# Patient Record
Sex: MALE | Race: WHITE | NOT HISPANIC OR LATINO | Employment: FULL TIME | URBAN - METROPOLITAN AREA
[De-identification: names, ages, dates, MRNs, and addresses within clinical notes are randomized per-mention and may not be internally consistent; named-entity substitution may affect disease eponyms.]

---

## 2017-01-09 ENCOUNTER — ALLSCRIPTS OFFICE VISIT (OUTPATIENT)
Dept: OTHER | Facility: OTHER | Age: 57
End: 2017-01-09

## 2017-01-09 LAB — S PYO AG THROAT QL: NEGATIVE

## 2017-06-08 ENCOUNTER — GENERIC CONVERSION - ENCOUNTER (OUTPATIENT)
Dept: OTHER | Facility: OTHER | Age: 57
End: 2017-06-08

## 2017-06-22 ENCOUNTER — GENERIC CONVERSION - ENCOUNTER (OUTPATIENT)
Dept: OTHER | Facility: OTHER | Age: 57
End: 2017-06-22

## 2017-07-20 ENCOUNTER — GENERIC CONVERSION - ENCOUNTER (OUTPATIENT)
Dept: OTHER | Facility: OTHER | Age: 57
End: 2017-07-20

## 2017-08-15 ENCOUNTER — GENERIC CONVERSION - ENCOUNTER (OUTPATIENT)
Dept: OTHER | Facility: OTHER | Age: 57
End: 2017-08-15

## 2017-10-31 ENCOUNTER — ALLSCRIPTS OFFICE VISIT (OUTPATIENT)
Dept: OTHER | Facility: OTHER | Age: 57
End: 2017-10-31

## 2017-11-17 ENCOUNTER — ALLSCRIPTS OFFICE VISIT (OUTPATIENT)
Dept: OTHER | Facility: OTHER | Age: 57
End: 2017-11-17

## 2017-11-17 LAB — HBA1C MFR BLD HPLC: 11.7 %

## 2017-11-20 ENCOUNTER — GENERIC CONVERSION - ENCOUNTER (OUTPATIENT)
Dept: OTHER | Facility: OTHER | Age: 57
End: 2017-11-20

## 2017-11-20 LAB
A/G RATIO (HISTORICAL): 1.4 (ref 1.2–2.2)
ALBUMIN SERPL BCP-MCNC: 4.2 G/DL (ref 3.5–5.5)
ALP SERPL-CCNC: 68 IU/L (ref 39–117)
ALT SERPL W P-5'-P-CCNC: 20 IU/L (ref 0–44)
AST SERPL W P-5'-P-CCNC: 20 IU/L (ref 0–40)
BILIRUB SERPL-MCNC: 0.9 MG/DL (ref 0–1.2)
BUN SERPL-MCNC: 19 MG/DL (ref 6–24)
BUN/CREA RATIO (HISTORICAL): 18 (ref 9–20)
CALCIUM SERPL-MCNC: 9.6 MG/DL (ref 8.7–10.2)
CHLORIDE SERPL-SCNC: 98 MMOL/L (ref 96–106)
CHOLEST SERPL-MCNC: 111 MG/DL (ref 100–199)
CO2 SERPL-SCNC: 24 MMOL/L (ref 18–29)
CREAT SERPL-MCNC: 1.04 MG/DL (ref 0.76–1.27)
CREATININE, URINE (HISTORICAL): 163.9 MG/DL
EGFR AFRICAN AMERICAN (HISTORICAL): 90 ML/MIN/1.73
EGFR-AMERICAN CALC (HISTORICAL): 78 ML/MIN/1.73
GLUCOSE SERPL-MCNC: 245 MG/DL (ref 65–99)
HDLC SERPL-MCNC: 40 MG/DL
LDLC SERPL CALC-MCNC: 54 MG/DL (ref 0–99)
MICROALBUM.,U,RANDOM (HISTORICAL): 1220.4 UG/ML
MICROALBUMIN/CREATININE RATIO (HISTORICAL): 744.6 MG/G CREAT (ref 0–30)
POTASSIUM SERPL-SCNC: 4.2 MMOL/L (ref 3.5–5.2)
PROSTATE SPECIFIC ANTIGEN (HISTORICAL): 0.5 NG/ML (ref 0–4)
SODIUM SERPL-SCNC: 138 MMOL/L (ref 134–144)
TOT. GLOBULIN, SERUM (HISTORICAL): 3 G/DL (ref 1.5–4.5)
TOTAL PROTEIN (HISTORICAL): 7.2 G/DL (ref 6–8.5)
TRIGL SERPL-MCNC: 85 MG/DL (ref 0–149)

## 2017-11-21 ENCOUNTER — GENERIC CONVERSION - ENCOUNTER (OUTPATIENT)
Dept: OTHER | Facility: OTHER | Age: 57
End: 2017-11-21

## 2017-11-21 LAB
HEPATITIS C ANTIBODY (HISTORICAL): <0.1 S/CO RATIO (ref 0–0.9)
INTERPRETATION (HISTORICAL): NORMAL

## 2018-01-07 ENCOUNTER — HOSPITAL ENCOUNTER (EMERGENCY)
Facility: HOSPITAL | Age: 58
Discharge: HOME | End: 2018-01-07
Attending: EMERGENCY MEDICINE | Admitting: EMERGENCY MEDICINE
Payer: COMMERCIAL

## 2018-01-07 VITALS
DIASTOLIC BLOOD PRESSURE: 88 MMHG | OXYGEN SATURATION: 97 % | TEMPERATURE: 95.5 F | HEART RATE: 70 BPM | WEIGHT: 255 LBS | RESPIRATION RATE: 15 BRPM | SYSTOLIC BLOOD PRESSURE: 167 MMHG

## 2018-01-07 DIAGNOSIS — I10 HYPERTENSION: ICD-10-CM

## 2018-01-07 DIAGNOSIS — R73.9 HYPERGLYCEMIA: ICD-10-CM

## 2018-01-07 DIAGNOSIS — F41.9 ANXIETY: Primary | ICD-10-CM

## 2018-01-07 LAB
ALBUMIN SERPL BCP-MCNC: 3.9 G/DL (ref 3.5–5)
ALP SERPL-CCNC: 99 U/L (ref 46–116)
ALT SERPL W P-5'-P-CCNC: 39 U/L (ref 12–78)
ANION GAP SERPL CALCULATED.3IONS-SCNC: 8 MMOL/L (ref 4–13)
AST SERPL W P-5'-P-CCNC: 16 U/L (ref 5–45)
BASOPHILS # BLD AUTO: 0 THOUSANDS/ΜL (ref 0–0.1)
BASOPHILS NFR BLD AUTO: 0 % (ref 0–1)
BILIRUB SERPL-MCNC: 0.9 MG/DL (ref 0.2–1)
BUN SERPL-MCNC: 20 MG/DL (ref 5–25)
CALCIUM SERPL-MCNC: 9.5 MG/DL (ref 8.3–10.1)
CHLORIDE SERPL-SCNC: 98 MMOL/L (ref 100–108)
CO2 SERPL-SCNC: 31 MMOL/L (ref 21–32)
CREAT SERPL-MCNC: 1.11 MG/DL (ref 0.6–1.3)
DEPRECATED D DIMER PPP: 620 NG/ML (FEU) (ref 190–520)
EOSINOPHIL # BLD AUTO: 0.2 THOUSAND/ΜL (ref 0–0.61)
EOSINOPHIL NFR BLD AUTO: 2 % (ref 0–6)
ERYTHROCYTE [DISTWIDTH] IN BLOOD BY AUTOMATED COUNT: 14.1 % (ref 11.6–15.1)
GFR SERPL CREATININE-BSD FRML MDRD: 72 ML/MIN/1.73SQ M
GLUCOSE SERPL-MCNC: 307 MG/DL (ref 65–140)
HCT VFR BLD AUTO: 47.6 % (ref 42–52)
HGB BLD-MCNC: 16.1 G/DL (ref 14–18)
LYMPHOCYTES # BLD AUTO: 1.1 THOUSANDS/ΜL (ref 0.6–4.47)
LYMPHOCYTES NFR BLD AUTO: 11 % (ref 14–44)
MCH RBC QN AUTO: 29 PG (ref 27–31)
MCHC RBC AUTO-ENTMCNC: 34 G/DL (ref 31.4–37.4)
MCV RBC AUTO: 86 FL (ref 82–98)
MONOCYTES # BLD AUTO: 0.8 THOUSAND/ΜL (ref 0.17–1.22)
MONOCYTES NFR BLD AUTO: 9 % (ref 4–12)
NEUTROPHILS # BLD AUTO: 7.3 THOUSANDS/ΜL (ref 1.85–7.62)
NEUTS SEG NFR BLD AUTO: 78 % (ref 43–75)
NRBC BLD AUTO-RTO: 0 /100 WBCS
PLATELET # BLD AUTO: 216 THOUSANDS/UL (ref 130–400)
PMV BLD AUTO: 9.5 FL (ref 8.9–12.7)
POTASSIUM SERPL-SCNC: 3.3 MMOL/L (ref 3.5–5.3)
PROT SERPL-MCNC: 8 G/DL (ref 6.4–8.2)
RBC # BLD AUTO: 5.56 MILLION/UL (ref 4.7–6.1)
SODIUM SERPL-SCNC: 137 MMOL/L (ref 136–145)
TROPONIN I SERPL-MCNC: <0.02 NG/ML
WBC # BLD AUTO: 9.4 THOUSAND/UL (ref 4.8–10.8)

## 2018-01-07 PROCEDURE — 84484 ASSAY OF TROPONIN QUANT: CPT | Performed by: EMERGENCY MEDICINE

## 2018-01-07 PROCEDURE — 85025 COMPLETE CBC W/AUTO DIFF WBC: CPT | Performed by: EMERGENCY MEDICINE

## 2018-01-07 PROCEDURE — 80053 COMPREHEN METABOLIC PANEL: CPT | Performed by: EMERGENCY MEDICINE

## 2018-01-07 PROCEDURE — 93005 ELECTROCARDIOGRAM TRACING: CPT

## 2018-01-07 PROCEDURE — 85379 FIBRIN DEGRADATION QUANT: CPT | Performed by: EMERGENCY MEDICINE

## 2018-01-07 PROCEDURE — 99285 EMERGENCY DEPT VISIT HI MDM: CPT

## 2018-01-07 PROCEDURE — 36415 COLL VENOUS BLD VENIPUNCTURE: CPT | Performed by: EMERGENCY MEDICINE

## 2018-01-07 RX ORDER — GLIPIZIDE 10 MG/1
10 TABLET, FILM COATED, EXTENDED RELEASE ORAL DAILY
COMMUNITY
End: 2018-02-25 | Stop reason: SDUPTHER

## 2018-01-07 RX ORDER — ALPRAZOLAM 0.5 MG/1
0.5 TABLET ORAL 3 TIMES DAILY PRN
Qty: 15 TABLET | Refills: 0 | Status: SHIPPED | OUTPATIENT
Start: 2018-01-07 | End: 2021-02-25 | Stop reason: ALTCHOICE

## 2018-01-07 RX ORDER — ASPIRIN 81 MG/1
81 TABLET ORAL
COMMUNITY
End: 2021-12-01 | Stop reason: HOSPADM

## 2018-01-07 RX ORDER — ALPRAZOLAM 0.5 MG/1
0.5 TABLET ORAL ONCE
Status: COMPLETED | OUTPATIENT
Start: 2018-01-07 | End: 2018-01-07

## 2018-01-07 RX ORDER — ATORVASTATIN CALCIUM 20 MG/1
20 TABLET, FILM COATED ORAL DAILY
COMMUNITY
End: 2018-07-03 | Stop reason: SDUPTHER

## 2018-01-07 RX ORDER — LOSARTAN POTASSIUM AND HYDROCHLOROTHIAZIDE 12.5; 5 MG/1; MG/1
1 TABLET ORAL DAILY
COMMUNITY
End: 2018-07-03 | Stop reason: SDUPTHER

## 2018-01-07 RX ORDER — INSULIN GLARGINE 100 [IU]/ML
60 INJECTION, SOLUTION SUBCUTANEOUS DAILY
COMMUNITY
End: 2018-01-31 | Stop reason: SDUPTHER

## 2018-01-07 RX ADMIN — ALPRAZOLAM 0.5 MG: 0.5 TABLET ORAL at 03:54

## 2018-01-07 NOTE — DISCHARGE INSTRUCTIONS
Anxiety   WHAT YOU SHOULD KNOW:   Anxiety is a condition that causes you to feel excessive worry, uneasiness, or fear  Family or work stress, smoking, caffeine, and alcohol can increase your risk for anxiety  Certain medicines or health conditions can also increase your risk  Anxiety may begin gradually, and can become a long-term condition if it is not managed or treated  AFTER YOU LEAVE:   Medicines:   · Medicines  can help you feel more calm and relaxed, and decrease your symptoms  · Take your medicine as directed  Contact your healthcare provider if you think your medicine is not helping or if you have side effects  Tell him if you are allergic to any medicine  Keep a list of the medicines, vitamins, and herbs you take  Include the amounts, and when and why you take them  Bring the list or the pill bottles to follow-up visits  Carry your medicine list with you in case of an emergency  Follow up with your healthcare provider within 2 weeks or as directed:  Write down your questions so you remember to ask them during your visits  Manage anxiety:   · Go to counseling as directed  Cognitive behavioral therapy can help you understand and change how you react to events that trigger your symptoms  · Find ways to manage your symptoms  Activities such as exercise, meditation, or listening to music can help you relax  · Practice deep breathing  Breathing can change how your body reacts to stress  Focus on taking slow, deep breaths several times a day, or during an anxiety attack  Breathe in through your nose, and out through your mouth  · Avoid caffeine  Caffeine can make your symptoms worse  Avoid foods or drinks that are meant to increase your energy level  · Limit or avoid alcohol  Ask your healthcare provider if alcohol is safe for you  You may not be able to drink alcohol if you take certain anxiety or depression medicines  Limit alcohol to 1 drink per day if you are a woman   Limit alcohol to 2 drinks per day if you are a man  A drink of alcohol is 12 ounces of beer, 5 ounces of wine, or 1½ ounces of liquor  Contact your healthcare provider if:   · Your symptoms get worse or do not get better with treatment  · You think your medicine may be causing side effects  · Your anxiety keeps you from doing your regular daily activities  · You have new symptoms since your last visit  · You have questions or concerns about your condition or care  Seek care immediately or call 911 if:   · You have chest pain, tightness, or heaviness that may spread to your shoulders, arms, jaw, neck, or back  · You feel like hurting yourself or someone else  · You feel dizzy, lightheaded, or faint  © 2014 [address] [employer] is for End User's use only and may not be sold, redistributed or otherwise used for commercial purposes  All illustrations and images included in CareNotes® are the copyrighted property of A [employer]  or Damien Moreno  The above information is an  only  It is not intended as medical advice for individual conditions or treatments  Talk to your doctor, nurse or pharmacist before following any medical regimen to see if it is safe and effective for you  Hypertension   WHAT YOU NEED TO KNOW:   Hypertension is high blood pressure (BP)  Your BP is the force of your blood moving against the walls of your arteries  Normal BP is less than 120/80  Prehypertension is between 120/80 and 139/89  Hypertension is 140/90 or higher  Hypertension causes your BP to get so high that your heart has to work much harder than normal  This can damage your heart  You can control hypertension with a healthy lifestyle or medicines  A controlled blood pressure helps protect your organs, such as your heart, lungs, brain, and kidneys    DISCHARGE INSTRUCTIONS:   Call 911 for any of the following:   · You have discomfort in your chest that feels like squeezing, pressure, fullness, or pain  · You become confused or have difficulty speaking  · You suddenly feel lightheaded or have trouble breathing  · You have pain or discomfort in your back, neck, jaw, stomach, or arm  Return to the emergency department if:   · You have a severe headache or vision loss  · You have weakness in an arm or leg  Contact your healthcare provider if:   · You feel faint, dizzy, confused, or drowsy  · You have been taking your BP medicine and your BP is still higher than your healthcare provider says it should be  · You have questions or concerns about your condition or care  Medicines: You may  need any of the following:  · Medicine  may be used to help lower your BP  You may need more than one type of medicine  Take the medicine exactly as directed  · Diuretics  help decrease extra fluid that collects in your body  This will help lower your BP  You may urinate more often while you take this medicine  · Cholesterol medicine  helps lower your cholesterol level  A low cholesterol level helps prevent heart disease and makes it easier to control your blood pressure  · Take your medicine as directed  Contact your healthcare provider if you think your medicine is not helping or if you have side effects  Tell him or her if you are allergic to any medicine  Keep a list of the medicines, vitamins, and herbs you take  Include the amounts, and when and why you take them  Bring the list or the pill bottles to follow-up visits  Carry your medicine list with you in case of an emergency  Follow up with your healthcare provider as directed: You will need to return to have your BP checked and to have other lab tests done  Write down your questions so you remember to ask them during your visits  Manage hypertension:  Talk with your healthcare provider about these and other ways to manage hypertension:  · Check your BP at home    Sit and rest for 5 minutes before you take your BP  Extend your arm and support it on a flat surface  Your arm should be at the same level as your heart  Follow the directions that came with your BP monitor  If possible, take at least 2 BP readings each time  Take your BP at least twice a day at the same times each day, such as morning and evening  Keep a record of your BP readings and bring it to your follow-up visits  Ask your healthcare provider what your BP should be  · Limit sodium (salt) as directed  Too much sodium can affect your fluid balance  Check labels to find low-sodium or no-salt-added foods  Some low-sodium foods use potassium salts for flavor  Too much potassium can also cause health problems  Your healthcare provider will tell you how much sodium and potassium are safe for you to have in a day  He or she may recommend that you limit sodium to 2,300 mg a day  · Follow the meal plan recommended by your healthcare provider  A dietitian or your provider can give you more information on low-sodium plans or the DASH (Dietary Approaches to Stop Hypertension) eating plan  The DASH plan is low in sodium, unhealthy fats, and total fat  It is high in potassium, calcium, and fiber  · Exercise to maintain a healthy weight  Exercise at least 30 minutes per day, on most days of the week  This will help decrease your blood pressure  Ask your healthcare provider about the best exercise plan for you  · Decrease stress  This may help lower your BP  Learn ways to relax, such as deep breathing or listening to music  · Limit alcohol  Women should limit alcohol to 1 drink a day  Men should limit alcohol to 2 drinks a day  A drink of alcohol is 12 ounces of beer, 5 ounces of wine, or 1½ ounces of liquor  · Do not smoke  Nicotine and other chemicals in cigarettes and cigars can increase your BP and also cause lung damage  Ask your healthcare provider for information if you currently smoke and need help to quit   E-cigarettes or smokeless tobacco still contain nicotine  Talk to your healthcare provider before you use these products  · Manage any other health conditions you have  Health conditions such as diabetes can increase your risk for hypertension  Follow your healthcare provider's instructions and take all your medicines as directed  © 2017 [address] [employer] Information is for End User's use only and may not be sold, redistributed or otherwise used for commercial purposes  All illustrations and images included in CareNotes® are the copyrighted property of [employer]  or Damien Moreno  The above information is an  only  It is not intended as medical advice for individual conditions or treatments  Talk to your doctor, nurse or pharmacist before following any medical regimen to see if it is safe and effective for you

## 2018-01-07 NOTE — ED PROVIDER NOTES
History  Chief Complaint   Patient presents with    Anxiety     woke up at midnight feeling extremely anxious, tried multiple things to calm down but still feels as if something is wrong    Shortness of Breath     states he can breathe fine but still feels short of breath    Ankle Pain     right ankle pain since lasy Thursday     59year-old anxious white male presents with complaints of waking up from sleep feeling short of breath and anxious  Patient tried multiple techniques to, is nerves that was not successful  Patient is felt like something was wrong  Patient also complains of having right ankle pain for several days  No chest pain, no nausea, no vomiting, no cough, no rash          History provided by:  Patient  Anxiety   Presenting symptoms: no suicidal thoughts, no suicidal threats and no suicide attempt    Patient accompanied by:  Family member  Degree of incapacity (severity):  Mild  Onset quality:  Sudden  Duration:  1 hour  Timing:  Constant  Progression:  Improving  Chronicity:  Recurrent  Context: not alcohol use, not drug abuse and not recent medication change    Treatment compliance:  Most of the time  Relieved by:  Nothing  Ineffective treatments:  None tried  Associated symptoms: anxiety and hyperventilating    Associated symptoms: no abdominal pain, no chest pain, not distractible, no euphoric mood and no poor judgment    Risk factors: no hx of mental illness, no hx of suicide attempts and no recent psychiatric admission    Shortness of Breath   Severity:  Mild  Onset quality:  Sudden  Duration:  1 hour  Timing:  Constant  Progression:  Improving  Chronicity:  Recurrent  Relieved by:  Nothing  Worsened by:  Stress  Ineffective treatments:  Sitting up, position changes and rest  Associated symptoms: no abdominal pain, no chest pain, no cough, no fever, no vomiting and no wheezing    Ankle Pain   Associated symptoms: no fever        Prior to Admission Medications   Prescriptions Last Dose Informant Patient Reported? Taking?   aspirin (ECOTRIN LOW STRENGTH) 81 mg EC tablet 1/6/2018 at Unknown time  Yes Yes   Sig: Take 81 mg by mouth daily   atorvastatin (LIPITOR) 20 mg tablet 1/6/2018 at Unknown time  Yes Yes   Sig: Take 20 mg by mouth daily   glipiZIDE (GLUCOTROL XL) 10 mg 24 hr tablet 1/6/2018 at Unknown time  Yes Yes   Sig: Take 10 mg by mouth daily   insulin glargine (LANTUS) 100 units/mL subcutaneous injection 1/7/2018 at Unknown time  Yes Yes   Sig: Inject 60 Units under the skin daily   losartan-hydrochlorothiazide (HYZAAR) 50-12 5 mg per tablet 1/6/2018 at Unknown time  Yes Yes   Sig: Take 1 tablet by mouth daily   metFORMIN (GLUCOPHAGE) 500 mg tablet 1/6/2018 at Unknown time  Yes Yes   Sig: Take 500 mg by mouth daily with breakfast      Facility-Administered Medications: None       Past Medical History:   Diagnosis Date    Diabetes mellitus ([facility] [address] )     Hypertension        Past Surgical History:   Procedure Laterality Date    ANKLE SURGERY      BACK SURGERY      FINGER SURGERY      KNEE SURGERY      SHOULDER SURGERY         History reviewed  No pertinent family history  I have reviewed and agree with the history as documented  Social History   Substance Use Topics    Smoking status: Never Smoker    Smokeless tobacco: Never Used    Alcohol use Yes      Comment: rare        Review of Systems   Constitutional: Negative  Negative for chills and fever  HENT: Negative  Respiratory: Positive for shortness of breath  Negative for cough, wheezing and stridor  Cardiovascular: Negative for chest pain, palpitations and leg swelling  Gastrointestinal: Negative for abdominal pain, nausea and vomiting  Genitourinary: Negative  Musculoskeletal: Positive for arthralgias  Skin: Negative  Neurological: Negative  Hematological: Negative  Psychiatric/Behavioral: Positive for sleep disturbance  Negative for suicidal ideas  The patient is nervous/anxious      All other systems reviewed and are negative  Physical Exam  ED Triage Vitals [01/07/18 0249]   Temperature Pulse Respirations Blood Pressure SpO2   (!) 95 5 °F (35 3 °C) 78 20 (!) 197/105 98 %      Temp Source Heart Rate Source Patient Position - Orthostatic VS BP Location FiO2 (%)   Tympanic Monitor Lying Right arm --      Pain Score       No Pain           Orthostatic Vital Signs  Vitals:    01/07/18 0249 01/07/18 0422   BP: (!) 197/105 167/88   Pulse: 78 70   Patient Position - Orthostatic VS: Lying Sitting       Physical Exam   Constitutional: He is oriented to person, place, and time  He appears well-developed and well-nourished  HENT:   Head: Normocephalic and atraumatic  Right Ear: External ear normal    Left Ear: External ear normal    Nose: Nose normal    Mouth/Throat: Oropharynx is clear and moist    Eyes: Conjunctivae and EOM are normal  Pupils are equal, round, and reactive to light  Neck: Normal range of motion  Neck supple  Cardiovascular: Normal rate, regular rhythm, normal heart sounds and intact distal pulses  Pulmonary/Chest: Effort normal and breath sounds normal  No respiratory distress  He has no wheezes  He has no rales  Abdominal: Soft  Bowel sounds are normal    Musculoskeletal: Normal range of motion  Neurological: He is alert and oriented to person, place, and time  Skin: Skin is warm and dry  Capillary refill takes less than 2 seconds  Psychiatric: His speech is normal and behavior is normal  Judgment and thought content normal  His mood appears anxious  He expresses no suicidal ideation  He expresses no suicidal plans and no homicidal plans  Nursing note and vitals reviewed        ED Medications  Medications   ALPRAZolam (XANAX) tablet 0 5 mg (0 5 mg Oral Given 1/7/18 0354)       Diagnostic Studies  Results Reviewed     Procedure Component Value Units Date/Time    Troponin I [[id number]]  (Normal) Collected:  01/07/18 8170    Lab Status:  Final result Specimen:  Blood from Arm, Left Updated:  01/07/18 0505     Troponin I <0 02 ng/mL     Narrative:         [employer] Chemistry analyzer 99% cutoff is > 0 04 ng/mL in network labs    o cTnI 99% cutoff is useful only when applied to patients in the clinical setting of myocardial ischemia  o cTnI 99% cutoff should be interpreted in the context of clinical history, ECG findings and possibly cardiac imaging to establish correct diagnosis  o cTnI 99% cutoff may be suggestive but clearly not indicative of a coronary event without the clinical setting of myocardial ischemia  Comprehensive metabolic panel [[id number]]  (Abnormal) Collected:  01/07/18 0439    Lab Status:  Final result Specimen:  Blood from Arm, Left Updated:  01/07/18 0502     Sodium 137 mmol/L      Potassium 3 3 (L) mmol/L      Chloride 98 (L) mmol/L      CO2 31 mmol/L      Anion Gap 8 mmol/L      BUN 20 mg/dL      Creatinine 1 11 mg/dL      Glucose 307 (H) mg/dL      Calcium 9 5 mg/dL      AST 16 U/L      ALT 39 U/L      Alkaline Phosphatase 99 U/L      Total Protein 8 0 g/dL      Albumin 3 9 g/dL      Total Bilirubin 0 90 mg/dL      eGFR 72 ml/min/1 73sq m     Narrative:         National Kidney Disease Education Program recommendations are as follows:  GFR calculation is accurate only with a steady state creatinine  Chronic Kidney disease less than 60 ml/min/1 73 sq  meters  Kidney failure less than 15 ml/min/1 73 sq  meters      CBC and differential [[id number]]  (Abnormal) Collected:  01/07/18 0439    Lab Status:  Final result Specimen:  Blood from Arm, Left Updated:  01/07/18 0447     WBC 9 40 Thousand/uL      RBC 5 56 Million/uL      Hemoglobin 16 1 g/dL      Hematocrit 47 6 %      MCV 86 fL      MCH 29 0 pg      MCHC 34 0 g/dL      RDW 14 1 %      MPV 9 5 fL      Platelets 552 Thousands/uL      nRBC 0 /100 WBCs      Neutrophils Relative 78 (H) %      Lymphocytes Relative 11 (L) %      Monocytes Relative 9 %      Eosinophils Relative 2 %      Basophils Relative 0 %      Neutrophils Absolute 7 30 Thousands/µL      Lymphocytes Absolute 1 10 Thousands/µL      Monocytes Absolute 0 80 Thousand/µL      Eosinophils Absolute 0 20 Thousand/µL      Basophils Absolute 0 00 Thousands/µL     D-Dimer [[id number]]  (Abnormal) Collected:  01/07/18 0310    Lab Status:  Final result Specimen:  Blood from Vein Updated:  01/07/18 0344     D-Dimer, Quant 620 (H) ng/ml (FEU)                  No orders to display              Procedures  ECG 12 Lead Documentation  Date/Time: 1/7/2018 2:52 AM  Performed by: Saba Shrestha  Authorized by: Tye DAO     ECG reviewed by me, the ED Provider: yes    Patient location:  ED  Previous ECG:     Comparison to cardiac monitor: Yes (SR 79)    Interpretation:     Interpretation: abnormal    Rate:     ECG rate:  79    ECG rate assessment: normal    Rhythm:     Rhythm: sinus rhythm and A-V block    Ectopy:     Ectopy: none    QRS:     QRS axis:  Normal    QRS intervals:  Normal  Conduction:     Conduction: abnormal      Abnormal conduction: bifascicular block    ST segments:     ST segments:  Normal  T waves:     T waves: normal    Other findings:     Other findings: LVH               Phone Contacts  ED Phone Contact    ED Course  ED Course                                MDM  CritCare Time    Disposition  Final diagnoses:   Anxiety   Hypertension   Hyperglycemia     Time reflects when diagnosis was documented in both MDM as applicable and the Disposition within this note     Time User Action Codes Description Comment    1/7/2018  3:16 AM Petra Guerrero Add [F41 9] Anxiety     1/7/2018  3:16 AM Petra Guerrero Add [I10] Hypertension     1/7/2018  5:37 AM Petra Guerrero Add [R73 9] Hyperglycemia       ED Disposition     ED Disposition Condition Comment    Discharge  Unknown Lard discharge to home/self care      Condition at discharge: Stable        Follow-up Information     Follow up With Specialties Details Why Contact Irlanda Ibarra DO Family Medicine Schedule an appointment as soon as possible for a visit in 3 days As needed Flor Irizarry  [phone]          Discharge Medication List as of 1/7/2018  3:20 AM      START taking these medications    Details   ALPRAZolam (XANAX) 0 5 mg tablet Take 1 tablet by mouth 3 (three) times a day as needed for anxiety for up to 15 doses, Starting Sun 1/7/2018, Print         CONTINUE these medications which have NOT CHANGED    Details   aspirin (ECOTRIN LOW STRENGTH) 81 mg EC tablet Take 81 mg by mouth daily, Historical Med      atorvastatin (LIPITOR) 20 mg tablet Take 20 mg by mouth daily, Historical Med      glipiZIDE (GLUCOTROL XL) 10 mg 24 hr tablet Take 10 mg by mouth daily, Historical Med      insulin glargine (LANTUS) 100 units/mL subcutaneous injection Inject 60 Units under the skin daily, Historical Med      losartan-hydrochlorothiazide (HYZAAR) 50-12 5 mg per tablet Take 1 tablet by mouth daily, Historical Med      metFORMIN (GLUCOPHAGE) 500 mg tablet Take 500 mg by mouth daily with breakfast, Historical Med           No discharge procedures on file      ED Provider  Electronically Signed by           Brittany Ahuja MD  01/11/18 4267

## 2018-01-08 LAB
ATRIAL RATE: 79 BPM
P AXIS: 67 DEGREES
PR INTERVAL: 232 MS
QRS AXIS: -74 DEGREES
QRSD INTERVAL: 112 MS
QT INTERVAL: 424 MS
QTC INTERVAL: 486 MS
T WAVE AXIS: 47 DEGREES
VENTRICULAR RATE: 79 BPM

## 2018-01-13 VITALS
RESPIRATION RATE: 16 BRPM | TEMPERATURE: 96.8 F | HEART RATE: 84 BPM | BODY MASS INDEX: 35.5 KG/M2 | WEIGHT: 248 LBS | HEIGHT: 70 IN | SYSTOLIC BLOOD PRESSURE: 138 MMHG | DIASTOLIC BLOOD PRESSURE: 76 MMHG

## 2018-01-14 VITALS
RESPIRATION RATE: 20 BRPM | WEIGHT: 257 LBS | TEMPERATURE: 98.3 F | BODY MASS INDEX: 36.79 KG/M2 | SYSTOLIC BLOOD PRESSURE: 124 MMHG | HEART RATE: 80 BPM | HEIGHT: 70 IN | DIASTOLIC BLOOD PRESSURE: 86 MMHG

## 2018-01-14 NOTE — RESULT NOTES
Discussion/Summary   Wagen Miranda,   Your PSA is normal    Your hepatitis C screening is negative  Your cholesterol level has improved  Kidney function, liver enzymes and electrolytes are normal    Dr Aminata Roman     Verified Results  (1) LIPID PANEL FASTING W DIRECT LDL REFLEX [id number] 11:23AM Sonia Hoffman     Test Name Result Flag Reference   Cholesterol, Total 111 mg/dL  100-199   Triglycerides 85 mg/dL  0-149   HDL Cholesterol 40 mg/dL  >39   LDL Cholesterol Calc 54 mg/dL  0-99     (1) COMPREHENSIVE METABOLIC PANEL [id number] 63:79MD Sonia Hoffman     Test Name Result Flag Reference   Glucose, Serum 245 mg/dL H 65-99   BUN 19 mg/dL  6-24   Creatinine, Serum 1 04 mg/dL  0 76-1 27   BUN/Creatinine Ratio 18  9-20   Sodium, Serum 138 mmol/L  134-144   Potassium, Serum 4 2 mmol/L  3 5-5 2   Chloride, Serum 98 mmol/L     Carbon Dioxide, Total 24 mmol/L  18-29   Calcium, Serum 9 6 mg/dL  8 7-10 2   Protein, Total, Serum 7 2 g/dL  6 0-8 5   Albumin, Serum 4 2 g/dL  3 5-5 5   Globulin, Total 3 0 g/dL  1 5-4 5   A/G Ratio 1 4  1 2-2 2   Bilirubin, Total 0 9 mg/dL  0 0-1 2   Alkaline Phosphatase, S 68 IU/L     AST (SGOT) 20 IU/L  0-40   ALT (SGPT) 20 IU/L  0-44   eGFR If NonAfricn Am 78 mL/min/1 73  >59   eGFR If Africn Am 90 mL/min/1 73  >59     (1) MICROALBUMIN CREATININE RATIO, RANDOM URINE 20Nov2017 11:23AM Sonia Hoffman     Test Name Result Flag Reference   Creatinine, Urine 163 9 mg/dL  Not Estab  Microalbumin, Urine 1220 4 ug/mL  Not Estab  Results confirmed on  dilution  Microalb/Creat Ratio 744 6 mg/g creat H 0 0-30 0     (1) PSA (SCREEN) (Dx V76 44 Screen for Prostate Cancer) [id number] 11:23AM Sonia Hoffman     Test Name Result Flag Reference   Prostate Specific Ag, Serum 0 5 ng/mL  0 0-4 0   Roche ECLIA methodology  According to the American Urological Association, Serum PSA should  decrease and remain at undetectable levels after radical  prostatectomy   The AUA defines biochemical recurrence as an initial  PSA value 0 2 ng/mL or greater followed by a subsequent confirmatory  PSA value 0 2 ng/mL or greater  Values obtained with different assay methods or kits cannot be used  interchangeably  Results cannot be interpreted as absolute evidence  of the presence or absence of malignant disease  [facility]) Cardiovascular Risk Assessment 20Nov2017 11:23AM Tameka Vang     Test Name Result Flag Reference   Interpretation Note     Supplemental report is available  PDF Image   (LC) HCV Antibody [id number] 11:23AM Tameka Vang     Test Name Result Flag Reference   Hep C Virus Ab <0 1 s/co ratio  0 0-0 9   Negative:     < 0 8                                              Indeterminate: 0 8 - 0 9                                                   Positive:     > 0 9                  The CDC recommends that a positive HCV antibody result                  be followed up with a HCV Nucleic Acid Amplification                  test ([id number])

## 2018-01-14 NOTE — MISCELLANEOUS
Provider Comments  Provider Comments:   LEFT MESSAGE REGARDING NO SHOW   TOLD PATIENT TO CALL AND RESCHEDULE      Signatures   Electronically signed by : Alicia Peters DO; Sep  9 2016 10:17AM EST                       (Review)

## 2018-01-15 NOTE — MISCELLANEOUS
Provider Comments  Provider Comments:   LMOM ABOUT MISSED APT        Signatures   Electronically signed by : Juli Colindres DO; May 25 2016  2:39PM EST                       (Review)

## 2018-01-16 NOTE — MISCELLANEOUS
Provider Comments  Provider Comments:   pt was a no show   called and left voicemail to reschedule      Signatures   Electronically signed by : Leida Arambula DO; Oct 31 2017 11:17AM EST                       (Review)

## 2018-01-23 ENCOUNTER — ALLSCRIPTS OFFICE VISIT (OUTPATIENT)
Dept: OTHER | Facility: OTHER | Age: 58
End: 2018-01-23

## 2018-01-24 NOTE — PROGRESS NOTES
Assessment    1  Benign essential hypertension (401 1) (I10)   2  Deviated nasal septum (470) (J34 2)   3  Shortness of breath (786 05) (R06 02)    Plan  Deviated nasal septum    · 1 - Thor Martinze MD, Marva Pacheco  (Otolaryngology) Co-Management  *  Status: Hold For - Scheduling   Requested for: 81ZNX7260  Care Summary provided  : Yes  Shortness of breath    · 1 Esme Magallanes MD, Rohan (Cardiology) Co-Management  *  Status: Active  Requested for:  [id number]  Care Summary provided  : Yes    Discussion/Summary    Hypertension - Well controlled   shortness of breath - not improving -reviewed ER visit, referred to cardiology  deviated septum - referred to ENT  Chief Complaint  follow up from er visit-      History of Present Illness  He woke up in the middle of the night feeling short of breath  he went to the ER for evaluation  he is feeling winded with activity  He is not sure if it is his weight and being out of shape  His nose has been congested and waking him up  He has tried nasal spray with no relief  Review of Systems    Constitutional: not feeling tired  Cardiovascular: no chest pain  Active Problems    1  Benign essential hypertension (401 1) (I10)   2  Carpal tunnel syndrome, bilateral (354 0) (G56 03)   3  Cholelithiasis (574 20) (K80 20)   4  Colon cancer screening (V76 51) (Z12 11)   5  Diabetes mellitus with neurological manifestations, uncontrolled (250 62)   (E11 49,E11 65)   6  Elevation of level of transaminase or lactic acid dehydrogenase (LDH) (790 4) (R74 0)   7  Fatty liver (571 8) (K76 0)   8  Gout (274 9) (M10 9)   9  Herniated nucleus pulposus, L3-4 (722 10) (M51 26)   10  Hyperlipidemia (272 4) (E78 5)   11  Liver enlargement (789 1) (R16 0)   12  Localized, primary osteoarthritis of lower leg, unspecified laterality   13  Lumbago (724 2) (M54 5)   14  Nephrolithiasis (592 0) (N20 0)   15   Peripheral neuropathy (356 9) (G62 9)   16  Special screening examination for neoplasm of prostate (V76 44) (Z12 5)   17  Special screening for malignant neoplasm of prostate (V76 44) (Z12 5)   18  Temporomandibular dysfunction syndrome (524 60) (M26 609)   19  Trigger finger (727 03) (M65 30)   20  Well adult on routine health check (V70 0) (Z00 00)    Past Medical History    1  Acute maxillary sinusitis (461 0) (J01 00)   2  Acute pharyngitis (462) (J02 9)   3  Acute upper respiratory infection (465 9) (J06 9)   4  History of Corneal Abrasion (918 1)   5  History of acute bronchitis (V12 69) (Z87 09)   6  History of acute pharyngitis (V12 69) (Z87 09)   7  History of acute sinusitis (V12 69) (Z87 09)   8  History of influenza (V12 09) (Z87 09)   9  History of streptococcal pharyngitis (V12 09) (Z87 09)   10  History of viremia (V12 3) (Z86 2)   11  Impaired fasting glucose (790 21) (R73 01)   12  Influenza vaccine needed (V04 81) (Z23)   13  History of Superficial Injury Trunk (911 8)   14  Well adult on routine health check (V70 0) (Z00 00)    Surgical History    1  History of Arthrotomy Of Knee   2  History of Arthrotomy Of The Ankle   3  History of Hand Incision Tendon Sheath Of A Finger   4  History of Shoulder Surgery    Family History  Mother    1  Family history of Hypertension (V17 49)   2  Family history of Lung Cancer (V16 1)  Father    3  Family history of Chronic Obstructive Pulmonary Disease  Family History    4  Family history of Depression   5  Family history of Diabetes Mellitus (V18 0)    Social History    · Former smoker (Z41 18) (V01 691)    Current Meds   1  Aspir-81 81 MG Oral Tablet Delayed Release; 1 every day; Therapy: 92YQK3628 to Recorded   2  Atorvastatin Calcium 20 MG Oral Tablet; take one tablet by mouth every day; Therapy: 82Fpz1825 to (Evaluate:21Jun2018)  Requested for: 62Eif1334; Last   Rx:86Cxu9419 Ordered   3  BD Pen Needle Short U/F 31G X 8 MM Miscellaneous; USE AS DIRECTED FOR   INJECTION OF LANTUS SOLOSTAR;    Therapy: 83WWU9910 to (Evaluate:10Jan2018)  Requested for: 62ORA4885; Last   Rx:12Oct2017 Ordered   4  GlipiZIDE XL 10 MG Oral Tablet Extended Release 24 Hour; Take 1 tablet daily; Therapy: 23SSZ4319 to (Evaluate:40Wuq0253)  Requested for: 68PAB0066; Last   Rx:17Nov2017 Ordered   5  Lancets Miscellaneous; test glucose 3 times a day  (dx: 250 02); Therapy: 10XEC7560 to (Last Rx:16Oct2013)  Requested for: 86ENH5497 Ordered   6  Lantus SoloStar 100 UNIT/ML Subcutaneous Solution Pen-injector; INJECT 60 UNITS   ONCE DAILY AS DIRECTED; Therapy: 23GYD7814 to (TBOHKJKS:64DEI3909)  Requested for: 92LJX2040; Last   Rx:56Xeq2485 Ordered   7  LifeScan Unistik 2 Miscellaneous; test glucose 3 times a day  (dx: E11 49, E11 65); Therapy: 62BAY6874 to (Evaluate:13Oct2016)  Requested for: 46YHW5986; Last   Rx:19Oct2015 Ordered   8  Losartan Potassium-HCTZ 50-12 5 MG Oral Tablet; take one tablet by mouth every day; Therapy: 37PTU9466 to (Lunda Exeland)  Requested for: 35VTI7024; Last   Rx:01Jan2018 Ordered   9  MetFORMIN HCl - 500 MG Oral Tablet; TAKE TWO TABLETS BY MOUTH TWICE A DAY; Therapy: 21XFR1297 to (Evaluate:10Jan2018)  Requested for: 07PLJ8208; Last   Rx:12Oct2017 Ordered   10  UltiCare Short Pen Needles 31G X 8 MM Miscellaneous; use once daily as directed; Therapy: 62NYE4468 to (Carlos Hernandez)  Requested for: 92Qxg0710; Last    Rx:73Tjo5347 Ordered    Allergies    1  Codeine Derivatives    2  Latex    Vitals  Vital Signs    Recorded: 44EDH3672 08:55AM   Temperature 96 6 F   Heart Rate 84   Respiration 16   Systolic 384   Diastolic 82   Height 5 ft 10 in   Weight 246 lb    BMI Calculated 35 3   BSA Calculated 2 28     Physical Exam    Constitutional   General appearance: No acute distress, well appearing and well nourished  Ears, Nose, Mouth, and Throat   External inspection of ears and nose: Normal     Otoscopic examination: Tympanic membrance translucent with normal light reflex  Canals patent without erythema      Oropharynx: Normal with no erythema, edema, exudate or lesions  Pulmonary   Auscultation of lungs: Clear to auscultation, equal breath sounds bilaterally, no wheezes, no rales, no rhonci  no rales or crackles were heard bilaterally  no rhonchi  no friction rub  no wheezing  Cardiovascular   Auscultation of heart: Normal rate and rhythm, normal S1 and S2, without murmurs  Musculoskeletal   Gait and station: Normal     Psychiatric   Mood and affect: Normal          Results/Data  PHQ-2 Adult Depression Screening 23Jan2018 09:05AM User, s     Test Name Result Flag Reference   PHQ-2 Adult Depression Score 0     Over the last two weeks, how often have you been bothered by any of the following problems?   Little interest or pleasure in doing things: Not at all - 0  Feeling down, depressed, or hopeless: Not at all - 0   PHQ-2 Adult Depression Screening Negative         Future Appointments    Date/Time Provider Specialty Site   02/19/2018 09:00 AM Lino Almanza DO Family Medicine [facility]     Signatures   Electronically signed by : Bernardino Ruggiero DO; Jan 23 2018  9:21AM EST                       (Author)

## 2018-01-31 DIAGNOSIS — IMO0002 DIABETES MELLITUS WITH NEUROLOGICAL MANIFESTATIONS, UNCONTROLLED: Primary | ICD-10-CM

## 2018-01-31 PROBLEM — K76.0 FATTY LIVER: Status: ACTIVE | Noted: 2017-11-17

## 2018-01-31 RX ORDER — DIABETIC SUPPLIES,MISCELL
KIT MISCELLANEOUS
COMMUNITY
Start: 2015-10-19 | End: 2021-07-15 | Stop reason: CLARIF

## 2018-01-31 RX ORDER — INSULIN GLARGINE 100 [IU]/ML
INJECTION, SOLUTION SUBCUTANEOUS
Qty: 15 ML | Refills: 3 | Status: SHIPPED | OUTPATIENT
Start: 2018-01-31 | End: 2018-06-08 | Stop reason: SDUPTHER

## 2018-01-31 RX ORDER — LANCETS 30 GAUGE
EACH MISCELLANEOUS
COMMUNITY
Start: 2013-10-16 | End: 2018-01-31 | Stop reason: SDUPTHER

## 2018-02-05 ENCOUNTER — OFFICE VISIT (OUTPATIENT)
Dept: CARDIOLOGY CLINIC | Facility: CLINIC | Age: 58
End: 2018-02-05
Payer: COMMERCIAL

## 2018-02-05 VITALS
BODY MASS INDEX: 37.08 KG/M2 | DIASTOLIC BLOOD PRESSURE: 82 MMHG | WEIGHT: 259 LBS | SYSTOLIC BLOOD PRESSURE: 142 MMHG | OXYGEN SATURATION: 97 % | HEART RATE: 65 BPM | HEIGHT: 70 IN

## 2018-02-05 DIAGNOSIS — K76.0 FATTY LIVER: ICD-10-CM

## 2018-02-05 DIAGNOSIS — I45.2 RBBB PLUS LA HEMIBLOCK: ICD-10-CM

## 2018-02-05 DIAGNOSIS — R06.02 SHORTNESS OF BREATH: Primary | ICD-10-CM

## 2018-02-05 DIAGNOSIS — I10 BENIGN ESSENTIAL HYPERTENSION: ICD-10-CM

## 2018-02-05 DIAGNOSIS — IMO0002 DIABETES MELLITUS WITH NEUROLOGICAL MANIFESTATIONS, UNCONTROLLED: ICD-10-CM

## 2018-02-05 DIAGNOSIS — R06.02 EXERTIONAL SHORTNESS OF BREATH: ICD-10-CM

## 2018-02-05 PROCEDURE — 99244 OFF/OP CNSLTJ NEW/EST MOD 40: CPT | Performed by: INTERNAL MEDICINE

## 2018-02-05 PROCEDURE — 93000 ELECTROCARDIOGRAM COMPLETE: CPT | Performed by: INTERNAL MEDICINE

## 2018-02-05 NOTE — PROGRESS NOTES
Cardiology Consultation     Kelsey Bassett  [id number]  1960  [facility]  [facility] [address] [address]    Consult for: Shortness of breath  Appreciate consult by:Dr Bret Piña    HPI:  68-year-old gentleman with a history of non-insulin diabetes mellitus, hypertension, former smoker with worsening shortness of breath on exertion  At times he feels lik he is unable to catch his breath  He is able to walk up a flight of stairs but has more labored breathing  He has had progressive shortness of breath on exertion  His weight has been stable  No swelling in his legs  Denies having irregular heart rhythm  Feels flutting very rarily  No dizziness or light-headness  He denies having any prior syncopal episodes  He reports having some snoring at night but denies having daytime fatigue  He denies having any history of asthma or recent upper respiratory infection  Previously he had elevated blood sugars but recently they have improved with medication increase and dietary changes  PMH  Diabetes for 10 years    Family Hx  Uncle MI in 45s    [address]  - 5 children  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  Travel   Take dog for walk  Hurt his ankle + knees- has trouble walking  Smoking- stop 18years ago  18yr pack hx  Alcohol- very rare  Sleep- has a hard time sleeping  +snoring  No daytime sleepiness  Past Medical History:   Diagnosis Date    Diabetes mellitus ([facility] [address] )     Hypertension      Social History     Social History    Marital status: /Civil Union     Spouse name: N/A    Number of children: N/A    Years of education: N/A     Occupational History    Not on file       Social History Main Topics    Smoking status: Never Smoker    Smokeless tobacco: Never Used    Alcohol use Yes      Comment: rare    Drug use: No    Sexual activity: Not on file     Other Topics Concern    Not on file     Social History Narrative    No narrative on file      Family History   Problem Relation Age of Onset    Hypertension Mother     Cancer Mother      lung    Arthritis Mother     Hyperlipidemia Mother     Stroke Maternal Grandfather     Diabetes Cousin      Past Surgical History:   Procedure Laterality Date    ANKLE SURGERY      BACK SURGERY      COLONOSCOPY      FINGER SURGERY      KNEE SURGERY      SHOULDER SURGERY         Current Outpatient Prescriptions:     ALPRAZolam (XANAX) 0 5 mg tablet, Take 1 tablet by mouth 3 (three) times a day as needed for anxiety for up to 15 doses, Disp: 15 tablet, Rfl: 0    aspirin (ECOTRIN LOW STRENGTH) 81 mg EC tablet, Take 81 mg by mouth daily, Disp: , Rfl:     atorvastatin (LIPITOR) 20 mg tablet, Take 20 mg by mouth daily, Disp: , Rfl:     glipiZIDE (GLUCOTROL XL) 10 mg 24 hr tablet, Take 10 mg by mouth daily, Disp: , Rfl:     Insulin Pen Needle (B-D ULTRAFINE III SHORT PEN) 31G X 8 MM MISC, by Does not apply route, Disp: , Rfl:     Lancets (LIFESCAN UNISTIK 2) MISC, by Does not apply route, Disp: , Rfl:     LANTUS SOLOSTAR injection pen 100 units/mL, INJECT 60 UNITS ONCE DAILY AS DIRECTED, Disp: 15 mL, Rfl: 3    losartan-hydrochlorothiazide (HYZAAR) 50-12 5 mg per tablet, Take 1 tablet by mouth daily, Disp: , Rfl:     metFORMIN (GLUCOPHAGE) 500 mg tablet, Take 500 mg by mouth daily with breakfast, Disp: , Rfl:   Allergies   Allergen Reactions    Codeine Nausea Only     Reaction Date: 03Oct2005;     Latex      Vitals:    02/05/18 1300   BP: 142/82   BP Location: Right arm   Patient Position: Sitting   Cuff Size: Large   Pulse: 65   SpO2: 97%   Weight: 117 kg (259 lb)   Height: 5' 10" (1 778 m)       Review of Systems:  Review of Systems   Constitutional: Negative  HENT: Negative  Eyes: Negative  Respiratory: Positive for shortness of breath  Cardiovascular: Negative  Gastrointestinal: Negative  Endocrine: Negative  Genitourinary: Negative  Musculoskeletal: Negative  Skin: Negative  Allergic/Immunologic: Negative  Neurological: Negative  Hematological: Negative  Psychiatric/Behavioral: Negative  Physical Exam:  Physical Exam   Constitutional: He is oriented to person, place, and time  No distress  HENT:   Head: Normocephalic and atraumatic  Right Ear: External ear normal    Left Ear: External ear normal    Eyes: Conjunctivae are normal  Pupils are equal, round, and reactive to light  Right eye exhibits no discharge  Left eye exhibits no discharge  No scleral icterus  Neck: Normal range of motion  Neck supple  No JVD present  No tracheal deviation present  No thyromegaly present  Cardiovascular: Normal rate and regular rhythm  Exam reveals no gallop and no friction rub  No murmur heard  Pulmonary/Chest: Effort normal and breath sounds normal  No stridor  No respiratory distress  He has no wheezes  He has no rales  He exhibits no tenderness  Abdominal: Soft  Bowel sounds are normal  He exhibits no distension and no mass  There is no tenderness  There is no rebound and no guarding  Musculoskeletal: Normal range of motion  He exhibits no edema, tenderness or deformity  Neurological: He is alert and oriented to person, place, and time  He has normal reflexes  No cranial nerve deficit  He exhibits normal muscle tone  Coordination normal    Skin: Skin is warm and dry  No rash noted  He is not diaphoretic  No erythema  No pallor  Psychiatric: He has a normal mood and affect  His behavior is normal  Judgment and thought content normal    Nursing note and vitals reviewed        Labs:  Admission on 01/07/2018, Discharged on 01/07/2018   Component Date Value    D-Dimer, Quant 01/07/2018 620*    WBC 01/07/2018 9 40     RBC 01/07/2018 5 56     Hemoglobin 01/07/2018 16 1     Hematocrit 01/07/2018 47 6     MCV 01/07/2018 86     MCH 01/07/2018 29 0     MCHC 01/07/2018 34 0     RDW 01/07/2018 14 1     MPV 01/07/2018 9 5     Platelets 66/75/5135 216     nRBC 01/07/2018 0     Neutrophils Relative 01/07/2018 78*    Lymphocytes Relative 01/07/2018 11*    Monocytes Relative 01/07/2018 9     Eosinophils Relative 01/07/2018 2     Basophils Relative 01/07/2018 0     Neutrophils Absolute 01/07/2018 7 30     Lymphocytes Absolute 01/07/2018 1 10     Monocytes Absolute 01/07/2018 0 80     Eosinophils Absolute 01/07/2018 0 20     Basophils Absolute 01/07/2018 0 00     Sodium 01/07/2018 137     Potassium 01/07/2018 3 3*    Chloride 01/07/2018 98*    CO2 01/07/2018 31     Anion Gap 01/07/2018 8     BUN 01/07/2018 20     Creatinine 01/07/2018 1 11     Glucose 01/07/2018 307*    Calcium 01/07/2018 9 5     AST 01/07/2018 16     ALT 01/07/2018 39     Alkaline Phosphatase 01/07/2018 99     Total Protein 01/07/2018 8 0     Albumin 01/07/2018 3 9     Total Bilirubin 01/07/2018 0 90     eGFR 01/07/2018 72     Troponin I 01/07/2018 <0 02     Ventricular Rate 01/07/2018 79     Atrial Rate 01/07/2018 79     MT Interval 01/07/2018 232     QRSD Interval 01/07/2018 112     QT Interval 01/07/2018 424     QTC Interval 01/07/2018 486     P Axis 01/07/2018 67     QRS Axis 01/07/2018 -74     T Wave [place] 01/07/2018 47              Patient Active Problem List   Diagnosis    Benign essential hypertension    Diabetes mellitus with neurological manifestations, uncontrolled ([address] )    Fatty liver    Gout    Hyperlipidemia    Herniated nucleus pulposus, L3-4    Low back pain    Shortness of breath    Exertional shortness of breath    RBBB plus LA hemiblock       Discussion/Summary:  Abnormal baseline EKG/exertional shortness of breath-high [place] risk factors for coronary artery disease  Will rule out for ischemia    He also reports having an 18 year history of smoking but has no signs of active wheezing   --exercise treadmill stress test to rule out active ischemia and to better define his exercise capacity  --echo 2D to evaluate for diastolic function and pulmonary pressures    Diabetes mellitus-previously poorly controlled    Currently with improved control    Fatty liver  --currently on statin  --recommend Omega 3 supplementation    Hypertension-well controlled      Kailey Davidson 59

## 2018-02-15 ENCOUNTER — HOSPITAL ENCOUNTER (OUTPATIENT)
Dept: NON INVASIVE DIAGNOSTICS | Facility: HOSPITAL | Age: 58
Discharge: HOME | End: 2018-02-15
Attending: INTERNAL MEDICINE

## 2018-02-25 DIAGNOSIS — E11.49 DIABETES MELLITUS TYPE 2 WITH NEUROLOGICAL MANIFESTATIONS (HCC): Primary | ICD-10-CM

## 2018-02-25 RX ORDER — GLIPIZIDE 10 MG/1
TABLET, FILM COATED, EXTENDED RELEASE ORAL
Qty: 90 TABLET | Refills: 1 | Status: SHIPPED | OUTPATIENT
Start: 2018-02-25 | End: 2018-09-15 | Stop reason: SDUPTHER

## 2018-03-06 ENCOUNTER — HOSPITAL ENCOUNTER (OUTPATIENT)
Dept: NON INVASIVE DIAGNOSTICS | Facility: HOSPITAL | Age: 58
Discharge: HOME | End: 2018-03-06
Attending: INTERNAL MEDICINE

## 2018-05-26 DIAGNOSIS — E11.49 DIABETES MELLITUS TYPE 2 WITH NEUROLOGICAL MANIFESTATIONS (HCC): Primary | ICD-10-CM

## 2018-05-28 RX ORDER — PEN NEEDLE, DIABETIC 31 GX5/16"
NEEDLE, DISPOSABLE MISCELLANEOUS
Qty: 200 EACH | Refills: 1 | Status: SHIPPED | OUTPATIENT
Start: 2018-05-28 | End: 2019-08-06 | Stop reason: SDUPTHER

## 2018-06-08 DIAGNOSIS — IMO0002 DIABETES MELLITUS WITH NEUROLOGICAL MANIFESTATIONS, UNCONTROLLED: ICD-10-CM

## 2018-06-08 RX ORDER — INSULIN GLARGINE 100 [IU]/ML
INJECTION, SOLUTION SUBCUTANEOUS
Qty: 15 ML | Refills: 3 | Status: SHIPPED | OUTPATIENT
Start: 2018-06-08 | End: 2018-08-21 | Stop reason: SDUPTHER

## 2018-06-27 DIAGNOSIS — Z79.4 TYPE 2 DIABETES MELLITUS WITHOUT COMPLICATION, WITH LONG-TERM CURRENT USE OF INSULIN (HCC): Primary | ICD-10-CM

## 2018-06-27 DIAGNOSIS — E11.9 TYPE 2 DIABETES MELLITUS WITHOUT COMPLICATION, WITH LONG-TERM CURRENT USE OF INSULIN (HCC): Primary | ICD-10-CM

## 2018-06-29 ENCOUNTER — TELEPHONE (OUTPATIENT)
Dept: FAMILY MEDICINE CLINIC | Facility: CLINIC | Age: 58
End: 2018-06-29

## 2018-06-29 DIAGNOSIS — E11.9 TYPE 2 DIABETES MELLITUS WITHOUT COMPLICATION, WITH LONG-TERM CURRENT USE OF INSULIN (HCC): ICD-10-CM

## 2018-06-29 DIAGNOSIS — Z79.4 TYPE 2 DIABETES MELLITUS WITHOUT COMPLICATION, WITH LONG-TERM CURRENT USE OF INSULIN (HCC): ICD-10-CM

## 2018-06-29 NOTE — TELEPHONE ENCOUNTER
Pharmacy would like clarification for directions for test strips that were Rx'd, please advise   gabino

## 2018-07-03 DIAGNOSIS — I10 BENIGN ESSENTIAL HYPERTENSION: ICD-10-CM

## 2018-07-03 DIAGNOSIS — E78.2 MIXED HYPERLIPIDEMIA: Primary | ICD-10-CM

## 2018-07-03 RX ORDER — ATORVASTATIN CALCIUM 20 MG/1
TABLET, FILM COATED ORAL
Qty: 30 TABLET | Refills: 1 | Status: SHIPPED | OUTPATIENT
Start: 2018-07-03 | End: 2018-09-15 | Stop reason: SDUPTHER

## 2018-07-03 RX ORDER — LOSARTAN POTASSIUM AND HYDROCHLOROTHIAZIDE 12.5; 5 MG/1; MG/1
1 TABLET ORAL DAILY
Qty: 30 TABLET | Refills: 1 | Status: SHIPPED | OUTPATIENT
Start: 2018-07-03 | End: 2018-09-15 | Stop reason: SDUPTHER

## 2018-07-20 ENCOUNTER — TELEPHONE (OUTPATIENT)
Dept: FAMILY MEDICINE CLINIC | Facility: CLINIC | Age: 58
End: 2018-07-20

## 2018-07-20 DIAGNOSIS — E11.49 DIABETES MELLITUS TYPE 2 WITH NEUROLOGICAL MANIFESTATIONS (HCC): Primary | ICD-10-CM

## 2018-07-20 NOTE — TELEPHONE ENCOUNTER
I am trying to order the machine to test sugars to match the test strips he just got at [facility]    Did I send you the order for approval?

## 2018-08-01 DIAGNOSIS — E78.2 MIXED HYPERLIPIDEMIA: ICD-10-CM

## 2018-08-01 DIAGNOSIS — E11.49 DIABETES MELLITUS TYPE 2 WITH NEUROLOGICAL MANIFESTATIONS (HCC): Primary | ICD-10-CM

## 2018-08-01 DIAGNOSIS — I10 BENIGN ESSENTIAL HYPERTENSION: ICD-10-CM

## 2018-08-02 NOTE — TELEPHONE ENCOUNTER
I sent in his Metformin, but he is due for a follow up appointment and lab work  Beatriz Fountain, DO

## 2018-08-07 ENCOUNTER — OFFICE VISIT (OUTPATIENT)
Dept: OBGYN CLINIC | Facility: CLINIC | Age: 58
End: 2018-08-07
Payer: COMMERCIAL

## 2018-08-07 ENCOUNTER — APPOINTMENT (OUTPATIENT)
Dept: RADIOLOGY | Facility: CLINIC | Age: 58
End: 2018-08-07
Payer: COMMERCIAL

## 2018-08-07 VITALS
WEIGHT: 260.8 LBS | SYSTOLIC BLOOD PRESSURE: 132 MMHG | HEIGHT: 70 IN | DIASTOLIC BLOOD PRESSURE: 86 MMHG | HEART RATE: 76 BPM | BODY MASS INDEX: 37.34 KG/M2

## 2018-08-07 DIAGNOSIS — M25.571 PAIN, JOINT, ANKLE AND FOOT, RIGHT: ICD-10-CM

## 2018-08-07 DIAGNOSIS — M19.171 POST-TRAUMATIC OSTEOARTHRITIS OF RIGHT ANKLE: Primary | ICD-10-CM

## 2018-08-07 PROCEDURE — 73610 X-RAY EXAM OF ANKLE: CPT

## 2018-08-07 PROCEDURE — 99203 OFFICE O/P NEW LOW 30 MIN: CPT | Performed by: ORTHOPAEDIC SURGERY

## 2018-08-07 RX ORDER — PREDNISONE 20 MG/1
20 TABLET ORAL 2 TIMES DAILY WITH MEALS
Qty: 10 TABLET | Refills: 0 | Status: SHIPPED | OUTPATIENT
Start: 2018-08-07 | End: 2018-09-07

## 2018-08-07 NOTE — PROGRESS NOTES
Assessment/Plan:  1  Post-traumatic osteoarthritis of right ankle     2  Pain, joint, ankle and foot, right  XR ankle 3+ vw right    predniSONE 20 mg tablet       Scribe Attestation    I,:   Gertrudis Barrett am acting as a scribe while in the presence of the attending physician :        I,:   Chon Ambrocio MD personally performed the services described in this documentation    as scribed in my presence :              Does not demonstrate like a gouty right ankle today  There is moderate swelling, but no redness and heat  Royann Canavan does demonstrate significant osteoarthritis the ankle mortise and this is consistent with his history of surgical intervention of this ankle  He did note that approximately 5 6 years ago his ankle was aspirated when it did become swollen red and hot where the doctors at that time said it was gout  Hip very well could have been an early flare up of gout  Today demonstrates more of an osteoarthritic flare up  I would like to treat this conservatively with an oral steroid for 5 days  I provided prescription for this today  Additionally I discouraged Royann Canavan from using ice or heat on his ankle as I explained ice will exacerbate his symptoms should it be a gouty origin  Should Royann Canavan fail to have symptom relief with the oral steroids and he may follow up with me in 1 week  Subjective:   Anil Mathis is a 59 y o  male who presents with right ankle pain and swelling  He states that it begin to cause him issues approximately 1 month ago however over the last 5 days has increased in pain and swelling  Today he is experiencing intermittent and moderate aching about the anterior and lateral aspect of his right ankle  Does have a history surgery to this ankle approximately 25 years ago  Pain is better at rest however is exacerbated with weight-bearing activities  Today he denies any distal paresthesias  Review of Systems   Constitutional: Negative  HENT: Negative      Eyes: Negative  Respiratory: Negative  Cardiovascular: Negative  Gastrointestinal: Negative  Endocrine:        Frequent Urination   Genitourinary: Negative  Musculoskeletal: Positive for arthralgias and myalgias  Skin: Negative  Allergic/Immunologic: Negative  Neurological: Positive for numbness  Hematological: Negative  Psychiatric/Behavioral: Positive for agitation  Past Medical History:   Diagnosis Date    Corneal abrasion     last assessed - [id number]    Diabetes mellitus ([address] )     Hypertension     Impaired fasting glucose     last assessed - 48MEV5157    Viremia     Resolved - 22Jan2018       Past Surgical History:   Procedure Laterality Date    ANKLE SURGERY      ARTHROTOMY ANKLE      ARTHROTOMY KNEE      BACK SURGERY      COLONOSCOPY      FINGER SURGERY      INCISION TENDON SHEATH      of a finger    KNEE SURGERY      SHOULDER SURGERY         Family History   Problem Relation Age of Onset    Hypertension Mother    [place] Arthritis Mother     Hyperlipidemia Mother     Lung cancer Mother     Stroke Maternal Grandfather     Diabetes Cousin     COPD Father     Depression Family     Diabetes Family        Social History     Occupational History    Not on file       Social History Main Topics    Smoking status: Never Smoker    Smokeless tobacco: Never Used      Comment: Former smoker    Alcohol use Yes      Comment: rare    Drug use: No    Sexual activity: Not on file         Current Outpatient Prescriptions:     ALPRAZolam (XANAX) 0 5 mg tablet, Take 1 tablet by mouth 3 (three) times a day as needed for anxiety for up to 15 doses, Disp: 15 tablet, Rfl: 0    aspirin (ECOTRIN LOW STRENGTH) 81 mg EC tablet, Take 81 mg by mouth daily, Disp: , Rfl:     atorvastatin (LIPITOR) 20 mg tablet, TAKE ONE TABLET BY MOUTH EVERY DAY, Disp: 30 tablet, Rfl: 1    B-D ULTRAFINE III SHORT PEN 31G X 8 MM MISC, USE AS DIRECTED FOR INJECTION OF LANTUS SOLOSTAR, Disp: 200 each, Rfl: 1    GLIPIZIDE XL 10 MG 24 hr tablet, TAKE ONE TABLET BY MOUTH EVERY DAY, Disp: 90 tablet, Rfl: 1    glucose blood (ONE TOUCH ULTRA TEST) test strip, Test glucose 3 times daily, Disp: 300 each, Rfl: 3    Lancets (LIFESCAN UNISTIK 2) MISC, by Does not apply route, Disp: , Rfl:     LANTUS SOLOSTAR 100 units/mL injection pen, INJECT 60 UNITS ONCE DAILY AS DIRECTED, Disp: 15 mL, Rfl: 3    losartan-hydrochlorothiazide (HYZAAR) 50-12 5 mg per tablet, TAKE 1 TABLET BY MOUTH DAILY, Disp: 30 tablet, Rfl: 1    metFORMIN (GLUCOPHAGE) 500 mg tablet, TAKE TWO TABLETS BY MOUTH TWICE A DAY, Disp: 360 tablet, Rfl: 0    predniSONE 20 mg tablet, Take 1 tablet (20 mg total) by mouth 2 (two) times a day with meals, Disp: 10 tablet, Rfl: 0    Allergies   Allergen Reactions    Codeine Nausea Only     Reaction Date: 03Oct2005;     Latex        Objective:  Vitals:    08/07/18 1409   BP: 132/86   Pulse: 76       Right Ankle Exam   Swelling: moderate    Tenderness   Right ankle tenderness location: Anterior and lateral von joint line is tender  Range of Motion   Dorsiflexion: 0   Plantar flexion: 45   Inversion: 45   Eversion: 20     Muscle Strength   Dorsiflexion:  5/5  Plantar flexion:  5/5  Anterior tibial:  5/5  Posterior tibial:  5/5  Gastrocsoleus:  5/5  Peroneal muscle:  5/5    Tests   Anterior drawer: negative  Varus tilt: negative    Other   Erythema: absent  Scars: absent  Sensation: normal  Pulse: present           Observations     Right Ankle/Foot   Negative for adhesive scar  Strength/Myotome Testing     Right Ankle/Foot   Dorsiflexion: 5  Plantar flexion: 5      Physical Exam   Constitutional: He is oriented to person, place, and time  He appears well-developed and well-nourished  HENT:   Head: Normocephalic and atraumatic  Eyes: Conjunctivae are normal    Neck: Normal range of motion  Cardiovascular: Normal rate      Pulmonary/Chest: Effort normal    Musculoskeletal:   As noted in HPI   Neurological: He is alert and oriented to person, place, and time  Skin: Skin is warm and dry  Psychiatric: He has a normal mood and affect  His behavior is normal  Judgment and thought content normal        I have personally reviewed pertinent films in PACS and my interpretation is as follows:    X-rays of the right ankle demonstrates moderate osteoarthritis of the ankle mortise with bone spurs noted at the medial malleolus  Osteochondral defect noted at the the medial aspect of the patellar dome

## 2018-08-17 LAB
ALBUMIN SERPL-MCNC: 4.1 G/DL (ref 3.5–5.5)
ALBUMIN/GLOB SERPL: 1.6 {RATIO} (ref 1.2–2.2)
ALP SERPL-CCNC: 79 IU/L (ref 39–117)
ALT SERPL-CCNC: 29 IU/L (ref 0–44)
AMBIG ABBREV DEFAULT: NORMAL
AST SERPL-CCNC: 23 IU/L (ref 0–40)
BASOPHILS # BLD AUTO: 0 X10E3/UL (ref 0–0.2)
BASOPHILS NFR BLD AUTO: 1 %
BILIRUB SERPL-MCNC: 1 MG/DL (ref 0–1.2)
BUN SERPL-MCNC: 18 MG/DL (ref 6–24)
BUN/CREAT SERPL: 19 (ref 9–20)
CALCIUM SERPL-MCNC: 9.6 MG/DL (ref 8.7–10.2)
CHLORIDE SERPL-SCNC: 96 MMOL/L (ref 96–106)
CHOLEST SERPL-MCNC: 119 MG/DL (ref 100–199)
CO2 SERPL-SCNC: 27 MMOL/L (ref 20–29)
CREAT SERPL-MCNC: 0.97 MG/DL (ref 0.76–1.27)
EOSINOPHIL # BLD AUTO: 0.3 X10E3/UL (ref 0–0.4)
EOSINOPHIL NFR BLD AUTO: 4 %
ERYTHROCYTE [DISTWIDTH] IN BLOOD BY AUTOMATED COUNT: 14.8 % (ref 12.3–15.4)
GLOBULIN SER-MCNC: 2.6 G/DL (ref 1.5–4.5)
GLUCOSE SERPL-MCNC: 298 MG/DL (ref 65–99)
HBA1C MFR BLD: 10.8 % (ref 4.8–5.6)
HCT VFR BLD AUTO: 46.7 % (ref 37.5–51)
HDLC SERPL-MCNC: 40 MG/DL
HGB BLD-MCNC: 16 G/DL (ref 13–17.7)
IMM GRANULOCYTES # BLD: 0 X10E3/UL (ref 0–0.1)
IMM GRANULOCYTES NFR BLD: 0 %
LDLC SERPL CALC-MCNC: 56 MG/DL (ref 0–99)
LYMPHOCYTES # BLD AUTO: 1.5 X10E3/UL (ref 0.7–3.1)
LYMPHOCYTES NFR BLD AUTO: 20 %
MCH RBC QN AUTO: 28.7 PG (ref 26.6–33)
MCHC RBC AUTO-ENTMCNC: 34.3 G/DL (ref 31.5–35.7)
MCV RBC AUTO: 84 FL (ref 79–97)
MICRODELETION SYND BLD/T FISH: NORMAL
MONOCYTES # BLD AUTO: 0.5 X10E3/UL (ref 0.1–0.9)
MONOCYTES NFR BLD AUTO: 7 %
NEUTROPHILS # BLD AUTO: 5.2 X10E3/UL (ref 1.4–7)
NEUTROPHILS NFR BLD AUTO: 68 %
PLATELET # BLD AUTO: 204 X10E3/UL (ref 150–379)
POTASSIUM SERPL-SCNC: 4 MMOL/L (ref 3.5–5.2)
PROT SERPL-MCNC: 6.7 G/DL (ref 6–8.5)
RBC # BLD AUTO: 5.58 X10E6/UL (ref 4.14–5.8)
SL AMB EGFR AFRICAN AMERICAN: 98 ML/MIN/1.73
SL AMB EGFR NON AFRICAN AMERICAN: 85 ML/MIN/1.73
SODIUM SERPL-SCNC: 139 MMOL/L (ref 134–144)
TRIGL SERPL-MCNC: 117 MG/DL (ref 0–149)
WBC # BLD AUTO: 7.6 X10E3/UL (ref 3.4–10.8)

## 2018-08-21 ENCOUNTER — OFFICE VISIT (OUTPATIENT)
Dept: FAMILY MEDICINE CLINIC | Facility: CLINIC | Age: 58
End: 2018-08-21
Payer: COMMERCIAL

## 2018-08-21 VITALS
DIASTOLIC BLOOD PRESSURE: 76 MMHG | WEIGHT: 258 LBS | TEMPERATURE: 98 F | HEART RATE: 82 BPM | SYSTOLIC BLOOD PRESSURE: 128 MMHG | HEIGHT: 70 IN | RESPIRATION RATE: 18 BRPM | BODY MASS INDEX: 36.94 KG/M2

## 2018-08-21 DIAGNOSIS — IMO0002 DIABETES MELLITUS WITH NEUROLOGICAL MANIFESTATIONS, UNCONTROLLED: ICD-10-CM

## 2018-08-21 DIAGNOSIS — E78.2 MIXED HYPERLIPIDEMIA: ICD-10-CM

## 2018-08-21 DIAGNOSIS — E11.49 DIABETES MELLITUS TYPE 2 WITH NEUROLOGICAL MANIFESTATIONS (HCC): Primary | ICD-10-CM

## 2018-08-21 DIAGNOSIS — Z12.11 COLON CANCER SCREENING: ICD-10-CM

## 2018-08-21 DIAGNOSIS — I10 BENIGN ESSENTIAL HYPERTENSION: ICD-10-CM

## 2018-08-21 PROCEDURE — 99214 OFFICE O/P EST MOD 30 MIN: CPT | Performed by: FAMILY MEDICINE

## 2018-08-21 PROCEDURE — 3074F SYST BP LT 130 MM HG: CPT | Performed by: FAMILY MEDICINE

## 2018-08-21 PROCEDURE — 3078F DIAST BP <80 MM HG: CPT | Performed by: FAMILY MEDICINE

## 2018-08-21 RX ORDER — BLOOD-GLUCOSE METER
EACH MISCELLANEOUS 3 TIMES DAILY
Qty: 1 EACH | Refills: 0 | Status: SHIPPED | OUTPATIENT
Start: 2018-08-21 | End: 2021-07-15 | Stop reason: CLARIF

## 2018-08-21 NOTE — PATIENT INSTRUCTIONS
Recent Results (from the past 672 hour(s))   Yolanda Hill Default    Collection Time: 08/16/18  9:07 AM   Result Value Ref Range    White Blood Cell Count 7 6 3 4 - 10 8 x10E3/uL    Red Blood Cell Count 5 58 4 14 - 5 80 x10E6/uL    Hemoglobin 16 0 13 0 - 17 7 g/dL    HCT 46 7 37 5 - 51 0 %    MCV 84 79 - 97 fL    MCH 28 7 26 6 - 33 0 pg    MCHC 34 3 31 5 - 35 7 g/dL    RDW 14 8 12 3 - 15 4 %    Platelet Count 880 135 - 379 x10E3/uL    Neutrophils 68 Not Estab  %    Lymphocytes 20 Not Estab  %    Monocytes 7 Not Estab  %    Eosinophils 4 Not Estab  %    Basophils Relative 1 Not Estab  %    Neutrophils (Absolute) 5 2 1 4 - 7 0 x10E3/uL    Lymphocytes (Absolute) 1 5 0 7 - 3 1 x10E3/uL    Monocytes (Absolute) 0 5 0 1 - 0 9 x10E3/uL    Eosinophils (Absolute) 0 3 0 0 - 0 4 x10E3/uL    Basophils (Absolute) 0 0 0 0 - 0 2 x10E3/uL    Immature Granulocytes 0 Not Estab  %    Immature Granulocytes (Absolute) 0 0 0 0 - 0 1 x10E3/uL   Comprehensive metabolic panel    Collection Time: 08/16/18  9:07 AM   Result Value Ref Range    SL AMB GLUCOSE 298 (H) 65 - 99 mg/dL    BUN 18 6 - 24 mg/dL    Creatinine, Serum 0 97 0 76 - 1 27 mg/dL    eGFR Non African American 85 >59 mL/min/1 73    SL AMB EGFR AFRICAN AMERICAN 98 >59 mL/min/1 73    SL AMB BUN/CREATININE RATIO 19 9 - 20    SL AMB SODIUM 139 134 - 144 mmol/L    SL AMB POTASSIUM 4 0 3 5 - 5 2 mmol/L    SL AMB CHLORIDE 96 96 - 106 mmol/L    SL AMB CARBON DIOXIDE 27 20 - 29 mmol/L    CALCIUM 9 6 8 7 - 10 2 mg/dL    SL AMB PROTEIN, TOTAL 6 7 6 0 - 8 5 g/dL    Serum Albumin 4 1 3 5 - 5 5 g/dL    Globulin, Total 2 6 1 5 - 4 5 g/dL    SL AMB ALBUMIN/GLOBULIN RATIO 1 6 1 2 - 2 2    SL AMB BILIRUBIN, TOTAL 1 0 0 0 - 1 2 mg/dL    Alk Phos Isoenzymes 79 39 - 117 IU/L    SL AMB AST 23 0 - 40 IU/L    SL AMB ALT 29 0 - 44 IU/L   Lipid panel    Collection Time: 08/16/18  9:07 AM   Result Value Ref Range    Cholesterol, Total 119 100 - 199 mg/dL    Triglycerides 117 0 - 149 mg/dL    HDL 40 >39 mg/dL    LDL Direct 56 0 - 99 mg/dL   Hemoglobin A1c (w/out EAG)    Collection Time: 08/16/18  9:07 AM   Result Value Ref Range    Hemoglobin A1C 10 8 (H) 4 8 - 5 6 %   Yolanda Harding Default    Collection Time: 08/16/18  9:07 AM   Result Value Ref Range    YOLANDA HARDING DEFAULT Comment    Cardiovascular Report    Collection Time: 08/16/18  9:07 AM   Result Value Ref Range    SL AMB INTERPRETATION Note      Increase your insulin by 1 unit a day until you reach 75 units a day

## 2018-08-21 NOTE — ASSESSMENT & PLAN NOTE
Lab Results   Component Value Date    HGBA1C 10 8 (H) 08/16/2018       No results for input(s): POCGLU in the last 72 hours      Blood Sugar Average: Last 72 hrs:    Not controlled   Dose of lantus increased to 75 units a day  He is going to meet with the nutritionist at [facility]

## 2018-08-21 NOTE — PROGRESS NOTES
Assessment/Plan:    Problem List Items Addressed This Visit     Benign essential hypertension     Well controlled         Relevant Orders    Comprehensive metabolic panel    Diabetes mellitus type 2 with neurological manifestations ([facility] [address] ) - Primary     Lab Results   Component Value Date    HGBA1C 10 8 (H) 08/16/2018       No results for input(s): POCGLU in the last 72 hours      Blood Sugar Average: Last 72 hrs:    Not controlled   Dose of lantus increased to 75 units a day  He is going to meet with the nutritionist at [facility]           Relevant Medications    Blood Glucose Monitoring Suppl (ONE TOUCH ULTRA 2) w/Device KIT    insulin glargine (LANTUS SOLOSTAR) 100 units/mL injection pen    Other Relevant Orders    Microalbumin / creatinine urine ratio    Comprehensive metabolic panel    Hemoglobin A1C    Mixed hyperlipidemia     LDL is at goal with atorvastatin  Continue 20 mg a day           Other Visit Diagnoses     Diabetes mellitus with neurological manifestations, uncontrolled ([facility] )        Relevant Medications    insulin glargine (LANTUS SOLOSTAR) 100 units/mL injection pen    Colon cancer screening        Relevant Orders    Ambulatory referral to Gastroenterology          Patient Instructions     Recent Results (from the past 672 hour(s))   Yolanda Hill Default    Collection Time: 08/16/18  9:07 AM   Result Value Ref Range    White Blood Cell Count 7 6 3 4 - 10 8 x10E3/uL    Red Blood Cell Count 5 58 4 14 - 5 80 x10E6/uL    Hemoglobin 16 0 13 0 - 17 7 g/dL    HCT 46 7 37 5 - 51 0 %    MCV 84 79 - 97 fL    MCH 28 7 26 6 - 33 0 pg    MCHC 34 3 31 5 - 35 7 g/dL    RDW 14 8 12 3 - 15 4 %    Platelet Count 623 995 - 379 x10E3/uL    Neutrophils 68 Not Estab  %    Lymphocytes 20 Not Estab  %    Monocytes 7 Not Estab  %    Eosinophils 4 Not Estab  %    Basophils Relative 1 Not Estab  %    Neutrophils (Absolute) 5 2 1 4 - 7 0 x10E3/uL    Lymphocytes (Absolute) 1 5 0 7 - 3 1 x10E3/uL    Monocytes (Absolute) 0 5 0 1 - 0 9 x10E3/uL    Eosinophils (Absolute) 0 3 0 0 - 0 4 x10E3/uL    Basophils (Absolute) 0 0 0 0 - 0 2 x10E3/uL    Immature Granulocytes 0 Not Estab  %    Immature Granulocytes (Absolute) 0 0 0 0 - 0 1 x10E3/uL   Comprehensive metabolic panel    Collection Time: 08/16/18  9:07 AM   Result Value Ref Range    SL AMB GLUCOSE 298 (H) 65 - 99 mg/dL    BUN 18 6 - 24 mg/dL    Creatinine, Serum 0 97 0 76 - 1 27 mg/dL    eGFR Non African American 85 >59 mL/min/1 73    SL AMB EGFR AFRICAN AMERICAN 98 >59 mL/min/1 73    SL AMB BUN/CREATININE RATIO 19 9 - 20    SL AMB SODIUM 139 134 - 144 mmol/L    SL AMB POTASSIUM 4 0 3 5 - 5 2 mmol/L    SL AMB CHLORIDE 96 96 - 106 mmol/L    SL AMB CARBON DIOXIDE 27 20 - 29 mmol/L    CALCIUM 9 6 8 7 - 10 2 mg/dL    SL AMB PROTEIN, TOTAL 6 7 6 0 - 8 5 g/dL    Serum Albumin 4 1 3 5 - 5 5 g/dL    Globulin, Total 2 6 1 5 - 4 5 g/dL    SL AMB ALBUMIN/GLOBULIN RATIO 1 6 1 2 - 2 2    SL AMB BILIRUBIN, TOTAL 1 0 0 0 - 1 2 mg/dL    Alk Phos Isoenzymes 79 39 - 117 IU/L    SL AMB AST 23 0 - 40 IU/L    SL AMB ALT 29 0 - 44 IU/L   Lipid panel    Collection Time: 08/16/18  9:07 AM   Result Value Ref Range    Cholesterol, Total 119 100 - 199 mg/dL    Triglycerides 117 0 - 149 mg/dL    HDL 40 >39 mg/dL    LDL Direct 56 0 - 99 mg/dL   Hemoglobin A1c (w/out EAG)    Collection Time: 08/16/18  9:07 AM   Result Value Ref Range    Hemoglobin A1C 10 8 (H) 4 8 - 5 6 %   Ambig Abbrev Default    Collection Time: 08/16/18  9:07 AM   Result Value Ref Range    AMBIG ABBREV DEFAULT Comment    Cardiovascular Report    Collection Time: 08/16/18  9:07 AM   Result Value Ref Range    SL AMB INTERPRETATION Note      Increase your insulin by 1 unit a day until you reach 75 units a day      Return in about 3 months (around 11/21/2018) for Next scheduled follow up  Subjective:      Patient ID: Yodit Farrell is a 59 y o  male      Chief Complaint   Patient presents with    Follow-up     lab work review and diabetic check  [facility] He knows that he is not eating as well as he should  He is taking his lantus  He sugars are running high  Hypertension   This is a chronic problem  The current episode started more than 1 year ago  The problem is unchanged  The problem is controlled  Past treatments include angiotensin blockers and diuretics  The current treatment provides moderate improvement  Compliance problems include diet  The following portions of the patient's history were reviewed and updated as appropriate:  past social history    Review of Systems   Respiratory: Negative  Cardiovascular: Negative  Musculoskeletal: Positive for arthralgias           Current Outpatient Prescriptions   Medication Sig Dispense Refill    ALPRAZolam (XANAX) 0 5 mg tablet Take 1 tablet by mouth 3 (three) times a day as needed for anxiety for up to 15 doses 15 tablet 0    aspirin (ECOTRIN LOW STRENGTH) 81 mg EC tablet Take 81 mg by mouth daily      atorvastatin (LIPITOR) 20 mg tablet TAKE ONE TABLET BY MOUTH EVERY DAY 30 tablet 1    B-D ULTRAFINE III SHORT PEN 31G X 8 MM MISC USE AS DIRECTED FOR INJECTION OF LANTUS SOLOSTAR 200 each 1    GLIPIZIDE XL 10 MG 24 hr tablet TAKE ONE TABLET BY MOUTH EVERY DAY 90 tablet 1    glucose blood (ONE TOUCH ULTRA TEST) test strip Test glucose 3 times daily 300 each 3    insulin glargine (LANTUS SOLOSTAR) 100 units/mL injection pen Inject 75 Units under the skin daily As directed 10 pen 5    Lancets (Breathometer UNISTIK 2) MISC by Does not apply route      losartan-hydrochlorothiazide (HYZAAR) 50-12 5 mg per tablet TAKE 1 TABLET BY MOUTH DAILY 30 tablet 1    metFORMIN (GLUCOPHAGE) 500 mg tablet TAKE TWO TABLETS BY MOUTH TWICE A  tablet 0    Blood Glucose Monitoring Suppl (ONE TOUCH ULTRA 2) w/Device KIT by Does not apply route 3 (three) times a day 1 each 0    predniSONE 20 mg tablet Take 1 tablet (20 mg total) by mouth 2 (two) times a day with meals (Patient not taking: Reported on 8/21/2018 ) 10 tablet 0     No current facility-administered medications for this visit  Objective:    /76   Pulse 82   Temp 98 °F (36 7 °C)   Resp 18   Ht 5' 10" (1 778 m)   Wt 117 kg (258 lb)   BMI 37 02 kg/m²        Physical Exam   Constitutional: He appears well-developed and well-nourished  HENT:   Head: Normocephalic and atraumatic  Right Ear: External ear normal    Left Ear: External ear normal    Mouth/Throat: Oropharynx is clear and moist    Cardiovascular: Normal rate, regular rhythm and normal heart sounds  No murmur heard  Pulses:       Dorsalis pedis pulses are 2+ on the right side, and 2+ on the left side  Posterior tibial pulses are 2+ on the right side, and 2+ on the left side  Pulmonary/Chest: Effort normal and breath sounds normal  No respiratory distress  He has no wheezes  He has no rales  Musculoskeletal: He exhibits no edema or tenderness  Feet:   Right Foot:   Skin Integrity: Negative for ulcer, skin breakdown, erythema, warmth, callus or dry skin  Left Foot:   Skin Integrity: Negative for ulcer, skin breakdown, erythema, warmth, callus or dry skin  Nursing note and vitals reviewed  Patient's shoes and socks removed  Right Foot/Ankle   Right Foot Inspection  Skin Exam: skin normal skin not intact, no dry skin, no warmth, no callus, no erythema, no maceration, no abnormal color, no pre-ulcer, no ulcer and no callus                          Toe Exam: ROM and strength within normal limits  Sensory       Monofilament testing: intact  Vascular  Capillary refills: < 3 seconds  The right DP pulse is 2+  The right PT pulse is 2+       Left Foot/Ankle  Left Foot Inspection  Skin Exam: skin normalskin not intact, no dry skin, no warmth, no erythema, no maceration, normal color, no pre-ulcer, no ulcer and no callus                         Toe Exam: ROM and strength within normal limits                   Sensory       Monofilament: intact  Vascular  Capillary refills: < 3 seconds  The left DP pulse is 2+  The left PT pulse is 2+     Assign Risk Category:  No deformity present; ;        Risk: 0       Ivana Palafox, DO

## 2018-08-29 ENCOUNTER — OFFICE VISIT (OUTPATIENT)
Dept: OBGYN CLINIC | Facility: CLINIC | Age: 58
End: 2018-08-29
Payer: COMMERCIAL

## 2018-08-29 VITALS
SYSTOLIC BLOOD PRESSURE: 151 MMHG | BODY MASS INDEX: 37.22 KG/M2 | DIASTOLIC BLOOD PRESSURE: 85 MMHG | HEIGHT: 70 IN | HEART RATE: 60 BPM | WEIGHT: 260 LBS

## 2018-08-29 DIAGNOSIS — M25.571 RIGHT ANKLE PAIN, UNSPECIFIED CHRONICITY: Primary | ICD-10-CM

## 2018-08-29 PROCEDURE — 99214 OFFICE O/P EST MOD 30 MIN: CPT | Performed by: ORTHOPAEDIC SURGERY

## 2018-08-29 NOTE — PROGRESS NOTES
Assessment/Plan:  1  Right ankle pain, unspecified chronicity  MRI ankle/heel right  wo contrast     Carina Tidwell did have what appeared to be an osteochondral defect on x-rays taken at last visit  We are ordering an MRI to further evaluate this  In the meantime he can ice and elevate the ankle  We will see him back after the MRI to discuss the results and how we will proceed  Subjective:   Lakisha Arrieta is a 59 y o  male who presents today for evaluation of his right ankle  We had seen him about 3 weeks ago for this ankle and questioned an arthritic flare verses a gout flare  He was prescribed prednisone which he said improved his symptoms very quickly  He had been doing fine up until this past weekend when the ankle flared up again  It became swollen and quite painful  He denies having any erythema  He states about Monday night the symptoms started to improve and he was able to walk more easily again  He states that his pain was mostly about the anterior aspect of the ankle and was worse with attempts at walking or really any activity  He still does note swelling though his pain is improved at this point  Notes fair range of motion of the ankle  X-rays of the ankle taken last visit 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  Does have a history of 1 prior gout flare of this ankle which was about 4 years ago, confirmed by aspiration of the ankle  Review of Systems   Constitutional: Negative for chills, fever and unexpected weight change  HENT: Negative for hearing loss, nosebleeds and sore throat  Eyes: Negative for pain, redness and visual disturbance  Respiratory: Negative for cough, shortness of breath and wheezing  Cardiovascular: Negative for chest pain, palpitations and leg swelling  Gastrointestinal: Negative for abdominal pain, nausea and vomiting  Endocrine: Negative for polyphagia and polyuria     Genitourinary: Negative for dysuria and hematuria  Musculoskeletal:        See HPI   Skin: Negative for rash and wound  Neurological: Negative for dizziness, numbness and headaches  Psychiatric/Behavioral: Negative for decreased concentration and suicidal ideas  The patient is not nervous/anxious  Past Medical History:   Diagnosis Date    Corneal abrasion     last assessed - [id number]    Diabetes mellitus ([address] )     Hypertension     Impaired fasting glucose     last assessed - [id number]    Viremia     Resolved - 22Jan2018       Past Surgical History:   Procedure Laterality Date    ANKLE SURGERY      ARTHROTOMY ANKLE      ARTHROTOMY KNEE      BACK SURGERY      COLONOSCOPY      FINGER SURGERY      INCISION TENDON SHEATH      of a finger    KNEE SURGERY      SHOULDER SURGERY         Family History   Problem Relation Age of Onset    Hypertension Mother    Tamiramona Ordoñez Arthritis Mother     Hyperlipidemia Mother     Lung cancer Mother     Stroke Maternal Grandfather     Diabetes Cousin     COPD Father     Depression Family     Diabetes Family        Social History     Occupational History    Not on file       Social History Main Topics    Smoking status: Former Smoker    Smokeless tobacco: Never Used      Comment: Former smoker    Alcohol use Yes      Comment: rare    Drug use: No    Sexual activity: Not on file         Current Outpatient Prescriptions:     ALPRAZolam (XANAX) 0 5 mg tablet, Take 1 tablet by mouth 3 (three) times a day as needed for anxiety for up to 15 doses, Disp: 15 tablet, Rfl: 0    aspirin (ECOTRIN LOW STRENGTH) 81 mg EC tablet, Take 81 mg by mouth daily, Disp: , Rfl:     atorvastatin (LIPITOR) 20 mg tablet, TAKE ONE TABLET BY MOUTH EVERY DAY, Disp: 30 tablet, Rfl: 1    B-D ULTRAFINE III SHORT PEN 31G X 8 MM MISC, USE AS DIRECTED FOR INJECTION OF LANTUS SOLOSTAR, Disp: 200 each, Rfl: 1    Blood Glucose Monitoring Suppl (ONE TOUCH ULTRA 2) w/Device KIT, by Does not apply route 3 (three) times a day, Disp: 1 each, Rfl: 0    GLIPIZIDE XL 10 MG 24 hr tablet, TAKE ONE TABLET BY MOUTH EVERY DAY, Disp: 90 tablet, Rfl: 1    glucose blood (ONE TOUCH ULTRA TEST) test strip, Test glucose 3 times daily, Disp: 300 each, Rfl: 3    insulin glargine (LANTUS SOLOSTAR) 100 units/mL injection pen, Inject 75 Units under the skin daily As directed, Disp: 10 pen, Rfl: 5    Lancets (LIFESCAN UNISTIK 2) MISC, by Does not apply route, Disp: , Rfl:     losartan-hydrochlorothiazide (HYZAAR) 50-12 5 mg per tablet, TAKE 1 TABLET BY MOUTH DAILY, Disp: 30 tablet, Rfl: 1    metFORMIN (GLUCOPHAGE) 500 mg tablet, TAKE TWO TABLETS BY MOUTH TWICE A DAY, Disp: 360 tablet, Rfl: 0    predniSONE 20 mg tablet, Take 1 tablet (20 mg total) by mouth 2 (two) times a day with meals, Disp: 10 tablet, Rfl: 0    Allergies   Allergen Reactions    Codeine Nausea Only     Reaction Date: 03Oct2005;     Latex        Objective:  Vitals:    08/29/18 1055   BP: 151/85   Pulse: 60       Ortho Exam    Physical Exam   Constitutional: He is oriented to person, place, and time  He appears well-developed  HENT:   Head: Normocephalic and atraumatic  Eyes: Conjunctivae are normal    Neck: Neck supple  Cardiovascular: Intact distal pulses  Pulmonary/Chest: Effort normal    Abdominal: Soft  Neurological: He is alert and oriented to person, place, and time  Skin: Skin is warm and dry  Psychiatric: He has a normal mood and affect  His behavior is normal    Vitals reviewed  Right ankle:  Diffuse swelling  No erythema  Near full range of motion of ankle  Tenderness tibiotalar joint and medial talar dome  No tenderness to light touch  Sensation intact  2+ DP pulse

## 2018-09-07 ENCOUNTER — OFFICE VISIT (OUTPATIENT)
Dept: FAMILY MEDICINE CLINIC | Facility: CLINIC | Age: 58
End: 2018-09-07
Payer: COMMERCIAL

## 2018-09-07 VITALS
BODY MASS INDEX: 36.88 KG/M2 | SYSTOLIC BLOOD PRESSURE: 142 MMHG | HEIGHT: 70 IN | HEART RATE: 64 BPM | DIASTOLIC BLOOD PRESSURE: 88 MMHG | RESPIRATION RATE: 18 BRPM | TEMPERATURE: 98.4 F | WEIGHT: 257.6 LBS

## 2018-09-07 DIAGNOSIS — J06.9 ACUTE UPPER RESPIRATORY INFECTION: Primary | ICD-10-CM

## 2018-09-07 PROCEDURE — 1036F TOBACCO NON-USER: CPT | Performed by: NURSE PRACTITIONER

## 2018-09-07 PROCEDURE — 3008F BODY MASS INDEX DOCD: CPT | Performed by: NURSE PRACTITIONER

## 2018-09-07 PROCEDURE — 3725F SCREEN DEPRESSION PERFORMED: CPT | Performed by: NURSE PRACTITIONER

## 2018-09-07 PROCEDURE — 99213 OFFICE O/P EST LOW 20 MIN: CPT | Performed by: NURSE PRACTITIONER

## 2018-09-07 RX ORDER — AMOXICILLIN 875 MG/1
875 TABLET, COATED ORAL 2 TIMES DAILY
Qty: 20 TABLET | Refills: 0 | Status: SHIPPED | OUTPATIENT
Start: 2018-09-07 | End: 2018-09-17

## 2018-09-07 NOTE — PROGRESS NOTES
Assessment/Plan:    Advised on supportive care of viral illness  Recommended Coricidin HBP OTC as well as saline nasal rinses  To start antibiotics if no improvement in symptoms in the next 3 days  Problem List Items Addressed This Visit     None      Visit Diagnoses     Acute upper respiratory infection    -  Primary    Relevant Medications    amoxicillin (AMOXIL) 875 mg tablet          There are no Patient Instructions on file for this visit  Return if symptoms worsen or fail to improve  Subjective:      Patient ID: Jovana Vila is a 59 y o  male  Chief Complaint   Patient presents with    Sore Throat     for past couple days, not pt says "mucusy" akrma       C/o sinus congestion, sore throat, and post nasal drip  This started last night  Denies cough, SOB, or wheezing  He is not currently taking anything OTC for symptoms  The following portions of the patient's history were reviewed and updated as appropriate: allergies, current medications, past family history, past medical history, past social history, past surgical history and problem list     Review of Systems   Constitutional: Negative for chills, fatigue and fever  HENT: Positive for congestion, postnasal drip and sore throat  Negative for ear pain, rhinorrhea and sinus pressure  Respiratory: Positive for cough  Negative for shortness of breath and wheezing  Cardiovascular: Negative for chest pain  Gastrointestinal: Negative for abdominal pain, diarrhea, nausea and vomiting  Musculoskeletal: Negative for arthralgias  Skin: Negative for rash  Neurological: Negative for headaches           Current Outpatient Prescriptions   Medication Sig Dispense Refill    ALPRAZolam (XANAX) 0 5 mg tablet Take 1 tablet by mouth 3 (three) times a day as needed for anxiety for up to 15 doses 15 tablet 0    aspirin (ECOTRIN LOW STRENGTH) 81 mg EC tablet Take 81 mg by mouth daily      atorvastatin (LIPITOR) 20 mg tablet TAKE ONE TABLET BY MOUTH EVERY DAY 30 tablet 1    B-D ULTRAFINE III SHORT PEN 31G X 8 MM MISC USE AS DIRECTED FOR INJECTION OF LANTUS SOLOSTAR 200 each 1    Blood Glucose Monitoring Suppl (ONE TOUCH ULTRA 2) w/Device KIT by Does not apply route 3 (three) times a day 1 each 0    GLIPIZIDE XL 10 MG 24 hr tablet TAKE ONE TABLET BY MOUTH EVERY DAY 90 tablet 1    glucose blood (ONE TOUCH ULTRA TEST) test strip Test glucose 3 times daily 300 each 3    insulin glargine (LANTUS SOLOSTAR) 100 units/mL injection pen Inject 75 Units under the skin daily As directed 10 pen 5    Lancets ([employer] UNISTIK 2) MISC by Does not apply route      losartan-hydrochlorothiazide (HYZAAR) 50-12 5 mg per tablet TAKE 1 TABLET BY MOUTH DAILY 30 tablet 1    metFORMIN (GLUCOPHAGE) 500 mg tablet TAKE TWO TABLETS BY MOUTH TWICE A  tablet 0    amoxicillin (AMOXIL) 875 mg tablet Take 1 tablet (875 mg total) by mouth 2 (two) times a day for 10 days 20 tablet 0     No current facility-administered medications for this visit  Objective:    /88   Pulse 64   Temp 98 4 °F (36 9 °C)   Resp 18   Ht 5' 10" (1 778 m)   Wt 117 kg (257 lb 9 6 oz)   BMI 36 96 kg/m²        Physical Exam   Constitutional: He appears well-developed and well-nourished  HENT:   Right Ear: Tympanic membrane, external ear and ear canal normal    Left Ear: Tympanic membrane, external ear and ear canal normal    Nose: Mucosal edema and rhinorrhea (clear) present  Mouth/Throat: Oropharynx is clear and moist and mucous membranes are normal    Eyes: Conjunctivae are normal    Cardiovascular: Normal rate, regular rhythm and normal heart sounds  Pulmonary/Chest: Effort normal and breath sounds normal    Abdominal: Bowel sounds are normal  He exhibits no distension  There is no splenomegaly or hepatomegaly  There is no tenderness  Lymphadenopathy:        Right cervical: No superficial cervical adenopathy present         Left cervical: No superficial cervical adenopathy present  Skin: No rash noted  Psychiatric: He has a normal mood and affect  Nursing note and vitals reviewed               Lizeth Catherine

## 2018-09-10 DIAGNOSIS — M25.571 PAIN, JOINT, ANKLE AND FOOT, RIGHT: Primary | ICD-10-CM

## 2018-09-15 DIAGNOSIS — E11.49 DIABETES MELLITUS TYPE 2 WITH NEUROLOGICAL MANIFESTATIONS (HCC): ICD-10-CM

## 2018-09-15 DIAGNOSIS — E78.2 MIXED HYPERLIPIDEMIA: ICD-10-CM

## 2018-09-15 DIAGNOSIS — I10 BENIGN ESSENTIAL HYPERTENSION: ICD-10-CM

## 2018-09-16 RX ORDER — LOSARTAN POTASSIUM AND HYDROCHLOROTHIAZIDE 12.5; 5 MG/1; MG/1
TABLET ORAL
Qty: 90 TABLET | Refills: 1 | Status: SHIPPED | OUTPATIENT
Start: 2018-09-16 | End: 2019-03-26 | Stop reason: SDUPTHER

## 2018-09-16 RX ORDER — ATORVASTATIN CALCIUM 20 MG/1
TABLET, FILM COATED ORAL
Qty: 90 TABLET | Refills: 1 | Status: SHIPPED | OUTPATIENT
Start: 2018-09-16 | End: 2019-03-26 | Stop reason: SDUPTHER

## 2018-09-16 RX ORDER — GLIPIZIDE 10 MG/1
TABLET, FILM COATED, EXTENDED RELEASE ORAL
Qty: 90 TABLET | Refills: 1 | Status: SHIPPED | OUTPATIENT
Start: 2018-09-16 | End: 2019-03-26 | Stop reason: SDUPTHER

## 2018-12-14 DIAGNOSIS — E11.49 DIABETES MELLITUS TYPE 2 WITH NEUROLOGICAL MANIFESTATIONS (HCC): ICD-10-CM

## 2018-12-18 ENCOUNTER — TELEPHONE (OUTPATIENT)
Dept: FAMILY MEDICINE CLINIC | Facility: CLINIC | Age: 58
End: 2018-12-18

## 2019-02-19 ENCOUNTER — TELEPHONE (OUTPATIENT)
Dept: FAMILY MEDICINE CLINIC | Facility: CLINIC | Age: 59
End: 2019-02-19

## 2019-02-19 NOTE — TELEPHONE ENCOUNTER
Called pt and [facility] requesting a call back for future appt and overdue labs    Fede Keller MA

## 2019-02-21 ENCOUNTER — TELEPHONE (OUTPATIENT)
Dept: FAMILY MEDICINE CLINIC | Facility: CLINIC | Age: 59
End: 2019-02-21

## 2019-02-21 NOTE — TELEPHONE ENCOUNTER
I asked the pt what surgery he is having and he stated that he  is having ankle surgery and I told him that usually the surgeon who is doing the surgery eliminates the medications that need to be stopped and he said that he was told that since Dr Juan Akbar manages his diabetes they wanted her to give the ok and revise the med's that need to be stopped  I also told him that Dr Juan Akbar was not here today and that I would be sending this to another provider    Hayley Scott MA

## 2019-02-21 NOTE — TELEPHONE ENCOUNTER
Patient had pre op clearance done by [facility] on 2/15 and as per note advised not to take metformin and glipizide on day of surgery and discuss lantus with PCP  Labs reviewed and glucose was 95 but no HbA1c was done recently and last done was done in oct 2018 and was 10 8  Patient surgeon on call RAMIRO Medeiros called and informed that patient HbA1c was 10 8 on august, 2018 and clearance was done by NP at [facility]  It will be surgeon discretion to proceed with surgery with this HbA1c levels  Patient stated that takes metformin and glipizide in evening and lantus in morning     Patient advised to take glipizide tonight and do not take metformin tonight and will take half of lantus in morning which will be 37 units in am      Above all discussed with Dr Mavis Carvalho and agreed with the plan

## 2019-02-21 NOTE — TELEPHONE ENCOUNTER
[facility] requesting a call back  Joleen Mckeon

## 2019-02-21 NOTE — TELEPHONE ENCOUNTER
Please review medications for patient  Patient is having surgery tomorrow and it is a Workmans comp  He went to [facility] for his preop per his WC  All of that paperwork is in epic in media  He needs to know if his medication needs to be changed or stopped  Please advise

## 2019-02-21 NOTE — TELEPHONE ENCOUNTER
Lori Mendez is the surgeon  [phone]    I told him that because he did not do his pre-op here that we are not suppose to advise anything  He said that he had his blood work faxed over, new blood work is in chart

## 2019-02-21 NOTE — TELEPHONE ENCOUNTER
Please call patient and ask for ortho surgeon information   Cannot advise anything as our office did not do pre op and last time was in office was sep, 2018 and HbA1c was 10 8  denis

## 2019-02-26 ENCOUNTER — TELEPHONE (OUTPATIENT)
Dept: FAMILY MEDICINE CLINIC | Facility: CLINIC | Age: 59
End: 2019-02-26

## 2019-02-26 DIAGNOSIS — Z12.5 PROSTATE CANCER SCREENING: Primary | ICD-10-CM

## 2019-02-26 NOTE — TELEPHONE ENCOUNTER
I printed out blood work labs to have done but wife is requested that Dr Lynette Cohen order a PSA too  Can we put this lab order with blood work up front for       Please call Rosy Escudero wife when ready [phone]    Thank You

## 2019-03-15 LAB
ALBUMIN SERPL-MCNC: 4.2 G/DL (ref 3.6–4.8)
ALBUMIN/CREAT UR: 1187.7 MG/G CREAT (ref 0–30)
ALBUMIN/GLOB SERPL: 1.5 {RATIO} (ref 1.2–2.2)
ALP SERPL-CCNC: 69 IU/L (ref 39–117)
ALT SERPL-CCNC: 29 IU/L (ref 0–44)
AST SERPL-CCNC: 26 IU/L (ref 0–40)
BILIRUB SERPL-MCNC: 0.6 MG/DL (ref 0–1.2)
BUN SERPL-MCNC: 17 MG/DL (ref 8–27)
BUN/CREAT SERPL: 14 (ref 10–24)
CALCIUM SERPL-MCNC: 9.7 MG/DL (ref 8.6–10.2)
CHLORIDE SERPL-SCNC: 99 MMOL/L (ref 96–106)
CO2 SERPL-SCNC: 26 MMOL/L (ref 20–29)
CREAT SERPL-MCNC: 1.18 MG/DL (ref 0.76–1.27)
CREAT UR-MCNC: 132.2 MG/DL
EST. AVERAGE GLUCOSE BLD GHB EST-MCNC: 194 MG/DL
GLOBULIN SER-MCNC: 2.8 G/DL (ref 1.5–4.5)
GLUCOSE SERPL-MCNC: 154 MG/DL (ref 65–99)
HBA1C MFR BLD: 8.4 % (ref 4.8–5.6)
MICROALBUMIN UR-MCNC: 1570.1 UG/ML
POTASSIUM SERPL-SCNC: 3.9 MMOL/L (ref 3.5–5.2)
PROT SERPL-MCNC: 7 G/DL (ref 6–8.5)
PSA SERPL-MCNC: 0.4 NG/ML (ref 0–4)
SL AMB EGFR AFRICAN AMERICAN: 77 ML/MIN/1.73
SL AMB EGFR NON AFRICAN AMERICAN: 67 ML/MIN/1.73
SL AMB REFLEX CRITERIA: NORMAL
SODIUM SERPL-SCNC: 142 MMOL/L (ref 134–144)

## 2019-03-19 ENCOUNTER — CONSULT (OUTPATIENT)
Dept: GASTROENTEROLOGY | Facility: CLINIC | Age: 59
End: 2019-03-19
Payer: COMMERCIAL

## 2019-03-19 VITALS
DIASTOLIC BLOOD PRESSURE: 88 MMHG | SYSTOLIC BLOOD PRESSURE: 142 MMHG | HEART RATE: 72 BPM | BODY MASS INDEX: 37.19 KG/M2 | HEIGHT: 70 IN | TEMPERATURE: 98.6 F | WEIGHT: 259.8 LBS

## 2019-03-19 DIAGNOSIS — Z12.11 SCREEN FOR COLON CANCER: Primary | ICD-10-CM

## 2019-03-19 PROCEDURE — 99243 OFF/OP CNSLTJ NEW/EST LOW 30: CPT | Performed by: INTERNAL MEDICINE

## 2019-03-19 RX ORDER — IBUPROFEN 800 MG/1
TABLET ORAL
Refills: 0 | COMMUNITY
Start: 2019-03-01 | End: 2019-03-29 | Stop reason: ALTCHOICE

## 2019-03-19 NOTE — PROGRESS NOTES
Consultation - [address] [facility]  [facility] Setting 1960 male         Chief Complaint:  For colonoscopy    HPI:  78-year-old male with history of diabetes mellitus, hypertension, hyperlipidemia was referred for colonoscopy  Patient's last colonoscopy was more than 10 years ago which was reported to be normal at that time  Patient has regular bowel movements and denies any blood or mucus in the stool  Appetite is good and denies any recent weight loss  Denies any abdominal pain, nausea, or vomiting  Has no heartburn or acid reflux  Denies any difficulty swallowing  REVIEW OF SYSTEMS: Review of Systems   Constitutional: Negative for activity change, appetite change, chills, diaphoresis, fatigue, fever and unexpected weight change  HENT: Negative for ear discharge, ear pain, facial swelling, hearing loss, nosebleeds, sore throat, tinnitus and voice change  Eyes: Negative for pain, discharge, redness, itching and visual disturbance  Respiratory: Negative for apnea, cough, chest tightness, shortness of breath and wheezing  Cardiovascular: Negative for chest pain and palpitations  Gastrointestinal:        As noted in HPI   Endocrine: Negative for cold intolerance, heat intolerance and polyuria  Genitourinary: Negative for difficulty urinating, dysuria, flank pain, hematuria and urgency  Musculoskeletal: Positive for arthralgias  Negative for back pain, gait problem, joint swelling and myalgias  Skin: Negative for rash and wound  Neurological: Negative for dizziness, tremors, seizures, speech difficulty, light-headedness, numbness and headaches  Hematological: Negative for adenopathy  Does not bruise/bleed easily  Psychiatric/Behavioral: Negative for agitation, behavioral problems and confusion  The patient is not nervous/anxious           Past Medical History:   Diagnosis Date    Corneal abrasion     last assessed - [id number]    Diabetes mellitus ([facility] [address] )     Hypertension     Impaired fasting glucose     last assessed - [id number]    Viremia     Resolved - [id number]      Past Surgical History:   Procedure Laterality Date    ANKLE SURGERY      ARTHROTOMY ANKLE      ARTHROTOMY KNEE      BACK SURGERY      COLONOSCOPY      FINGER SURGERY      INCISION TENDON SHEATH      of a finger    KNEE SURGERY      SHOULDER SURGERY       Social History     Socioeconomic History    Marital status: /Civil Union     Spouse name: Not on file    Number of children: Not on file    Years of education: Not on file    Highest education level: Not on file   Occupational History    Not on file   Social Needs    Financial resource strain: Not on file    Food insecurity:     Worry: Not on file     Inability: Not on file    Transportation needs:     Medical: Not on file     Non-medical: Not on file   Tobacco Use    Smoking status: Former Smoker    Smokeless tobacco: Never Used    Tobacco comment: Former smoker   Substance and Sexual Activity    Alcohol use: Yes     Comment: rare    Drug use: No    Sexual activity: Not on file   Lifestyle    Physical activity:     Days per week: Not on file     Minutes per session: Not on file    Stress: Not on file   Relationships    Social connections:     Talks on phone: Not on file     Gets together: Not on file     Attends Yarsani service: Not on file     Active member of club or organization: Not on file     Attends meetings of clubs or organizations: Not on file     Relationship status: Not on file    Intimate partner violence:     Fear of current or ex partner: Not on file     Emotionally abused: Not on file     Physically abused: Not on file     Forced sexual activity: Not on file   Other Topics Concern    Not on file   Social History Narrative    Not on file     Family History   Problem Relation Age of Onset    Hypertension Mother     Arthritis Mother     Hyperlipidemia Mother     Lung cancer Mother     Stroke Maternal Grandfather     Diabetes Cousin     COPD Father     Depression Family     Diabetes Family      Codeine and Latex  Current Outpatient Medications   Medication Sig Dispense Refill    aspirin (ECOTRIN LOW STRENGTH) 81 mg EC tablet Take 81 mg by mouth daily      atorvastatin (LIPITOR) 20 mg tablet TAKE ONE TABLET BY MOUTH EVERY DAY 90 tablet 1    B-D ULTRAFINE III SHORT PEN 31G X 8 MM MISC USE AS DIRECTED FOR INJECTION OF LANTUS SOLOSTAR 200 each 1    Blood Glucose Monitoring Suppl (ONE TOUCH ULTRA 2) w/Device KIT by Does not apply route 3 (three) times a day 1 each 0    glipiZIDE (GLUCOTROL XL) 10 mg 24 hr tablet TAKE ONE TABLET BY MOUTH EVERY DAY 90 tablet 1    glucose blood (ONE TOUCH ULTRA TEST) test strip Test glucose 3 times daily 300 each 3    ibuprofen (MOTRIN) 800 mg tablet   0    insulin glargine (LANTUS SOLOSTAR) 100 units/mL injection pen Inject 75 Units under the skin daily As directed 10 pen 5    Lancets ([employer] UNISTIK 2) MISC by Does not apply route      losartan-hydrochlorothiazide (HYZAAR) 50-12 5 mg per tablet TAKE ONE TABLET BY MOUTH EVERY DAY 90 tablet 1    metFORMIN (GLUCOPHAGE) 500 mg tablet TAKE TWO TABLETS BY MOUTH TWICE A  tablet 1    ALPRAZolam (XANAX) 0 5 mg tablet Take 1 tablet by mouth 3 (three) times a day as needed for anxiety for up to 15 doses (Patient not taking: Reported on 3/19/2019) 15 tablet 0    Na Sulfate-K Sulfate-Mg Sulf (SUPREP BOWEL PREP KIT) 17 5-3 13-1 6 GM/177ML SOLN Take 2 Bottles by mouth see administration instructions Please follow the instructions from the office 2 Bottle 0     No current facility-administered medications for this visit  Blood pressure 142/88, pulse 72, temperature 98 6 °F (37 °C), temperature source Tympanic, height 5' 10" (1 778 m), weight 118 kg (259 lb 12 8 oz)  PHYSICAL EXAM: Physical Exam   Constitutional: He is oriented to person, place, and time  He appears well-developed     HENT:   Head: Normocephalic and atraumatic  Mouth/Throat: Oropharynx is clear and moist    Eyes: Pupils are equal, round, and reactive to light  Conjunctivae are normal  Right eye exhibits no discharge  Left eye exhibits no discharge  No scleral icterus  Neck: Neck supple  No JVD present  No tracheal deviation present  No thyromegaly present  Cardiovascular: Normal rate, regular rhythm, normal heart sounds and intact distal pulses  Exam reveals no gallop and no friction rub  No murmur heard  Pulmonary/Chest: Effort normal and breath sounds normal  No respiratory distress  He has no wheezes  He has no rales  He exhibits no tenderness  Abdominal: Soft  Bowel sounds are normal  He exhibits no distension and no mass  There is no tenderness  There is no rebound and no guarding  No hernia  Musculoskeletal: He exhibits no edema  Lymphadenopathy:     He has no cervical adenopathy  Neurological: He is alert and oriented to person, place, and time  Skin: Skin is warm and dry  No rash noted  No erythema  Psychiatric: He has a normal mood and affect  His behavior is normal  Thought content normal         Lab Results   Component Value Date    WBC 7 6 08/16/2018    HGB 16 0 08/16/2018    HCT 46 7 08/16/2018    MCV 84 08/16/2018     08/16/2018     Lab Results   Component Value Date    GLUCOSE 245 (H) 11/20/2017    CALCIUM 9 5 01/07/2018     11/20/2017    K 3 9 03/14/2019    CO2 26 03/14/2019    CL 99 03/14/2019    BUN 17 03/14/2019    CREATININE 1 11 01/07/2018     Lab Results   Component Value Date    ALT 29 03/14/2019    AST 26 03/14/2019    ALKPHOS 99 01/07/2018    BILITOT 0 9 11/20/2017     No results found for: INR, PROTIME    No results found  ASSESSMENT & PLAN:    Screen for colon cancer  Screening for colon cancer - patient is at average risk for colon cancer screening  Rule out colorectal lesions including polyps or malignancy         -Schedule for colonoscopy      -High-fiber diet     -Patient was given instructions about the colonoscopy prep     -Patient was explained about  the risks and benefits of the procedure  Risks including but not limited to bleeding, infection, perforation were explained in detail  Also explained about less than 100% sensitivity with the exam and other alternatives

## 2019-03-26 DIAGNOSIS — I10 BENIGN ESSENTIAL HYPERTENSION: ICD-10-CM

## 2019-03-26 DIAGNOSIS — E78.2 MIXED HYPERLIPIDEMIA: ICD-10-CM

## 2019-03-26 DIAGNOSIS — E11.49 DIABETES MELLITUS TYPE 2 WITH NEUROLOGICAL MANIFESTATIONS (HCC): ICD-10-CM

## 2019-03-26 RX ORDER — LOSARTAN POTASSIUM AND HYDROCHLOROTHIAZIDE 12.5; 5 MG/1; MG/1
TABLET ORAL
Qty: 90 TABLET | Refills: 0 | Status: SHIPPED | OUTPATIENT
Start: 2019-03-26 | End: 2019-06-24

## 2019-03-26 RX ORDER — GLIPIZIDE 10 MG/1
TABLET, FILM COATED, EXTENDED RELEASE ORAL
Qty: 90 TABLET | Refills: 0 | Status: SHIPPED | OUTPATIENT
Start: 2019-03-26 | End: 2019-03-29 | Stop reason: SDUPTHER

## 2019-03-26 RX ORDER — ATORVASTATIN CALCIUM 20 MG/1
TABLET, FILM COATED ORAL
Qty: 90 TABLET | Refills: 0 | Status: SHIPPED | OUTPATIENT
Start: 2019-03-26 | End: 2019-06-24

## 2019-03-29 ENCOUNTER — OFFICE VISIT (OUTPATIENT)
Dept: FAMILY MEDICINE CLINIC | Facility: CLINIC | Age: 59
End: 2019-03-29
Payer: COMMERCIAL

## 2019-03-29 VITALS
BODY MASS INDEX: 37.65 KG/M2 | DIASTOLIC BLOOD PRESSURE: 80 MMHG | RESPIRATION RATE: 16 BRPM | HEIGHT: 70 IN | WEIGHT: 263 LBS | SYSTOLIC BLOOD PRESSURE: 132 MMHG | HEART RATE: 84 BPM | TEMPERATURE: 98.1 F

## 2019-03-29 DIAGNOSIS — E78.2 MIXED HYPERLIPIDEMIA: ICD-10-CM

## 2019-03-29 DIAGNOSIS — I10 BENIGN ESSENTIAL HYPERTENSION: Primary | ICD-10-CM

## 2019-03-29 DIAGNOSIS — E11.49 DIABETES MELLITUS TYPE 2 WITH NEUROLOGICAL MANIFESTATIONS (HCC): ICD-10-CM

## 2019-03-29 PROCEDURE — 99214 OFFICE O/P EST MOD 30 MIN: CPT | Performed by: FAMILY MEDICINE

## 2019-03-29 RX ORDER — GLIPIZIDE 10 MG/1
20 TABLET, FILM COATED, EXTENDED RELEASE ORAL DAILY
Qty: 60 TABLET | Refills: 3 | Status: SHIPPED | OUTPATIENT
Start: 2019-03-29 | End: 2019-06-24

## 2019-03-29 NOTE — ASSESSMENT & PLAN NOTE
Lab Results   Component Value Date    HGBA1C 8 4 (H) 03/14/2019       No results for input(s): POCGLU in the last 72 hours  Blood Sugar Average: Last 72 hrs:    Improved control, A1c is down from over 10 to 8 4  Dose of glipizide increased from 10 mg a day to 20 mg total day  He is also going to keep working on diet and weight loss    His goals lose 10 lb before his next appointment

## 2019-03-29 NOTE — PROGRESS NOTES
Assessment/Plan:    Problem List Items Addressed This Visit     Benign essential hypertension - Primary     Stable  Continue losartan 50-12 5         Relevant Orders    Comprehensive metabolic panel    Lipid Panel with Direct LDL reflex    Diabetes mellitus type 2 with neurological manifestations (HCC)     Lab Results   Component Value Date    HGBA1C 8 4 (H) 03/14/2019       No results for input(s): POCGLU in the last 72 hours  Blood Sugar Average: Last 72 hrs:    Improved control, A1c is down from over 10 to 8 4  Dose of glipizide increased from 10 mg a day to 20 mg total day  He is also going to keep working on diet and weight loss  His goals lose 10 lb before his next appointment           Relevant Medications    glipiZIDE (GLUCOTROL XL) 10 mg 24 hr tablet    Other Relevant Orders    Comprehensive metabolic panel    Hemoglobin A1C    Lipid Panel with Direct LDL reflex    Mixed hyperlipidemia     Stable  Continue atorvastatin 20 mg daily         Relevant Orders    Comprehensive metabolic panel    Lipid Panel with Direct LDL reflex        He has his colonoscopy scheduled  BMI Counseling: Body mass index is 37 74 kg/m²  Discussed with patient's BMI with him  The BMI is above average  BMI counseling and education was provided to the patient  Nutrition recommendations include 3-5 servings of fruits/vegetables daily  Exercise recommendations include exercising 3-5 times per week  Patient Instructions     Recent Results (from the past 672 hour(s))   PSA (Reflex To Free) (Serial)    Collection Time: 03/14/19  8:08 AM   Result Value Ref Range    Prostate Specific Antigen Total 0 4 0 0 - 4 0 ng/mL    Reflex Criteria Comment    Microalbumin / creatinine urine ratio    Collection Time: 03/14/19  8:10 AM   Result Value Ref Range    Creatinine, Urine 132 2 Not Estab  mg/dL    Microalbum  ,U,Random 1,570 1 Not Estab  ug/mL    Microalb/Creat Ratio 1,187 7 (H) 0 0 - 30 0 mg/g creat   Comprehensive metabolic panel Collection Time: 03/14/19  8:10 AM   Result Value Ref Range    Glucose, Random 154 (H) 65 - 99 mg/dL    BUN 17 8 - 27 mg/dL    Creatinine 1 18 0 76 - 1 27 mg/dL    eGFR Non  67 >59 mL/min/1 73    eGFR  77 >59 mL/min/1 73    SL AMB BUN/CREATININE RATIO 14 10 - 24    Sodium 142 134 - 144 mmol/L    Potassium 3 9 3 5 - 5 2 mmol/L    Chloride 99 96 - 106 mmol/L    CO2 26 20 - 29 mmol/L    CALCIUM 9 7 8 6 - 10 2 mg/dL    Protein, Total 7 0 6 0 - 8 5 g/dL    Albumin 4 2 3 6 - 4 8 g/dL    Globulin, Total 2 8 1 5 - 4 5 g/dL    Albumin/Globulin Ratio 1 5 1 2 - 2 2    TOTAL BILIRUBIN 0 6 0 0 - 1 2 mg/dL    Alk Phos Isoenzymes 69 39 - 117 IU/L    AST 26 0 - 40 IU/L    ALT 29 0 - 44 IU/L   Hemoglobin A1C    Collection Time: 03/14/19  8:10 AM   Result Value Ref Range    Hemoglobin A1C 8 4 (H) 4 8 - 5 6 %    Estimated Average Glucose 194 mg/dL           Return in about 3 months (around 6/29/2019) for Next scheduled follow up  Subjective:      Patient ID: Bridger Jean is a 59 y o  male  Chief Complaint   Patient presents with    Follow-up     review new labs-lj       He is only working part time  He is less pain since his sugars are better  He can tell that the low better sugar control is helping his neuropathy  Hyperlipidemia-patient is tolerating his atorvastatin 20 mg daily well  Does not have a with localized muscle pain from it  Hypertension-patient is tolerating his medication well  Not having problems with headaches or leg swelling  The following portions of the patient's history were reviewed and updated as appropriate:  past social history    Review of Systems   Respiratory: Negative  Cardiovascular: Negative            Current Outpatient Medications   Medication Sig Dispense Refill    ALPRAZolam (XANAX) 0 5 mg tablet Take 1 tablet by mouth 3 (three) times a day as needed for anxiety for up to 15 doses 15 tablet 0    aspirin (ECOTRIN LOW STRENGTH) 81 mg EC tablet Take 81 mg by mouth daily      atorvastatin (LIPITOR) 20 mg tablet TAKE ONE TABLET BY MOUTH EVERY DAY 90 tablet 0    B-D ULTRAFINE III SHORT PEN 31G X 8 MM MISC USE AS DIRECTED FOR INJECTION OF LANTUS SOLOSTAR 200 each 1    Blood Glucose Monitoring Suppl (ONE TOUCH ULTRA 2) w/Device KIT by Does not apply route 3 (three) times a day 1 each 0    glipiZIDE (GLUCOTROL XL) 10 mg 24 hr tablet Take 2 tablets (20 mg total) by mouth daily 60 tablet 3    glucose blood (ONE TOUCH ULTRA TEST) test strip Test glucose 3 times daily 300 each 3    insulin glargine (LANTUS SOLOSTAR) 100 units/mL injection pen Inject 75 Units under the skin daily As directed 10 pen 5    Lancets ([employer] UNISTIK 2) MISC by Does not apply route      losartan-hydrochlorothiazide (HYZAAR) 50-12 5 mg per tablet TAKE ONE TABLET BY MOUTH EVERY DAY 90 tablet 0    metFORMIN (GLUCOPHAGE) 500 mg tablet TAKE TWO TABLETS BY MOUTH TWICE A  tablet 1    Na Sulfate-K Sulfate-Mg Sulf (SUPREP BOWEL PREP KIT) 17 5-3 13-1 6 GM/177ML SOLN Take 2 Bottles by mouth see administration instructions Please follow the instructions from the office 2 Bottle 0     No current facility-administered medications for this visit  Objective:    /80   Pulse 84   Temp 98 1 °F (36 7 °C)   Resp 16   Ht 5' 10" (1 778 m)   Wt 119 kg (263 lb)   BMI 37 74 kg/m²        Physical Exam   Constitutional: He appears well-developed and well-nourished  HENT:   Head: Normocephalic and atraumatic  Right Ear: External ear normal    Left Ear: External ear normal    Mouth/Throat: Oropharynx is clear and moist    Cardiovascular: Normal rate, regular rhythm and normal heart sounds  No murmur heard  Pulmonary/Chest: Effort normal and breath sounds normal  No respiratory distress  He has no wheezes  He has no rales  Musculoskeletal: He exhibits no edema or tenderness  Nursing note and vitals reviewed               Leida Ralf, DO

## 2019-03-29 NOTE — PATIENT INSTRUCTIONS
Recent Results (from the past 672 hour(s))   PSA (Reflex To Free) (Serial)    Collection Time: 03/14/19  8:08 AM   Result Value Ref Range    Prostate Specific Antigen Total 0 4 0 0 - 4 0 ng/mL    Reflex Criteria Comment    Microalbumin / creatinine urine ratio    Collection Time: 03/14/19  8:10 AM   Result Value Ref Range    Creatinine, Urine 132 2 Not Estab  mg/dL    Microalbum  ,U,Random 1,570 1 Not Estab  ug/mL    Microalb/Creat Ratio 1,187 7 (H) 0 0 - 30 0 mg/g creat   Comprehensive metabolic panel    Collection Time: 03/14/19  8:10 AM   Result Value Ref Range    Glucose, Random 154 (H) 65 - 99 mg/dL    BUN 17 8 - 27 mg/dL    Creatinine 1 18 0 76 - 1 27 mg/dL    eGFR Non  67 >59 mL/min/1 73    eGFR  77 >59 mL/min/1 73    SL AMB BUN/CREATININE RATIO 14 10 - 24    Sodium 142 134 - 144 mmol/L    Potassium 3 9 3 5 - 5 2 mmol/L    Chloride 99 96 - 106 mmol/L    CO2 26 20 - 29 mmol/L    CALCIUM 9 7 8 6 - 10 2 mg/dL    Protein, Total 7 0 6 0 - 8 5 g/dL    Albumin 4 2 3 6 - 4 8 g/dL    Globulin, Total 2 8 1 5 - 4 5 g/dL    Albumin/Globulin Ratio 1 5 1 2 - 2 2    TOTAL BILIRUBIN 0 6 0 0 - 1 2 mg/dL    Alk Phos Isoenzymes 69 39 - 117 IU/L    AST 26 0 - 40 IU/L    ALT 29 0 - 44 IU/L   Hemoglobin A1C    Collection Time: 03/14/19  8:10 AM   Result Value Ref Range    Hemoglobin A1C 8 4 (H) 4 8 - 5 6 %    Estimated Average Glucose 194 mg/dL

## 2019-04-08 ENCOUNTER — OFFICE VISIT (OUTPATIENT)
Dept: FAMILY MEDICINE CLINIC | Facility: CLINIC | Age: 59
End: 2019-04-08
Payer: COMMERCIAL

## 2019-04-08 VITALS
WEIGHT: 260.4 LBS | BODY MASS INDEX: 37.28 KG/M2 | RESPIRATION RATE: 20 BRPM | TEMPERATURE: 99.3 F | HEIGHT: 70 IN | HEART RATE: 86 BPM | DIASTOLIC BLOOD PRESSURE: 78 MMHG | SYSTOLIC BLOOD PRESSURE: 126 MMHG

## 2019-04-08 DIAGNOSIS — I10 BENIGN ESSENTIAL HYPERTENSION: Primary | ICD-10-CM

## 2019-04-08 DIAGNOSIS — E11.49 DIABETES MELLITUS TYPE 2 WITH NEUROLOGICAL MANIFESTATIONS (HCC): ICD-10-CM

## 2019-04-08 DIAGNOSIS — E78.2 MIXED HYPERLIPIDEMIA: ICD-10-CM

## 2019-04-08 DIAGNOSIS — J01.00 ACUTE NON-RECURRENT MAXILLARY SINUSITIS: ICD-10-CM

## 2019-04-08 PROCEDURE — 3078F DIAST BP <80 MM HG: CPT | Performed by: FAMILY MEDICINE

## 2019-04-08 PROCEDURE — 3074F SYST BP LT 130 MM HG: CPT | Performed by: FAMILY MEDICINE

## 2019-04-08 PROCEDURE — 99214 OFFICE O/P EST MOD 30 MIN: CPT | Performed by: FAMILY MEDICINE

## 2019-04-08 PROCEDURE — 1036F TOBACCO NON-USER: CPT | Performed by: FAMILY MEDICINE

## 2019-04-08 PROCEDURE — 3008F BODY MASS INDEX DOCD: CPT | Performed by: FAMILY MEDICINE

## 2019-04-08 RX ORDER — BENZONATATE 200 MG/1
200 CAPSULE ORAL 3 TIMES DAILY PRN
Qty: 30 CAPSULE | Refills: 0 | Status: SHIPPED | OUTPATIENT
Start: 2019-04-08 | End: 2020-02-03 | Stop reason: ALTCHOICE

## 2019-04-08 RX ORDER — AMOXICILLIN 875 MG/1
875 TABLET, COATED ORAL 2 TIMES DAILY
Qty: 20 TABLET | Refills: 0 | Status: SHIPPED | OUTPATIENT
Start: 2019-04-08 | End: 2019-04-18

## 2019-04-18 ENCOUNTER — ANESTHESIA EVENT (OUTPATIENT)
Dept: GASTROENTEROLOGY | Facility: AMBULARY SURGERY CENTER | Age: 59
End: 2019-04-18
Payer: COMMERCIAL

## 2019-04-19 ENCOUNTER — HOSPITAL ENCOUNTER (OUTPATIENT)
Facility: AMBULARY SURGERY CENTER | Age: 59
Setting detail: OUTPATIENT SURGERY
Discharge: HOME | End: 2019-04-19
Attending: INTERNAL MEDICINE | Admitting: INTERNAL MEDICINE
Payer: COMMERCIAL

## 2019-04-19 ENCOUNTER — ANESTHESIA (OUTPATIENT)
Dept: GASTROENTEROLOGY | Facility: AMBULARY SURGERY CENTER | Age: 59
End: 2019-04-19
Payer: COMMERCIAL

## 2019-04-19 VITALS
WEIGHT: 260 LBS | BODY MASS INDEX: 37.22 KG/M2 | DIASTOLIC BLOOD PRESSURE: 71 MMHG | HEART RATE: 80 BPM | RESPIRATION RATE: 18 BRPM | TEMPERATURE: 97.2 F | SYSTOLIC BLOOD PRESSURE: 144 MMHG | HEIGHT: 70 IN | OXYGEN SATURATION: 97 %

## 2019-04-19 DIAGNOSIS — Z12.11 SCREEN FOR COLON CANCER: ICD-10-CM

## 2019-04-19 LAB — GLUCOSE SERPL-MCNC: 128 MG/DL (ref 65–140)

## 2019-04-19 PROCEDURE — NC001 PR NO CHARGE: Performed by: INTERNAL MEDICINE

## 2019-04-19 PROCEDURE — 88305 TISSUE EXAM BY PATHOLOGIST: CPT | Performed by: PATHOLOGY

## 2019-04-19 PROCEDURE — 82948 REAGENT STRIP/BLOOD GLUCOSE: CPT

## 2019-04-19 PROCEDURE — 45380 COLONOSCOPY AND BIOPSY: CPT | Performed by: INTERNAL MEDICINE

## 2019-04-19 RX ORDER — SODIUM CHLORIDE, SODIUM LACTATE, POTASSIUM CHLORIDE, CALCIUM CHLORIDE 600; 310; 30; 20 MG/100ML; MG/100ML; MG/100ML; MG/100ML
100 INJECTION, SOLUTION INTRAVENOUS CONTINUOUS
Status: DISCONTINUED | OUTPATIENT
Start: 2019-04-19 | End: 2019-04-19 | Stop reason: HOSPADM

## 2019-04-19 RX ORDER — PROPOFOL 10 MG/ML
INJECTION, EMULSION INTRAVENOUS CONTINUOUS PRN
Status: DISCONTINUED | OUTPATIENT
Start: 2019-04-19 | End: 2019-04-19 | Stop reason: SURG

## 2019-04-19 RX ORDER — LIDOCAINE HYDROCHLORIDE 10 MG/ML
INJECTION, SOLUTION INFILTRATION; PERINEURAL AS NEEDED
Status: DISCONTINUED | OUTPATIENT
Start: 2019-04-19 | End: 2019-04-19 | Stop reason: SURG

## 2019-04-19 RX ORDER — PROPOFOL 10 MG/ML
INJECTION, EMULSION INTRAVENOUS AS NEEDED
Status: DISCONTINUED | OUTPATIENT
Start: 2019-04-19 | End: 2019-04-19 | Stop reason: SURG

## 2019-04-19 RX ADMIN — SODIUM CHLORIDE, SODIUM LACTATE, POTASSIUM CHLORIDE, AND CALCIUM CHLORIDE 100 ML/HR: .6; .31; .03; .02 INJECTION, SOLUTION INTRAVENOUS at 08:39

## 2019-04-19 RX ADMIN — PROPOFOL 130 MG: 10 INJECTION, EMULSION INTRAVENOUS at 09:14

## 2019-04-19 RX ADMIN — PROPOFOL 140 MCG/KG/MIN: 10 INJECTION, EMULSION INTRAVENOUS at 09:14

## 2019-04-19 RX ADMIN — LIDOCAINE HYDROCHLORIDE 50 MG: 10 INJECTION, SOLUTION INFILTRATION; PERINEURAL at 09:14

## 2019-04-23 ENCOUNTER — TELEPHONE (OUTPATIENT)
Dept: GASTROENTEROLOGY | Facility: AMBULARY SURGERY CENTER | Age: 59
End: 2019-04-23

## 2019-06-23 DIAGNOSIS — IMO0002 DIABETES MELLITUS WITH NEUROLOGICAL MANIFESTATIONS, UNCONTROLLED: ICD-10-CM

## 2019-06-23 DIAGNOSIS — I10 BENIGN ESSENTIAL HYPERTENSION: ICD-10-CM

## 2019-06-23 DIAGNOSIS — E78.2 MIXED HYPERLIPIDEMIA: ICD-10-CM

## 2019-06-23 DIAGNOSIS — E11.49 DIABETES MELLITUS TYPE 2 WITH NEUROLOGICAL MANIFESTATIONS (HCC): ICD-10-CM

## 2019-06-24 RX ORDER — ATORVASTATIN CALCIUM 20 MG/1
TABLET, FILM COATED ORAL
Qty: 90 TABLET | Refills: 0 | Status: SHIPPED | OUTPATIENT
Start: 2019-06-24 | End: 2019-10-06 | Stop reason: SDUPTHER

## 2019-06-24 RX ORDER — LOSARTAN POTASSIUM AND HYDROCHLOROTHIAZIDE 12.5; 5 MG/1; MG/1
TABLET ORAL
Qty: 90 TABLET | Refills: 0 | Status: SHIPPED | OUTPATIENT
Start: 2019-06-24 | End: 2019-10-06 | Stop reason: SDUPTHER

## 2019-06-24 RX ORDER — INSULIN GLARGINE 100 [IU]/ML
INJECTION, SOLUTION SUBCUTANEOUS
Qty: 30 ML | Refills: 0 | Status: SHIPPED | OUTPATIENT
Start: 2019-06-24 | End: 2019-08-06 | Stop reason: SDUPTHER

## 2019-06-24 RX ORDER — GLIPIZIDE 10 MG/1
TABLET, FILM COATED, EXTENDED RELEASE ORAL
Qty: 90 TABLET | Refills: 0 | Status: SHIPPED | OUTPATIENT
Start: 2019-06-24 | End: 2019-09-27 | Stop reason: SDUPTHER

## 2019-07-09 PROBLEM — E66.01 CLASS 2 SEVERE OBESITY WITH SERIOUS COMORBIDITY AND BODY MASS INDEX (BMI) OF 37.0 TO 37.9 IN ADULT (HCC): Status: ACTIVE | Noted: 2019-07-09

## 2019-07-09 PROBLEM — E66.812 CLASS 2 SEVERE OBESITY WITH SERIOUS COMORBIDITY AND BODY MASS INDEX (BMI) OF 37.0 TO 37.9 IN ADULT (HCC): Status: ACTIVE | Noted: 2019-07-09

## 2019-08-06 DIAGNOSIS — E11.49 DIABETES MELLITUS TYPE 2 WITH NEUROLOGICAL MANIFESTATIONS (HCC): ICD-10-CM

## 2019-08-06 DIAGNOSIS — IMO0002 DIABETES MELLITUS WITH NEUROLOGICAL MANIFESTATIONS, UNCONTROLLED: ICD-10-CM

## 2019-08-06 RX ORDER — PEN NEEDLE, DIABETIC 31 GX5/16"
NEEDLE, DISPOSABLE MISCELLANEOUS
Qty: 200 EACH | Refills: 1 | Status: SHIPPED | OUTPATIENT
Start: 2019-08-06 | End: 2020-08-23

## 2019-08-07 RX ORDER — INSULIN GLARGINE 100 [IU]/ML
INJECTION, SOLUTION SUBCUTANEOUS
Qty: 30 ML | Refills: 3 | Status: SHIPPED | OUTPATIENT
Start: 2019-08-07 | End: 2019-11-02

## 2019-09-11 ENCOUNTER — APPOINTMENT (OUTPATIENT)
Dept: RADIOLOGY | Facility: CLINIC | Age: 59
End: 2019-09-11
Payer: COMMERCIAL

## 2019-09-11 ENCOUNTER — OFFICE VISIT (OUTPATIENT)
Dept: OBGYN CLINIC | Facility: CLINIC | Age: 59
End: 2019-09-11
Payer: COMMERCIAL

## 2019-09-11 VITALS
SYSTOLIC BLOOD PRESSURE: 153 MMHG | HEIGHT: 70 IN | DIASTOLIC BLOOD PRESSURE: 85 MMHG | WEIGHT: 264.4 LBS | BODY MASS INDEX: 37.85 KG/M2 | HEART RATE: 64 BPM

## 2019-09-11 DIAGNOSIS — M25.561 RIGHT KNEE PAIN, UNSPECIFIED CHRONICITY: ICD-10-CM

## 2019-09-11 DIAGNOSIS — M17.11 PRIMARY OSTEOARTHRITIS OF RIGHT KNEE: Primary | ICD-10-CM

## 2019-09-11 PROCEDURE — 99214 OFFICE O/P EST MOD 30 MIN: CPT | Performed by: ORTHOPAEDIC SURGERY

## 2019-09-11 PROCEDURE — 73562 X-RAY EXAM OF KNEE 3: CPT

## 2019-09-11 NOTE — PATIENT INSTRUCTIONS
Knee Exercises   AMBULATORY CARE:   What you need to know about knee exercises:  Knee exercises help strengthen the muscles around your knee  Strong muscles can help reduce pain and decrease your risk of future injury  Knee exercises also help you heal after an injury or surgery  · Start slow  These are beginning exercises  Ask your healthcare provider if you need to see a physical therapist for more advanced exercises  As you get stronger, you may be able to do more sets of each exercise or add weights  · Stop if you feel pain  It is normal to feel some discomfort at first  Regular exercise will help decrease your discomfort over time  · Do the exercises on both legs  Do this so both knees remain strong  · Warm up before you do knee exercises  Walk or ride a stationary bike for 5 or 10 minutes to warm your muscles  How to perform knee stretches safely:  Always stretch before you do strengthening exercises  Do these stretching exercises again after you do the strengthening exercises  Do these stretches 4 or 5 days a week, or as directed  · Standing calf stretch: Face a wall and place both palms flat on the wall, or hold the back of a chair for balance  Keep a slight bend in your knees  Take a big step backward with one leg  Keep your other leg directly under you  Keep both heels flat and press your hips forward  Hold the stretch for 30 seconds, and then relax for 30 seconds  Switch legs  Repeat 2 or 3 times on each leg  · Standing quadriceps stretch:  Stand and place one hand against a wall or hold the back of a chair for balance  With your weight on one leg, bend your other leg and grab your ankle  Bring your heel toward your buttocks  Hold the stretch for 30 to 60 seconds  Switch legs  Repeat 2 or 3 times on each leg  · Sitting hamstring stretch:  Sit with both legs straight in front of you  Do not point or flex your toes   Place your palms on the floor and slide your hands forward until you feel the stretch  Do not round your back  Hold the stretch for 30 seconds  Repeat 2 or 3 times  How to perform knee strengthening exercises safely:  Do these exercises 4 or 5 days a week, or as directed  · Standing half squats:  Stand with your feet shoulder-width apart  Lean your back against a wall or hold the back of a chair for balance, if needed  Slowly sit down about 10 inches, as if you are going to sit in a chair  Your body weight should be mostly over your heels  Hold the squat for 5 seconds, then rise to a standing position  Do 3 sets of 10 squats to strengthen your buttocks and thighs  · Standing hamstring curls: Face a wall and place both palms flat on the wall, or hold the back of a chair for balance  With your weight on one leg, lift your other foot as close to your buttocks as you can  Hold for 5 seconds and then lower your leg  Do 2 sets of 10 curls on each leg  This exercise strengthens the muscles in the back of your thigh  · Standing calf raises:  Face a wall and place both palms flat on the wall, or hold the back of a chair for balance  Stand up straight, and do not lean  Place all your weight on one leg by lifting the other foot off the floor  Raise the heel of the foot that is on the floor as high as you can and then lower it  Do 2 sets of 10 calf raises on each leg to strengthen your calf muscles  · Straight leg lifts:  Lie on your stomach with straight legs  Fold your arms in front of you and rest your head in your arms  Tighten your leg muscles and raise one leg as high as you can  Hold for 5 seconds, then lower your leg  Do 2 sets of 10 lifts on each leg to strengthen your buttocks  · Sitting leg lifts:  Sit in a chair  Slowly straighten and raise one leg  Squeeze your thigh muscles and hold for 5 seconds  Relax and return your foot to the floor  Do 2 sets of 10 lifts on each leg   This helps strengthen the muscles in the front of your thigh  Contact your healthcare provider if:   · You have new pain or your pain becomes worse  · You have questions or concerns about your condition or care  © 2017 [employer] Information is for End User's use only and may not be sold, redistributed or otherwise used for commercial purposes  All illustrations and images included in CareNotes® are the copyrighted property of [employer]  or Damien Moreno  The above information is an  only  It is not intended as medical advice for individual conditions or treatments  Talk to your doctor, nurse or pharmacist before following any medical regimen to see if it is safe and effective for you

## 2019-09-11 NOTE — PROGRESS NOTES
Assessment/Plan:  1  Primary osteoarthritis of right knee  Injection procedure prior authorization   2  Right knee pain, unspecified chronicity  XR knee 3 vw right non injury       Scribe Attestation    I,:   Phuong Sargent am acting as a scribe while in the presence of the attending physician :        I,:   Mikael Fabry, MD personally performed the services described in this documentation    as scribed in my presence :            Unfortunately, Murtaza's x-ray of his right knee demonstrates severe tri compartment osteoarthritis with diffused osteophytes  He has not done any conservative treatment for almost 2 years and I believe we can continue to try to treat this conservatively for couple more years to buy some time before a knee replacement  I would like him to go to formal physical therapy, however he notes financially he cannot afford the co-pay at this time  We provided him with some home exercises for his knee today  He has previously received gel injections and it provided him with good relief  We will put in a referral for Euflexxa today  I explained to him that with Euflexxa he will receive 1 injection every week for 3 weeks  If the injections do not provide him with relief, we will refer him to Dr Jessica Gómez to discuss possible knee replacement  We will see him back in the office for the 1st round of Euflexxa after authorization  Subjective:   Edenilson Cage is a 59 y o  male who presents to the office today for initial evaluation of right knee pain  He notes a history of surgeries in this knee performed in the '80s  He has not had any issues with this knee up until about a couple years ago with no mechanism of injury  He was getting treated for right knee osteoarthritis and received a cortisone injection that provided little to no relief  He also received gel injections that provided more relief than the cortisone injection, however it only lasted a couple months    He denies any formal physical therapy for this knee  At today's visit, he localizes majority of his pain in the posterior aspect of his knee  He states his pain is exacerbated after sitting for a while and then taking a few steps once he gets up  He denies any radicular symptoms  He denies any numbness and tingling  Review of Systems   Constitutional: Negative for chills, fever and unexpected weight change  HENT: Negative for hearing loss, nosebleeds and sore throat  Eyes: Negative for pain, redness and visual disturbance  Respiratory: Positive for shortness of breath  Negative for cough and wheezing  Cardiovascular: Positive for leg swelling  Negative for chest pain and palpitations  Gastrointestinal: Negative for abdominal pain, nausea and vomiting  Endocrine: Negative for polyphagia and polyuria  Genitourinary: Negative for dysuria and hematuria  Musculoskeletal: Positive for arthralgias, joint swelling and myalgias  See HPI   Skin: Negative for rash and wound  Neurological: Positive for numbness  Negative for dizziness and headaches  Psychiatric/Behavioral: Negative for decreased concentration and suicidal ideas  The patient is not nervous/anxious  Past Medical History:   Diagnosis Date    Corneal abrasion     last assessed - [id number]    Diabetes mellitus ([facility] [address] )     Hiatal hernia     Hyperlipidemia     Hypertension     Impaired fasting glucose     last assessed - [id number]    Kidney stone     last stone 2009    PONV (postoperative nausea and vomiting)     Sinus infection     currently under tx PO ABT etc as of 04/17/19    Viremia     Resolved - [id number]       Past Surgical History:   Procedure Laterality Date    ANKLE SURGERY Right     tendon arthrotomy    ARTHROTOMY ANKLE      BACK SURGERY  2014    lumbar laminectomy    COLONOSCOPY      COLONOSCOPY N/A 4/19/2019    Procedure: COLONOSCOPY;  Surgeon: Sarah Thurston MD;  Location: [place] [address] [facility] LAB;   Service: Gastroenterology    FINGER SURGERY Bilateral     every finger contracture release over a 10 year period trigger finger    INCISION TENDON SHEATH      of a finger    KNEE SURGERY Right     x2 arthrotomy    SHOULDER SURGERY Left     x1 staples, tendon repair secondary to many spontaneous dislocations       Family History   Problem Relation Age of Onset    Hypertension Mother    Nellrosi Betts Arthritis Mother     Hyperlipidemia Mother    Nellrosi Betts Lung cancer Mother     Stroke Maternal Grandfather     Diabetes Cousin     COPD Father     Depression Family     Diabetes Family     No Known Problems Sister     No Known Problems Brother     No Known Problems Maternal Aunt     No Known Problems Maternal Uncle     No Known Problems Paternal Aunt     No Known Problems Paternal Uncle     No Known Problems Maternal Grandmother     No Known Problems Paternal Grandmother     No Known Problems Paternal Grandfather        Social History     Occupational History    Not on file   Tobacco Use    Smoking status: Former Smoker     Last attempt to quit:      Years since quittin 7    Smokeless tobacco: Never Used    Tobacco comment: Former smoker   Substance and Sexual Activity    Alcohol use: Yes     Frequency: 2-3 times a week     Comment: rare    Drug use: No    Sexual activity: Not on file         Current Outpatient Medications:     ALPRAZolam (XANAX) 0 5 mg tablet, Take 1 tablet by mouth 3 (three) times a day as needed for anxiety for up to 15 doses, Disp: 15 tablet, Rfl: 0    aspirin (ECOTRIN LOW STRENGTH) 81 mg EC tablet, Take 81 mg by mouth daily, Disp: , Rfl:     atorvastatin (LIPITOR) 20 mg tablet, TAKE ONE TABLET BY MOUTH EVERY DAY, Disp: 90 tablet, Rfl: 0    B-D ULTRAFINE III SHORT PEN 31G X 8 MM MISC, USE AS DIRECTED FOR INJECTION OF LANTUS SOLOSTAR, Disp: 200 each, Rfl: 1    benzonatate (TESSALON) 200 MG capsule, Take 1 capsule (200 mg total) by mouth 3 (three) times a day as needed for cough, Disp: 30 capsule, Rfl: 0    Blood Glucose Monitoring Suppl (ONE TOUCH ULTRA 2) w/Device KIT, by Does not apply route 3 (three) times a day, Disp: 1 each, Rfl: 0    glipiZIDE (GLUCOTROL XL) 10 mg 24 hr tablet, TAKE ONE TABLET BY MOUTH EVERY DAY, Disp: 90 tablet, Rfl: 0    glucose blood (ONE TOUCH ULTRA TEST) test strip, Test glucose 3 times daily, Disp: 300 each, Rfl: 3    Lancets (LIFESCAN UNISTIK 2) MISC, by Does not apply route, Disp: , Rfl:     LANTUS SOLOSTAR 100 units/mL injection pen, INJECT 75 UNITS UNDER THE SKIN ONCE DAILY AS DIRECTED, Disp: 30 mL, Rfl: 3    losartan-hydrochlorothiazide (HYZAAR) 50-12 5 mg per tablet, TAKE ONE TABLET BY MOUTH EVERY DAY, Disp: 90 tablet, Rfl: 0    losartan-hydrochlorothiazide (HYZAAR) 50-12 5 mg per tablet, TAKE ONE TABLET BY MOUTH EVERY DAY, Disp: 90 tablet, Rfl: 0    metFORMIN (GLUCOPHAGE) 500 mg tablet, TAKE TWO TABLETS BY MOUTH TWICE A DAY, Disp: 360 tablet, Rfl: 1    Allergies   Allergen Reactions    Latex Rash     At dentist, lips swollen     Codeine Nausea Only     Reaction Date: [id number]; Objective:  Vitals:    09/11/19 1135   BP: 153/85   Pulse: 64       Right Knee Exam     Tenderness   Right knee tenderness location: posterior aspect of knee  Range of Motion   Right knee extension: -3    Flexion: 90     Tests   Varus: negative Valgus: negative  Lachman:  Anterior - negative    Posterior - negative  Drawer:  Anterior - negative    Posterior - negative    Other   Erythema: absent  Scars: present (Well healed surgical incision)  Sensation: normal  Pulse: present (2+ dorsal pedal)  Effusion: effusion (trace) present          Observations     Right Knee   Positive for effusion (trace)  Physical Exam   Constitutional: He is oriented to person, place, and time  He appears well-developed and well-nourished  HENT:   Head: Normocephalic and atraumatic  Eyes: Pupils are equal, round, and reactive to light   Conjunctivae are normal    Neck: Normal range of motion  Neck supple  Cardiovascular: Normal rate and intact distal pulses  Pulmonary/Chest: Effort normal  No respiratory distress  Musculoskeletal:        Right knee: He exhibits effusion (trace)  As noted in HPI   Neurological: He is alert and oriented to person, place, and time  Skin: Skin is warm and dry  Psychiatric: He has a normal mood and affect  His behavior is normal    Vitals reviewed  I have personally reviewed pertinent films in PACS and my interpretation is as follows:  X-rays of the right knee obtained on 09/11/2019 demonstrate moderate to severe try compartment osteoarthritis with diffused osteophytes present

## 2019-09-27 DIAGNOSIS — E11.49 DIABETES MELLITUS TYPE 2 WITH NEUROLOGICAL MANIFESTATIONS (HCC): ICD-10-CM

## 2019-09-27 RX ORDER — GLIPIZIDE 10 MG/1
TABLET, FILM COATED, EXTENDED RELEASE ORAL
Qty: 90 TABLET | Refills: 1 | Status: SHIPPED | OUTPATIENT
Start: 2019-09-27 | End: 2020-02-03 | Stop reason: SDUPTHER

## 2019-10-06 DIAGNOSIS — E78.2 MIXED HYPERLIPIDEMIA: ICD-10-CM

## 2019-10-06 DIAGNOSIS — I10 BENIGN ESSENTIAL HYPERTENSION: ICD-10-CM

## 2019-10-06 RX ORDER — ATORVASTATIN CALCIUM 20 MG/1
TABLET, FILM COATED ORAL
Qty: 90 TABLET | Refills: 1 | Status: SHIPPED | OUTPATIENT
Start: 2019-10-06 | End: 2020-03-30

## 2019-10-06 RX ORDER — LOSARTAN POTASSIUM AND HYDROCHLOROTHIAZIDE 12.5; 5 MG/1; MG/1
TABLET ORAL
Qty: 90 TABLET | Refills: 1 | Status: SHIPPED | OUTPATIENT
Start: 2019-10-06 | End: 2020-03-30

## 2019-11-02 ENCOUNTER — TELEPHONE (OUTPATIENT)
Dept: FAMILY MEDICINE CLINIC | Facility: CLINIC | Age: 59
End: 2019-11-02

## 2019-11-02 DIAGNOSIS — E11.49 DIABETES MELLITUS TYPE 2 WITH NEUROLOGICAL MANIFESTATIONS (HCC): Primary | ICD-10-CM

## 2019-11-02 NOTE — TELEPHONE ENCOUNTER
Nora Patricia called back with all card info:    NEW INSURANCE IS [address] [place], [place] [address] [facility]     MEMBER ID: [id number]   Name: Hetal Cunningham  EFF: 10/15/2019  PLAN TIER MEMBER    RX BIN: [id number]   RX GRP: [id number]  RXPCN: [address] [place] [id number] PHONE NUMBER: [phone]    PHARMACY SERVICES PHONE NUMBER: [phone]      WWW  [employer]/MEMBER

## 2019-11-02 NOTE — TELEPHONE ENCOUNTER
Patients wife called and stated they changed insurance and now patients insulin needs to be Prior Authorized  Pharmacy is [facility]    Patient is completely out since Thursday    Please call wife Sunday Massiel   [phone]

## 2019-11-02 NOTE — TELEPHONE ENCOUNTER
Pt's new insurance does not cover Lantus at all  It is plan exclusion  Aracelis yu PA was done ID [id number]  [phone]  Approved until 11/02/2020  Pharmacy and Pt aware  No further action needed    Task complete  rmklpn

## 2019-11-02 NOTE — TELEPHONE ENCOUNTER
Spoke to pt and wife, Janiya Saab  They are aware [employer] paperwork has been completed and faxed 11/01/19  No PA papperwork noted yesterday  Callled [employer] for the information    ID [id number] [employer]  [phone]  [facility]

## 2019-11-04 NOTE — TELEPHONE ENCOUNTER
Pt did  the [address] but it also was expensive  [employer] assistance can take up to 8 weeks    Pt and wife aware of all the information  Task Complete for now  klpn

## 2019-11-20 ENCOUNTER — TELEPHONE (OUTPATIENT)
Dept: OBGYN CLINIC | Facility: HOSPITAL | Age: 59
End: 2019-11-20

## 2019-11-20 NOTE — TELEPHONE ENCOUNTER
Leandra   [phone]    Dr Kal Malloy    Patient states he has new insurance, [employer] and would like to get Gel injections reauthorized

## 2019-11-21 NOTE — TELEPHONE ENCOUNTER
Phones patient and left a message asking him to call back with his new insurance information, his ID#, Group# and a provider/member # on the back of the card

## 2019-11-22 NOTE — TELEPHONE ENCOUNTER
Patient is returning phone call     ID# [id number]  Group# [id number]  provider/member # on the back of the card:  Benefits/needs status - [phone]  Member Service # - [phone]  Provider  # - [phone]

## 2019-12-19 ENCOUNTER — TELEPHONE (OUTPATIENT)
Dept: FAMILY MEDICINE CLINIC | Facility: CLINIC | Age: 59
End: 2019-12-19

## 2019-12-19 NOTE — TELEPHONE ENCOUNTER
Forms completed by Dr Monalisa Colindres pt to  forms Pt also has more to complete and to make copy of pt's income  before he can mail it  [facility] is connected with [facility] artemio     [phone]  [facility]

## 2019-12-24 NOTE — TELEPHONE ENCOUNTER
Called pt again for  of the form which is at the   Pt must complete his part and then submit income information    yoon

## 2019-12-31 NOTE — TELEPHONE ENCOUNTER
Pt picked up the folder  Aware to complete pt's part and mail it back    Prescription [facility] was called today and made aware   rmklpn

## 2020-01-09 DIAGNOSIS — E11.49 DIABETES MELLITUS TYPE 2 WITH NEUROLOGICAL MANIFESTATIONS (HCC): ICD-10-CM

## 2020-01-10 NOTE — TELEPHONE ENCOUNTER
He has cancelled his past couple of appointments  Please call him and ask him to schedule and let him know he needs to keep the appointment  Once he schedules I will refill the medication    Jayla Sharif DO

## 2020-01-14 NOTE — TELEPHONE ENCOUNTER
6 boxes of Basaglar kwikpens 100ml were delivered to the Office 01/14/2020  Pt made aware for   Eleni Reason paperwork given to pt  Copies will be put in pt's chart     [place] [phone]   Task Complete  klpn

## 2020-02-03 ENCOUNTER — OFFICE VISIT (OUTPATIENT)
Dept: FAMILY MEDICINE CLINIC | Facility: CLINIC | Age: 60
End: 2020-02-03
Payer: COMMERCIAL

## 2020-02-03 VITALS
OXYGEN SATURATION: 96 % | HEART RATE: 75 BPM | SYSTOLIC BLOOD PRESSURE: 140 MMHG | DIASTOLIC BLOOD PRESSURE: 80 MMHG | WEIGHT: 261 LBS | HEIGHT: 70 IN | TEMPERATURE: 98.8 F | BODY MASS INDEX: 37.37 KG/M2 | RESPIRATION RATE: 16 BRPM

## 2020-02-03 DIAGNOSIS — I10 BENIGN ESSENTIAL HYPERTENSION: ICD-10-CM

## 2020-02-03 DIAGNOSIS — M25.50 ARTHRALGIA, UNSPECIFIED JOINT: ICD-10-CM

## 2020-02-03 DIAGNOSIS — E11.49 DIABETES MELLITUS TYPE 2 WITH NEUROLOGICAL MANIFESTATIONS (HCC): Primary | ICD-10-CM

## 2020-02-03 DIAGNOSIS — E66.01 CLASS 2 SEVERE OBESITY DUE TO EXCESS CALORIES WITH SERIOUS COMORBIDITY AND BODY MASS INDEX (BMI) OF 37.0 TO 37.9 IN ADULT (HCC): ICD-10-CM

## 2020-02-03 PROBLEM — J01.00 ACUTE NON-RECURRENT MAXILLARY SINUSITIS: Status: RESOLVED | Noted: 2019-04-08 | Resolved: 2020-02-03

## 2020-02-03 LAB
LEFT EYE DIABETIC RETINOPATHY: ABNORMAL
LEFT EYE IMAGE QUALITY: ABNORMAL
LEFT EYE MACULAR EDEMA: ABNORMAL
LEFT EYE OTHER RETINOPATHY: ABNORMAL
RIGHT EYE DIABETIC RETINOPATHY: ABNORMAL
RIGHT EYE IMAGE QUALITY: ABNORMAL
RIGHT EYE MACULAR EDEMA: ABNORMAL
RIGHT EYE OTHER RETINOPATHY: ABNORMAL
SEVERITY (EYE EXAM): ABNORMAL
SL AMB POCT HEMOGLOBIN AIC: 9.5 (ref ?–6.5)

## 2020-02-03 PROCEDURE — 1036F TOBACCO NON-USER: CPT | Performed by: FAMILY MEDICINE

## 2020-02-03 PROCEDURE — 3046F HEMOGLOBIN A1C LEVEL >9.0%: CPT | Performed by: ORTHOPAEDIC SURGERY

## 2020-02-03 PROCEDURE — 3077F SYST BP >= 140 MM HG: CPT | Performed by: FAMILY MEDICINE

## 2020-02-03 PROCEDURE — 2024F 7 FLD RTA PHOTO EVC RTNOPTHY: CPT | Performed by: ORTHOPAEDIC SURGERY

## 2020-02-03 PROCEDURE — 99214 OFFICE O/P EST MOD 30 MIN: CPT | Performed by: FAMILY MEDICINE

## 2020-02-03 PROCEDURE — 36415 COLL VENOUS BLD VENIPUNCTURE: CPT | Performed by: FAMILY MEDICINE

## 2020-02-03 PROCEDURE — 3046F HEMOGLOBIN A1C LEVEL >9.0%: CPT | Performed by: FAMILY MEDICINE

## 2020-02-03 PROCEDURE — 92250 FUNDUS PHOTOGRAPHY W/I&R: CPT | Performed by: FAMILY MEDICINE

## 2020-02-03 PROCEDURE — 3079F DIAST BP 80-89 MM HG: CPT | Performed by: FAMILY MEDICINE

## 2020-02-03 PROCEDURE — 83036 HEMOGLOBIN GLYCOSYLATED A1C: CPT | Performed by: FAMILY MEDICINE

## 2020-02-03 PROCEDURE — 3008F BODY MASS INDEX DOCD: CPT | Performed by: FAMILY MEDICINE

## 2020-02-03 RX ORDER — GLIPIZIDE 10 MG/1
20 TABLET, FILM COATED, EXTENDED RELEASE ORAL DAILY
Qty: 180 TABLET | Refills: 1 | Status: SHIPPED | OUTPATIENT
Start: 2020-02-03 | End: 2020-04-01

## 2020-02-03 NOTE — PROGRESS NOTES
Assessment/Plan:    1  Diabetes mellitus type 2 with neurological manifestations [facility])  Assessment & Plan:  Not controlled, A1c is 9 5  His life is back to a routine and is working 7 days a week  Now he is taking his medication regularly  He will start [employer] in March  Orders:  -     IRIS Diabetic eye exam  -     metFORMIN (GLUCOPHAGE) 500 mg tablet; Take 2 tablets (1,000 mg total) by mouth 2 (two) times a day  -     glipiZIDE (GLUCOTROL XL) 10 mg 24 hr tablet; Take 2 tablets (20 mg total) by mouth daily At bed time  -     POCT hemoglobin A1c  -     Hemoglobin A1C; Future; Expected date: 04/18/2020  -     Microalbumin / creatinine urine ratio; Future; Expected date: 04/18/2020    2  Benign essential hypertension  Assessment & Plan:  Well controlled     Orders:  -     CBC; Future; Expected date: 04/18/2020  -     Comprehensive metabolic panel; Future; Expected date: 04/18/2020  -     Lipid Panel with Direct LDL reflex; Future; Expected date: 04/18/2020  -     TSH, 3rd generation; Future; Expected date: 04/18/2020    3  Class 2 severe obesity due to excess calories with serious comorbidity and body mass index (BMI) of 37 0 to 37 9 in adult [facility])  Assessment & Plan:  Unchanged  Diet discussed      4  Arthralgia, unspecified joint  Comments:  having significant joint pain throughout his body  Orders:  -     Rheumatoid Arthritis Factor; Future    BMI Counseling: Body mass index is 37 45 kg/m²  The BMI is above normal  Exercise recommendations include exercising 3-5 times per week  There are no Patient Instructions on file for this visit  Return in about 3 months (around 5/3/2020) for Annual physical     Subjective:      Patient ID: Amy Nicole is a 62 y o  male  Chief Complaint   Patient presents with    Follow-up     Diabetic [id number]       He has been out of metformin  He is now working 7 days a week  He will go on his new insurance in March      He has been occasionally checking his sugars and getting in the 200s  He is not exercising due to arthritis pain  Hypertension   This is a chronic problem  The current episode started more than 1 year ago  The problem is unchanged  The problem is controlled  Pertinent negatives include no peripheral edema or shortness of breath  Past treatments include diuretics  The current treatment provides moderate improvement  Compliance problems include exercise and diet  The following portions of the patient's history were reviewed and updated as appropriate:  past social history    Review of Systems   Respiratory: Negative  Negative for shortness of breath  Cardiovascular: Negative  Musculoskeletal: Positive for arthralgias           Current Outpatient Medications   Medication Sig Dispense Refill    aspirin (ECOTRIN LOW STRENGTH) 81 mg EC tablet Take 81 mg by mouth daily      atorvastatin (LIPITOR) 20 mg tablet TAKE ONE TABLET BY MOUTH EVERY DAY 90 tablet 1    B-D ULTRAFINE III SHORT PEN 31G X 8 MM MISC USE AS DIRECTED FOR INJECTION OF LANTUS SOLOSTAR 200 each 1    Blood Glucose Monitoring Suppl (ONE TOUCH ULTRA 2) w/Device KIT by Does not apply route 3 (three) times a day 1 each 0    glipiZIDE (GLUCOTROL XL) 10 mg 24 hr tablet Take 2 tablets (20 mg total) by mouth daily At bed time 180 tablet 1    glucose blood (ONE TOUCH ULTRA TEST) test strip Test glucose 3 times daily 300 each 3    insulin glargine (BASAGLAR KWIKPEN) 100 units/mL injection pen Inject 75 Units under the skin daily 5 pen 3    Lancets (Gingerd UNISTIK 2) MISC by Does not apply route      losartan-hydrochlorothiazide (HYZAAR) 50-12 5 mg per tablet TAKE ONE TABLET BY MOUTH EVERY DAY 90 tablet 1    ALPRAZolam (XANAX) 0 5 mg tablet Take 1 tablet by mouth 3 (three) times a day as needed for anxiety for up to 15 doses (Patient not taking: Reported on 2/3/2020) 15 tablet 0    metFORMIN (GLUCOPHAGE) 500 mg tablet Take 2 tablets (1,000 mg total) by mouth 2 (two) times a day 360 tablet 1     No current facility-administered medications for this visit  Objective:    /80   Pulse 75   Temp 98 8 °F (37 1 °C)   Resp 16   Ht 5' 10" (1 778 m)   Wt 118 kg (261 lb)   SpO2 96%   BMI 37 45 kg/m²      Physical Exam   Constitutional: He appears well-developed and well-nourished  HENT:   Head: Normocephalic and atraumatic  Right Ear: External ear normal    Left Ear: External ear normal    Mouth/Throat: Oropharynx is clear and moist    Cardiovascular: Normal rate, regular rhythm and normal heart sounds  No murmur heard  Pulses:       Dorsalis pedis pulses are 2+ on the right side, and 2+ on the left side  Posterior tibial pulses are 2+ on the right side, and 2+ on the left side  Pulmonary/Chest: Effort normal and breath sounds normal  No respiratory distress  He has no wheezes  He has no rales  Musculoskeletal: He exhibits no edema or tenderness  Feet:   Right Foot:   Skin Integrity: Negative for ulcer, skin breakdown, erythema, warmth, callus or dry skin  Left Foot:   Skin Integrity: Negative for ulcer, skin breakdown, erythema, warmth, callus or dry skin  Nursing note and vitals reviewed  Patient's shoes and socks removed  Right Foot/Ankle   Right Foot Inspection  Skin Exam: skin normal skin not intact, no dry skin, no warmth, no callus, no erythema, no maceration, no abnormal color, no pre-ulcer, no ulcer and no callus                          Toe Exam: ROM and strength within normal limits  Sensory       Monofilament testing: intact  Vascular  Capillary refills: < 3 seconds  The right DP pulse is 2+  The right PT pulse is 2+       Left Foot/Ankle  Left Foot Inspection  Skin Exam: skin normalskin not intact, no dry skin, no warmth, no erythema, no maceration, normal color, no pre-ulcer, no ulcer and no callus                         Toe Exam: ROM and strength within normal limits                   Sensory       Monofilament: intact  Vascular  Capillary refills: < 3 seconds  The left DP pulse is 2+  The left PT pulse is 2+     Assign Risk Category:  No deformity present; ;        Risk: 0       Antonio Boyer DO

## 2020-02-03 NOTE — ASSESSMENT & PLAN NOTE
Not controlled, A1c is 9 5  His life is back to a routine and is working 7 days a week  Now he is taking his medication regularly  He will start [employer] in March

## 2020-02-04 ENCOUNTER — TELEPHONE (OUTPATIENT)
Dept: FAMILY MEDICINE CLINIC | Facility: CLINIC | Age: 60
End: 2020-02-04

## 2020-02-04 NOTE — TELEPHONE ENCOUNTER
2/4/2020 1:01 PM Called patient regarding abnormal retinal exam   It shows moderate diabetic retinopathy in his right eye  He is going to need to follow-up with a ophthalmologist for further evaluation  Message left on his voicemail to call the office    Burnham MakerDO

## 2020-03-14 ENCOUNTER — OFFICE VISIT (OUTPATIENT)
Dept: FAMILY MEDICINE CLINIC | Facility: CLINIC | Age: 60
End: 2020-03-14
Payer: COMMERCIAL

## 2020-03-14 VITALS
WEIGHT: 263 LBS | HEIGHT: 70 IN | TEMPERATURE: 96.8 F | SYSTOLIC BLOOD PRESSURE: 138 MMHG | BODY MASS INDEX: 37.65 KG/M2 | RESPIRATION RATE: 16 BRPM | HEART RATE: 72 BPM | DIASTOLIC BLOOD PRESSURE: 76 MMHG

## 2020-03-14 DIAGNOSIS — J01.90 ACUTE SINUSITIS, RECURRENCE NOT SPECIFIED, UNSPECIFIED LOCATION: Primary | ICD-10-CM

## 2020-03-14 PROCEDURE — 99213 OFFICE O/P EST LOW 20 MIN: CPT | Performed by: NURSE PRACTITIONER

## 2020-03-14 PROCEDURE — 3075F SYST BP GE 130 - 139MM HG: CPT | Performed by: NURSE PRACTITIONER

## 2020-03-14 PROCEDURE — 3046F HEMOGLOBIN A1C LEVEL >9.0%: CPT | Performed by: NURSE PRACTITIONER

## 2020-03-14 PROCEDURE — 2022F DILAT RTA XM EVC RTNOPTHY: CPT | Performed by: NURSE PRACTITIONER

## 2020-03-14 PROCEDURE — 3008F BODY MASS INDEX DOCD: CPT | Performed by: NURSE PRACTITIONER

## 2020-03-14 PROCEDURE — 1036F TOBACCO NON-USER: CPT | Performed by: NURSE PRACTITIONER

## 2020-03-14 PROCEDURE — 3078F DIAST BP <80 MM HG: CPT | Performed by: NURSE PRACTITIONER

## 2020-03-14 RX ORDER — AMOXICILLIN 875 MG/1
875 TABLET, COATED ORAL 2 TIMES DAILY
Qty: 20 TABLET | Refills: 0 | Status: SHIPPED | OUTPATIENT
Start: 2020-03-14 | End: 2020-03-24

## 2020-03-14 RX ORDER — BENZONATATE 200 MG/1
200 CAPSULE ORAL 3 TIMES DAILY PRN
Qty: 30 CAPSULE | Refills: 0 | Status: SHIPPED | OUTPATIENT
Start: 2020-03-14 | End: 2021-02-25 | Stop reason: ALTCHOICE

## 2020-03-14 NOTE — PROGRESS NOTES
Assessment/Plan:    1  Acute sinusitis, recurrence not specified, unspecified location  -     amoxicillin (AMOXIL) 875 mg tablet; Take 1 tablet (875 mg total) by mouth 2 (two) times a day for 10 days  -     benzonatate (TESSALON) 200 MG capsule; Take 1 capsule (200 mg total) by mouth 3 (three) times a day as needed for cough          BMI Counseling: Body mass index is 37 74 kg/m²  Discussed the patient's BMI with him  The BMI is above normal  Nutrition recommendations include reducing portion sizes, decreasing overall calorie intake, 3-5 servings of fruits/vegetables daily, reducing fast food intake, consuming healthier snacks, decreasing soda and/or juice intake, moderation in carbohydrate intake, increasing intake of lean protein, reducing intake of saturated fat and trans fat and reducing intake of cholesterol  Patient Instructions:  Fluticasone 2 sprays in each nostril daily  Take medication with food  It is important that you take the entire course of antibiotics prescribed  May also take a probiotic of your choice to maintain healthy GI chanda  Can take some probiotic and yogurt with the medication  Increase fluid intake, saline nasal rinses, and hot tea with honey and lemon  Cool air humidification can be helpful as well  May take Ibuprofen or Tylenol as needed for pain or fevers  Mucinex D for sinus congestion or Coricidin HBP if you have high blood pressure or a heart condition  Mucinex or Robitussin DM are effective for cough and chest congestion  Supportive care discussed and advised  Advised to RTO for any worsening and no improvement  Follow up for no improvement and worsening of conditions  Patient advised and educated when to see immediate medical care  Return if symptoms worsen or fail to improve        Future Appointments   Date Time Provider Prasanna Zhao   5/4/2020  6:45 PM DO CALE Bhagat [facility]           Subjective:      Patient ID: Duane Noble Domingo Urbina is a 62 y o  male  Chief Complaint   Patient presents with    Sinus pressure     for about a week  ac/cma     Nasal Congestion    Cough     dry cough         Vitals:  /76   Pulse 72   Temp (!) 96 8 °F (36 °C)   Resp 16   Ht 5' 10" (1 778 m)   Wt 119 kg (263 lb)   BMI 37 74 kg/m²     HPI   Patient stated that started with cough and congestion couple of days ago  Stated that symptoms progressed to sinus pressure  Using mucinex and tylenol but symptoms getting worse  Denies fever, chills, and sob  PHQ-9 Depression Screening    PHQ-9:    Frequency of the following problems over the past two weeks:       Little interest or pleasure in doing things:  0 - not at all  Feeling down, depressed, or hopeless:  0 - not at all  PHQ-2 Score:  0             The following portions of the patient's history were reviewed and updated as appropriate: allergies, current medications, past family history, past medical history, past social history, past surgical history and problem list       Review of Systems   Constitutional: Negative for chills, diaphoresis, fatigue, fever and unexpected weight change  HENT: Positive for congestion and sinus pressure  Negative for dental problem, drooling, ear discharge, ear pain, facial swelling, hearing loss, mouth sores, nosebleeds, postnasal drip, rhinorrhea, sinus pain, sneezing, sore throat, tinnitus, trouble swallowing and voice change  Respiratory: Positive for cough  Negative for chest tightness, shortness of breath and wheezing  Cardiovascular: Negative  Gastrointestinal: Negative for abdominal pain, constipation, diarrhea, nausea and vomiting  Musculoskeletal: Negative  Skin: Negative  Neurological: Negative for dizziness, weakness, light-headedness and headaches  Hematological: Negative            Objective:    Social History     Tobacco Use   Smoking Status Former Smoker    Last attempt to quit: 2000    Years since quitting: 20 2   Smokeless Tobacco Never Used   Tobacco Comment    Former smoker       Allergies: Allergies   Allergen Reactions    Latex Rash     At dentist, lips swollen     Codeine Nausea Only     Reaction Date: [id number];          Current Outpatient Medications   Medication Sig Dispense Refill    aspirin (ECOTRIN LOW STRENGTH) 81 mg EC tablet Take 81 mg by mouth daily      atorvastatin (LIPITOR) 20 mg tablet TAKE ONE TABLET BY MOUTH EVERY DAY 90 tablet 1    B-D ULTRAFINE III SHORT PEN 31G X 8 MM MISC USE AS DIRECTED FOR INJECTION OF LANTUS SOLOSTAR 200 each 1    Blood Glucose Monitoring Suppl (ONE TOUCH ULTRA 2) w/Device KIT by Does not apply route 3 (three) times a day 1 each 0    glipiZIDE (GLUCOTROL XL) 10 mg 24 hr tablet Take 2 tablets (20 mg total) by mouth daily At bed time 180 tablet 1    glucose blood (ONE TOUCH ULTRA TEST) test strip Test glucose 3 times daily 300 each 3    insulin glargine (BASAGLAR KWIKPEN) 100 units/mL injection pen Inject 75 Units under the skin daily 5 pen 3    Lancets ([place] UNISTIK 2) MISC by Does not apply route      losartan-hydrochlorothiazide (HYZAAR) 50-12 5 mg per tablet TAKE ONE TABLET BY MOUTH EVERY DAY 90 tablet 1    metFORMIN (GLUCOPHAGE) 500 mg tablet Take 2 tablets (1,000 mg total) by mouth 2 (two) times a day 360 tablet 1    ALPRAZolam (XANAX) 0 5 mg tablet Take 1 tablet by mouth 3 (three) times a day as needed for anxiety for up to 15 doses (Patient not taking: Reported on 2/3/2020) 15 tablet 0    amoxicillin (AMOXIL) 875 mg tablet Take 1 tablet (875 mg total) by mouth 2 (two) times a day for 10 days 20 tablet 0    benzonatate (TESSALON) 200 MG capsule Take 1 capsule (200 mg total) by mouth 3 (three) times a day as needed for cough 30 capsule 0     No current facility-administered medications for this visit  Physical Exam   Constitutional: He is oriented to person, place, and time  He appears well-developed and well-nourished     HENT:   Head: Normocephalic  Right Ear: Tympanic membrane, external ear and ear canal normal    Left Ear: Tympanic membrane, external ear and ear canal normal    Nose: Mucosal edema present  Right sinus exhibits maxillary sinus tenderness  Right sinus exhibits no frontal sinus tenderness  Left sinus exhibits maxillary sinus tenderness  Left sinus exhibits no frontal sinus tenderness  Mouth/Throat: Oropharynx is clear and moist and mucous membranes are normal    Neck: Neck supple  Cardiovascular: Normal rate, regular rhythm and normal heart sounds  Pulmonary/Chest: Effort normal and breath sounds normal    Abdominal: Normal appearance and bowel sounds are normal  There is no hepatosplenomegaly  There is no tenderness  There is no rebound  Musculoskeletal: Normal range of motion  Lymphadenopathy:        Right cervical: No superficial cervical and no posterior cervical adenopathy present  Left cervical: No superficial cervical and no posterior cervical adenopathy present  Neurological: He is alert and oriented to person, place, and time  Skin: Skin is warm and dry  Psychiatric: He has a normal mood and affect  His behavior is normal  Judgment and thought content normal    Vitals reviewed                    SERA Salgado

## 2020-03-14 NOTE — PATIENT INSTRUCTIONS
Fluticasone 2 sprays in each nostril daily  Take medication with food  It is important that you take the entire course of antibiotics prescribed  May also take a probiotic of your choice to maintain healthy GI chanda  Can take some probiotic and yogurt with the medication  Increase fluid intake, saline nasal rinses, and hot tea with honey and lemon  Cool air humidification can be helpful as well  May take Ibuprofen or Tylenol as needed for pain or fevers  Mucinex D for sinus congestion or Coricidin HBP if you have high blood pressure or a heart condition  Mucinex or Robitussin DM are effective for cough and chest congestion  Supportive care discussed and advised  Advised to RTO for any worsening and no improvement  Follow up for no improvement and worsening of conditions  Patient advised and educated when to see immediate medical care

## 2020-03-24 ENCOUNTER — TELEPHONE (OUTPATIENT)
Dept: OBGYN CLINIC | Facility: HOSPITAL | Age: 60
End: 2020-03-24

## 2020-03-24 DIAGNOSIS — M17.11 PRIMARY OSTEOARTHRITIS OF RIGHT KNEE: Primary | ICD-10-CM

## 2020-03-24 NOTE — TELEPHONE ENCOUNTER
TO BE COMPLETED BY CENTRAL AUTH TEAM:     Physician: DR Katherin Carvajal    Medication: Marcos Downing     Number of Injections in Series (Appointments scheduled 1 week apart from one another): 3    Schedule after this date: UPON AVAILABILITY    Billing Info: Buy and Bill/Specialty Pharmacy-Patient Supply>   BUY & BILL    Appointment Message Line:  (please copy and paste appointment message line when scheduling appointment) RIGHT KNEE Marcos Hummary #1,2,3/BUY & BILL    Additional Comments: [id number]  3/24/20-3/24/21

## 2020-03-24 NOTE — TELEPHONE ENCOUNTER
Jacob Martínez  [phone]    Dr Brandi Vinson    Patient has new insurance [employer] on file, would like to resubmit for Gel injection RT knee

## 2020-03-30 DIAGNOSIS — E78.2 MIXED HYPERLIPIDEMIA: ICD-10-CM

## 2020-03-30 DIAGNOSIS — I10 BENIGN ESSENTIAL HYPERTENSION: ICD-10-CM

## 2020-03-30 RX ORDER — LOSARTAN POTASSIUM AND HYDROCHLOROTHIAZIDE 12.5; 5 MG/1; MG/1
TABLET ORAL
Qty: 90 TABLET | Refills: 1 | Status: SHIPPED | OUTPATIENT
Start: 2020-03-30 | End: 2020-04-02 | Stop reason: RX

## 2020-03-30 RX ORDER — ATORVASTATIN CALCIUM 20 MG/1
TABLET, FILM COATED ORAL
Qty: 90 TABLET | Refills: 1 | Status: SHIPPED | OUTPATIENT
Start: 2020-03-30 | End: 2020-10-13

## 2020-04-01 ENCOUNTER — TELEPHONE (OUTPATIENT)
Dept: FAMILY MEDICINE CLINIC | Facility: CLINIC | Age: 60
End: 2020-04-01

## 2020-04-01 DIAGNOSIS — I10 BENIGN ESSENTIAL HYPERTENSION: Primary | ICD-10-CM

## 2020-04-01 DIAGNOSIS — E11.49 DIABETES MELLITUS TYPE 2 WITH NEUROLOGICAL MANIFESTATIONS (HCC): ICD-10-CM

## 2020-04-01 RX ORDER — GLIPIZIDE 10 MG/1
TABLET, FILM COATED, EXTENDED RELEASE ORAL
Qty: 90 TABLET | Refills: 1 | Status: SHIPPED | OUTPATIENT
Start: 2020-04-01 | End: 2020-08-21

## 2020-04-02 PROCEDURE — 4010F ACE/ARB THERAPY RXD/TAKEN: CPT | Performed by: ORTHOPAEDIC SURGERY

## 2020-04-02 RX ORDER — LOSARTAN POTASSIUM 50 MG/1
50 TABLET ORAL DAILY
Qty: 90 TABLET | Refills: 1 | Status: SHIPPED | OUTPATIENT
Start: 2020-04-02 | End: 2020-10-13

## 2020-04-02 RX ORDER — HYDROCHLOROTHIAZIDE 12.5 MG/1
12.5 TABLET ORAL DAILY
Qty: 90 TABLET | Refills: 1 | Status: SHIPPED | OUTPATIENT
Start: 2020-04-02 | End: 2020-10-13

## 2020-04-15 ENCOUNTER — TELEPHONE (OUTPATIENT)
Dept: FAMILY MEDICINE CLINIC | Facility: CLINIC | Age: 60
End: 2020-04-15

## 2020-04-20 NOTE — TELEPHONE ENCOUNTER
Patients spouse Pinky Stroud is calling in stating that he now has new insurance [employer] and is wanting to make sure that the authorization for the injections had went under the new insurance  She is asking for more clarification as to this was sent through the correct insurance      Call back# [phone]

## 2020-04-29 NOTE — TELEPHONE ENCOUNTER
Patients spouse Katie Rodas is calling in wanting to make sure that this was under his new insurance [employer] as per prev note it is active let her aware of this information

## 2020-05-01 ENCOUNTER — TELEPHONE (OUTPATIENT)
Dept: OBGYN CLINIC | Facility: HOSPITAL | Age: 60
End: 2020-05-01

## 2020-05-08 ENCOUNTER — TELEPHONE (OUTPATIENT)
Dept: OTHER | Facility: OTHER | Age: 60
End: 2020-05-08

## 2020-05-27 ENCOUNTER — OFFICE VISIT (OUTPATIENT)
Dept: OBGYN CLINIC | Facility: CLINIC | Age: 60
End: 2020-05-27
Payer: COMMERCIAL

## 2020-05-27 DIAGNOSIS — M17.11 PRIMARY OSTEOARTHRITIS OF RIGHT KNEE: Primary | ICD-10-CM

## 2020-05-27 PROCEDURE — 20610 DRAIN/INJ JOINT/BURSA W/O US: CPT | Performed by: ORTHOPAEDIC SURGERY

## 2020-05-27 RX ORDER — HYALURONATE SODIUM 10 MG/ML
20 SYRINGE (ML) INTRAARTICULAR
Status: COMPLETED | OUTPATIENT
Start: 2020-05-27 | End: 2020-05-27

## 2020-05-27 RX ADMIN — Medication 20 MG: at 08:17

## 2020-06-03 ENCOUNTER — OFFICE VISIT (OUTPATIENT)
Dept: OBGYN CLINIC | Facility: CLINIC | Age: 60
End: 2020-06-03
Payer: COMMERCIAL

## 2020-06-03 VITALS — HEIGHT: 70 IN | TEMPERATURE: 98.4 F | BODY MASS INDEX: 37.02 KG/M2 | WEIGHT: 258.6 LBS

## 2020-06-03 DIAGNOSIS — M17.11 PRIMARY OSTEOARTHRITIS OF RIGHT KNEE: Primary | ICD-10-CM

## 2020-06-03 PROCEDURE — 20610 DRAIN/INJ JOINT/BURSA W/O US: CPT | Performed by: ORTHOPAEDIC SURGERY

## 2020-06-03 RX ORDER — HYALURONATE SODIUM 10 MG/ML
20 SYRINGE (ML) INTRAARTICULAR
Status: COMPLETED | OUTPATIENT
Start: 2020-06-03 | End: 2020-06-03

## 2020-06-03 RX ADMIN — Medication 20 MG: at 08:28

## 2020-06-10 ENCOUNTER — OFFICE VISIT (OUTPATIENT)
Dept: OBGYN CLINIC | Facility: CLINIC | Age: 60
End: 2020-06-10
Payer: COMMERCIAL

## 2020-06-10 DIAGNOSIS — M17.11 PRIMARY OSTEOARTHRITIS OF RIGHT KNEE: Primary | ICD-10-CM

## 2020-06-10 PROCEDURE — 20610 DRAIN/INJ JOINT/BURSA W/O US: CPT | Performed by: ORTHOPAEDIC SURGERY

## 2020-06-10 RX ORDER — HYALURONATE SODIUM 10 MG/ML
20 SYRINGE (ML) INTRAARTICULAR
Status: COMPLETED | OUTPATIENT
Start: 2020-06-10 | End: 2020-06-10

## 2020-06-10 RX ADMIN — Medication 20 MG: at 10:06

## 2020-06-26 ENCOUNTER — OFFICE VISIT (OUTPATIENT)
Dept: OBGYN CLINIC | Facility: CLINIC | Age: 60
End: 2020-06-26
Payer: COMMERCIAL

## 2020-06-26 VITALS
HEIGHT: 70 IN | HEART RATE: 65 BPM | BODY MASS INDEX: 37.62 KG/M2 | WEIGHT: 262.8 LBS | DIASTOLIC BLOOD PRESSURE: 91 MMHG | SYSTOLIC BLOOD PRESSURE: 169 MMHG | TEMPERATURE: 97.1 F

## 2020-06-26 DIAGNOSIS — M17.11 PRIMARY OSTEOARTHRITIS OF RIGHT KNEE: Primary | ICD-10-CM

## 2020-06-26 PROCEDURE — 3077F SYST BP >= 140 MM HG: CPT | Performed by: ORTHOPAEDIC SURGERY

## 2020-06-26 PROCEDURE — 3080F DIAST BP >= 90 MM HG: CPT | Performed by: ORTHOPAEDIC SURGERY

## 2020-06-26 PROCEDURE — 1036F TOBACCO NON-USER: CPT | Performed by: ORTHOPAEDIC SURGERY

## 2020-06-26 PROCEDURE — 2022F DILAT RTA XM EVC RTNOPTHY: CPT | Performed by: ORTHOPAEDIC SURGERY

## 2020-06-26 PROCEDURE — 99214 OFFICE O/P EST MOD 30 MIN: CPT | Performed by: ORTHOPAEDIC SURGERY

## 2020-06-26 PROCEDURE — 3046F HEMOGLOBIN A1C LEVEL >9.0%: CPT | Performed by: ORTHOPAEDIC SURGERY

## 2020-06-26 PROCEDURE — 3008F BODY MASS INDEX DOCD: CPT | Performed by: ORTHOPAEDIC SURGERY

## 2020-07-08 ENCOUNTER — APPOINTMENT (OUTPATIENT)
Dept: RADIOLOGY | Facility: CLINIC | Age: 60
End: 2020-07-08
Payer: COMMERCIAL

## 2020-07-08 ENCOUNTER — OFFICE VISIT (OUTPATIENT)
Dept: OBGYN CLINIC | Facility: CLINIC | Age: 60
End: 2020-07-08
Payer: COMMERCIAL

## 2020-07-08 VITALS
HEIGHT: 69 IN | TEMPERATURE: 96.9 F | SYSTOLIC BLOOD PRESSURE: 154 MMHG | HEART RATE: 60 BPM | BODY MASS INDEX: 38.66 KG/M2 | WEIGHT: 261 LBS | DIASTOLIC BLOOD PRESSURE: 109 MMHG

## 2020-07-08 DIAGNOSIS — M17.11 PRIMARY OSTEOARTHRITIS OF RIGHT KNEE: Primary | ICD-10-CM

## 2020-07-08 DIAGNOSIS — M25.561 CHRONIC PAIN OF RIGHT KNEE: ICD-10-CM

## 2020-07-08 DIAGNOSIS — G89.29 CHRONIC PAIN OF RIGHT KNEE: ICD-10-CM

## 2020-07-08 DIAGNOSIS — M17.11 PRIMARY OSTEOARTHRITIS OF RIGHT KNEE: ICD-10-CM

## 2020-07-08 PROCEDURE — 3080F DIAST BP >= 90 MM HG: CPT | Performed by: ORTHOPAEDIC SURGERY

## 2020-07-08 PROCEDURE — 1036F TOBACCO NON-USER: CPT | Performed by: ORTHOPAEDIC SURGERY

## 2020-07-08 PROCEDURE — 3077F SYST BP >= 140 MM HG: CPT | Performed by: ORTHOPAEDIC SURGERY

## 2020-07-08 PROCEDURE — 99214 OFFICE O/P EST MOD 30 MIN: CPT | Performed by: ORTHOPAEDIC SURGERY

## 2020-07-08 PROCEDURE — 3046F HEMOGLOBIN A1C LEVEL >9.0%: CPT | Performed by: ORTHOPAEDIC SURGERY

## 2020-07-08 PROCEDURE — 2022F DILAT RTA XM EVC RTNOPTHY: CPT | Performed by: ORTHOPAEDIC SURGERY

## 2020-07-08 PROCEDURE — 3008F BODY MASS INDEX DOCD: CPT | Performed by: ORTHOPAEDIC SURGERY

## 2020-07-08 PROCEDURE — 73562 X-RAY EXAM OF KNEE 3: CPT

## 2020-07-08 NOTE — PROGRESS NOTES
Assessment/Plan:  1  Primary osteoarthritis of right knee  XR knee 3 vw right non injury   2  Chronic pain of right knee       Scribe Attestation    I,:   Bing Petty MA am acting as a scribe while in the presence of the attending physician :        I,:   Elysia Mendiola DO personally performed the services described in this documentation    as scribed in my presence :            Donato Conrad is a 25-year-old male who presents to the office today for evaluation of right knee pain  I discussed with him that his signs and symptoms are consistent with right knee osteoarthritis  X-rays demonstrate severe tricompartmental osteoarthritis  He is aware that he will ultimately require a total knee replacement  We did discuss a possible steroid injection today  Patient is aware he is unable to undergo a knee replacement until 3 months after undergoing a steroid injection  Patient deferred this today  The pre león and postoperative expectations surrounding right total knee replacement were discussed in his questions were addressed  Patient states he would like to discuss possible surgical planning with his family and his job  Patient will follow up in the office once he discusses treatment options and for possible surgical planning  Subjective: right knee pain     Patient ID: Aleta Dance is a 62 y o  male  HPI  Donato Conrad is a 25-year-old male who presents to the office today as a referral from my partner Dr Dennise Schirmer for evaluation of right knee pain  Patient states this has this has been ongoing for the past 3 years after a fall  Patient states his pain has been progressively getting worse  He notes pain globally about the knee which he states is increased with activities and steps  Patient states he avoids steps as much as possible and does take the elevator at work   Patient underwent two series of viscosupplementation one in 2017 with [facility] which he states provided him with some relief and the most recent being performed on 6/10/20 which did not provide him with any relief  Patient states his pain is a 7 out of 10 on the pain scale  He also notes stiffness about the knee after sitting for prolonged periods of time  He has been taking Advil OTC as needed for pain without any relief  He does have a history of 2 prior surgeries in the 1980's  Review of Systems   Constitutional: Positive for activity change  Negative for chills and fever  HENT: Negative for drooling and sneezing  Eyes: Negative for redness  Respiratory: Negative for cough and wheezing  Gastrointestinal: Negative for nausea and vomiting  Musculoskeletal: Positive for arthralgias  Negative for joint swelling and myalgias  Neurological: Negative for weakness and numbness  Psychiatric/Behavioral: Negative for behavioral problems  The patient is not nervous/anxious  Past Medical History:   Diagnosis Date    Corneal abrasion     last assessed - [id number]    Diabetes mellitus ([address] )     Hiatal hernia     Hyperlipidemia     Hypertension     Impaired fasting glucose     last assessed - [id number]    Kidney stone     last stone 2009    PONV (postoperative nausea and vomiting)     Sinus infection     currently under tx PO ABT etc as of 04/17/19    Viremia     Resolved - [id number]       Past Surgical History:   Procedure Laterality Date    ANKLE SURGERY Right     tendon arthrotomy    ARTHROTOMY ANKLE      BACK SURGERY  2014    lumbar laminectomy    COLONOSCOPY      COLONOSCOPY N/A 4/19/2019    Procedure: COLONOSCOPY;  Surgeon: Donato Lewis MD;  [facility];   Service: Gastroenterology    FINGER SURGERY Bilateral     every finger contracture release over a 10 year period trigger finger    INCISION TENDON SHEATH      of a finger    KNEE SURGERY Right     x2 arthrotomy    SHOULDER SURGERY Left     x1 staples, tendon repair secondary to many spontaneous dislocations       Family History   Problem Relation Age of Onset    Hypertension Mother    Anthony Hylton Arthritis Mother     Hyperlipidemia Mother     Lung cancer Mother     Stroke Maternal Grandfather     Diabetes Cousin     COPD Father     Depression Family     Diabetes Family     No Known Problems Sister     No Known Problems Brother     No Known Problems Maternal Aunt     No Known Problems Maternal Uncle     No Known Problems Paternal Aunt     No Known Problems Paternal Uncle     No Known Problems Maternal Grandmother     No Known Problems Paternal Grandmother     No Known Problems Paternal Grandfather        Social History     Occupational History    Not on file   Tobacco Use    Smoking status: Former Smoker     Last attempt to quit: 2000     Years since quittin 5    Smokeless tobacco: Never Used    Tobacco comment: Former smoker   Substance and Sexual Activity    Alcohol use: Yes     Frequency: Monthly or less     Comment: rare    Drug use: No    Sexual activity: Not on file         Current Outpatient Medications:     aspirin (ECOTRIN LOW STRENGTH) 81 mg EC tablet, Take 81 mg by mouth daily, Disp: , Rfl:     atorvastatin (LIPITOR) 20 mg tablet, TAKE ONE TABLET BY MOUTH EVERY DAY, Disp: 90 tablet, Rfl: 1    B-D ULTRAFINE III SHORT PEN 31G X 8 MM MISC, USE AS DIRECTED FOR INJECTION OF LANTUS SOLOSTAR, Disp: 200 each, Rfl: 1    benzonatate (TESSALON) 200 MG capsule, Take 1 capsule (200 mg total) by mouth 3 (three) times a day as needed for cough, Disp: 30 capsule, Rfl: 0    Blood Glucose Monitoring Suppl (ONE TOUCH ULTRA 2) w/Device KIT, by Does not apply route 3 (three) times a day, Disp: 1 each, Rfl: 0    glipiZIDE (GLUCOTROL XL) 10 mg 24 hr tablet, TAKE ONE TABLET BY MOUTH EVERY DAY, Disp: 90 tablet, Rfl: 1    glucose blood (ONE TOUCH ULTRA TEST) test strip, Test glucose 3 times daily, Disp: 300 each, Rfl: 3    hydrochlorothiazide (HYDRODIURIL) 12 5 mg tablet, Take 1 tablet (12 5 mg total) by mouth daily, Disp: 90 tablet, Rfl: 1    insulin glargine (BASAGLAR KWIKPEN) 100 units/mL injection pen, Inject 75 Units under the skin daily, Disp: 5 pen, Rfl: 3    Lancets ([place] 2) MISC, by Does not apply route, Disp: , Rfl:     losartan (COZAAR) 50 mg tablet, Take 1 tablet (50 mg total) by mouth daily, Disp: 90 tablet, Rfl: 1    metFORMIN (GLUCOPHAGE) 500 mg tablet, Take 2 tablets (1,000 mg total) by mouth 2 (two) times a day, Disp: 360 tablet, Rfl: 1    ALPRAZolam (XANAX) 0 5 mg tablet, Take 1 tablet by mouth 3 (three) times a day as needed for anxiety for up to 15 doses (Patient not taking: Reported on 6/3/2020), Disp: 15 tablet, Rfl: 0    Allergies   Allergen Reactions    Latex Rash     At dentist, lips swollen     Codeine Nausea Only     Reaction Date: [id number]; Objective:  Vitals:    07/08/20 0856   BP: (!) 154/109   Pulse: 60   Temp: (!) 96 9 °F (36 1 °C)       Body mass index is 38 54 kg/m²  Right Knee Exam     Tenderness   The patient is experiencing tenderness in the medial joint line  Range of Motion   Right knee extension: 5  Flexion: 100     Tests   Varus: negative Valgus: negative  Lachman:  Anterior - negative    Posterior - negative  Drawer:  Anterior - negative    Posterior - negative    Other   Erythema: absent  Scars: present (curvilinear scar anterior medial and anterior lateral both closer to the coronal plane )  Sensation: normal  Pulse: present  Swelling: none  Effusion: no effusion present    Comments:  Knee is stable 5, 30, and 90  + peripatellar crepitus  + patellofemoral grind  No warmth  No effusion  No erythema             Observations     Right Knee   Negative for effusion  Physical Exam   Constitutional: He is oriented to person, place, and time  He appears well-developed and well-nourished  HENT:   Head: Normocephalic and atraumatic  Eyes: Conjunctivae are normal  Right eye exhibits no discharge  Left eye exhibits no discharge  Neck: Normal range of motion  Neck supple  Cardiovascular: Normal rate and intact distal pulses  Pulmonary/Chest: Effort normal  No respiratory distress  Musculoskeletal:        Right knee: He exhibits no effusion  As noted in HPI   Neurological: He is alert and oriented to person, place, and time  Skin: Skin is warm and dry  Psychiatric: He has a normal mood and affect  His behavior is normal  Judgment and thought content normal    Nursing note and vitals reviewed  I have personally reviewed pertinent films in PACS  X-ray right knee performed in the office today demonstrates severe tricompartmental osteoarthritis

## 2020-07-16 ENCOUNTER — OFFICE VISIT (OUTPATIENT)
Dept: OBGYN CLINIC | Facility: CLINIC | Age: 60
End: 2020-07-16
Payer: COMMERCIAL

## 2020-07-16 VITALS
HEART RATE: 61 BPM | BODY MASS INDEX: 38.79 KG/M2 | WEIGHT: 261.9 LBS | DIASTOLIC BLOOD PRESSURE: 85 MMHG | HEIGHT: 69 IN | SYSTOLIC BLOOD PRESSURE: 168 MMHG | TEMPERATURE: 96 F

## 2020-07-16 DIAGNOSIS — G89.29 CHRONIC PAIN OF RIGHT KNEE: ICD-10-CM

## 2020-07-16 DIAGNOSIS — M17.11 PRIMARY OSTEOARTHRITIS OF RIGHT KNEE: Primary | ICD-10-CM

## 2020-07-16 DIAGNOSIS — M25.561 CHRONIC PAIN OF RIGHT KNEE: ICD-10-CM

## 2020-07-16 PROCEDURE — 3008F BODY MASS INDEX DOCD: CPT | Performed by: ORTHOPAEDIC SURGERY

## 2020-07-16 PROCEDURE — 2022F DILAT RTA XM EVC RTNOPTHY: CPT | Performed by: ORTHOPAEDIC SURGERY

## 2020-07-16 PROCEDURE — 3046F HEMOGLOBIN A1C LEVEL >9.0%: CPT | Performed by: ORTHOPAEDIC SURGERY

## 2020-07-16 PROCEDURE — 20610 DRAIN/INJ JOINT/BURSA W/O US: CPT | Performed by: ORTHOPAEDIC SURGERY

## 2020-07-16 PROCEDURE — 99213 OFFICE O/P EST LOW 20 MIN: CPT | Performed by: ORTHOPAEDIC SURGERY

## 2020-07-16 PROCEDURE — 3079F DIAST BP 80-89 MM HG: CPT | Performed by: ORTHOPAEDIC SURGERY

## 2020-07-16 PROCEDURE — 3077F SYST BP >= 140 MM HG: CPT | Performed by: ORTHOPAEDIC SURGERY

## 2020-07-16 PROCEDURE — 1036F TOBACCO NON-USER: CPT | Performed by: ORTHOPAEDIC SURGERY

## 2020-07-16 RX ORDER — BUPIVACAINE HYDROCHLORIDE 5 MG/ML
6 INJECTION, SOLUTION EPIDURAL; INTRACAUDAL
Status: COMPLETED | OUTPATIENT
Start: 2020-07-16 | End: 2020-07-16

## 2020-07-16 RX ORDER — TRIAMCINOLONE ACETONIDE 40 MG/ML
80 INJECTION, SUSPENSION INTRA-ARTICULAR; INTRAMUSCULAR
Status: COMPLETED | OUTPATIENT
Start: 2020-07-16 | End: 2020-07-16

## 2020-07-16 RX ADMIN — TRIAMCINOLONE ACETONIDE 80 MG: 40 INJECTION, SUSPENSION INTRA-ARTICULAR; INTRAMUSCULAR at 08:51

## 2020-07-16 RX ADMIN — BUPIVACAINE HYDROCHLORIDE 6 ML: 5 INJECTION, SOLUTION EPIDURAL; INTRACAUDAL at 08:51

## 2020-07-16 NOTE — PROGRESS NOTES
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  He elected to pursue a corticosteroid injection today  The risks and benefits of undergoing injection were discussed  He was agreeable to it  He tolerated well  Post-injection instructions were provided  We also discussed how his knee pain and arthritis correlates to his fall at work 3 years ago  I did discuss with him today that it is unlikely that his fall at work 3 years ago which caused an ankle injury caused his osteoarthritis of his knee considering his extensive surgical history as a child on his knee  I did discuss however that the fall 3 years ago may have exacerbated previously quiescent severe osteoarthritis in his knee creating his symptom profile that he has been experiencing over the last 3 years  He demonstrates understanding of this  I will see him back in approximately 3 months time for consideration of repeat steroid injection prior to right total knee replacement in January      Large joint arthrocentesis: R knee  Date/Time: 7/16/2020 8:51 AM  Consent given by: patient  Site marked: site marked  Timeout: Immediately prior to procedure a time out was called to verify the correct patient, procedure, equipment, support staff and site/side marked as required   Supporting Documentation  Indications: pain   Procedure Details  Location: knee - R knee  Preparation: Patient was prepped and draped in the usual sterile fashion  Needle size: 20 G  Ultrasound guidance: no  Approach: anterolateral  Medications administered: 6 mL bupivacaine (PF) 0 5 %; 80 mg triamcinolone acetonide 40 mg/mL    Patient tolerance: patient tolerated the procedure well with no immediate complications  Dressing:  Sterile dressing applied          Subjective:  Chronic right knee pain    Patient ID: Britta Blue is a 62 y o  male  HPI  Patient is here for follow-up regarding his right knee activity-related pain  He has decided with his family that he will pursue surgical intervention for his severe right knee osteoarthritis in January when it is a slower time of year 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  His pain is unchanged and remains a 7/10 diffusely in the right knee  It is radiating throughout the entire knee and exacerbated with activity  He is here to a steroid injection for his right knee  He does have questions relating to his fall at work 3 years ago which he injured his ankle and if this could be a cause for his right knee osteoarthritis  Review of Systems   Constitutional: Positive for activity change  HENT: Negative  Eyes: Negative  Respiratory: Negative  Cardiovascular: Negative  Gastrointestinal: Negative  Endocrine: Negative  Musculoskeletal: Positive for arthralgias  Skin: Negative  Neurological: Negative  Psychiatric/Behavioral: Negative  Past Medical History:   Diagnosis Date    Corneal abrasion     last assessed - [id number]    Diabetes mellitus ([address] )     Hiatal hernia     Hyperlipidemia     Hypertension     Impaired fasting glucose     last assessed - [id number]    Kidney stone     last stone 2009    PONV (postoperative nausea and vomiting)     Sinus infection     currently under tx PO ABT etc as of 04/17/19    Viremia     Resolved - [id number]       Past Surgical History:   Procedure Laterality Date    ANKLE SURGERY Right     tendon arthrotomy    ARTHROTOMY ANKLE      BACK SURGERY  2014    lumbar laminectomy    COLONOSCOPY      COLONOSCOPY N/A 4/19/2019    Procedure: COLONOSCOPY;  Surgeon: Donato Lewis MD;  Location: [facility];   Service: Gastroenterology    FINGER SURGERY Bilateral every finger contracture release over a 10 year period trigger finger    INCISION TENDON SHEATH      of a finger    KNEE SURGERY Right     x2 arthrotomy    SHOULDER SURGERY Left     x1 staples, tendon repair secondary to many spontaneous dislocations       Family History   Problem Relation Age of Onset    Hypertension Mother    Breonna Martinez Arthritis Mother     Hyperlipidemia Mother    Breonna Martinez Lung cancer Mother     Stroke Maternal Grandfather     Diabetes Cousin     COPD Father     Depression Family     Diabetes Family     No Known Problems Sister     No Known Problems Brother     No Known Problems Maternal Aunt     No Known Problems Maternal Uncle     No Known Problems Paternal Aunt     No Known Problems Paternal Uncle     No Known Problems Maternal Grandmother     No Known Problems Paternal Grandmother     No Known Problems Paternal Grandfather        Social History     Occupational History    Not on file   Tobacco Use    Smoking status: Former Smoker     Last attempt to quit:      Years since quittin 5    Smokeless tobacco: Never Used    Tobacco comment: Former smoker   Substance and Sexual Activity    Alcohol use: Yes     Frequency: Monthly or less     Comment: rare    Drug use: No    Sexual activity: Not on file         Current Outpatient Medications:     ALPRAZolam (XANAX) 0 5 mg tablet, Take 1 tablet by mouth 3 (three) times a day as needed for anxiety for up to 15 doses (Patient not taking: Reported on 6/3/2020), Disp: 15 tablet, Rfl: 0    aspirin (ECOTRIN LOW STRENGTH) 81 mg EC tablet, Take 81 mg by mouth daily, Disp: , Rfl:     atorvastatin (LIPITOR) 20 mg tablet, TAKE ONE TABLET BY MOUTH EVERY DAY, Disp: 90 tablet, Rfl: 1    B-D ULTRAFINE III SHORT PEN 31G X 8 MM MISC, USE AS DIRECTED FOR INJECTION OF LANTUS SOLOSTAR, Disp: 200 each, Rfl: 1    benzonatate (TESSALON) 200 MG capsule, Take 1 capsule (200 mg total) by mouth 3 (three) times a day as needed for cough, Disp: 30 capsule, Rfl: 0    Blood Glucose Monitoring Suppl (ONE TOUCH ULTRA 2) w/Device KIT, by Does not apply route 3 (three) times a day, Disp: 1 each, Rfl: 0    glipiZIDE (GLUCOTROL XL) 10 mg 24 hr tablet, TAKE ONE TABLET BY MOUTH EVERY DAY, Disp: 90 tablet, Rfl: 1    glucose blood (ONE TOUCH ULTRA TEST) test strip, Test glucose 3 times daily, Disp: 300 each, Rfl: 3    hydrochlorothiazide (HYDRODIURIL) 12 5 mg tablet, Take 1 tablet (12 5 mg total) by mouth daily, Disp: 90 tablet, Rfl: 1    insulin glargine (BASAGLAR KWIKPEN) 100 units/mL injection pen, Inject 75 Units under the skin daily, Disp: 5 pen, Rfl: 3    Lancets (LIFESCAN UNISTIK 2) MISC, by Does not apply route, Disp: , Rfl:     losartan (COZAAR) 50 mg tablet, Take 1 tablet (50 mg total) by mouth daily, Disp: 90 tablet, Rfl: 1    metFORMIN (GLUCOPHAGE) 500 mg tablet, Take 2 tablets (1,000 mg total) by mouth 2 (two) times a day, Disp: 360 tablet, Rfl: 1    Allergies   Allergen Reactions    Latex Rash     At dentist, lips swollen     Codeine Nausea Only     Reaction Date: [id number]; Objective:  Vitals:    07/16/20 0803   BP: 168/85   Pulse: 61   Temp: (!) 96 °F (35 6 °C)       Body mass index is 38 68 kg/m²  Right Knee Exam     Tenderness   The patient is experiencing tenderness in the medial joint line  Range of Motion   Right knee extension: 5  Flexion: 100     Tests   Varus: negative Valgus: negative  Lachman:  Anterior - negative    Posterior - negative  Drawer:  Anterior - negative    Posterior - negative    Other   Erythema: absent  Scars: present (curvilinear scar anterior medial and anterior lateral both closer to the coronal plane )  Sensation: normal  Pulse: present  Swelling: none  Effusion: no effusion present    Comments:  Knee is stable 5, 30, and 90  + peripatellar crepitus  + patellofemoral grind  No warmth  No effusion  No erythema             Observations     Right Knee   Negative for effusion         Physical Exam   Constitutional: He is oriented to person, place, and time  He appears well-developed  HENT:   Head: Atraumatic  Eyes: EOM are normal    Neck: Neck supple  Cardiovascular: Normal rate  Pulmonary/Chest: Effort normal    Abdominal: Soft  Musculoskeletal:        Right knee: He exhibits no effusion  See orthopedic exam   Neurological: He is alert and oriented to person, place, and time  Skin: Skin is warm and dry  Psychiatric: He has a normal mood and affect  Nursing note and vitals reviewed  Ariella GOLDEN    Division of Adult Reconstruction  Department of Orthopaedic Surgery  [facility]

## 2020-08-07 ENCOUNTER — OFFICE VISIT (OUTPATIENT)
Dept: OBGYN CLINIC | Facility: CLINIC | Age: 60
End: 2020-08-07
Payer: COMMERCIAL

## 2020-08-07 VITALS
TEMPERATURE: 98 F | HEART RATE: 81 BPM | DIASTOLIC BLOOD PRESSURE: 83 MMHG | WEIGHT: 261 LBS | HEIGHT: 69 IN | SYSTOLIC BLOOD PRESSURE: 146 MMHG | BODY MASS INDEX: 38.66 KG/M2

## 2020-08-07 DIAGNOSIS — M25.561 CHRONIC PAIN OF RIGHT KNEE: ICD-10-CM

## 2020-08-07 DIAGNOSIS — M17.11 PRIMARY OSTEOARTHRITIS OF RIGHT KNEE: Primary | ICD-10-CM

## 2020-08-07 DIAGNOSIS — E11.49 DIABETES MELLITUS TYPE 2 WITH NEUROLOGICAL MANIFESTATIONS (HCC): ICD-10-CM

## 2020-08-07 DIAGNOSIS — E66.01 CLASS 2 SEVERE OBESITY DUE TO EXCESS CALORIES WITH SERIOUS COMORBIDITY AND BODY MASS INDEX (BMI) OF 37.0 TO 37.9 IN ADULT (HCC): ICD-10-CM

## 2020-08-07 DIAGNOSIS — M25.461 EFFUSION OF RIGHT KNEE: ICD-10-CM

## 2020-08-07 DIAGNOSIS — G89.29 CHRONIC PAIN OF RIGHT KNEE: ICD-10-CM

## 2020-08-07 PROCEDURE — 20610 DRAIN/INJ JOINT/BURSA W/O US: CPT | Performed by: ORTHOPAEDIC SURGERY

## 2020-08-07 PROCEDURE — 99213 OFFICE O/P EST LOW 20 MIN: CPT | Performed by: ORTHOPAEDIC SURGERY

## 2020-08-07 PROCEDURE — 3079F DIAST BP 80-89 MM HG: CPT | Performed by: ORTHOPAEDIC SURGERY

## 2020-08-07 PROCEDURE — 3046F HEMOGLOBIN A1C LEVEL >9.0%: CPT | Performed by: ORTHOPAEDIC SURGERY

## 2020-08-07 PROCEDURE — 2022F DILAT RTA XM EVC RTNOPTHY: CPT | Performed by: ORTHOPAEDIC SURGERY

## 2020-08-07 PROCEDURE — 1036F TOBACCO NON-USER: CPT | Performed by: ORTHOPAEDIC SURGERY

## 2020-08-07 PROCEDURE — 3008F BODY MASS INDEX DOCD: CPT | Performed by: ORTHOPAEDIC SURGERY

## 2020-08-07 PROCEDURE — 3077F SYST BP >= 140 MM HG: CPT | Performed by: ORTHOPAEDIC SURGERY
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
## 2020-08-21 DIAGNOSIS — E11.49 DIABETES MELLITUS TYPE 2 WITH NEUROLOGICAL MANIFESTATIONS (HCC): ICD-10-CM

## 2020-08-21 RX ORDER — GLIPIZIDE 10 MG/1
TABLET, FILM COATED, EXTENDED RELEASE ORAL
Qty: 180 TABLET | Refills: 1 | Status: SHIPPED | OUTPATIENT
Start: 2020-08-21 | End: 2021-02-24

## 2020-08-22 DIAGNOSIS — E11.49 DIABETES MELLITUS TYPE 2 WITH NEUROLOGICAL MANIFESTATIONS (HCC): ICD-10-CM

## 2020-08-23 RX ORDER — PEN NEEDLE, DIABETIC 31 GX5/16"
NEEDLE, DISPOSABLE MISCELLANEOUS
Qty: 200 EACH | Refills: 0 | Status: SHIPPED | OUTPATIENT
Start: 2020-08-23 | End: 2021-02-08

## 2020-08-23 NOTE — TELEPHONE ENCOUNTER
I refilled the patient's medication, but they are due for an appointment  Please ask them to schedule     Thank you,  Bernardo Sanchez, DO

## 2020-10-03 ENCOUNTER — TELEPHONE (OUTPATIENT)
Dept: FAMILY MEDICINE CLINIC | Facility: CLINIC | Age: 60
End: 2020-10-03

## 2020-10-03 DIAGNOSIS — E11.49 DIABETES MELLITUS TYPE 2 WITH NEUROLOGICAL MANIFESTATIONS (HCC): ICD-10-CM

## 2020-10-03 RX ORDER — INSULIN GLARGINE 100 [IU]/ML
75 INJECTION, SOLUTION SUBCUTANEOUS DAILY
Qty: 5 PEN | Refills: 3 | Status: SHIPPED | OUTPATIENT
Start: 2020-10-03 | End: 2021-02-21 | Stop reason: SDUPTHER

## 2020-10-06 LAB
LEFT EYE DIABETIC RETINOPATHY: NORMAL
RIGHT EYE DIABETIC RETINOPATHY: NORMAL

## 2020-10-08 ENCOUNTER — TELEPHONE (OUTPATIENT)
Dept: FAMILY MEDICINE CLINIC | Facility: CLINIC | Age: 60
End: 2020-10-08

## 2020-10-13 DIAGNOSIS — E78.2 MIXED HYPERLIPIDEMIA: ICD-10-CM

## 2020-10-13 DIAGNOSIS — I10 BENIGN ESSENTIAL HYPERTENSION: ICD-10-CM

## 2020-10-13 RX ORDER — HYDROCHLOROTHIAZIDE 12.5 MG/1
TABLET ORAL
Qty: 90 TABLET | Refills: 1 | Status: SHIPPED | OUTPATIENT
Start: 2020-10-13 | End: 2021-04-13

## 2020-10-13 RX ORDER — LOSARTAN POTASSIUM 50 MG/1
TABLET ORAL
Qty: 90 TABLET | Refills: 1 | Status: SHIPPED | OUTPATIENT
Start: 2020-10-13 | End: 2021-02-25 | Stop reason: SDUPTHER

## 2020-10-13 RX ORDER — ATORVASTATIN CALCIUM 20 MG/1
TABLET, FILM COATED ORAL
Qty: 90 TABLET | Refills: 1 | Status: SHIPPED | OUTPATIENT
Start: 2020-10-13 | End: 2021-04-13

## 2021-02-07 DIAGNOSIS — E11.49 DIABETES MELLITUS TYPE 2 WITH NEUROLOGICAL MANIFESTATIONS (HCC): ICD-10-CM

## 2021-02-08 RX ORDER — PEN NEEDLE, DIABETIC 31 GX5/16"
NEEDLE, DISPOSABLE MISCELLANEOUS
Qty: 200 EACH | Refills: 1 | Status: SHIPPED | OUTPATIENT
Start: 2021-02-08 | End: 2021-02-21 | Stop reason: SDUPTHER

## 2021-02-15 ENCOUNTER — TELEPHONE (OUTPATIENT)
Dept: FAMILY MEDICINE CLINIC | Facility: CLINIC | Age: 61
End: 2021-02-15

## 2021-02-15 DIAGNOSIS — Z12.5 PROSTATE CANCER SCREENING: ICD-10-CM

## 2021-02-15 DIAGNOSIS — E11.40 TYPE 2 DIABETES MELLITUS WITH DIABETIC NEUROPATHY, WITH LONG-TERM CURRENT USE OF INSULIN (HCC): ICD-10-CM

## 2021-02-15 DIAGNOSIS — I10 BENIGN ESSENTIAL HYPERTENSION: ICD-10-CM

## 2021-02-15 DIAGNOSIS — E78.2 MIXED HYPERLIPIDEMIA: Primary | ICD-10-CM

## 2021-02-15 DIAGNOSIS — Z79.4 TYPE 2 DIABETES MELLITUS WITH DIABETIC NEUROPATHY, WITH LONG-TERM CURRENT USE OF INSULIN (HCC): ICD-10-CM

## 2021-02-15 NOTE — PROGRESS NOTES
Assessment/Plan:    1  Type 2 diabetes mellitus with diabetic neuropathy, with long-term current use of insulin [facility])  Assessment & Plan:    Lab Results   Component Value Date    HGBA1C 9 1 (H) 02/19/2021     Not controlled  Had eye exam done at [facility] ([place])     Orders:  -     Ambulatory referral to Endocrinology; Future  -     insulin glargine (Basaglar KwikPen) 100 units/mL injection pen; Inject 80 Units under the skin daily  -     Hemoglobin A1C; Future; Expected date: 05/11/2021    2  Class 2 severe obesity due to excess calories with serious comorbidity and body mass index (BMI) of 38 0 to 38 9 in adult [facility])  Assessment & Plan:  worsening      3  Benign essential hypertension  Assessment & Plan:  Not controlled  Dose of losartan increased from 50 to 100    Orders:  -     losartan (COZAAR) 100 MG tablet; Take 1 tablet (100 mg total) by mouth daily  -     Comprehensive metabolic panel; Future; Expected date: 05/11/2021  -     Lipid Panel with Direct LDL reflex; Future; Expected date: 05/11/2021    4  Mixed hyperlipidemia  Assessment & Plan:  Well controlled  Continue atorvastatin 20 mg daily  LDL is 64    Orders:  -     Comprehensive metabolic panel; Future; Expected date: 05/11/2021  -     Lipid Panel with Direct LDL reflex; Future; Expected date: 05/11/2021    BMI Counseling: Body mass index is 39 28 kg/m²  The BMI is above normal  Nutrition recommendations include moderation in carbohydrate intake  Suspect hand osteoarthritis  Stretching exercises reviewed      Patient Instructions     Recent Results (from the past 672 hour(s))   Yolanda Hill Default    Collection Time: 02/19/21 10:56 AM   Result Value Ref Range    White Blood Cell Count 5 9 3 4 - 10 8 x10E3/uL    Red Blood Cell Count 5 54 4 14 - 5 80 x10E6/uL    Hemoglobin 15 8 13 0 - 17 7 g/dL    HCT 47 2 37 5 - 51 0 %    MCV 85 79 - 97 fL    MCH 28 5 26 6 - 33 0 pg    MCHC 33 5 31 5 - 35 7 g/dL    RDW 14 5 11 6 - 15 4 %    Platelet Count 332 116 - 450 x10E3/uL    Neutrophils 64 Not Estab  %    Lymphocytes 20 Not Estab  %    Monocytes 9 Not Estab  %    Eosinophils 5 Not Estab  %    Basophils PCT 1 Not Estab  %    Neutrophils (Absolute) 3 9 1 4 - 7 0 x10E3/uL    Lymphocytes (Absolute) 1 2 0 7 - 3 1 x10E3/uL    Monocytes (Absolute) 0 5 0 1 - 0 9 x10E3/uL    Eosinophils (Absolute) 0 3 0 0 - 0 4 x10E3/uL    Basophils ABS 0 1 0 0 - 0 2 x10E3/uL    Immature Granulocytes 1 Not Estab  %    Immature Granulocytes (Absolute) 0 0 0 0 - 0 1 x10E3/uL   Comprehensive metabolic panel    Collection Time: 02/19/21 10:56 AM   Result Value Ref Range    Glucose, Random 118 (H) 65 - 99 mg/dL    BUN 21 8 - 27 mg/dL    Creatinine 1 06 0 76 - 1 27 mg/dL    eGFR Non African American 75 >59 mL/min/1 73    eGFR  87 >59 mL/min/1 73    SL AMB BUN/CREATININE RATIO 20 10 - 24    Sodium 140 134 - 144 mmol/L    Potassium 3 7 3 5 - 5 2 mmol/L    Chloride 99 96 - 106 mmol/L    CO2 27 20 - 29 mmol/L    CALCIUM 9 5 8 6 - 10 2 mg/dL    Protein, Total 6 6 6 0 - 8 5 g/dL    Albumin 4 1 3 8 - 4 8 g/dL    Globulin, Total 2 5 1 5 - 4 5 g/dL    Albumin/Globulin Ratio 1 6 1 2 - 2 2    TOTAL BILIRUBIN 0 7 0 0 - 1 2 mg/dL    Alk Phos Isoenzymes 72 39 - 117 IU/L    AST 26 0 - 40 IU/L    ALT 31 0 - 44 IU/L   Lipid panel    Collection Time: 02/19/21 10:56 AM   Result Value Ref Range    Cholesterol, Total 123 100 - 199 mg/dL    Triglycerides 96 0 - 149 mg/dL    HDL 41 >39 mg/dL    LDL Calculated 64 0 - 99 mg/dL   Microalbumin / creatinine urine ratio    Collection Time: 02/19/21 10:56 AM   Result Value Ref Range    Creatinine, Urine 192 8 Not Estab  mg/dL    Microalbum  ,U,Random 4,223 0 Not Estab  ug/mL    Microalb/Creat Ratio 2,190 (H) 0 - 29 mg/g creat   Hemoglobin A1c (w/out EAG)    Collection Time: 02/19/21 10:56 AM   Result Value Ref Range    Hemoglobin A1C 9 1 (H) 4 8 - 5 6 %   TSH, 3rd generation    Collection Time: 02/19/21 10:56 AM   Result Value Ref Range    TSH 1 500 0 450 - 4 500 uIU/mL   PSA Total, Diagnostic    Collection Time: 02/19/21 10:56 AM   Result Value Ref Range    Prostate Specific Antigen Total 0 5 0 0 - 4 0 ng/mL   Cardiovascular Report    Collection Time: 02/19/21 10:56 AM   Result Value Ref Range    Interpretation Note            Return in about 3 months (around 5/25/2021) for Annual physical     Subjective:      Patient ID: Wilian Escudero is a 56 y o  male  Chief Complaint   Patient presents with    Follow-up     follow up on diabetes [id number]        He has a new job in [place], [place]  He is commuting to hours both ways  He is also traveling about 2 days a week during the day for work as well  He is also getting a lot of joint pain in his hands  He is getting his COVID vaccine tomorrow  Hypertension - patient has been taking his blood pressure medication regularly  Associated symptoms:  Swelling:  no  Leg cramps: no    Hyperlipidemia-  he  has been taking their cholesterol medication regularly  Associated symptoms:  Myalgias: no    Diabetes:  Taking medications regularly - yes no: yes  Side effects of medication- yes/no: no  Hypoglycemia -yes no: no        The following portions of the patient's history were reviewed and updated as appropriate: allergies, current medications, past family history, past medical history, past social history, past surgical history and problem list     Review of Systems   Respiratory: Negative  Cardiovascular: Negative  Musculoskeletal: Positive for arthralgias           Current Outpatient Medications   Medication Sig Dispense Refill    aspirin (ECOTRIN LOW STRENGTH) 81 mg EC tablet Take 81 mg by mouth daily      atorvastatin (LIPITOR) 20 mg tablet TAKE ONE TABLET BY MOUTH EVERY DAY 90 tablet 1    Blood Glucose Monitoring Suppl (ONE TOUCH ULTRA 2) w/Device KIT by Does not apply route 3 (three) times a day 1 each 0    glipiZIDE (GLUCOTROL XL) 10 mg 24 hr tablet TAKE TWO TABLETS BY MOUTH EVERY DAY AT BEDTIME 180 tablet 1    glucose blood (ONE TOUCH ULTRA TEST) test strip Test glucose 3 times daily 300 each 3    hydrochlorothiazide (HYDRODIURIL) 12 5 mg tablet TAKE ONE TABLET BY MOUTH EVERY DAY (GENERIC FOR HYDRODIURIL) 90 tablet 1    insulin glargine (Basaglar KwikPen) 100 units/mL injection pen Inject 80 Units under the skin daily 25 pen 1    Insulin Pen Needle (B-D ULTRAFINE III SHORT PEN) 31G X 8 MM MISC Inject under the skin daily Use as directed 200 each 0    Lancets ([employer] UNISTIK 2) MISC by Does not apply route      losartan (COZAAR) 100 MG tablet Take 1 tablet (100 mg total) by mouth daily 90 tablet 1    metFORMIN (GLUCOPHAGE) 500 mg tablet TAKE TWO TABLETS BY MOUTH TWICE A  tablet 1     No current facility-administered medications for this visit  Objective:    /86   Pulse 69   Temp (!) 97 3 °F (36 3 °C)   Resp 16   Ht 5' 9" (1 753 m)   Wt 121 kg (266 lb)   SpO2 99%   BMI 39 28 kg/m²        Physical Exam  Vitals signs and nursing note reviewed  Constitutional:       Appearance: He is well-developed  HENT:      Head: Normocephalic and atraumatic  Right Ear: Tympanic membrane and external ear normal       Left Ear: Tympanic membrane and external ear normal    Cardiovascular:      Rate and Rhythm: Normal rate and regular rhythm  Pulses:           Dorsalis pedis pulses are 2+ on the right side and 2+ on the left side  Posterior tibial pulses are 2+ on the right side and 2+ on the left side  Heart sounds: Normal heart sounds  No murmur  Pulmonary:      Effort: Pulmonary effort is normal  No respiratory distress  Breath sounds: Normal breath sounds  No wheezing or rales  Musculoskeletal:         General: Deformity (  Nodules  bilateral fingers) present  Right lower leg: No edema  Left lower leg: No edema  Feet:      Right foot:      Skin integrity: No ulcer, skin breakdown, erythema, warmth, callus or dry skin        Left foot:      Skin integrity: No ulcer, skin breakdown, erythema, warmth, callus or dry skin  Patient's shoes and socks removed  Right Foot/Ankle   Right Foot Inspection  Skin Exam: skin normal skin not intact, no dry skin, no warmth, no callus, no erythema, no maceration, no abnormal color, no pre-ulcer, no ulcer and no callus                          Toe Exam: ROM and strength within normal limits  Sensory       Monofilament testing: intact  Vascular  Capillary refills: < 3 seconds  The right DP pulse is 2+  The right PT pulse is 2+  Left Foot/Ankle  Left Foot Inspection  Skin Exam: skin normalskin not intact, no dry skin, no warmth, no erythema, no maceration, normal color, no pre-ulcer, no ulcer and no callus                         Toe Exam: ROM and strength within normal limits                   Sensory       Monofilament: intact  Vascular  Capillary refills: < 3 seconds  The left DP pulse is 2+  The left PT pulse is 2+     Assign Risk Category:  No deformity present; ;        Risk: 0       Kayla Azul DO

## 2021-02-20 DIAGNOSIS — E11.49 DIABETES MELLITUS TYPE 2 WITH NEUROLOGICAL MANIFESTATIONS (HCC): ICD-10-CM

## 2021-02-20 LAB
ALBUMIN SERPL-MCNC: 4.1 G/DL (ref 3.8–4.8)
ALBUMIN/CREAT UR: 2190 MG/G CREAT (ref 0–29)
ALBUMIN/GLOB SERPL: 1.6 {RATIO} (ref 1.2–2.2)
ALP SERPL-CCNC: 72 IU/L (ref 39–117)
ALT SERPL-CCNC: 31 IU/L (ref 0–44)
AST SERPL-CCNC: 26 IU/L (ref 0–40)
BASOPHILS # BLD AUTO: 0.1 X10E3/UL (ref 0–0.2)
BASOPHILS NFR BLD AUTO: 1 %
BILIRUB SERPL-MCNC: 0.7 MG/DL (ref 0–1.2)
BUN SERPL-MCNC: 21 MG/DL (ref 8–27)
BUN/CREAT SERPL: 20 (ref 10–24)
CALCIUM SERPL-MCNC: 9.5 MG/DL (ref 8.6–10.2)
CHLORIDE SERPL-SCNC: 99 MMOL/L (ref 96–106)
CHOLEST SERPL-MCNC: 123 MG/DL (ref 100–199)
CO2 SERPL-SCNC: 27 MMOL/L (ref 20–29)
CREAT SERPL-MCNC: 1.06 MG/DL (ref 0.76–1.27)
CREAT UR-MCNC: 192.8 MG/DL
EOSINOPHIL # BLD AUTO: 0.3 X10E3/UL (ref 0–0.4)
EOSINOPHIL NFR BLD AUTO: 5 %
ERYTHROCYTE [DISTWIDTH] IN BLOOD BY AUTOMATED COUNT: 14.5 % (ref 11.6–15.4)
GLOBULIN SER-MCNC: 2.5 G/DL (ref 1.5–4.5)
GLUCOSE SERPL-MCNC: 118 MG/DL (ref 65–99)
HBA1C MFR BLD: 9.1 % (ref 4.8–5.6)
HCT VFR BLD AUTO: 47.2 % (ref 37.5–51)
HDLC SERPL-MCNC: 41 MG/DL
HGB BLD-MCNC: 15.8 G/DL (ref 13–17.7)
IMM GRANULOCYTES # BLD: 0 X10E3/UL (ref 0–0.1)
IMM GRANULOCYTES NFR BLD: 1 %
LDLC SERPL CALC-MCNC: 64 MG/DL (ref 0–99)
LYMPHOCYTES # BLD AUTO: 1.2 X10E3/UL (ref 0.7–3.1)
LYMPHOCYTES NFR BLD AUTO: 20 %
MCH RBC QN AUTO: 28.5 PG (ref 26.6–33)
MCHC RBC AUTO-ENTMCNC: 33.5 G/DL (ref 31.5–35.7)
MCV RBC AUTO: 85 FL (ref 79–97)
MICROALBUMIN UR-MCNC: 4223 UG/ML
MICRODELETION SYND BLD/T FISH: NORMAL
MONOCYTES # BLD AUTO: 0.5 X10E3/UL (ref 0.1–0.9)
MONOCYTES NFR BLD AUTO: 9 %
NEUTROPHILS # BLD AUTO: 3.9 X10E3/UL (ref 1.4–7)
NEUTROPHILS NFR BLD AUTO: 64 %
PLATELET # BLD AUTO: 225 X10E3/UL (ref 150–450)
POTASSIUM SERPL-SCNC: 3.7 MMOL/L (ref 3.5–5.2)
PROT SERPL-MCNC: 6.6 G/DL (ref 6–8.5)
PSA SERPL-MCNC: 0.5 NG/ML (ref 0–4)
RBC # BLD AUTO: 5.54 X10E6/UL (ref 4.14–5.8)
SL AMB EGFR AFRICAN AMERICAN: 87 ML/MIN/1.73
SL AMB EGFR NON AFRICAN AMERICAN: 75 ML/MIN/1.73
SODIUM SERPL-SCNC: 140 MMOL/L (ref 134–144)
TRIGL SERPL-MCNC: 96 MG/DL (ref 0–149)
TSH SERPL DL<=0.005 MIU/L-ACNC: 1.5 UIU/ML (ref 0.45–4.5)
WBC # BLD AUTO: 5.9 X10E3/UL (ref 3.4–10.8)

## 2021-02-20 PROCEDURE — 3062F POS MACROALBUMINURIA REV: CPT | Performed by: INTERNAL MEDICINE

## 2021-02-20 PROCEDURE — 3046F HEMOGLOBIN A1C LEVEL >9.0%: CPT | Performed by: INTERNAL MEDICINE

## 2021-02-21 DIAGNOSIS — E11.49 DIABETES MELLITUS TYPE 2 WITH NEUROLOGICAL MANIFESTATIONS (HCC): ICD-10-CM

## 2021-02-22 RX ORDER — PEN NEEDLE, DIABETIC 31 GX5/16"
NEEDLE, DISPOSABLE MISCELLANEOUS DAILY
Qty: 200 EACH | Refills: 0 | Status: SHIPPED | OUTPATIENT
Start: 2021-02-22 | End: 2022-01-12

## 2021-02-22 RX ORDER — INSULIN GLARGINE 100 [IU]/ML
75 INJECTION, SOLUTION SUBCUTANEOUS DAILY
Qty: 5 PEN | Refills: 1 | Status: SHIPPED | OUTPATIENT
Start: 2021-02-22 | End: 2021-02-25 | Stop reason: SDUPTHER

## 2021-02-22 RX ORDER — INSULIN GLARGINE 100 [IU]/ML
75 INJECTION, SOLUTION SUBCUTANEOUS DAILY
Qty: 5 PEN | Refills: 0 | Status: SHIPPED | OUTPATIENT
Start: 2021-02-22 | End: 2021-02-25 | Stop reason: SDUPTHER

## 2021-02-24 DIAGNOSIS — E11.49 DIABETES MELLITUS TYPE 2 WITH NEUROLOGICAL MANIFESTATIONS (HCC): ICD-10-CM

## 2021-02-24 RX ORDER — GLIPIZIDE 10 MG/1
TABLET, FILM COATED, EXTENDED RELEASE ORAL
Qty: 180 TABLET | Refills: 1 | Status: SHIPPED | OUTPATIENT
Start: 2021-02-24 | End: 2021-08-30

## 2021-02-25 ENCOUNTER — OFFICE VISIT (OUTPATIENT)
Dept: FAMILY MEDICINE CLINIC | Facility: CLINIC | Age: 61
End: 2021-02-25
Payer: COMMERCIAL

## 2021-02-25 VITALS
RESPIRATION RATE: 16 BRPM | WEIGHT: 266 LBS | OXYGEN SATURATION: 99 % | HEIGHT: 69 IN | SYSTOLIC BLOOD PRESSURE: 155 MMHG | TEMPERATURE: 97.3 F | BODY MASS INDEX: 39.4 KG/M2 | DIASTOLIC BLOOD PRESSURE: 86 MMHG | HEART RATE: 69 BPM

## 2021-02-25 DIAGNOSIS — I10 BENIGN ESSENTIAL HYPERTENSION: ICD-10-CM

## 2021-02-25 DIAGNOSIS — E11.40 TYPE 2 DIABETES MELLITUS WITH DIABETIC NEUROPATHY, WITH LONG-TERM CURRENT USE OF INSULIN (HCC): Primary | ICD-10-CM

## 2021-02-25 DIAGNOSIS — E78.2 MIXED HYPERLIPIDEMIA: ICD-10-CM

## 2021-02-25 DIAGNOSIS — Z79.4 TYPE 2 DIABETES MELLITUS WITH DIABETIC NEUROPATHY, WITH LONG-TERM CURRENT USE OF INSULIN (HCC): Primary | ICD-10-CM

## 2021-02-25 DIAGNOSIS — E66.01 CLASS 2 SEVERE OBESITY DUE TO EXCESS CALORIES WITH SERIOUS COMORBIDITY AND BODY MASS INDEX (BMI) OF 38.0 TO 38.9 IN ADULT (HCC): ICD-10-CM

## 2021-02-25 PROCEDURE — 4010F ACE/ARB THERAPY RXD/TAKEN: CPT | Performed by: INTERNAL MEDICINE

## 2021-02-25 PROCEDURE — 99214 OFFICE O/P EST MOD 30 MIN: CPT | Performed by: FAMILY MEDICINE

## 2021-02-25 RX ORDER — INSULIN GLARGINE 100 [IU]/ML
80 INJECTION, SOLUTION SUBCUTANEOUS DAILY
Qty: 25 PEN | Refills: 1 | Status: SHIPPED | OUTPATIENT
Start: 2021-02-25 | End: 2021-08-16

## 2021-02-25 RX ORDER — LOSARTAN POTASSIUM 100 MG/1
100 TABLET ORAL DAILY
Qty: 90 TABLET | Refills: 1 | Status: SHIPPED | OUTPATIENT
Start: 2021-02-25 | End: 2021-04-13 | Stop reason: SDUPTHER

## 2021-02-25 NOTE — PATIENT INSTRUCTIONS
Recent Results (from the past 672 hour(s))   Yolanda Hill Default    Collection Time: 02/19/21 10:56 AM   Result Value Ref Range    White Blood Cell Count 5 9 3 4 - 10 8 x10E3/uL    Red Blood Cell Count 5 54 4 14 - 5 80 x10E6/uL    Hemoglobin 15 8 13 0 - 17 7 g/dL    HCT 47 2 37 5 - 51 0 %    MCV 85 79 - 97 fL    MCH 28 5 26 6 - 33 0 pg    MCHC 33 5 31 5 - 35 7 g/dL    RDW 14 5 11 6 - 15 4 %    Platelet Count 997 032 - 450 x10E3/uL    Neutrophils 64 Not Estab  %    Lymphocytes 20 Not Estab  %    Monocytes 9 Not Estab  %    Eosinophils 5 Not Estab  %    Basophils PCT 1 Not Estab  %    Neutrophils (Absolute) 3 9 1 4 - 7 0 x10E3/uL    Lymphocytes (Absolute) 1 2 0 7 - 3 1 x10E3/uL    Monocytes (Absolute) 0 5 0 1 - 0 9 x10E3/uL    Eosinophils (Absolute) 0 3 0 0 - 0 4 x10E3/uL    Basophils ABS 0 1 0 0 - 0 2 x10E3/uL    Immature Granulocytes 1 Not Estab  %    Immature Granulocytes (Absolute) 0 0 0 0 - 0 1 x10E3/uL   Comprehensive metabolic panel    Collection Time: 02/19/21 10:56 AM   Result Value Ref Range    Glucose, Random 118 (H) 65 - 99 mg/dL    BUN 21 8 - 27 mg/dL    Creatinine 1 06 0 76 - 1 27 mg/dL    eGFR Non African American 75 >59 mL/min/1 73    eGFR  87 >59 mL/min/1 73    SL AMB BUN/CREATININE RATIO 20 10 - 24    Sodium 140 134 - 144 mmol/L    Potassium 3 7 3 5 - 5 2 mmol/L    Chloride 99 96 - 106 mmol/L    CO2 27 20 - 29 mmol/L    CALCIUM 9 5 8 6 - 10 2 mg/dL    Protein, Total 6 6 6 0 - 8 5 g/dL    Albumin 4 1 3 8 - 4 8 g/dL    Globulin, Total 2 5 1 5 - 4 5 g/dL    Albumin/Globulin Ratio 1 6 1 2 - 2 2    TOTAL BILIRUBIN 0 7 0 0 - 1 2 mg/dL    Alk Phos Isoenzymes 72 39 - 117 IU/L    AST 26 0 - 40 IU/L    ALT 31 0 - 44 IU/L   Lipid panel    Collection Time: 02/19/21 10:56 AM   Result Value Ref Range    Cholesterol, Total 123 100 - 199 mg/dL    Triglycerides 96 0 - 149 mg/dL    HDL 41 >39 mg/dL    LDL Calculated 64 0 - 99 mg/dL   Microalbumin / creatinine urine ratio    Collection Time: 02/19/21 10:56 AM   Result Value Ref Range    Creatinine, Urine 192 8 Not Estab  mg/dL    Microalbum  ,U,Random 4,223 0 Not Estab  ug/mL    Microalb/Creat Ratio 2,190 (H) 0 - 29 mg/g creat   Hemoglobin A1c (w/out EAG)    Collection Time: 02/19/21 10:56 AM   Result Value Ref Range    Hemoglobin A1C 9 1 (H) 4 8 - 5 6 %   TSH, 3rd generation    Collection Time: 02/19/21 10:56 AM   Result Value Ref Range    TSH 1 500 0 450 - 4 500 uIU/mL   PSA Total, Diagnostic    Collection Time: 02/19/21 10:56 AM   Result Value Ref Range    Prostate Specific Antigen Total 0 5 0 0 - 4 0 ng/mL   Cardiovascular Report    Collection Time: 02/19/21 10:56 AM   Result Value Ref Range    Interpretation Note

## 2021-02-25 NOTE — ASSESSMENT & PLAN NOTE
Lab Results   Component Value Date    HGBA1C 9 1 (H) 02/19/2021     Not controlled  Had eye exam done at [facility]

## 2021-03-18 ENCOUNTER — OFFICE VISIT (OUTPATIENT)
Dept: FAMILY MEDICINE CLINIC | Facility: CLINIC | Age: 61
End: 2021-03-18
Payer: COMMERCIAL

## 2021-03-18 VITALS
TEMPERATURE: 97.8 F | SYSTOLIC BLOOD PRESSURE: 174 MMHG | WEIGHT: 268 LBS | HEART RATE: 68 BPM | BODY MASS INDEX: 39.69 KG/M2 | DIASTOLIC BLOOD PRESSURE: 92 MMHG | HEIGHT: 69 IN | RESPIRATION RATE: 16 BRPM

## 2021-03-18 DIAGNOSIS — R06.02 EXERTIONAL SHORTNESS OF BREATH: ICD-10-CM

## 2021-03-18 DIAGNOSIS — I10 BENIGN ESSENTIAL HYPERTENSION: Primary | ICD-10-CM

## 2021-03-18 DIAGNOSIS — R94.31 ABNORMAL EKG: ICD-10-CM

## 2021-03-18 DIAGNOSIS — G47.00 INSOMNIA, UNSPECIFIED TYPE: ICD-10-CM

## 2021-03-18 PROCEDURE — 3725F SCREEN DEPRESSION PERFORMED: CPT | Performed by: NURSE PRACTITIONER

## 2021-03-18 PROCEDURE — 93000 ELECTROCARDIOGRAM COMPLETE: CPT | Performed by: NURSE PRACTITIONER

## 2021-03-18 PROCEDURE — 99214 OFFICE O/P EST MOD 30 MIN: CPT | Performed by: NURSE PRACTITIONER

## 2021-03-18 RX ORDER — ALPRAZOLAM 0.25 MG/1
0.25 TABLET ORAL
Qty: 14 TABLET | Refills: 0 | Status: SHIPPED | OUTPATIENT
Start: 2021-03-18 | End: 2021-04-13 | Stop reason: SDUPTHER

## 2021-03-18 RX ORDER — AMLODIPINE BESYLATE 5 MG/1
5 TABLET ORAL DAILY
Qty: 30 TABLET | Refills: 1 | Status: SHIPPED | OUTPATIENT
Start: 2021-03-18 | End: 2021-05-10

## 2021-03-18 NOTE — PROGRESS NOTES
Assessment/Plan:    EKG 1st degree AV block, no acute changes or ischemia, no changes compared to prior  Referral provided for cardiology  HTN uncontrolled  Suspect this is the cause of his symptoms  Will add Amlodipine in addition to his current medications  RTO 2 weeks for BP Check  Alprazolam prn for difficulty sleeping  Discussed he must use this sparingly  1  Benign essential hypertension  -     POCT ECG  -     amLODIPine (NORVASC) 5 mg tablet; Take 1 tablet (5 mg total) by mouth daily  -     Ambulatory referral to Cardiology; Future    2  Abnormal EKG  -     Ambulatory referral to Cardiology; Future    3  Exertional shortness of breath  -     Ambulatory referral to Cardiology; Future    4  Insomnia, unspecified type  -     ALPRAZolam (XANAX) 0 25 mg tablet; Take 1 tablet (0 25 mg total) by mouth daily at bedtime as needed for anxiety            There are no Patient Instructions on file for this visit  Return in about 2 weeks (around 4/1/2021)  Subjective:      Patient ID: Kristin Olvera is a 56 y o  male  Chief Complaint   Patient presents with    Anxiety     [facility] lpn    Insomnia       Pt here today to discuss recent symptoms  He has been getting an "anxious" feelt out of nowhere  It is usually worse at night and he is having difficulty falling asleep  He is often waking up  After a few hours and can't fall back asleep  He has a 2 hour commute and finds himself feeling sleepy during his drive, which is new over the past couple of weeks  States he has an episode a couple of years ago and went to the ER and was prescribed prn Xanax, u nsure what that was related to, but reports he has not been an anxious person  He denies any recent stressors or any apparent reason for feeling the way he does now  He is not experiencing any headaches or chest pains  He reports dyspnea on exertion, which is not new    He saw cardiology in 2018 but was lost to follow up and did not have the stress test or 2D echo  The following portions of the patient's history were reviewed and updated as appropriate: allergies, current medications, past family history, past medical history, past social history, past surgical history and problem list     Review of Systems   Constitutional: Negative for chills, fatigue and fever  Respiratory: Positive for shortness of breath  Negative for cough and wheezing  Cardiovascular: Negative for chest pain, palpitations and leg swelling  Gastrointestinal: Negative for abdominal pain, diarrhea, nausea and vomiting  Skin: Negative for rash  Neurological: Negative for dizziness and headaches     Psychiatric/Behavioral:        See HPI         Current Outpatient Medications   Medication Sig Dispense Refill    aspirin (ECOTRIN LOW STRENGTH) 81 mg EC tablet Take 81 mg by mouth daily      atorvastatin (LIPITOR) 20 mg tablet TAKE ONE TABLET BY MOUTH EVERY DAY 90 tablet 1    Blood Glucose Monitoring Suppl (ONE TOUCH ULTRA 2) w/Device KIT by Does not apply route 3 (three) times a day 1 each 0    glipiZIDE (GLUCOTROL XL) 10 mg 24 hr tablet TAKE TWO TABLETS BY MOUTH EVERY DAY AT BEDTIME 180 tablet 1    glucose blood (ONE TOUCH ULTRA TEST) test strip Test glucose 3 times daily 300 each 3    hydrochlorothiazide (HYDRODIURIL) 12 5 mg tablet TAKE ONE TABLET BY MOUTH EVERY DAY (GENERIC FOR HYDRODIURIL) 90 tablet 1    insulin glargine (Basaglar KwikPen) 100 units/mL injection pen Inject 80 Units under the skin daily 25 pen 1    Insulin Pen Needle (B-D ULTRAFINE III SHORT PEN) 31G X 8 MM MISC Inject under the skin daily Use as directed 200 each 0    Lancets ([employer] UNISTIK 2) MISC by Does not apply route      losartan (COZAAR) 100 MG tablet Take 1 tablet (100 mg total) by mouth daily 90 tablet 1    metFORMIN (GLUCOPHAGE) 500 mg tablet TAKE TWO TABLETS BY MOUTH TWICE A  tablet 1    ALPRAZolam (XANAX) 0 25 mg tablet Take 1 tablet (0 25 mg total) by mouth daily at bedtime as needed for anxiety 14 tablet 0    amLODIPine (NORVASC) 5 mg tablet Take 1 tablet (5 mg total) by mouth daily 30 tablet 1     No current facility-administered medications for this visit  Objective:    BP (!) 174/92   Pulse 68   Temp 97 8 °F (36 6 °C)   Resp 16   Ht 5' 9" (1 753 m)   Wt 122 kg (268 lb)   BMI 39 58 kg/m²        Physical Exam  Vitals signs and nursing note reviewed  Constitutional:       Appearance: He is well-developed  Cardiovascular:      Rate and Rhythm: Normal rate and regular rhythm  Heart sounds: Normal heart sounds  No murmur  Pulmonary:      Effort: Pulmonary effort is normal       Breath sounds: Normal breath sounds  Skin:     General: Skin is warm and dry  Capillary Refill: Capillary refill takes less than 2 seconds  Neurological:      Mental Status: He is alert     Psychiatric:         Mood and Affect: Mood normal          Behavior: Behavior normal                 SERA Bolaños

## 2021-03-24 ENCOUNTER — CONSULT (OUTPATIENT)
Dept: CARDIOLOGY CLINIC | Facility: CLINIC | Age: 61
End: 2021-03-24
Payer: COMMERCIAL

## 2021-03-24 VITALS
HEART RATE: 67 BPM | RESPIRATION RATE: 18 BRPM | SYSTOLIC BLOOD PRESSURE: 126 MMHG | TEMPERATURE: 98.6 F | DIASTOLIC BLOOD PRESSURE: 80 MMHG | HEIGHT: 69 IN | OXYGEN SATURATION: 98 % | WEIGHT: 269 LBS | BODY MASS INDEX: 39.84 KG/M2

## 2021-03-24 DIAGNOSIS — R06.02 EXERTIONAL SHORTNESS OF BREATH: ICD-10-CM

## 2021-03-24 DIAGNOSIS — I10 BENIGN ESSENTIAL HYPERTENSION: ICD-10-CM

## 2021-03-24 DIAGNOSIS — R94.31 ABNORMAL EKG: Primary | ICD-10-CM

## 2021-03-24 PROCEDURE — 1036F TOBACCO NON-USER: CPT | Performed by: INTERNAL MEDICINE

## 2021-03-24 PROCEDURE — 93000 ELECTROCARDIOGRAM COMPLETE: CPT | Performed by: INTERNAL MEDICINE

## 2021-03-24 PROCEDURE — 99205 OFFICE O/P NEW HI 60 MIN: CPT | Performed by: INTERNAL MEDICINE

## 2021-03-24 PROCEDURE — 3008F BODY MASS INDEX DOCD: CPT | Performed by: INTERNAL MEDICINE

## 2021-03-24 PROCEDURE — 3074F SYST BP LT 130 MM HG: CPT | Performed by: INTERNAL MEDICINE

## 2021-03-24 PROCEDURE — 3079F DIAST BP 80-89 MM HG: CPT | Performed by: INTERNAL MEDICINE

## 2021-03-24 NOTE — PROGRESS NOTES
[facility]  [address]   [place], [address]    Cardiology Consultation    Clemencia Monge  [id number]  1960      Consult for: Dyspnea  Appreciate consult by: Olga Franklin DO      Discussion/Summary:     1  Abnormal EKG  Ambulatory referral to Cardiology    POCT ECG   2  Benign essential hypertension  Ambulatory referral to Cardiology    POCT ECG   3  Exertional shortness of breath  Ambulatory referral to Cardiology    POCT ECG      Patient with exertional dyspnea which may be related to poor conditioning and obesity  He has right knee pain which limits his ability to ambulate  Will schedule for nuclear stress test with pharmacological agent as he cannot ambulate on treadmill  Will also obtain 2D echocardiogram to rule out any structural heart disease  Continue risk factor modification by improving diet, blood sugars  BP is at goal now  HPI:     Clemencia Monge is a 56 y o  here for  Evaluation of dyspnea  He notes shortness of breath with exertion  He has been having these symptoms for the past few years and was seen by Dr Magdalena Alva in February 2018 for the same complaint  He ordered stress test and echocardiogram at that time but patient did not have studies done  There has been no change in symptoms since then  He denies any chest pain, lower extremity edema, orthopnea or paroxysmal nocturnal dyspnea  He gets dyspnea as he goes up stairs or bends over  Patient has cardiac risk factors of diabetes, hypertension and dyslipidemia  Blood sugar was markedly elevated  Recently, he has made some lifestyle changes to improve his blood sugar  He has a long commute  No history of DVT       Past Medical History:   Diagnosis Date    Corneal abrasion     last assessed - [id number]    Diabetes mellitus ([facility] [address] )     Hiatal hernia     Hyperlipidemia     Hypertension     Impaired fasting glucose     last assessed - [id number]    Kidney stone     last stone 2009    PONV (postoperative nausea and vomiting)     Sinus infection     currently under tx PO ABT etc as of 19    Viremia     Resolved - [id number]     Social History     Tobacco Use    Smoking status: Former Smoker     Quit date:      Years since quittin 2    Smokeless tobacco: Never Used    Tobacco comment: Former smoker   Substance Use Topics    Alcohol use: Yes     Frequency: Monthly or less     Comment: rare    Drug use: No      Family History   Problem Relation Age of Onset    Hypertension Mother    Ashley Jaime Arthritis Mother     Hyperlipidemia Mother     Lung cancer Mother     Stroke Maternal Grandfather     Diabetes Cousin     COPD Father     Depression Family     Diabetes Family     No Known Problems Sister     No Known Problems Brother     No Known Problems Maternal Aunt     No Known Problems Maternal Uncle     No Known Problems Paternal Aunt     No Known Problems Paternal Uncle     No Known Problems Maternal Grandmother     No Known Problems Paternal Grandmother     No Known Problems Paternal Grandfather      Past Surgical History:   Procedure Laterality Date    ANKLE SURGERY Right     tendon arthrotomy    ARTHROTOMY ANKLE      BACK SURGERY  2014    lumbar laminectomy    COLONOSCOPY      COLONOSCOPY N/A 2019    Procedure: COLONOSCOPY;  Surgeon: Rishi Olvera MD;  Location: [place] [address] [facility];   Service: Gastroenterology    FINGER SURGERY Bilateral     every finger contracture release over a 10 year period trigger finger    INCISION TENDON SHEATH      of a finger    KNEE SURGERY Right     x2 arthrotomy    SHOULDER SURGERY Left     x1 staples, tendon repair secondary to many spontaneous dislocations       Current Outpatient Medications:     ALPRAZolam (XANAX) 0 25 mg tablet, Take 1 tablet (0 25 mg total) by mouth daily at bedtime as needed for anxiety, Disp: 14 tablet, Rfl: 0    amLODIPine (NORVASC) 5 mg tablet, Take 1 tablet (5 mg total) by mouth daily, Disp: 30 tablet, Rfl: 1    aspirin (ECOTRIN LOW STRENGTH) 81 mg EC tablet, Take 81 mg by mouth daily, Disp: , Rfl:     atorvastatin (LIPITOR) 20 mg tablet, TAKE ONE TABLET BY MOUTH EVERY DAY, Disp: 90 tablet, Rfl: 1    Blood Glucose Monitoring Suppl (ONE TOUCH ULTRA 2) w/Device KIT, by Does not apply route 3 (three) times a day, Disp: 1 each, Rfl: 0    glipiZIDE (GLUCOTROL XL) 10 mg 24 hr tablet, TAKE TWO TABLETS BY MOUTH EVERY DAY AT BEDTIME, Disp: 180 tablet, Rfl: 1    glucose blood (ONE TOUCH ULTRA TEST) test strip, Test glucose 3 times daily, Disp: 300 each, Rfl: 3    hydrochlorothiazide (HYDRODIURIL) 12 5 mg tablet, TAKE ONE TABLET BY MOUTH EVERY DAY (GENERIC FOR HYDRODIURIL), Disp: 90 tablet, Rfl: 1    insulin glargine (Basaglar KwikPen) 100 units/mL injection pen, Inject 80 Units under the skin daily, Disp: 25 pen, Rfl: 1    Insulin Pen Needle (B-D ULTRAFINE III SHORT PEN) 31G X 8 MM MISC, Inject under the skin daily Use as directed, Disp: 200 each, Rfl: 0    Lancets ([employer] UNISTIK 2) MISC, by Does not apply route, Disp: , Rfl:     losartan (COZAAR) 100 MG tablet, Take 1 tablet (100 mg total) by mouth daily, Disp: 90 tablet, Rfl: 1    metFORMIN (GLUCOPHAGE) 500 mg tablet, TAKE TWO TABLETS BY MOUTH TWICE A DAY, Disp: 360 tablet, Rfl: 1  Allergies   Allergen Reactions    Latex Rash     At dentist, lips swollen     Codeine Nausea Only     Reaction Date: [id number]; Review of Systems:   Review of Systems   Constitutional: Negative for chills and fever  HENT: Negative for ear pain and sore throat  Eyes: Negative for pain and visual disturbance  Respiratory: Positive for shortness of breath  Negative for cough  Cardiovascular: Negative for chest pain and palpitations  Gastrointestinal: Negative for abdominal pain and vomiting  Genitourinary: Negative for dysuria and hematuria  Musculoskeletal: Negative for arthralgias and back pain     Skin: Negative for color change and rash  Neurological: Negative for seizures and syncope  All other systems reviewed and are negative  Physical Examination:     Vitals:    03/24/21 1602   BP: 126/80   BP Location: Left arm   Patient Position: Sitting   Cuff Size: Large   Pulse: 67   Resp: 18   Temp: 98 6 °F (37 °C)   TempSrc: Temporal   SpO2: 98%   Weight: 122 kg (269 lb)   Height: 5' 9" (1 753 m)       Physical Exam   Constitutional: He appears healthy  No distress  Eyes: Pupils are equal, round, and reactive to light  Conjunctivae are normal    Neck: Normal range of motion  Neck supple  No JVD present  Cardiovascular: Normal rate, regular rhythm and normal heart sounds  Exam reveals no gallop and no friction rub  No murmur heard  Pulmonary/Chest: Effort normal and breath sounds normal  He has no wheezes  He has no rales  Musculoskeletal:         General: No tenderness, deformity or edema  Neurological: He is alert and oriented to person, place, and time  Skin: Skin is warm and dry          Labs:     Lab Results   Component Value Date    WBC 5 9 02/19/2021    HGB 15 8 02/19/2021    HCT 47 2 02/19/2021    MCV 85 02/19/2021    RDW 14 5 02/19/2021     02/19/2021     BMP:  Lab Results   Component Value Date    SODIUM 140 02/19/2021    K 3 7 02/19/2021    CL 99 02/19/2021    CO2 27 02/19/2021    BUN 21 02/19/2021    CREATININE 1 06 02/19/2021    GLUC 118 (H) 02/19/2021    CALCIUM 9 5 01/07/2018    EGFR 72 01/07/2018     LFT:  Lab Results   Component Value Date    AST 26 02/19/2021    ALT 31 02/19/2021    ALKPHOS 99 01/07/2018    TP 6 6 02/19/2021    ALB 4 1 02/19/2021      No results found for: [facility]  Lab Results   Component Value Date    HGBA1C 9 1 (H) 02/19/2021     Lipid Profile:   Lab Results   Component Value Date    CHOLESTEROL 123 02/19/2021    HDL 41 02/19/2021    LDLCALC 64 02/19/2021    TRIG 96 02/19/2021     Lab Results   Component Value Date    CHOLESTEROL 123 02/19/2021    CHOLESTEROL 119 08/16/2018     Lab Results   Component Value Date    TROPONINI <0 02 01/07/2018     No results found for: NTBNP   No results found for this or any previous visit (from the past 672 hour(s))  Imaging & Testing   I have personally reviewed pertinent reports  Cardiac Testing   No results found for this or any previous visit  EKG: Personally reviewed  NSR with first degree AV block and LAFB      Augie Asher DO, [place]  [phone]  Please call with any questions

## 2021-04-05 ENCOUNTER — OFFICE VISIT (OUTPATIENT)
Dept: FAMILY MEDICINE CLINIC | Facility: CLINIC | Age: 61
End: 2021-04-05
Payer: COMMERCIAL

## 2021-04-05 VITALS
RESPIRATION RATE: 16 BRPM | OXYGEN SATURATION: 98 % | WEIGHT: 268 LBS | DIASTOLIC BLOOD PRESSURE: 80 MMHG | SYSTOLIC BLOOD PRESSURE: 140 MMHG | HEART RATE: 73 BPM | TEMPERATURE: 97.5 F | HEIGHT: 69 IN | BODY MASS INDEX: 39.69 KG/M2

## 2021-04-05 DIAGNOSIS — I10 BENIGN ESSENTIAL HYPERTENSION: Primary | ICD-10-CM

## 2021-04-05 DIAGNOSIS — G47.00 INSOMNIA, UNSPECIFIED TYPE: ICD-10-CM

## 2021-04-05 DIAGNOSIS — E78.2 MIXED HYPERLIPIDEMIA: ICD-10-CM

## 2021-04-05 DIAGNOSIS — E11.40 TYPE 2 DIABETES MELLITUS WITH DIABETIC NEUROPATHY, WITH LONG-TERM CURRENT USE OF INSULIN (HCC): ICD-10-CM

## 2021-04-05 DIAGNOSIS — Z79.4 TYPE 2 DIABETES MELLITUS WITH DIABETIC NEUROPATHY, WITH LONG-TERM CURRENT USE OF INSULIN (HCC): ICD-10-CM

## 2021-04-05 PROCEDURE — 99214 OFFICE O/P EST MOD 30 MIN: CPT | Performed by: NURSE PRACTITIONER

## 2021-04-05 PROCEDURE — 3079F DIAST BP 80-89 MM HG: CPT | Performed by: NURSE PRACTITIONER

## 2021-04-05 PROCEDURE — 3077F SYST BP >= 140 MM HG: CPT | Performed by: NURSE PRACTITIONER

## 2021-04-05 RX ORDER — HYDROXYZINE PAMOATE 25 MG/1
25 CAPSULE ORAL DAILY PRN
Qty: 30 CAPSULE | Refills: 0 | Status: SHIPPED | OUTPATIENT
Start: 2021-04-05 | End: 2021-09-23

## 2021-04-05 NOTE — PROGRESS NOTES
Assessment/Plan:    1  Benign essential hypertension  Assessment & Plan:  Still slightly elevated, but improved  Will continue with current regimen  2  Insomnia, unspecified type  Assessment & Plan:  Improve with Xanax, however he is open to trying alternative medications  Will start Vistaril  Advised he may increase to 50mg daily if needed  Will have him RTO for f/u with PCP next month    Orders:  -     hydrOXYzine pamoate (VISTARIL) 25 mg capsule; Take 1 capsule (25 mg total) by mouth daily as needed (insomnia)    3  Type 2 diabetes mellitus with diabetic neuropathy, with long-term current use of insulin ([facility] [address] )  Assessment & Plan:  Has f/u with endo this month    Lab Results   Component Value Date    HGBA1C 9 1 (H) 02/19/2021         4  Mixed hyperlipidemia  Assessment & Plan:  Continue statin            There are no Patient Instructions on file for this visit  Return for Next scheduled follow up  Subjective:      Patient ID: Carlene Garcia is a 56 y o  male  Chief Complaint   Patient presents with    Follow-up     2 wk f/u-wmcma       Here today for BP Check  He saw cardiologist and will be going for a stress test and 2D echo  The Xanax is helping him sleep  Feels when he gets a good nights sleep, he feels less tired during the day  The following portions of the patient's history were reviewed and updated as appropriate: allergies, current medications, past family history, past medical history, past social history, past surgical history and problem list     Review of Systems   Constitutional: Negative  Respiratory: Negative  Cardiovascular: Negative  Neurological: Negative  Psychiatric/Behavioral: Positive for sleep disturbance           Current Outpatient Medications   Medication Sig Dispense Refill    ALPRAZolam (XANAX) 0 25 mg tablet Take 1 tablet (0 25 mg total) by mouth daily at bedtime as needed for anxiety 14 tablet 0    amLODIPine (NORVASC) 5 mg tablet Take 1 tablet (5 mg total) by mouth daily 30 tablet 1    aspirin (ECOTRIN LOW STRENGTH) 81 mg EC tablet Take 81 mg by mouth daily      atorvastatin (LIPITOR) 20 mg tablet TAKE ONE TABLET BY MOUTH EVERY DAY 90 tablet 1    Blood Glucose Monitoring Suppl (ONE TOUCH ULTRA 2) w/Device KIT by Does not apply route 3 (three) times a day 1 each 0    glipiZIDE (GLUCOTROL XL) 10 mg 24 hr tablet TAKE TWO TABLETS BY MOUTH EVERY DAY AT BEDTIME 180 tablet 1    glucose blood (ONE TOUCH ULTRA TEST) test strip Test glucose 3 times daily 300 each 3    hydrochlorothiazide (HYDRODIURIL) 12 5 mg tablet TAKE ONE TABLET BY MOUTH EVERY DAY (GENERIC FOR HYDRODIURIL) 90 tablet 1    insulin glargine (Basaglar KwikPen) 100 units/mL injection pen Inject 80 Units under the skin daily 25 pen 1    Insulin Pen Needle (B-D ULTRAFINE III SHORT PEN) 31G X 8 MM MISC Inject under the skin daily Use as directed 200 each 0    Lancets ([employer] UNISTIK 2) MISC by Does not apply route      losartan (COZAAR) 100 MG tablet Take 1 tablet (100 mg total) by mouth daily 90 tablet 1    metFORMIN (GLUCOPHAGE) 500 mg tablet TAKE TWO TABLETS BY MOUTH TWICE A  tablet 1    hydrOXYzine pamoate (VISTARIL) 25 mg capsule Take 1 capsule (25 mg total) by mouth daily as needed (insomnia) 30 capsule 0     No current facility-administered medications for this visit  Objective:    /80   Pulse 73   Temp 97 5 °F (36 4 °C)   Resp 16   Ht 5' 9" (1 753 m)   Wt 122 kg (268 lb)   SpO2 98%   BMI 39 58 kg/m²        Physical Exam  Vitals signs and nursing note reviewed  Constitutional:       Appearance: Normal appearance  He is well-developed  He is obese  HENT:      Head: Normocephalic and atraumatic  Right Ear: Tympanic membrane, ear canal and external ear normal       Left Ear: Tympanic membrane, ear canal and external ear normal       Nose: No mucosal edema or rhinorrhea  Mouth/Throat:      Pharynx: Uvula midline     Eyes: Conjunctiva/sclera: Conjunctivae normal    Neck:      Musculoskeletal: Neck supple  No edema  Thyroid: No thyromegaly  Cardiovascular:      Rate and Rhythm: Normal rate and regular rhythm  Pulses: Normal pulses  Heart sounds: Normal heart sounds  No murmur  Pulmonary:      Effort: Pulmonary effort is normal       Breath sounds: Normal breath sounds  Abdominal:      General: Bowel sounds are normal  There is no distension  Palpations: There is no hepatomegaly or splenomegaly  Tenderness: There is no abdominal tenderness  Lymphadenopathy:      Cervical:      Right cervical: No superficial cervical adenopathy  Left cervical: No superficial cervical adenopathy  Skin:     General: Skin is warm and dry  Capillary Refill: Capillary refill takes less than 2 seconds  Findings: No rash  Neurological:      Mental Status: He is alert     Psychiatric:         Mood and Affect: Mood normal          Behavior: Behavior normal                 Artice SERA Diaz

## 2021-04-05 NOTE — ASSESSMENT & PLAN NOTE
Improve with Xanax, however he is open to trying alternative medications  Will start Vistaril  Advised he may increase to 50mg daily if needed    Will have him RTO for f/u with PCP next month

## 2021-04-13 ENCOUNTER — TELEPHONE (OUTPATIENT)
Dept: FAMILY MEDICINE CLINIC | Facility: CLINIC | Age: 61
End: 2021-04-13

## 2021-04-13 DIAGNOSIS — E78.2 MIXED HYPERLIPIDEMIA: ICD-10-CM

## 2021-04-13 DIAGNOSIS — I10 BENIGN ESSENTIAL HYPERTENSION: ICD-10-CM

## 2021-04-13 DIAGNOSIS — G47.00 INSOMNIA, UNSPECIFIED TYPE: ICD-10-CM

## 2021-04-13 PROCEDURE — 4010F ACE/ARB THERAPY RXD/TAKEN: CPT | Performed by: INTERNAL MEDICINE

## 2021-04-13 RX ORDER — ALPRAZOLAM 0.25 MG/1
0.25 TABLET ORAL
Qty: 14 TABLET | Refills: 0 | Status: SHIPPED | OUTPATIENT
Start: 2021-04-13

## 2021-04-13 RX ORDER — ATORVASTATIN CALCIUM 20 MG/1
TABLET, FILM COATED ORAL
Qty: 90 TABLET | Refills: 1 | Status: SHIPPED | OUTPATIENT
Start: 2021-04-13 | End: 2021-10-05

## 2021-04-13 RX ORDER — LOSARTAN POTASSIUM 100 MG/1
100 TABLET ORAL DAILY
Qty: 90 TABLET | Refills: 1 | Status: SHIPPED | OUTPATIENT
Start: 2021-04-13 | End: 2021-11-09

## 2021-04-13 RX ORDER — HYDROCHLOROTHIAZIDE 12.5 MG/1
TABLET ORAL
Qty: 90 TABLET | Refills: 1 | Status: SHIPPED | OUTPATIENT
Start: 2021-04-13 | End: 2021-04-19

## 2021-04-13 NOTE — TELEPHONE ENCOUNTER
Pt needs anxiety medication  Can he still take the alprazolam with the vistaril? If so can this be refilled for occasional anxiety as needed  Pls let pt know

## 2021-04-13 NOTE — TELEPHONE ENCOUNTER
Left message for patient to return call, when patient calls back please relay the message from Dr Wagner Minus  Thank you    Alejandrina Pearce LPN

## 2021-04-13 NOTE — TELEPHONE ENCOUNTER
Patient is Aware of the two hours between medications  He stated he will definitely be doing that    NFA

## 2021-04-13 NOTE — TELEPHONE ENCOUNTER
I sent in a refill of xanax for him  He should not take the medications together  Please allow for at least 2 hours in between the medications    Thank you,  Ashley Whiting, DO

## 2021-04-19 ENCOUNTER — CONSULT (OUTPATIENT)
Dept: ENDOCRINOLOGY | Facility: CLINIC | Age: 61
End: 2021-04-19
Payer: COMMERCIAL

## 2021-04-19 VITALS
BODY MASS INDEX: 39.6 KG/M2 | HEIGHT: 69 IN | HEART RATE: 67 BPM | SYSTOLIC BLOOD PRESSURE: 144 MMHG | TEMPERATURE: 97.3 F | WEIGHT: 267.4 LBS | DIASTOLIC BLOOD PRESSURE: 80 MMHG

## 2021-04-19 DIAGNOSIS — E11.40 TYPE 2 DIABETES MELLITUS WITH DIABETIC NEUROPATHY, WITH LONG-TERM CURRENT USE OF INSULIN (HCC): Primary | ICD-10-CM

## 2021-04-19 DIAGNOSIS — N18.2 STAGE 2 CHRONIC KIDNEY DISEASE: ICD-10-CM

## 2021-04-19 DIAGNOSIS — Z79.4 TYPE 2 DIABETES MELLITUS WITH DIABETIC NEUROPATHY, WITH LONG-TERM CURRENT USE OF INSULIN (HCC): Primary | ICD-10-CM

## 2021-04-19 DIAGNOSIS — Z79.4 DIABETES MELLITUS DUE TO UNDERLYING CONDITION WITH HYPERGLYCEMIA, WITH LONG-TERM CURRENT USE OF INSULIN (HCC): ICD-10-CM

## 2021-04-19 DIAGNOSIS — E08.65 DIABETES MELLITUS DUE TO UNDERLYING CONDITION WITH HYPERGLYCEMIA, WITH LONG-TERM CURRENT USE OF INSULIN (HCC): ICD-10-CM

## 2021-04-19 DIAGNOSIS — E78.2 MIXED HYPERLIPIDEMIA: ICD-10-CM

## 2021-04-19 DIAGNOSIS — E66.9 CLASS 2 OBESITY WITHOUT SERIOUS COMORBIDITY WITH BODY MASS INDEX (BMI) OF 39.0 TO 39.9 IN ADULT, UNSPECIFIED OBESITY TYPE: ICD-10-CM

## 2021-04-19 DIAGNOSIS — I10 BENIGN ESSENTIAL HYPERTENSION: ICD-10-CM

## 2021-04-19 DIAGNOSIS — R80.9 POSITIVE FOR MACROALBUMINURIA: ICD-10-CM

## 2021-04-19 PROCEDURE — 1036F TOBACCO NON-USER: CPT | Performed by: INTERNAL MEDICINE

## 2021-04-19 PROCEDURE — 3008F BODY MASS INDEX DOCD: CPT | Performed by: INTERNAL MEDICINE

## 2021-04-19 PROCEDURE — 3066F NEPHROPATHY DOC TX: CPT | Performed by: INTERNAL MEDICINE

## 2021-04-19 PROCEDURE — 99205 OFFICE O/P NEW HI 60 MIN: CPT | Performed by: INTERNAL MEDICINE

## 2021-04-19 RX ORDER — HYDROCHLOROTHIAZIDE 25 MG/1
25 TABLET ORAL DAILY
Qty: 90 TABLET | Refills: 3 | Status: SHIPPED | OUTPATIENT
Start: 2021-04-19 | End: 2022-02-25

## 2021-04-19 NOTE — PATIENT INSTRUCTIONS
Please increase metformin to 1000 mg orally twice a day    continue Lantus 80 units subcutaneously once a day   Continue glipizide XL 10 mg orally daily, take this in the morning   check blood sugars before meals and bedtime, keep a log, sunlight for review in 2 weeks   Follow-up in 4 weeks     please call your insurance and inquire if a continuous glucose monitor like freestyle Ulysses or Dexcom would be covered

## 2021-04-19 NOTE — PROGRESS NOTES
Alvarado Garcia 56 y o  male MRN: [id number]    Encounter: [id number]      Assessment/Plan     Assessment: This is a 56y o -year-old male with type 2 diabetes, obesity, hyperlipidemia, CKD and hypertension  Plan:    - Type 2 diabetes:  Uncontrolled with A1c 9 1% (  On 02/19/2021)  Patient is currently on Lantus 80 units q h s , metformin 500 mg daily and glipizide XL 10 mg b i d  Patient is instructed to increase metformin to 1000 mg twice a day progressively if well tolerated  He is instructed to check his blood glucose 3 to 4 times a day and send us blood glucose logs in 1-2 weeks for further adjustment       -Hypertension: uncontrolled with a blood pressure of 144/80 today  Will increase hydrochlorothiazide to 25 mg daily, continue amlodipine 5 mg daily and losartan 100 mg daily  Patient is instructed to check his blood pressure at home       -Hyperlipidemia:  Continue Lipitor 20 mg daily       -Obesity:  Lifestyle modifications strongly advised  Patient is referred for diabetes education and dietitian consult  -Microalbuminuria:  Microalbumine/ creatinine ratio has been steadily increasing from 744 6 in 2017 to 2 190 on 2/2021  Continue losartan 100 mg daily  Will check microalbumin/ creatinine ratio again  Consider nephrology consult    CC: Diabetes    History of Present Illness     HPI:    Patient is 39-year-old male with type 2 diabetes, obesity, hypertension, hyperlipidemia,  who presents as a new consult for management of uncontrolled type 2 diabetes  Patient has had type 2 diabetes since 2007  Initially, he was on Metformin and Lantus 25 units daily  His diabetes has been complicated by neuropathy, retinopathy, microalbuminuria and CKD stage 1  No history of CVA or CAD  His current regimen:    -Metformin 500 mg at night  (  He was supposed to take twice a day but he always forgets to take the morning dose)  -Lantus 80 units in the morning   -Glipizide 10 mg daily at bedtime    He checks his blood glucose once in the morning and it ranges between 180 and 199  Patient gained 10 lb in the last year  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  He is physical activity is limited by right knee arthritis for which he is planned for knee replacement  He eats 3 meals a day and 2 snacks  Karlene Izabella is the largest meal    He admits to not following a carb controlled diet  He has recently seen ophtalmology: diabetic retinopathy +  Has seen a dietician 10 years ago  Microalbuni/ creat:  2 190 on 2/2021  Family history of type 2 diabetes on the father side  Review of Systems   Constitutional: Positive for fatigue  Negative for activity change, appetite change and unexpected weight change  HENT: Negative for trouble swallowing and voice change  Gastrointestinal: Positive for constipation  Negative for abdominal pain and diarrhea  Endocrine: Positive for polydipsia and polyuria  Genitourinary: Negative for dysuria  Musculoskeletal: Positive for arthralgias  Negative for myalgias  Skin: Negative for color change  Neurological: Positive for numbness  Negative for tremors and headaches  Psychiatric/Behavioral: Positive for sleep disturbance  Negative for behavioral problems  The patient is nervous/anxious  All other systems reviewed and are negative        Historical Information   Past Medical History:   Diagnosis Date    Corneal abrasion     last assessed - [id number]    Diabetes mellitus ([address] )     Hiatal hernia     Hyperlipidemia     Hypertension     Impaired fasting glucose     last assessed - [id number]    Kidney stone     last stone 2009    PONV (postoperative nausea and vomiting)     Sinus infection     currently under tx PO ABT etc as of 04/17/19    Viremia     Resolved - [id number]     Past Surgical History:   Procedure Laterality Date    ANKLE SURGERY Right     tendon arthrotomy    ARTHROTOMY ANKLE      BACK SURGERY  2014    lumbar laminectomy    COLONOSCOPY      COLONOSCOPY N/A 2019    Procedure: COLONOSCOPY;  Surgeon: Luh Hopkins MD;  Location: [facility] [address] [facility];   Service: Gastroenterology    FINGER SURGERY Bilateral     every finger contracture release over a 10 year period trigger finger    INCISION TENDON SHEATH      of a finger    KNEE SURGERY Right     x2 arthrotomy    SHOULDER SURGERY Left     x1 staples, tendon repair secondary to many spontaneous dislocations     Social History   Social History     Substance and Sexual Activity   Alcohol Use Yes    Frequency: Monthly or less    Comment: rare     Social History     Substance and Sexual Activity   Drug Use No     Social History     Tobacco Use   Smoking Status Former Smoker    Quit date:     Years since quittin 3   Smokeless Tobacco Never Used   Tobacco Comment    Former smoker     Family History:   Family History   Problem Relation Age of Onset    Hypertension Mother     Arthritis Mother     Hyperlipidemia Mother     Lung cancer Mother     Stroke Maternal Grandfather     Diabetes Cousin     COPD Father     Depression Family     Diabetes Family     No Known Problems Sister     No Known Problems Brother     No Known Problems Maternal Aunt     No Known Problems Maternal Uncle     No Known Problems Paternal Aunt     No Known Problems Paternal Uncle     No Known Problems Maternal Grandmother     No Known Problems Paternal Grandmother     No Known Problems Paternal Grandfather        Meds/Allergies   Current Outpatient Medications   Medication Sig Dispense Refill    ALPRAZolam (XANAX) 0 25 mg tablet Take 1 tablet (0 25 mg total) by mouth daily at bedtime as needed for anxiety 14 tablet 0    amLODIPine (NORVASC) 5 mg tablet Take 1 tablet (5 mg total) by mouth daily 30 tablet 1    aspirin (ECOTRIN LOW STRENGTH) 81 mg EC tablet Take 81 mg by mouth daily      atorvastatin (LIPITOR) 20 mg tablet TAKE ONE TABLET BY MOUTH EVERY DAY 90 tablet 1    Blood Glucose Monitoring Suppl (ONE TOUCH ULTRA 2) w/Device KIT by Does not apply route 3 (three) times a day 1 each 0    glipiZIDE (GLUCOTROL XL) 10 mg 24 hr tablet TAKE TWO TABLETS BY MOUTH EVERY DAY AT BEDTIME 180 tablet 1    glucose blood (ONE TOUCH ULTRA TEST) test strip Test glucose 3 times daily 300 each 3    hydrochlorothiazide (HYDRODIURIL) 12 5 mg tablet TAKE ONE TABLET BY MOUTH EVERY DAY (GENERIC FOR HYDRODIURIL) 90 tablet 1    hydrOXYzine pamoate (VISTARIL) 25 mg capsule Take 1 capsule (25 mg total) by mouth daily as needed (insomnia) 30 capsule 0    insulin glargine (Basaglar KwikPen) 100 units/mL injection pen Inject 80 Units under the skin daily 25 pen 1    Insulin Pen Needle (B-D ULTRAFINE III SHORT PEN) 31G X 8 MM MISC Inject under the skin daily Use as directed 200 each 0    Lancets (TapPress UNISTIK 2) MISC by Does not apply route      losartan (COZAAR) 100 MG tablet Take 1 tablet (100 mg total) by mouth daily 90 tablet 1    metFORMIN (GLUCOPHAGE) 500 mg tablet TAKE TWO TABLETS BY MOUTH TWICE A  tablet 1     No current facility-administered medications for this visit  Allergies   Allergen Reactions    Latex Rash     At dentist, lips swollen     Codeine Nausea Only     Reaction Date: [id number]; Objective   Vitals: Temperature (!) 97 3 °F (36 3 °C), height 5' 9" (1 753 m), weight 121 kg (267 lb 6 4 oz)  Physical Exam  Vitals signs reviewed  Constitutional:       General: He is not in acute distress  Appearance: He is obese  HENT:      Head: Normocephalic and atraumatic  Nose: Nose normal       Mouth/Throat:      Mouth: Mucous membranes are moist    Eyes:      General: No scleral icterus  Extraocular Movements: Extraocular movements intact  Conjunctiva/sclera: Conjunctivae normal       Pupils: Pupils are equal, round, and reactive to light  Neck:      Musculoskeletal: Normal range of motion and neck supple        Comments: No thyromegaly  Cardiovascular: Rate and Rhythm: Normal rate  Pulses: Normal pulses  Heart sounds: Normal heart sounds  Pulmonary:      Effort: Pulmonary effort is normal       Breath sounds: Normal breath sounds  Abdominal:      General: Bowel sounds are normal       Palpations: Abdomen is soft  Tenderness: There is no abdominal tenderness  Musculoskeletal: Normal range of motion  Right lower leg: No edema  Left lower leg: No edema  Lymphadenopathy:      Cervical: No cervical adenopathy  Skin:     General: Skin is warm  Findings: No rash  Neurological:      Mental Status: He is alert and oriented to person, place, and time  Gait: Gait normal       Deep Tendon Reflexes: Reflexes normal    Psychiatric:         Mood and Affect: Mood normal          Behavior: Behavior normal          Thought Content: Thought content normal          Judgment: Judgment normal          The history was obtained from the review of the chart, patient  Lab Results:   Lab Results   Component Value Date/Time    Hemoglobin A1C 9 1 (H) 02/19/2021 10:56 AM    White Blood Cell Count 5 9 02/19/2021 10:56 AM    Hemoglobin 15 8 02/19/2021 10:56 AM    HCT 47 2 02/19/2021 10:56 AM    MCV 85 02/19/2021 10:56 AM    Platelet Count 438 23/04/0553 10:56 AM    BUN 21 02/19/2021 10:56 AM    Potassium 3 7 02/19/2021 10:56 AM    Chloride 99 02/19/2021 10:56 AM    CO2 27 02/19/2021 10:56 AM    Creatinine 1 06 02/19/2021 10:56 AM    AST 26 02/19/2021 10:56 AM    ALT 31 02/19/2021 10:56 AM    Albumin 4 1 02/19/2021 10:56 AM    Globulin, Total 2 5 02/19/2021 10:56 AM    HDL 41 02/19/2021 10:56 AM    Triglycerides 96 02/19/2021 10:56 AM           Imaging Studies: I have personally reviewed pertinent reports  Portions of the record may have been created with voice recognition software  Occasional wrong word or "sound a like" substitutions may have occurred due to the inherent limitations of voice recognition software   Read the chart carefully and recognize, using context, where substitutions have occurred

## 2021-05-05 ENCOUNTER — HOSPITAL ENCOUNTER (OUTPATIENT)
Dept: NON INVASIVE DIAGNOSTICS | Facility: HOSPITAL | Age: 61
Discharge: HOME | End: 2021-05-05
Attending: INTERNAL MEDICINE
Payer: COMMERCIAL

## 2021-05-05 ENCOUNTER — HOSPITAL ENCOUNTER (OUTPATIENT)
Dept: RADIOLOGY | Facility: HOSPITAL | Age: 61
Discharge: HOME | End: 2021-05-05
Attending: INTERNAL MEDICINE
Payer: COMMERCIAL

## 2021-05-05 DIAGNOSIS — R06.02 EXERTIONAL SHORTNESS OF BREATH: ICD-10-CM

## 2021-05-05 DIAGNOSIS — R94.31 ABNORMAL EKG: ICD-10-CM

## 2021-05-05 LAB
CHEST PAIN STATEMENT: NORMAL
MAX DIASTOLIC BP: 85 MMHG
MAX HEART RATE: 98 BPM
MAX PREDICTED HEART RATE: 158 BPM
MAX. SYSTOLIC BP: 175 MMHG
PROTOCOL NAME: NORMAL
REASON FOR TERMINATION: NORMAL
TARGET HR FORMULA: NORMAL
TEST INDICATION: NORMAL
TIME IN EXERCISE PHASE: NORMAL

## 2021-05-05 PROCEDURE — 93306 TTE W/DOPPLER COMPLETE: CPT

## 2021-05-05 PROCEDURE — A9502 TC99M TETROFOSMIN: HCPCS

## 2021-05-05 PROCEDURE — 93016 CV STRESS TEST SUPVJ ONLY: CPT | Performed by: INTERNAL MEDICINE

## 2021-05-05 PROCEDURE — 93017 CV STRESS TEST TRACING ONLY: CPT

## 2021-05-05 PROCEDURE — 78452 HT MUSCLE IMAGE SPECT MULT: CPT

## 2021-05-05 PROCEDURE — 93018 CV STRESS TEST I&R ONLY: CPT | Performed by: INTERNAL MEDICINE

## 2021-05-05 PROCEDURE — 78452 HT MUSCLE IMAGE SPECT MULT: CPT | Performed by: INTERNAL MEDICINE

## 2021-05-05 PROCEDURE — 93306 TTE W/DOPPLER COMPLETE: CPT | Performed by: INTERNAL MEDICINE

## 2021-05-05 PROCEDURE — G1004 CDSM NDSC: HCPCS

## 2021-05-05 RX ADMIN — REGADENOSON 0.4 MG: 0.08 INJECTION, SOLUTION INTRAVENOUS at 08:56

## 2021-05-07 ENCOUNTER — TELEPHONE (OUTPATIENT)
Dept: CARDIOLOGY CLINIC | Facility: CLINIC | Age: 61
End: 2021-05-07

## 2021-05-07 NOTE — TELEPHONE ENCOUNTER
----- Message from Ave Lind DO sent at 5/6/2021 11:48 AM EDT -----  Can you please let the patient know stress test was normal - sent a separate task about echo - it was normal as well

## 2021-05-09 DIAGNOSIS — I10 BENIGN ESSENTIAL HYPERTENSION: ICD-10-CM

## 2021-05-10 RX ORDER — AMLODIPINE BESYLATE 5 MG/1
TABLET ORAL
Qty: 30 TABLET | Refills: 5 | Status: SHIPPED | OUTPATIENT
Start: 2021-05-10 | End: 2021-11-01

## 2021-06-07 DIAGNOSIS — I10 BENIGN ESSENTIAL HYPERTENSION: ICD-10-CM

## 2021-06-07 RX ORDER — HYDROCHLOROTHIAZIDE 25 MG/1
25 TABLET ORAL DAILY
Qty: 90 TABLET | Refills: 3 | OUTPATIENT
Start: 2021-06-07

## 2021-06-15 ENCOUNTER — TRANSCRIBE ORDERS (OUTPATIENT)
Dept: ADMINISTRATIVE | Facility: HOSPITAL | Age: 61
End: 2021-06-15

## 2021-06-15 DIAGNOSIS — M19.90 INFLAMMATORY ARTHRITIS: Primary | ICD-10-CM

## 2021-07-14 ENCOUNTER — TELEPHONE (OUTPATIENT)
Dept: DIABETES SERVICES | Facility: HOSPITAL | Age: 61
End: 2021-07-14

## 2021-07-14 ENCOUNTER — OFFICE VISIT (OUTPATIENT)
Dept: DIABETES SERVICES | Facility: CLINIC | Age: 61
End: 2021-07-14
Payer: COMMERCIAL

## 2021-07-14 VITALS — WEIGHT: 265 LBS | HEIGHT: 69 IN | BODY MASS INDEX: 39.25 KG/M2

## 2021-07-14 DIAGNOSIS — E11.40 TYPE 2 DIABETES MELLITUS WITH DIABETIC NEUROPATHY, WITH LONG-TERM CURRENT USE OF INSULIN (HCC): ICD-10-CM

## 2021-07-14 DIAGNOSIS — Z79.4 TYPE 2 DIABETES MELLITUS WITH DIABETIC NEUROPATHY, WITH LONG-TERM CURRENT USE OF INSULIN (HCC): ICD-10-CM

## 2021-07-14 PROCEDURE — 98960 EDU&TRN PT SELF-MGMT NQHP 1: CPT | Performed by: DIETITIAN, REGISTERED

## 2021-07-14 NOTE — TELEPHONE ENCOUNTER
DM assessment completed today  Given instructed on a OneTouch Verio meter in office  Can you please send in a prescription for Verio test strips and delica lancets to his pharmacy on file for twice a day testing? He is also interested in pursuing a Ulysses 14 day sensor  He understands that this may not be covered by insurance due to him not being on mealtime insulin, but he is on long-acting insulin  Can you please send in a prescription for a 2 pack of Ulysses 14 day sensors? He does not need a  ordered as he will run it through his phone and that will save him some money  He is willing to pay cash for it if needed as he thinks it would be very beneficial to help him manage his blood sugar  Thank you!

## 2021-07-14 NOTE — PROGRESS NOTES
Type 2 Diabetes Class Assessment    Met with Estela Clemens for DSME Initial visit  Jessy Schmitz has Type 2 Diabetes  States diabetes 10-15 years  Admits he has not really taken his diabetes seriously over much of that time, but he is highly motivated to change that now  He is starting to develop some complications from diabetes and needs to have a knee replacement surgery which they will not due until his A1c improves  He wants to improve his health overall and taking control of his diabetes is the 1st step in that  Diabetes assessment completed today, including blood sugar meter Education  He has a meter that is 8years old that he does not really use  I gave him a new OneTouch Verio meter, blood sugar in office 180  Still fasting but after coffee  I will request a prescription for OneTouch Verio test strips and lancets from his endocrinologist   He is also very interested in the [address] [place] [address] which he brought up in discussion to me  He thinks that will be very helpful with his work schedule and to help him manage his diabetes, willing to pay cash for it if not covered by insurance  I will ask the endocrinology office to order him a 2 pack of freestyle Ulysses 14 day sensors that he will run through his phone, no need to order a , that will save him some money  Goals are for him to start checking his blood sugars  With a meter as pair testing, before a meal and 2 hours after the same meal, rotating between meals, until he can get the freestyle Ulysses  He will continue to increase physical activity, and is scheduled for diabetes classes starting August evenings  Will call with questions or for more Education is needed  Diabetes Assessment  Visit Type: Initial visit  Present at Session: patient   Medical Diagnosis/ICD 10: E11 40  Special Learning Needs: No  Barriers to Learning: no barriers    How do you feel about making lifestyle changes at this time?  Ready, motivated  How would you rate your current knowledge of diabetes?: fair  How confident are you that will be able to take better control of your diabetes?: good    How long have you had diabetes? 15years  Have you had diabetes education in the past?: Yes - at dx a visit   Do you have any family members with diabetes?: Yes  Do you monitor your blood sugar? no    Any financial concerns pertaining to your diabetes supplies, medication or care?: No  Have you ever experienced hypoglycemia?:  No  Have you ever been hospitalized or gone to the ER due to your blood sugars?: No  How do you treat low blood sugars?: no idea   How do you treat high blood sugars? No idea   Do you wear a Diabetes I D ?: no  Where do you dispose of your sharps (needles,lancetes)? : in the trash     Ht Readings from Last 1 Encounters:   07/14/21 5' 9" (1 753 m)     Wt Readings from Last 2 Encounters:   07/14/21 120 kg (265 lb)   04/19/21 121 kg (267 lb 6 4 oz)     Weight Change: No    Diet Assessment  Do you follow any special diet presently?: No  Who cooks at home?:  patient and spouse  Who does the grocery shopping?: patient     Activity Assessment  Exercise: ADL, nothing structured, needs a new knee can barely bend     Lifestyle/Social Assessment    Racial/ethnic group:                                       Primary Language: English  Marital Status:   Work status: Full Time  Type of job and hours: up around 5am home around 7pm, has a 2hr commute each way     Who lives in your household?: spouse and children  Who is you primary support person(s): spouse   Describe your quality of life currently?: good  Any concerns for your safety?: No  Any Sabianism or cultural practices that may affect your diabetes care: No  Do you have a decrease or loss of hearing: No  Do you have a decrease or loss of vision?: No  When was the last time you had an ophthalmology exam?: within this year, needs to get sugars under better control   When was the last time you had dental exam?: every 6months, going today,   Do you check your feet for cracks, sores, debris?: No  When was the last time you had podiatry or foot exam?: none, sight and PCP , gets some tingling   Last flu shot?: not this year, covid shot  Pneumonia shot?: No      Lab Results   Component Value Date    HGBA1C 9 1 (H) 02/19/2021     Lab Results   Component Value Date    CHOL 111 11/20/2017    HDL 41 02/19/2021    LDLCALC 64 02/19/2021    TRIG 96 02/19/2021     No results found for: Salvador Dubin      The patient's history was reviewed and updated as appropriate: allergies, current medications  Intervention    Diabetes Overview :   Bhumika Bowens was instructed on basic concepts of diabetes, including identifying role of diabetes self management, basic pathophysiology and types of diabetes, A1c and blood sugar targets  Bhumika Bowens has good understanding of material covered  Taking Medications: Instructed patient on action, side effects, efficacy, prescribed dosage and appropriate timing and frequency of administration of his diabetes medication  Bhumikahank Bowens has good understanding of material covered  Monitoring Blood Sugars  Instructions for Meter Teaching- Patient instructed in the following:  Site selection and skin preparation, Loading strips and lancet device, meter activation, obtaining blood sample, test strip and lancet disposal and recording log book entries  Patient has good understanding of material covered and was able to test their own blood sugar in office today  Comments: Gave and instructed on One Touch Verio meter, Patient demonstrated use, blood sugar in office 180 mg/dl  at  8am    Testing frequency: Encouraged pair testing  Test sugars before a meal and 2hr after the same meal, rotating between breakfast, lunch, and dinner  Test sugars twice a day (7 days a week)  Goal Blood Sugars:   Premeal , even better <110  2hr after a meal <180, even better <140  A1C <7%, even better <6 5%      Hypoglycemia: Instructed patient on definition/risk of hypoglycemia, treatment, causes/symptoms, when to notify provider of lows, prevention of hypoglycemia and exercise precautions  Comments: [place] understanding of hypoglycemia concepts      Physical Activity: Discussed benefits of physical activity to optimize blood glucose control, encouraged activity at patient is physically able  Always consult a physician prior to starting an exercise program   Comments: [place] understanding of hypoglycemia concepts          Diabetes Education Record    Krys May was given our assessment packet, which includes: Know Your Numbers, Support Group Schedule, Hypoglycemia Sheet, Blood Sugar Log Record Sheet, Sharps Disposal, and Nutrition Guidelines for Diabetes  Patient response to instruction    Comprehensiongood  Motivationgood  Expected Compliancegood  Referring Provider: Chong Pyle you for referring your patient to [facility], it was a pleasure working with them today  Please feel free to call with any questions or concerns      Raphael Mendez, [address]  [place] [id number]

## 2021-07-14 NOTE — TELEPHONE ENCOUNTER
Please put in a prescription for OneTouch treated test strips, lancets, freestyle Ulysses 14 day sensor -2 pack      Patient does not need a

## 2021-07-15 DIAGNOSIS — Z79.4 TYPE 2 DIABETES MELLITUS WITH DIABETIC NEUROPATHY, WITH LONG-TERM CURRENT USE OF INSULIN (HCC): Primary | ICD-10-CM

## 2021-07-15 DIAGNOSIS — E11.40 TYPE 2 DIABETES MELLITUS WITH DIABETIC NEUROPATHY, WITH LONG-TERM CURRENT USE OF INSULIN (HCC): Primary | ICD-10-CM

## 2021-07-15 RX ORDER — BLOOD SUGAR DIAGNOSTIC
STRIP MISCELLANEOUS
Qty: 200 EACH | Refills: 3 | Status: SHIPPED | OUTPATIENT
Start: 2021-07-15

## 2021-07-15 RX ORDER — FLASH GLUCOSE SENSOR
KIT MISCELLANEOUS
Qty: 2 EACH | Refills: 12 | Status: SHIPPED | OUTPATIENT
Start: 2021-07-15 | End: 2022-07-29

## 2021-07-15 RX ORDER — LANCETS 33 GAUGE
EACH MISCELLANEOUS
Qty: 200 EACH | Refills: 3 | Status: SHIPPED | OUTPATIENT
Start: 2021-07-15

## 2021-07-15 RX ORDER — BLOOD-GLUCOSE METER
EACH MISCELLANEOUS
Qty: 1 KIT | Refills: 0 | Status: SHIPPED | OUTPATIENT
Start: 2021-07-15

## 2021-07-16 ENCOUNTER — HOSPITAL ENCOUNTER (OUTPATIENT)
Dept: RADIOLOGY | Facility: HOSPITAL | Age: 61
Discharge: HOME | End: 2021-07-16
Payer: COMMERCIAL

## 2021-07-16 DIAGNOSIS — M19.90 INFLAMMATORY ARTHRITIS: ICD-10-CM

## 2021-07-16 PROCEDURE — 76881 US COMPL JOINT R-T W/IMG: CPT

## 2021-08-02 LAB
ALBUMIN/CREAT UR: 1118 MG/G CREAT (ref 0–29)
CREAT UR-MCNC: 180.4 MG/DL
MICROALBUMIN UR-MCNC: 2017.5 UG/ML

## 2021-08-03 ENCOUNTER — TELEPHONE (OUTPATIENT)
Dept: DIABETES SERVICES | Facility: CLINIC | Age: 61
End: 2021-08-03

## 2021-08-03 NOTE — TELEPHONE ENCOUNTER
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[place] that we will get him rescheduled for virtual classes  Informed patient that the exception would be if he were to be in [place] at the time of the virtual classes were being provided to him  Asked patient to return my phone call to confirm he received message and to answer any questions he may have  Patient called back main diabetes education number, left voicemail asking for return call  Returned patient's phone call and was able to speak with him  Explained that I will be happy to help him as much as I can at his in person individual appointment that he has scheduled and that we will also let him know when we are able to reschedule him for virtual classes  After our educators obtain [place] licenses  Patient did state that he works in [place] so that being in [address] [place] [address] while he views classes is not an option  Patient was understanding and appreciative of the explanation and return call

## 2021-08-13 ENCOUNTER — TELEPHONE (OUTPATIENT)
Dept: ENDOCRINOLOGY | Facility: CLINIC | Age: 61
End: 2021-08-13

## 2021-08-13 PROCEDURE — 3066F NEPHROPATHY DOC TX: CPT | Performed by: FAMILY MEDICINE

## 2021-08-13 NOTE — TELEPHONE ENCOUNTER
----- Message from Kenn Pearson MD sent at 8/12/2021  1:47 PM EDT -----  Has macroalbuminuria - recommend consult with nephrology   Also is due for follow up

## 2021-08-14 DIAGNOSIS — Z79.4 TYPE 2 DIABETES MELLITUS WITH DIABETIC NEUROPATHY, WITH LONG-TERM CURRENT USE OF INSULIN (HCC): ICD-10-CM

## 2021-08-14 DIAGNOSIS — E11.40 TYPE 2 DIABETES MELLITUS WITH DIABETIC NEUROPATHY, WITH LONG-TERM CURRENT USE OF INSULIN (HCC): ICD-10-CM

## 2021-08-16 RX ORDER — INSULIN GLARGINE 100 [IU]/ML
80 INJECTION, SOLUTION SUBCUTANEOUS DAILY
Qty: 15 ML | Refills: 1 | Status: SHIPPED | OUTPATIENT
Start: 2021-08-16 | End: 2021-09-20

## 2021-08-17 ENCOUNTER — OFFICE VISIT (OUTPATIENT)
Dept: DIABETES SERVICES | Facility: CLINIC | Age: 61
End: 2021-08-17
Payer: COMMERCIAL

## 2021-08-17 VITALS — HEIGHT: 69 IN | BODY MASS INDEX: 38.33 KG/M2 | WEIGHT: 258.8 LBS

## 2021-08-17 DIAGNOSIS — Z79.4 TYPE 2 DIABETES MELLITUS WITH DIABETIC NEUROPATHY, WITH LONG-TERM CURRENT USE OF INSULIN (HCC): Primary | ICD-10-CM

## 2021-08-17 DIAGNOSIS — E11.40 TYPE 2 DIABETES MELLITUS WITH DIABETIC NEUROPATHY, WITH LONG-TERM CURRENT USE OF INSULIN (HCC): Primary | ICD-10-CM

## 2021-08-17 PROCEDURE — 97802 MEDICAL NUTRITION INDIV IN: CPT | Performed by: DIETITIAN, REGISTERED

## 2021-08-17 NOTE — PATIENT INSTRUCTIONS
45-60 grams of carbohydrate per meal  0-15 grams of carbohydrate per snack    3 meals per day, ideally as close to 4-5 hours apart as possible  3 snacks per day, at least 2 hours apart from meals  8 oz of protein per day  5 servings of fats per day  Use the Portion Book and label reading to determine carbohydrate amounts in food and beverages  Please complete 3 day food record prior to follow-up appointment in 6 weeks

## 2021-08-17 NOTE — PROGRESS NOTES
Medical Nutrition Therapy        Assessment    Visit Type: Initial visit    Chief complaint T2DM    HPI: 56year old male with T2DM, diagnosed in 2007  Most recent HbA1c 9 1%  Using Rowe to monitor BG, invite to share sent today and I reviewed his [employer] today  CGM was active 86% over last 2 weeks with average glucose 104 mg/dL  Time in range 81%, low 15%, high, 4%  Reviewed how he is taking DM medications which revealed that Oswaldo Dodson is taking his evening doses of glipizide XL and metformin qhs rather than before dinner  Advised to take both these medications with meals and explained increased risk of hypo when taking glipizide before bedtime without food  Oswaldo Dodson was seen 1 month ago for class assessment and is being seen for his initial MNT appointment today  Oswaldo Dodson reports that he spends several hours in the car, multiple days per week for his job as a  for a chain of [employer]  He states that he will often eat his breakfast and lunch while driving in his car so that these meals need to be easy to eat while driving  He also reports that he typically does not get home from work until around 7 pm due to working 2 hours away from home  He states that he is usually very hungry by the time he gets home from work  Murtazas diet history reveals inconsistent carbohydrate intake and inadequate intake of high-fiber fruit, vegetables, whole gains, and low-fat dairy  Currently meals range from 15 to 45 grams of carbohydrate  Explained basic pathophysiology of diabetes and impact of diet on blood glucose levels  Together we discussed what foods contain CHO, reading a food label, timing of meals and snacks, serving sizes, the role of fiber, and the role of consistent carbohydrate intake in the appropriate amounts at the appropriate times in achieving and maintaining glycemic control  Used the portion booklet to teach Oswaldo oDdson more about food groups and basic carbohydrate counting   Created an individualized meal plan for Jacob Nicole with 3 meals and 3 snacks providing 45-60 g carb per meal and 0-15 g carb per snack  Recommended 8 oz protein and 5 servings of fats per day  This plan will help promote weight loss  Put together sample meals for Murtaza's reference  Jacob Nicole demonstrated good understanding and will call with any questions prior to follow-up appointment in 6 weeks  Ht Readings from Last 1 Encounters:   07/14/21 5' 9" (1 753 m)     Wt Readings from Last 2 Encounters:   07/14/21 120 kg (265 lb)   04/19/21 121 kg (267 lb 6 4 oz)     Weight Change: Yes reports losing 8 lbs over last month, intentional    Medical Diagnosis/ICD10: E11 40, Z79 4 (ICD-10-CM) - Type 2 diabetes mellitus with diabetic neuropathy, with long-term current use of insulin    Barriers to Learning: no barriers    Do you follow any special diet presently?: No  Who shops: patient and spouse  Who cooks: patient and spouse    Food Log: Completed via the method of food recall    Breakfast:wakes 5 am  6:30 am  2 belvita cookies with his coffee 8-10 oz 1 sweet and low and creamer  OR a small banana OR english muffin with an egg  Morning Snack:10 am belvita cookies (2)  Lunch:1 pm  [place]  [place] ([facility]) lettuce tomato on a hard roll [employer])  Water with a no sugar lemonade (crystal light)  Afternoon Snack: 3-4 pm almonds (1 handful lightly salted)   Dinner: gets home from work around 7  Dinner 7-8 pm  Pasta (1 cup) 1 meatball, 1 sausage, homemade sauce (no sugar) small piece of bread  Water with crystal light  Evening Snack:10 pm 1 cookie  Beverages: coffee, water, crystal light  Eating out/Take out:twice per week, pizza Friday and goes out to eat saturday  Exercise right knee needs replacement, tries to walk his dog when he can  Going up the street causes to the point where he is limping      Calorie needs 1,891 kcals/day Carbs: 45-60 g/meal, 0-15 g/snack         Nutrition Diagnosis:  Inconsistent carbohydrate intake  intake related to Food and nutrition related knowledge deficit concerning appropriate amount and timing of carbohydrate intake as evidenced by  Estimated carbohydrate intake that is different from recommended types or ingested on an irregular basis    Intervention: plate method, increased fiber intake, label reading, carbohydrate counting, increased plant based foods, meal timing, meal planning, individualized meal plan and food diary     Treatment Goals: Patient understands education and recommendations, Patient will monitor fat intake, Patient will monitor portion control, Patient will consume snacks, Patient will consume 25-35 grams of fiber a day, Patient will increase their intake of plant based foods and Patient will count carbohydrates    Monitoring and evaluation:    Term code indicator  FH 1 6 3 Carbohydrate Intake Criteria: 45-60 g CHO per meal, 0-15 g CHO per snack  Term code indicator  FH 4 4 Mealtime Behavior Criteria: 3 meals per day, 4-5 hours apart  3 snacks per day, at least 2 hours apart from meals  Patients Response to Instruction:  Comprehensionvery good  Motivationvery good  Expected Compliancevery good    Thank you for coming to the [facility] for education today  Please feel free to call with any questions or concerns      Start: 8:12 am  Stop: 9:24 am  Referred by: MD Yonas Santiago, [address] [place] [id number]

## 2021-08-22 ENCOUNTER — OFFICE VISIT (OUTPATIENT)
Dept: URGENT CARE | Facility: CLINIC | Age: 61
End: 2021-08-22
Payer: COMMERCIAL

## 2021-08-22 VITALS
RESPIRATION RATE: 14 BRPM | OXYGEN SATURATION: 100 % | DIASTOLIC BLOOD PRESSURE: 72 MMHG | WEIGHT: 275 LBS | SYSTOLIC BLOOD PRESSURE: 126 MMHG | HEART RATE: 64 BPM | TEMPERATURE: 96.1 F | BODY MASS INDEX: 40.61 KG/M2

## 2021-08-22 DIAGNOSIS — H57.89 IRRITATION OF LEFT EYE: Primary | ICD-10-CM

## 2021-08-22 PROCEDURE — 99213 OFFICE O/P EST LOW 20 MIN: CPT | Performed by: PHYSICIAN ASSISTANT

## 2021-08-22 RX ORDER — AMOXICILLIN 500 MG/1
CAPSULE ORAL
COMMUNITY
Start: 2021-07-20 | End: 2021-11-24 | Stop reason: ALTCHOICE

## 2021-08-22 RX ORDER — GENTAMICIN SULFATE 3 MG/ML
1 SOLUTION/ DROPS OPHTHALMIC 3 TIMES DAILY
Qty: 5 ML | Refills: 0 | Status: SHIPPED | OUTPATIENT
Start: 2021-08-22 | End: 2021-08-27

## 2021-08-22 NOTE — PATIENT INSTRUCTIONS
Eye exam was negative for an abrasion  Prescribed antibiotic drops to help with the redness and drainage  Can clean eye with new warm towel a few times a day  Increase hand washing to prevent further infection  Follow up with eye doctor if needed  If symptoms worsen proceed to the ER

## 2021-08-24 NOTE — PROGRESS NOTES
[facility]        NAME: Theodora Gonzalez is a 56 y o  male  : 1960    MRN: [id number]  DATE: 2021  TIME: 2:39 PM    Assessment and Plan   Irritation of left eye [H57 89]  1  Irritation of left eye  gentamicin (GARAMYCIN) 0 3 % ophthalmic solution         Patient Instructions     Patient Instructions   Eye exam was negative for an abrasion  Prescribed antibiotic drops to help with the redness and drainage  Can clean eye with new warm towel a few times a day  Increase hand washing to prevent further infection  Follow up with eye doctor if needed  If symptoms worsen proceed to the ER  Follow up with PCP in 3-5 days  Proceed to  ER if symptoms worsen  Chief Complaint     Chief Complaint   Patient presents with    Eye Pain     pt presents with eye injury r/t foreign object flying in to left eye yesterday; states watery discharge         History of Present Illness         56-year-old male with a history of diabetes presents for left-sided eye pain x1 day  Patient notes he was weed whacking yesterday while wearing protective goggles but felt something flat under the goggles and there was immediate pain  He flushed his eyes with his wife's contact solution and there is minimal relief  This morning he noted there was yellow crust and mucus from his  He denies any itch, pain, blurred vision  No fevers or any other URI symptoms  Review of Systems   Review of Systems   Constitutional: Negative for chills, fatigue and fever  HENT: Negative for congestion, rhinorrhea and sore throat  Eyes: Positive for discharge and redness  Negative for photophobia, pain, itching and visual disturbance  Respiratory: Negative for cough and shortness of breath  Cardiovascular: Negative for chest pain and palpitations  Gastrointestinal: Negative for abdominal pain, diarrhea, nausea and vomiting  Musculoskeletal: Negative for back pain and myalgias  Skin: Negative for rash  Neurological: Negative for dizziness and headaches  All other systems reviewed and are negative          Current Medications       Current Outpatient Medications:     ALPRAZolam (XANAX) 0 25 mg tablet, Take 1 tablet (0 25 mg total) by mouth daily at bedtime as needed for anxiety, Disp: 14 tablet, Rfl: 0    amLODIPine (NORVASC) 5 mg tablet, TAKE ONE TABLET BY MOUTH EVERY DAY, Disp: 30 tablet, Rfl: 5    amoxicillin (AMOXIL) 500 mg capsule, , Disp: , Rfl:     aspirin (ECOTRIN LOW STRENGTH) 81 mg EC tablet, Take 81 mg by mouth daily, Disp: , Rfl:     atorvastatin (LIPITOR) 20 mg tablet, TAKE ONE TABLET BY MOUTH EVERY DAY, Disp: 90 tablet, Rfl: 1    Basaglar KwikPen 100 units/mL injection pen, INJECT 80 UNITS UNDER THE SKIN DAILY, Disp: 15 mL, Rfl: 1    Blood Glucose Monitoring Suppl (OneTouch Verio) w/Device KIT, Test blood sugar twice a day, Disp: 1 kit, Rfl: 0    Continuous Blood Gluc Sensor (FreeStyle Ulysses 14 Day Sensor) MISC, Change sensor every 14 days, Disp: 2 each, Rfl: 12    glipiZIDE (GLUCOTROL XL) 10 mg 24 hr tablet, TAKE TWO TABLETS BY MOUTH EVERY DAY AT BEDTIME, Disp: 180 tablet, Rfl: 1    glucose blood (OneTouch Verio) test strip, Test blood sugar twice a day, Disp: 200 each, Rfl: 3    hydrochlorothiazide (HYDRODIURIL) 25 mg tablet, Take 1 tablet (25 mg total) by mouth daily, Disp: 90 tablet, Rfl: 3    hydrOXYzine pamoate (VISTARIL) 25 mg capsule, Take 1 capsule (25 mg total) by mouth daily as needed (insomnia), Disp: 30 capsule, Rfl: 0    Insulin Pen Needle (B-D ULTRAFINE III SHORT PEN) 31G X 8 MM MISC, Inject under the skin daily Use as directed, Disp: 200 each, Rfl: 0    losartan (COZAAR) 100 MG tablet, Take 1 tablet (100 mg total) by mouth daily, Disp: 90 tablet, Rfl: 1    metFORMIN (GLUCOPHAGE) 500 mg tablet, Take 2 tablets (1,000 mg total) by mouth 2 (two) times a day with meals, Disp: 360 tablet, Rfl: 3    OneTouch Delica Lancets 99V MISC, Test blood sugar twice a day, Disp: 200 each, Rfl: 3    gentamicin (GARAMYCIN) 0 3 % ophthalmic solution, Administer 1 drop into the left eye 3 (three) times a day for 5 days, Disp: 5 mL, Rfl: 0    Current Allergies     Allergies as of 08/22/2021 - Reviewed 08/22/2021   Allergen Reaction Noted    Latex Rash 02/07/2014    Codeine Nausea Only 10/03/2005            The following portions of the patient's history were reviewed and updated as appropriate: allergies, current medications, past family history, past medical history, past social history, past surgical history and problem list      Past Medical History:   Diagnosis Date    Corneal abrasion     last assessed - [id number]    Diabetes mellitus ([address] )     Hiatal hernia     Hyperlipidemia     Hypertension     Impaired fasting glucose     last assessed - [id number]    Kidney stone     last stone 2009    PONV (postoperative nausea and vomiting)     Sinus infection     currently under tx PO ABT etc as of 04/17/19    Viremia     Resolved - [id number]       Past Surgical History:   Procedure Laterality Date    ANKLE SURGERY Right     tendon arthrotomy    ARTHROTOMY ANKLE      BACK SURGERY  2014    lumbar laminectomy    COLONOSCOPY      COLONOSCOPY N/A 4/19/2019    Procedure: COLONOSCOPY;  Surgeon: Fabiola Thrasher MD;  Location: [place] [address] GI LAB;   Service: Gastroenterology    FINGER SURGERY Bilateral     every finger contracture release over a 10 year period trigger finger    INCISION TENDON SHEATH      of a finger    KNEE SURGERY Right     x2 arthrotomy    SHOULDER SURGERY Left     x1 staples, tendon repair secondary to many spontaneous dislocations       Family History   Problem Relation Age of Onset    Hypertension Mother    [place] Arthritis Mother     Hyperlipidemia Mother     Lung cancer Mother     Stroke Maternal Grandfather     Diabetes Cousin     COPD Father     Depression Family     Diabetes Family     No Known Problems Sister     No Known Problems Brother     No Known Problems Maternal Aunt     No Known Problems Maternal Uncle     No Known Problems Paternal Aunt     No Known Problems Paternal Uncle     No Known Problems Maternal Grandmother     No Known Problems Paternal Grandmother     No Known Problems Paternal Grandfather          Medications have been verified  Objective   /72   Pulse 64   Temp (!) 96 1 °F (35 6 °C)   Resp 14   Wt 125 kg (275 lb)   SpO2 100%   BMI 40 61 kg/m²        Physical Exam     Physical Exam  Vitals and nursing note reviewed  Constitutional:       Appearance: Normal appearance  HENT:      Head: Normocephalic  Right Ear: Tympanic membrane normal       Left Ear: Tympanic membrane normal       Nose: Nose normal       Mouth/Throat:      Mouth: Mucous membranes are moist       Pharynx: No posterior oropharyngeal erythema  Eyes:      General:         Right eye: No discharge  Left eye: Discharge (Watery with minimal crust along lash lines) present  Extraocular Movements: Extraocular movements intact  Conjunctiva/sclera:      Left eye: Left conjunctiva is injected  Pupils: Pupils are equal, round, and reactive to light  Left eye: No corneal abrasion or fluorescein uptake  Cardiovascular:      Rate and Rhythm: Normal rate and regular rhythm  Pulses: Normal pulses  Heart sounds: Normal heart sounds  No murmur heard  Pulmonary:      Effort: Pulmonary effort is normal  No respiratory distress  Breath sounds: Normal breath sounds  Skin:     General: Skin is warm and dry  Neurological:      Mental Status: He is alert and oriented to person, place, and time     Psychiatric:         Behavior: Behavior normal

## 2021-08-29 DIAGNOSIS — E11.49 DIABETES MELLITUS TYPE 2 WITH NEUROLOGICAL MANIFESTATIONS (HCC): ICD-10-CM

## 2021-08-30 RX ORDER — GLIPIZIDE 10 MG/1
TABLET, FILM COATED, EXTENDED RELEASE ORAL
Qty: 180 TABLET | Refills: 1 | Status: SHIPPED | OUTPATIENT
Start: 2021-08-30 | End: 2022-02-25

## 2021-09-02 ENCOUNTER — OFFICE VISIT (OUTPATIENT)
Dept: FAMILY MEDICINE CLINIC | Facility: CLINIC | Age: 61
End: 2021-09-02
Payer: COMMERCIAL

## 2021-09-02 ENCOUNTER — TELEPHONE (OUTPATIENT)
Dept: ADMINISTRATIVE | Facility: OTHER | Age: 61
End: 2021-09-02

## 2021-09-02 VITALS
HEIGHT: 69 IN | WEIGHT: 258.4 LBS | HEART RATE: 62 BPM | DIASTOLIC BLOOD PRESSURE: 68 MMHG | TEMPERATURE: 97.7 F | BODY MASS INDEX: 38.27 KG/M2 | SYSTOLIC BLOOD PRESSURE: 128 MMHG | RESPIRATION RATE: 16 BRPM

## 2021-09-02 DIAGNOSIS — I10 BENIGN ESSENTIAL HYPERTENSION: ICD-10-CM

## 2021-09-02 DIAGNOSIS — E11.40 TYPE 2 DIABETES MELLITUS WITH DIABETIC NEUROPATHY, WITH LONG-TERM CURRENT USE OF INSULIN (HCC): Primary | ICD-10-CM

## 2021-09-02 DIAGNOSIS — Z79.4 TYPE 2 DIABETES MELLITUS WITH DIABETIC NEUROPATHY, WITH LONG-TERM CURRENT USE OF INSULIN (HCC): Primary | ICD-10-CM

## 2021-09-02 DIAGNOSIS — E78.2 MIXED HYPERLIPIDEMIA: ICD-10-CM

## 2021-09-02 PROCEDURE — 99214 OFFICE O/P EST MOD 30 MIN: CPT | Performed by: FAMILY MEDICINE

## 2021-09-02 PROCEDURE — 3008F BODY MASS INDEX DOCD: CPT | Performed by: FAMILY MEDICINE

## 2021-09-02 PROCEDURE — 1036F TOBACCO NON-USER: CPT | Performed by: FAMILY MEDICINE

## 2021-09-02 NOTE — PROGRESS NOTES
Assessment/Plan:    1  Type 2 diabetes mellitus with diabetic neuropathy, with long-term current use of insulin [facility])  Assessment & Plan:    Lab Results   Component Value Date    HGBA1C 9 1 (H) 02/19/2021     Improving with nutritionist and continuous blood glucose monitoring   Patient had lab work in July  Will contact [facility] for copy of results  He had his diabetic eye exam done earlier this year and got new glasses  Copy of report requested    Orders:  -     Hemoglobin A1C; Future; Expected date: 11/16/2021    2  Mixed hyperlipidemia  Assessment & Plan:  Stable   Continue atorvastatin 20 mg daily    Orders:  -     Comprehensive metabolic panel; Future; Expected date: 11/16/2021  -     Lipid Panel with Direct LDL reflex; Future; Expected date: 11/16/2021    3  Benign essential hypertension  Assessment & Plan:  Well controlled   Continue amlodipine, hydrochlorothiazide and losartan    Orders:  -     CBC; Future; Expected date: 11/16/2021  -     Comprehensive metabolic panel; Future; Expected date: 11/16/2021  -     Lipid Panel with Direct LDL reflex; Future; Expected date: 11/16/2021          There are no Patient Instructions on file for this visit  Return in about 3 months (around 12/2/2021) for Diabetic follow up, needs nursing appointment for 1st Shingrix   Subjective:      Patient ID: Edenilson Cage is a 56 y o  male  Chief Complaint   Patient presents with    Follow-up     3 month nm lpn       Patient has been using his continuous blood glucose monitor  He is also started diabetic Education  He is really making an effort to eat better  He is finding the continues glucose monitoring to be very helpful  Reviewed his Pap in his 7 day average has been 91  Hypertension-patient is taking his blood pressure medicine regularly  He denies any problems with leg swelling or cramping  Hyperlipidemia-patient is taking his to her statin regularly    Denies any problems with myalgias  The following portions of the patient's history were reviewed and updated as appropriate: allergies, current medications, past family history, past medical history, past social history, past surgical history and problem list     Review of Systems   Constitutional: Negative  Respiratory: Negative  Cardiovascular: Negative            Current Outpatient Medications   Medication Sig Dispense Refill    ALPRAZolam (XANAX) 0 25 mg tablet Take 1 tablet (0 25 mg total) by mouth daily at bedtime as needed for anxiety 14 tablet 0    amLODIPine (NORVASC) 5 mg tablet TAKE ONE TABLET BY MOUTH EVERY DAY (Patient taking differently: as needed ) 30 tablet 5    amoxicillin (AMOXIL) 500 mg capsule Prior to dental work      aspirin (ECOTRIN LOW STRENGTH) 81 mg EC tablet Take 81 mg by mouth daily      atorvastatin (LIPITOR) 20 mg tablet TAKE ONE TABLET BY MOUTH EVERY DAY 90 tablet 1    Basaglar KwikPen 100 units/mL injection pen INJECT 80 UNITS UNDER THE SKIN DAILY 15 mL 1    Blood Glucose Monitoring Suppl (OneTouch Verio) w/Device KIT Test blood sugar twice a day 1 kit 0    Continuous Blood Gluc Sensor (KwikpikStyle Ulysses 14 Day Sensor) MISC Change sensor every 14 days 2 each 12    glipiZIDE (GLUCOTROL XL) 10 mg 24 hr tablet TAKE TWO TABLETS BY MOUTH EVERY DAY AT BEDTIME 180 tablet 1    glucose blood (OneTouch Verio) test strip Test blood sugar twice a day 200 each 3    hydrochlorothiazide (HYDRODIURIL) 25 mg tablet Take 1 tablet (25 mg total) by mouth daily 90 tablet 3    hydrOXYzine pamoate (VISTARIL) 25 mg capsule Take 1 capsule (25 mg total) by mouth daily as needed (insomnia) 30 capsule 0    Insulin Pen Needle (B-D ULTRAFINE III SHORT PEN) 31G X 8 MM MISC Inject under the skin daily Use as directed 200 each 0    losartan (COZAAR) 100 MG tablet Take 1 tablet (100 mg total) by mouth daily 90 tablet 1    metFORMIN (GLUCOPHAGE) 500 mg tablet Take 2 tablets (1,000 mg total) by mouth 2 (two) times a day with meals 360 tablet 3    OneTouch Delica Lancets 94A MISC Test blood sugar twice a day 200 each 3     No current facility-administered medications for this visit  Objective:    /68   Pulse 62   Temp 97 7 °F (36 5 °C)   Resp 16   Ht 5' 9" (1 753 m)   Wt 117 kg (258 lb 6 4 oz)   BMI 38 16 kg/m²        Physical Exam  Vitals and nursing note reviewed  Constitutional:       Appearance: He is well-developed  HENT:      Head: Normocephalic and atraumatic  Right Ear: Tympanic membrane and external ear normal       Left Ear: Tympanic membrane and external ear normal    Cardiovascular:      Rate and Rhythm: Normal rate and regular rhythm  Heart sounds: Normal heart sounds  No murmur heard  Pulmonary:      Effort: Pulmonary effort is normal  No respiratory distress  Breath sounds: Normal breath sounds  No wheezing or rales  Musculoskeletal:      Right lower leg: No edema  Left lower leg: No edema                  [place] Callow, DO

## 2021-09-02 NOTE — ASSESSMENT & PLAN NOTE
Lab Results   Component Value Date    HGBA1C 9 1 (H) 02/19/2021     Improving with nutritionist and continuous blood glucose monitoring   Patient had lab work in July  Will contact [facility] for copy of results  He had his diabetic eye exam done earlier this year and got new glasses    Copy of report requested

## 2021-09-02 NOTE — TELEPHONE ENCOUNTER
----- Message from Delfina العلي DO sent at 9/2/2021  9:16 AM EDT -----  09/02/21 9:16 AM    Hello, our patient Kady Bourgeois has had Diabetic Eye Exam completed/performed  Please assist in updating the patient chart by making an External outreach to [facility] facility located in [place], [place]  The date of service is 2021      Thank you,  Delfina العلي DO  [facility]

## 2021-09-02 NOTE — TELEPHONE ENCOUNTER
Upon review of the In Basket request and the patient's chart, initial outreach has been made via fax, please see Contacts section for details       Thank you  Kinjal Chin

## 2021-09-02 NOTE — LETTER
Diabetic Eye Exam Form    Date Requested: 21  Patient: Edenilson Cage  Patient : 1960   Referring Provider: Yeyo Lopez,     Dilated Retinal Exam, Optomap-Iris Exam, or Fundus Photography Done         Yes ([place] one above)         No     Date of Diabetic Eye Exam ______________________________  Left Eye      Exam did show retinopathy    Exam did not show retinopathy         Mild       Moderate       None       Proliferative       Severe     Right Eye     Exam did show retinopathy    Exam did not show retinopathy         Mild       Moderate       None       Proliferative       Severe     Comments __________________________________________________________    Practice Providing Exam ______________________________________________    Exam Performed By (print name) _______________________________________      Provider Signature ___________________________________________________      These reports are needed for  compliance    Please fax this completed form and a copy of the Diabetic Eye Exam report to our office located at [place] [address] as soon as possible to [phone] yasmin Singh Arsenio: Phone [phone]    We thank you for your assistance in treating our mutual patient

## 2021-09-07 ENCOUNTER — TELEPHONE (OUTPATIENT)
Dept: OTHER | Facility: OTHER | Age: 61
End: 2021-09-07

## 2021-09-08 NOTE — TELEPHONE ENCOUNTER
Upon review of the In Basket request we were able to locate, review, and update the patient chart as requested for Diabetic Eye Exam     Any additional questions or concerns should be emailed to the Practice Liaisons via [email or web address] email, please do not reply via In Basket      Thank you  Darren Lewis

## 2021-09-08 NOTE — TELEPHONE ENCOUNTER
As a follow-up, a second attempt has been made for outreach via telephone call and fax, please see Contacts section for details      Thank you  Татьяна Mar

## 2021-09-15 ENCOUNTER — OFFICE VISIT (OUTPATIENT)
Dept: OBGYN CLINIC | Facility: CLINIC | Age: 61
End: 2021-09-15
Payer: COMMERCIAL

## 2021-09-15 ENCOUNTER — TELEPHONE (OUTPATIENT)
Dept: FAMILY MEDICINE CLINIC | Facility: CLINIC | Age: 61
End: 2021-09-15

## 2021-09-15 VITALS
BODY MASS INDEX: 37.33 KG/M2 | SYSTOLIC BLOOD PRESSURE: 130 MMHG | DIASTOLIC BLOOD PRESSURE: 80 MMHG | HEART RATE: 60 BPM | WEIGHT: 252 LBS | HEIGHT: 69 IN

## 2021-09-15 DIAGNOSIS — I10 BENIGN ESSENTIAL HYPERTENSION: Primary | ICD-10-CM

## 2021-09-15 DIAGNOSIS — E11.49 DIABETES MELLITUS TYPE 2 WITH NEUROLOGICAL MANIFESTATIONS (HCC): ICD-10-CM

## 2021-09-15 DIAGNOSIS — G89.29 CHRONIC PAIN OF RIGHT KNEE: ICD-10-CM

## 2021-09-15 DIAGNOSIS — M25.561 CHRONIC PAIN OF RIGHT KNEE: ICD-10-CM

## 2021-09-15 DIAGNOSIS — E11.40 TYPE 2 DIABETES MELLITUS WITH DIABETIC NEUROPATHY, WITH LONG-TERM CURRENT USE OF INSULIN (HCC): ICD-10-CM

## 2021-09-15 DIAGNOSIS — E66.01 CLASS 2 SEVERE OBESITY DUE TO EXCESS CALORIES WITH SERIOUS COMORBIDITY AND BODY MASS INDEX (BMI) OF 37.0 TO 37.9 IN ADULT (HCC): ICD-10-CM

## 2021-09-15 DIAGNOSIS — M25.461 EFFUSION OF RIGHT KNEE: ICD-10-CM

## 2021-09-15 DIAGNOSIS — M17.11 PRIMARY OSTEOARTHRITIS OF RIGHT KNEE: Primary | ICD-10-CM

## 2021-09-15 DIAGNOSIS — Z79.4 TYPE 2 DIABETES MELLITUS WITH DIABETIC NEUROPATHY, WITH LONG-TERM CURRENT USE OF INSULIN (HCC): ICD-10-CM

## 2021-09-15 PROCEDURE — 3075F SYST BP GE 130 - 139MM HG: CPT | Performed by: ORTHOPAEDIC SURGERY

## 2021-09-15 PROCEDURE — 99214 OFFICE O/P EST MOD 30 MIN: CPT | Performed by: ORTHOPAEDIC SURGERY

## 2021-09-15 PROCEDURE — 3079F DIAST BP 80-89 MM HG: CPT | Performed by: ORTHOPAEDIC SURGERY

## 2021-09-15 PROCEDURE — 3008F BODY MASS INDEX DOCD: CPT | Performed by: ORTHOPAEDIC SURGERY

## 2021-09-15 NOTE — TELEPHONE ENCOUNTER
DR CARR    Patient is asking if you could order an A1C  Dr Penny Meléndez do a knee replacement until his sugar is under 7  Patient said he has been working on lowering it and doesn't want to wait until November but his labs are due  Please advise

## 2021-09-15 NOTE — TELEPHONE ENCOUNTER
Left message on machine letting pt know that I put his lab slip up front for   No further action needed   Holly Clemons, GARRETTN

## 2021-09-15 NOTE — PROGRESS NOTES
Assessment/Plan:  1  Primary osteoarthritis of right knee     2  Chronic pain of right knee     3  Effusion of right knee     4  Diabetes mellitus type 2 with neurological manifestations ([address] )     5  Class 2 severe obesity due to excess calories with serious comorbidity and body mass index (BMI) of 37 0 to 37 9 in adult [facility])       Scribe Attestation    I,:  Summer Rolle am acting as a scribe while in the presence of the attending physician :       I,:  July Ruiz DO personally performed the services described in this documentation    as scribed in my presence :         Royann Canavan is a pleasant 59year old who presents to the office today for a follow-up evaluation of his activity related right knee pain due to his severe end-stage underlying osteoarthritis  The patient is currently working on lowering his hemoglobin A1c  Unfortunately I do not have a current hemoglobin A1c with the last hemoglobin A1c being in February 2021 which was 9 1  I discussed with the patient that if we would do an aspiration and corticosteroid injection today he could not proceed with a right total knee arthroplasty until 3 months after the injection  I discussed with the patient that there is a current order for him to get his hemoglobin A1c tested  At this time he will hold off on an aspiration and corticosteroid injection until we get the new hemoglobin A1c results  I discussed with the patient that if his hemoglobin A1c is equal to or under 7 0 we will proceed with a right total knee arthroplasty   And if his hemoglobin A1c is over 7 0 we will proceed with an aspiration and corticosteroid injection  I will follow-up with him once he receives his hemoglobin A1c results  He understood and had no further questions      Subjective: Right Knee Follow-up    Patient ID: Anil Mathis is a 59 y o  male who presents to the office today for a follow-up evaluation of his activity related right knee pain due to his severe end-stage underlying osteoarthritis  He states that about 2 weeks ago he was ambulating down stairs where he misjudged a step and twisted his right knee  He states that he felt an immediate sharp pain into his knee and notes increased swelling  He localizes his pain over the medial aspect of his right knee  He states that he will experience daily constant pain which will fluctuate between a dull and achy and sharp and shooting  He states that his pain will be exacerbated to a sharp shooting pain with increased activity such as walking for long periods of time  He states that he will experience episodes of instability when ambulating  He states that his knee will be stiff when getting up from a seated position  He states that he has been actively working on reducing his hemoglobin A1c  He states that he has tried Tylenol and ice to help alleviate his pain and swelling with minimal relief  The times a day he denies any distal paresthesias  Review of Systems   Constitutional: Positive for activity change  Negative for chills and fever  HENT: Negative for congestion and sneezing  Eyes: Negative for pain  Respiratory: Negative for cough and shortness of breath  Cardiovascular: Negative for chest pain and palpitations  Gastrointestinal: Negative for diarrhea and nausea  Endocrine: Negative for cold intolerance and heat intolerance  Genitourinary: Negative  Musculoskeletal: Positive for arthralgias, gait problem and joint swelling  Skin: Negative for rash  Allergic/Immunologic: Negative  Neurological: Negative for weakness and numbness  Hematological: Negative  Psychiatric/Behavioral: Negative for decreased concentration and suicidal ideas  The patient is not nervous/anxious            Past Medical History:   Diagnosis Date    Corneal abrasion     last assessed - [id number]    Diabetes mellitus ([address] )     Hiatal hernia     Hyperlipidemia     Hypertension     Impaired fasting glucose     last assessed - [id number]    Kidney stone     last stone     PONV (postoperative nausea and vomiting)     Sinus infection     currently under tx PO ABT etc as of 19    Viremia     Resolved - [id number]       Past Surgical History:   Procedure Laterality Date    ANKLE SURGERY Right     tendon arthrotomy    ARTHROTOMY ANKLE      BACK SURGERY  2014    lumbar laminectomy    COLONOSCOPY      COLONOSCOPY N/A 2019    Procedure: COLONOSCOPY;  Surgeon: Perico Berman MD;  Location: [place] [address] [facility];   Service: Gastroenterology    FINGER SURGERY Bilateral     every finger contracture release over a 10 year period trigger finger    INCISION TENDON SHEATH      of a finger    KNEE SURGERY Right     x2 arthrotomy    SHOULDER SURGERY Left     x1 staples, tendon repair secondary to many spontaneous dislocations       Family History   Problem Relation Age of Onset    Hypertension Mother    [place] Arthritis Mother     Hyperlipidemia Mother    [place] Lung cancer Mother     Stroke Maternal Grandfather     Diabetes Cousin     COPD Father     Depression Family     Diabetes Family     No Known Problems Sister     No Known Problems Brother     No Known Problems Maternal Aunt     No Known Problems Maternal Uncle     No Known Problems Paternal Aunt     No Known Problems Paternal Uncle     No Known Problems Maternal Grandmother     No Known Problems Paternal Grandmother     No Known Problems Paternal Grandfather        Social History     Occupational History    Not on file   Tobacco Use    Smoking status: Former Smoker     Quit date:      Years since quittin 7    Smokeless tobacco: Never Used    Tobacco comment: Former smoker   Vaping Use    Vaping Use: Never used   Substance and Sexual Activity    Alcohol use: Yes     Comment: rare, once a month     Drug use: No    Sexual activity: Not on file         Current Outpatient Medications:     ALPRAZolam (XANAX) 0 25 mg tablet, Take 1 tablet (0 25 mg total) by mouth daily at bedtime as needed for anxiety, Disp: 14 tablet, Rfl: 0    amLODIPine (NORVASC) 5 mg tablet, TAKE ONE TABLET BY MOUTH EVERY DAY (Patient taking differently: as needed ), Disp: 30 tablet, Rfl: 5    amoxicillin (AMOXIL) 500 mg capsule, Prior to dental work, Disp: , Rfl:     aspirin (ECOTRIN LOW STRENGTH) 81 mg EC tablet, Take 81 mg by mouth daily, Disp: , Rfl:     atorvastatin (LIPITOR) 20 mg tablet, TAKE ONE TABLET BY MOUTH EVERY DAY, Disp: 90 tablet, Rfl: 1    Basaglar KwikPen 100 units/mL injection pen, INJECT 80 UNITS UNDER THE SKIN DAILY, Disp: 15 mL, Rfl: 1    Blood Glucose Monitoring Suppl (OneTouch Verio) w/Device KIT, Test blood sugar twice a day, Disp: 1 kit, Rfl: 0    Continuous Blood Gluc Sensor (FreeStyle Ulysses 14 Day Sensor) MISC, Change sensor every 14 days, Disp: 2 each, Rfl: 12    glipiZIDE (GLUCOTROL XL) 10 mg 24 hr tablet, TAKE TWO TABLETS BY MOUTH EVERY DAY AT BEDTIME, Disp: 180 tablet, Rfl: 1    glucose blood (OneTouch Verio) test strip, Test blood sugar twice a day, Disp: 200 each, Rfl: 3    hydrochlorothiazide (HYDRODIURIL) 25 mg tablet, Take 1 tablet (25 mg total) by mouth daily, Disp: 90 tablet, Rfl: 3    hydrOXYzine pamoate (VISTARIL) 25 mg capsule, Take 1 capsule (25 mg total) by mouth daily as needed (insomnia), Disp: 30 capsule, Rfl: 0    Insulin Pen Needle (B-D ULTRAFINE III SHORT PEN) 31G X 8 MM MISC, Inject under the skin daily Use as directed, Disp: 200 each, Rfl: 0    losartan (COZAAR) 100 MG tablet, Take 1 tablet (100 mg total) by mouth daily, Disp: 90 tablet, Rfl: 1    metFORMIN (GLUCOPHAGE) 500 mg tablet, Take 2 tablets (1,000 mg total) by mouth 2 (two) times a day with meals, Disp: 360 tablet, Rfl: 3    OneTouch Delica Lancets 60H MISC, Test blood sugar twice a day, Disp: 200 each, Rfl: 3    Allergies   Allergen Reactions    Latex Rash     At dentist, lips swollen     Codeine Nausea Only     Reaction Date: [id number];         Objective:  Vitals: 09/15/21 0809   BP: 130/80   Pulse: 60       Body mass index is 37 21 kg/m²  Right Knee Exam     Tenderness   The patient is experiencing tenderness in the medial joint line, patella and lateral joint line  Range of Motion   Extension:  5 abnormal   Flexion:  100 (Limited by pain and swelling) abnormal     Tests   Varus: negative Valgus: negative  Drawer:  Anterior - negative    Posterior - negative  Patellar apprehension: negative    Other   Erythema: absent  Scars: present (curvilinear scar anterior medial and anterior lateral both closer to the coronal plane )  Sensation: normal  Pulse: present  Swelling: none  Effusion: effusion (2+) present    Comments:  Knee is stable 5, 30, and 90  + peripatellar crepitus  + patellofemoral grind  No warmth or erythema   Ambulates with a significantly antalgic gait use of 2 crutches            Observations     Right Knee   Positive for effusion (2+)  Physical Exam  Vitals and nursing note reviewed  Constitutional:       Appearance: He is well-developed  Comments: Body mass index is 38 54 kg/m²  HENT:      Head: Normocephalic and atraumatic  Pulmonary:      Effort: Pulmonary effort is normal    Musculoskeletal:      Cervical back: Normal range of motion  Right knee: Effusion (2+) present  Comments: See ortho exam   Skin:     General: Skin is warm and dry  Neurological:      Mental Status: He is alert and oriented to person, place, and time  Psychiatric:         Behavior: Behavior normal          Thought Content: Thought content normal          Judgment: Judgment normal          I have personally reviewed pertinent films in PACS  X-rays performed on 07/08/2020 of his right knee demonstrates severe tricompartmental osteoarthritis  X-ray's demonstrates joint space narrowing, sclerosis, osteophytes and subchondral cysts  There are no acute fractures, dislocations, lytic or blastic lesions

## 2021-09-18 LAB
ALBUMIN SERPL-MCNC: 4.3 G/DL (ref 3.8–4.8)
ALBUMIN/GLOB SERPL: 1.8 {RATIO} (ref 1.2–2.2)
ALP SERPL-CCNC: 57 IU/L (ref 44–121)
ALT SERPL-CCNC: 24 IU/L (ref 0–44)
AST SERPL-CCNC: 21 IU/L (ref 0–40)
BASOPHILS # BLD AUTO: 0.1 X10E3/UL (ref 0–0.2)
BASOPHILS NFR BLD AUTO: 1 %
BILIRUB SERPL-MCNC: 0.6 MG/DL (ref 0–1.2)
BUN SERPL-MCNC: 28 MG/DL (ref 8–27)
BUN/CREAT SERPL: 23 (ref 10–24)
CALCIUM SERPL-MCNC: 9.7 MG/DL (ref 8.6–10.2)
CHLORIDE SERPL-SCNC: 100 MMOL/L (ref 96–106)
CHOLEST SERPL-MCNC: 128 MG/DL (ref 100–199)
CO2 SERPL-SCNC: 27 MMOL/L (ref 20–29)
CREAT SERPL-MCNC: 1.22 MG/DL (ref 0.76–1.27)
EOSINOPHIL # BLD AUTO: 0.3 X10E3/UL (ref 0–0.4)
EOSINOPHIL NFR BLD AUTO: 4 %
ERYTHROCYTE [DISTWIDTH] IN BLOOD BY AUTOMATED COUNT: 13.3 % (ref 11.6–15.4)
GLOBULIN SER-MCNC: 2.4 G/DL (ref 1.5–4.5)
GLUCOSE SERPL-MCNC: 85 MG/DL (ref 65–99)
HBA1C MFR BLD: 6.2 % (ref 4.8–5.6)
HCT VFR BLD AUTO: 44.3 % (ref 37.5–51)
HDLC SERPL-MCNC: 40 MG/DL
HGB BLD-MCNC: 14.3 G/DL (ref 13–17.7)
IMM GRANULOCYTES # BLD: 0 X10E3/UL (ref 0–0.1)
IMM GRANULOCYTES NFR BLD: 0 %
LDLC SERPL CALC-MCNC: 74 MG/DL (ref 0–99)
LYMPHOCYTES # BLD AUTO: 1.1 X10E3/UL (ref 0.7–3.1)
LYMPHOCYTES NFR BLD AUTO: 16 %
MCH RBC QN AUTO: 27.1 PG (ref 26.6–33)
MCHC RBC AUTO-ENTMCNC: 32.3 G/DL (ref 31.5–35.7)
MCV RBC AUTO: 84 FL (ref 79–97)
MICRODELETION SYND BLD/T FISH: NORMAL
MONOCYTES # BLD AUTO: 0.6 X10E3/UL (ref 0.1–0.9)
MONOCYTES NFR BLD AUTO: 9 %
NEUTROPHILS # BLD AUTO: 4.6 X10E3/UL (ref 1.4–7)
NEUTROPHILS NFR BLD AUTO: 70 %
PLATELET # BLD AUTO: 270 X10E3/UL (ref 150–450)
POTASSIUM SERPL-SCNC: 3.8 MMOL/L (ref 3.5–5.2)
PROT SERPL-MCNC: 6.7 G/DL (ref 6–8.5)
RBC # BLD AUTO: 5.28 X10E6/UL (ref 4.14–5.8)
SL AMB EGFR AFRICAN AMERICAN: 72 ML/MIN/1.73
SL AMB EGFR NON AFRICAN AMERICAN: 63 ML/MIN/1.73
SODIUM SERPL-SCNC: 142 MMOL/L (ref 134–144)
TRIGL SERPL-MCNC: 70 MG/DL (ref 0–149)
WBC # BLD AUTO: 6.7 X10E3/UL (ref 3.4–10.8)

## 2021-09-18 PROCEDURE — 3044F HG A1C LEVEL LT 7.0%: CPT | Performed by: ORTHOPAEDIC SURGERY

## 2021-09-19 DIAGNOSIS — E11.40 TYPE 2 DIABETES MELLITUS WITH DIABETIC NEUROPATHY, WITH LONG-TERM CURRENT USE OF INSULIN (HCC): ICD-10-CM

## 2021-09-19 DIAGNOSIS — Z79.4 TYPE 2 DIABETES MELLITUS WITH DIABETIC NEUROPATHY, WITH LONG-TERM CURRENT USE OF INSULIN (HCC): ICD-10-CM

## 2021-09-20 RX ORDER — INSULIN GLARGINE 100 [IU]/ML
80 INJECTION, SOLUTION SUBCUTANEOUS DAILY
Qty: 15 ML | Refills: 1 | Status: SHIPPED | OUTPATIENT
Start: 2021-09-20 | End: 2021-10-26

## 2021-09-22 DIAGNOSIS — G47.00 INSOMNIA, UNSPECIFIED TYPE: ICD-10-CM

## 2021-09-23 RX ORDER — HYDROXYZINE PAMOATE 25 MG/1
CAPSULE ORAL
Qty: 30 CAPSULE | Refills: 3 | Status: SHIPPED | OUTPATIENT
Start: 2021-09-23 | End: 2022-01-18

## 2021-10-01 PROCEDURE — 3066F NEPHROPATHY DOC TX: CPT | Performed by: ORTHOPAEDIC SURGERY

## 2021-10-05 DIAGNOSIS — E78.2 MIXED HYPERLIPIDEMIA: ICD-10-CM

## 2021-10-05 RX ORDER — ATORVASTATIN CALCIUM 20 MG/1
TABLET, FILM COATED ORAL
Qty: 90 TABLET | Refills: 1 | Status: SHIPPED | OUTPATIENT
Start: 2021-10-05 | End: 2022-04-05

## 2021-10-26 DIAGNOSIS — Z79.4 TYPE 2 DIABETES MELLITUS WITH DIABETIC NEUROPATHY, WITH LONG-TERM CURRENT USE OF INSULIN (HCC): ICD-10-CM

## 2021-10-26 DIAGNOSIS — E11.40 TYPE 2 DIABETES MELLITUS WITH DIABETIC NEUROPATHY, WITH LONG-TERM CURRENT USE OF INSULIN (HCC): ICD-10-CM

## 2021-10-26 RX ORDER — INSULIN GLARGINE 100 [IU]/ML
80 INJECTION, SOLUTION SUBCUTANEOUS DAILY
Qty: 15 ML | Refills: 1 | Status: SHIPPED | OUTPATIENT
Start: 2021-10-26 | End: 2021-11-24 | Stop reason: SDUPTHER

## 2021-10-28 ENCOUNTER — APPOINTMENT (OUTPATIENT)
Dept: RADIOLOGY | Facility: CLINIC | Age: 61
End: 2021-10-28
Payer: COMMERCIAL

## 2021-10-28 ENCOUNTER — OFFICE VISIT (OUTPATIENT)
Dept: OBGYN CLINIC | Facility: CLINIC | Age: 61
End: 2021-10-28
Payer: COMMERCIAL

## 2021-10-28 ENCOUNTER — TELEPHONE (OUTPATIENT)
Dept: OBGYN CLINIC | Facility: MEDICAL CENTER | Age: 61
End: 2021-10-28

## 2021-10-28 VITALS
SYSTOLIC BLOOD PRESSURE: 124 MMHG | HEART RATE: 80 BPM | DIASTOLIC BLOOD PRESSURE: 88 MMHG | WEIGHT: 253 LBS | BODY MASS INDEX: 36.22 KG/M2 | HEIGHT: 70 IN

## 2021-10-28 DIAGNOSIS — M17.11 PRIMARY OSTEOARTHRITIS OF RIGHT KNEE: ICD-10-CM

## 2021-10-28 DIAGNOSIS — Z01.818 PRE-OP EXAM: ICD-10-CM

## 2021-10-28 DIAGNOSIS — M17.11 PRIMARY OSTEOARTHRITIS OF RIGHT KNEE: Primary | ICD-10-CM

## 2021-10-28 DIAGNOSIS — E11.49 DIABETES MELLITUS TYPE 2 WITH NEUROLOGICAL MANIFESTATIONS (HCC): ICD-10-CM

## 2021-10-28 DIAGNOSIS — Z87.898 HISTORY OF POSTOPERATIVE NAUSEA AND VOMITING: ICD-10-CM

## 2021-10-28 DIAGNOSIS — M25.561 CHRONIC PAIN OF RIGHT KNEE: ICD-10-CM

## 2021-10-28 DIAGNOSIS — G89.29 CHRONIC PAIN OF RIGHT KNEE: ICD-10-CM

## 2021-10-28 PROCEDURE — 73562 X-RAY EXAM OF KNEE 3: CPT

## 2021-10-28 PROCEDURE — 3008F BODY MASS INDEX DOCD: CPT | Performed by: ORTHOPAEDIC SURGERY

## 2021-10-28 PROCEDURE — 99215 OFFICE O/P EST HI 40 MIN: CPT | Performed by: ORTHOPAEDIC SURGERY

## 2021-10-31 PROBLEM — M06.09 SERONEGATIVE ARTHROPATHY OF MULTIPLE SITES (HCC): Status: ACTIVE | Noted: 2021-10-31

## 2021-10-31 PROBLEM — M47.812 CERVICAL SPONDYLOSIS WITHOUT MYELOPATHY: Status: ACTIVE | Noted: 2021-10-31

## 2021-10-31 PROBLEM — M17.11 PRIMARY OSTEOARTHRITIS OF RIGHT KNEE: Status: ACTIVE | Noted: 2021-10-31

## 2021-10-31 PROBLEM — N18.2 STAGE 2 CHRONIC KIDNEY DISEASE: Status: ACTIVE | Noted: 2021-10-31

## 2021-10-31 PROBLEM — M15.0 PRIMARY GENERALIZED (OSTEO)ARTHRITIS: Status: ACTIVE | Noted: 2021-10-31

## 2021-10-31 PROBLEM — E55.9 VITAMIN D DEFICIENCY: Status: ACTIVE | Noted: 2021-10-31

## 2021-10-31 PROBLEM — L40.0 PLAQUE PSORIASIS: Status: ACTIVE | Noted: 2021-10-31

## 2021-10-31 PROBLEM — M47.816 LUMBAR SPONDYLOSIS: Status: ACTIVE | Noted: 2021-10-31

## 2021-11-01 ENCOUNTER — TELEPHONE (OUTPATIENT)
Dept: OBGYN CLINIC | Facility: HOSPITAL | Age: 61
End: 2021-11-01

## 2021-11-01 DIAGNOSIS — I10 BENIGN ESSENTIAL HYPERTENSION: ICD-10-CM

## 2021-11-01 RX ORDER — AMLODIPINE BESYLATE 5 MG/1
TABLET ORAL
Qty: 30 TABLET | Refills: 5 | Status: SHIPPED | OUTPATIENT
Start: 2021-11-01 | End: 2022-05-12

## 2021-11-09 ENCOUNTER — PATIENT OUTREACH (OUTPATIENT)
Dept: OBGYN CLINIC | Facility: HOSPITAL | Age: 61
End: 2021-11-09

## 2021-11-09 DIAGNOSIS — I10 BENIGN ESSENTIAL HYPERTENSION: ICD-10-CM

## 2021-11-09 PROCEDURE — 4010F ACE/ARB THERAPY RXD/TAKEN: CPT | Performed by: FAMILY MEDICINE

## 2021-11-09 RX ORDER — LOSARTAN POTASSIUM 100 MG/1
TABLET ORAL
Qty: 90 TABLET | Refills: 1 | Status: SHIPPED | OUTPATIENT
Start: 2021-11-09 | End: 2021-12-27

## 2021-11-15 ENCOUNTER — HOSPITAL ENCOUNTER (OUTPATIENT)
Dept: RADIOLOGY | Facility: HOSPITAL | Age: 61
Discharge: HOME | End: 2021-11-15
Payer: COMMERCIAL

## 2021-11-15 DIAGNOSIS — M17.11 PRIMARY OSTEOARTHRITIS OF RIGHT KNEE: ICD-10-CM

## 2021-11-15 DIAGNOSIS — M06.09 SERONEGATIVE ARTHROPATHY OF MULTIPLE SITES (HCC): ICD-10-CM

## 2021-11-15 DIAGNOSIS — M47.812 CERVICAL SPONDYLOSIS WITHOUT MYELOPATHY: ICD-10-CM

## 2021-11-15 PROCEDURE — 72052 X-RAY EXAM NECK SPINE 6/>VWS: CPT

## 2021-11-15 PROCEDURE — 73110 X-RAY EXAM OF WRIST: CPT

## 2021-11-15 PROCEDURE — 71046 X-RAY EXAM CHEST 2 VIEWS: CPT

## 2021-11-15 PROCEDURE — 73130 X-RAY EXAM OF HAND: CPT

## 2021-11-16 ENCOUNTER — TELEPHONE (OUTPATIENT)
Dept: FAMILY MEDICINE CLINIC | Facility: CLINIC | Age: 61
End: 2021-11-16

## 2021-11-16 ENCOUNTER — OFFICE VISIT (OUTPATIENT)
Dept: CARDIOLOGY CLINIC | Facility: CLINIC | Age: 61
End: 2021-11-16
Payer: COMMERCIAL

## 2021-11-16 VITALS
SYSTOLIC BLOOD PRESSURE: 124 MMHG | TEMPERATURE: 98.2 F | DIASTOLIC BLOOD PRESSURE: 66 MMHG | WEIGHT: 248 LBS | HEIGHT: 70 IN | HEART RATE: 65 BPM | BODY MASS INDEX: 35.5 KG/M2

## 2021-11-16 DIAGNOSIS — Z01.810 PRE-OPERATIVE CARDIOVASCULAR EXAMINATION: Primary | ICD-10-CM

## 2021-11-16 DIAGNOSIS — M17.11 PRIMARY OSTEOARTHRITIS OF RIGHT KNEE: ICD-10-CM

## 2021-11-16 DIAGNOSIS — Z79.4 TYPE 2 DIABETES MELLITUS WITH DIABETIC NEUROPATHY, WITH LONG-TERM CURRENT USE OF INSULIN (HCC): ICD-10-CM

## 2021-11-16 DIAGNOSIS — Z01.818 PRE-OP EXAM: ICD-10-CM

## 2021-11-16 DIAGNOSIS — I10 BENIGN ESSENTIAL HYPERTENSION: ICD-10-CM

## 2021-11-16 DIAGNOSIS — E11.40 TYPE 2 DIABETES MELLITUS WITH DIABETIC NEUROPATHY, WITH LONG-TERM CURRENT USE OF INSULIN (HCC): ICD-10-CM

## 2021-11-16 DIAGNOSIS — E78.2 MIXED HYPERLIPIDEMIA: ICD-10-CM

## 2021-11-16 LAB
ALBUMIN SERPL-MCNC: 4.2 G/DL (ref 3.8–4.8)
ALBUMIN/GLOB SERPL: 1.4 {RATIO} (ref 1.2–2.2)
ALP SERPL-CCNC: 68 IU/L (ref 44–121)
ALT SERPL-CCNC: 20 IU/L (ref 0–44)
AST SERPL-CCNC: 21 IU/L (ref 0–40)
BASOPHILS # BLD AUTO: 0.1 X10E3/UL (ref 0–0.2)
BASOPHILS NFR BLD AUTO: 1 %
BILIRUB SERPL-MCNC: 0.4 MG/DL (ref 0–1.2)
BUN SERPL-MCNC: 20 MG/DL (ref 8–27)
BUN/CREAT SERPL: 17 (ref 10–24)
CALCIUM SERPL-MCNC: 9.8 MG/DL (ref 8.6–10.2)
CHLORIDE SERPL-SCNC: 103 MMOL/L (ref 96–106)
CHOLEST SERPL-MCNC: 127 MG/DL (ref 100–199)
CO2 SERPL-SCNC: 27 MMOL/L (ref 20–29)
CREAT SERPL-MCNC: 1.18 MG/DL (ref 0.76–1.27)
EOSINOPHIL # BLD AUTO: 0.3 X10E3/UL (ref 0–0.4)
EOSINOPHIL NFR BLD AUTO: 5 %
ERYTHROCYTE [DISTWIDTH] IN BLOOD BY AUTOMATED COUNT: 13.5 % (ref 11.6–15.4)
GLOBULIN SER-MCNC: 2.9 G/DL (ref 1.5–4.5)
GLUCOSE SERPL-MCNC: 50 MG/DL (ref 65–99)
HBA1C MFR BLD: 5.7 % (ref 4.8–5.6)
HCT VFR BLD AUTO: 45.4 % (ref 37.5–51)
HDLC SERPL-MCNC: 41 MG/DL
HGB BLD-MCNC: 15.4 G/DL (ref 13–17.7)
IMM GRANULOCYTES # BLD: 0 X10E3/UL (ref 0–0.1)
IMM GRANULOCYTES NFR BLD: 0 %
LDLC SERPL CALC-MCNC: 70 MG/DL (ref 0–99)
LYMPHOCYTES # BLD AUTO: 1.4 X10E3/UL (ref 0.7–3.1)
LYMPHOCYTES NFR BLD AUTO: 21 %
MCH RBC QN AUTO: 28.3 PG (ref 26.6–33)
MCHC RBC AUTO-ENTMCNC: 33.9 G/DL (ref 31.5–35.7)
MCV RBC AUTO: 84 FL (ref 79–97)
MICRODELETION SYND BLD/T FISH: NORMAL
MONOCYTES # BLD AUTO: 0.5 X10E3/UL (ref 0.1–0.9)
MONOCYTES NFR BLD AUTO: 8 %
NEUTROPHILS # BLD AUTO: 4.6 X10E3/UL (ref 1.4–7)
NEUTROPHILS NFR BLD AUTO: 65 %
PLATELET # BLD AUTO: 330 X10E3/UL (ref 150–450)
POTASSIUM SERPL-SCNC: 4.1 MMOL/L (ref 3.5–5.2)
PROT SERPL-MCNC: 7.1 G/DL (ref 6–8.5)
RBC # BLD AUTO: 5.44 X10E6/UL (ref 4.14–5.8)
SL AMB EGFR AFRICAN AMERICAN: 75 ML/MIN/1.73
SL AMB EGFR NON AFRICAN AMERICAN: 65 ML/MIN/1.73
SODIUM SERPL-SCNC: 145 MMOL/L (ref 134–144)
TRIGL SERPL-MCNC: 80 MG/DL (ref 0–149)
WBC # BLD AUTO: 6.9 X10E3/UL (ref 3.4–10.8)

## 2021-11-16 PROCEDURE — 99243 OFF/OP CNSLTJ NEW/EST LOW 30: CPT | Performed by: INTERNAL MEDICINE

## 2021-11-16 PROCEDURE — 93000 ELECTROCARDIOGRAM COMPLETE: CPT | Performed by: INTERNAL MEDICINE

## 2021-11-16 PROCEDURE — 3044F HG A1C LEVEL LT 7.0%: CPT | Performed by: FAMILY MEDICINE

## 2021-11-17 ENCOUNTER — CONSULT (OUTPATIENT)
Dept: NEPHROLOGY | Facility: CLINIC | Age: 61
End: 2021-11-17
Payer: COMMERCIAL

## 2021-11-17 ENCOUNTER — PATIENT OUTREACH (OUTPATIENT)
Dept: OBGYN CLINIC | Facility: HOSPITAL | Age: 61
End: 2021-11-17

## 2021-11-17 VITALS
BODY MASS INDEX: 35.36 KG/M2 | SYSTOLIC BLOOD PRESSURE: 132 MMHG | DIASTOLIC BLOOD PRESSURE: 70 MMHG | HEIGHT: 70 IN | WEIGHT: 247 LBS

## 2021-11-17 DIAGNOSIS — Z79.4 TYPE 2 DIABETES MELLITUS WITH DIABETIC NEUROPATHY, WITH LONG-TERM CURRENT USE OF INSULIN (HCC): Primary | ICD-10-CM

## 2021-11-17 DIAGNOSIS — E55.9 VITAMIN D DEFICIENCY: ICD-10-CM

## 2021-11-17 DIAGNOSIS — E11.40 TYPE 2 DIABETES MELLITUS WITH DIABETIC NEUROPATHY, WITH LONG-TERM CURRENT USE OF INSULIN (HCC): Primary | ICD-10-CM

## 2021-11-17 DIAGNOSIS — I10 BENIGN ESSENTIAL HYPERTENSION: ICD-10-CM

## 2021-11-17 DIAGNOSIS — R80.9 POSITIVE FOR MACROALBUMINURIA: ICD-10-CM

## 2021-11-17 PROCEDURE — 3078F DIAST BP <80 MM HG: CPT | Performed by: INTERNAL MEDICINE

## 2021-11-17 PROCEDURE — 3075F SYST BP GE 130 - 139MM HG: CPT | Performed by: INTERNAL MEDICINE

## 2021-11-17 PROCEDURE — 3066F NEPHROPATHY DOC TX: CPT | Performed by: FAMILY MEDICINE

## 2021-11-17 PROCEDURE — 99244 OFF/OP CNSLTJ NEW/EST MOD 40: CPT | Performed by: INTERNAL MEDICINE

## 2021-11-18 ENCOUNTER — TELEPHONE (OUTPATIENT)
Dept: OBGYN CLINIC | Facility: CLINIC | Age: 61
End: 2021-11-18

## 2021-11-18 ENCOUNTER — RA CDI HCC (OUTPATIENT)
Dept: OTHER | Facility: HOSPITAL | Age: 61
End: 2021-11-18

## 2021-11-18 PROBLEM — E11.40 TYPE 2 DIABETES MELLITUS WITH DIABETIC NEUROPATHY, WITH LONG-TERM CURRENT USE OF INSULIN (HCC): Status: ACTIVE | Noted: 2021-11-18

## 2021-11-18 PROBLEM — Z79.4 TYPE 2 DIABETES MELLITUS WITH DIABETIC NEUROPATHY, WITH LONG-TERM CURRENT USE OF INSULIN (HCC): Status: ACTIVE | Noted: 2021-11-18

## 2021-11-19 PROBLEM — E11.3311: Status: ACTIVE | Noted: 2021-11-19

## 2021-11-22 ENCOUNTER — TELEPHONE (OUTPATIENT)
Dept: OBGYN CLINIC | Facility: CLINIC | Age: 61
End: 2021-11-22

## 2021-11-22 ENCOUNTER — APPOINTMENT (OUTPATIENT)
Dept: LAB | Facility: HOSPITAL | Age: 61
End: 2021-11-22
Attending: ORTHOPAEDIC SURGERY
Payer: COMMERCIAL

## 2021-11-22 DIAGNOSIS — M17.11 PRIMARY OSTEOARTHRITIS OF RIGHT KNEE: Primary | ICD-10-CM

## 2021-11-22 DIAGNOSIS — G89.29 CHRONIC PAIN OF RIGHT KNEE: ICD-10-CM

## 2021-11-22 DIAGNOSIS — Z01.818 PRE-OP EXAM: ICD-10-CM

## 2021-11-22 DIAGNOSIS — M25.561 CHRONIC PAIN OF RIGHT KNEE: ICD-10-CM

## 2021-11-22 DIAGNOSIS — M17.11 PRIMARY OSTEOARTHRITIS OF RIGHT KNEE: ICD-10-CM

## 2021-11-22 LAB
ALBUMIN SERPL BCP-MCNC: 3.4 G/DL (ref 3.5–5)
ALP SERPL-CCNC: 64 U/L (ref 46–116)
ALT SERPL W P-5'-P-CCNC: 27 U/L (ref 12–78)
ANION GAP SERPL CALCULATED.3IONS-SCNC: 7 MMOL/L (ref 4–13)
APTT PPP: 37 SECONDS (ref 23–37)
AST SERPL W P-5'-P-CCNC: 23 U/L (ref 5–45)
BASOPHILS # BLD AUTO: 0.05 THOUSANDS/ΜL (ref 0–0.1)
BASOPHILS NFR BLD AUTO: 1 % (ref 0–1)
BILIRUB SERPL-MCNC: 0.48 MG/DL (ref 0.2–1)
BUN SERPL-MCNC: 25 MG/DL (ref 5–25)
CALCIUM ALBUM COR SERPL-MCNC: 9.5 MG/DL (ref 8.3–10.1)
CALCIUM SERPL-MCNC: 9 MG/DL (ref 8.3–10.1)
CHLORIDE SERPL-SCNC: 106 MMOL/L (ref 100–108)
CO2 SERPL-SCNC: 30 MMOL/L (ref 21–32)
CREAT SERPL-MCNC: 1.29 MG/DL (ref 0.6–1.3)
EOSINOPHIL # BLD AUTO: 0.36 THOUSAND/ΜL (ref 0–0.61)
EOSINOPHIL NFR BLD AUTO: 5 % (ref 0–6)
ERYTHROCYTE [DISTWIDTH] IN BLOOD BY AUTOMATED COUNT: 13.3 % (ref 11.6–15.1)
GFR SERPL CREATININE-BSD FRML MDRD: 59 ML/MIN/1.73SQ M
GLUCOSE P FAST SERPL-MCNC: 86 MG/DL (ref 65–99)
HCT VFR BLD AUTO: 44.1 % (ref 36.5–49.3)
HGB BLD-MCNC: 14.4 G/DL (ref 12–17)
IMM GRANULOCYTES # BLD AUTO: 0.02 THOUSAND/UL (ref 0–0.2)
IMM GRANULOCYTES NFR BLD AUTO: 0 % (ref 0–2)
INR PPP: 0.95 (ref 0.84–1.19)
LYMPHOCYTES # BLD AUTO: 1.12 THOUSANDS/ΜL (ref 0.6–4.47)
LYMPHOCYTES NFR BLD AUTO: 16 % (ref 14–44)
MCH RBC QN AUTO: 28 PG (ref 26.8–34.3)
MCHC RBC AUTO-ENTMCNC: 32.7 G/DL (ref 31.4–37.4)
MCV RBC AUTO: 86 FL (ref 82–98)
MONOCYTES # BLD AUTO: 0.62 THOUSAND/ΜL (ref 0.17–1.22)
MONOCYTES NFR BLD AUTO: 9 % (ref 4–12)
NEUTROPHILS # BLD AUTO: 4.64 THOUSANDS/ΜL (ref 1.85–7.62)
NEUTS SEG NFR BLD AUTO: 69 % (ref 43–75)
NRBC BLD AUTO-RTO: 0 /100 WBCS
PLATELET # BLD AUTO: 267 THOUSANDS/UL (ref 149–390)
PMV BLD AUTO: 10.7 FL (ref 8.9–12.7)
POTASSIUM SERPL-SCNC: 3.5 MMOL/L (ref 3.5–5.3)
PROT SERPL-MCNC: 7.2 G/DL (ref 6.4–8.2)
PROTHROMBIN TIME: 12.5 SECONDS (ref 11.6–14.5)
RBC # BLD AUTO: 5.14 MILLION/UL (ref 3.88–5.62)
SODIUM SERPL-SCNC: 143 MMOL/L (ref 136–145)
WBC # BLD AUTO: 6.81 THOUSAND/UL (ref 4.31–10.16)

## 2021-11-22 PROCEDURE — 80053 COMPREHEN METABOLIC PANEL: CPT

## 2021-11-22 PROCEDURE — 85025 COMPLETE CBC W/AUTO DIFF WBC: CPT

## 2021-11-22 PROCEDURE — 85730 THROMBOPLASTIN TIME PARTIAL: CPT

## 2021-11-22 PROCEDURE — 85610 PROTHROMBIN TIME: CPT

## 2021-11-22 PROCEDURE — 36415 COLL VENOUS BLD VENIPUNCTURE: CPT

## 2021-11-22 PROCEDURE — 87081 CULTURE SCREEN ONLY: CPT

## 2021-11-22 RX ORDER — ZINC SULFATE 50(220)MG
220 CAPSULE ORAL DAILY
Qty: 30 CAPSULE | Refills: 0 | Status: SHIPPED | OUTPATIENT
Start: 2021-11-22 | End: 2021-12-01 | Stop reason: HOSPADM

## 2021-11-22 RX ORDER — FOLIC ACID 1 MG/1
1 TABLET ORAL DAILY
Qty: 30 TABLET | Refills: 0 | Status: SHIPPED | OUTPATIENT
Start: 2021-11-22 | End: 2021-12-01 | Stop reason: HOSPADM

## 2021-11-22 RX ORDER — FERROUS SULFATE TAB EC 324 MG (65 MG FE EQUIVALENT) 324 (65 FE) MG
324 TABLET DELAYED RESPONSE ORAL
Qty: 30 TABLET | Refills: 0 | Status: SHIPPED | OUTPATIENT
Start: 2021-11-22 | End: 2021-12-01 | Stop reason: HOSPADM

## 2021-11-22 RX ORDER — MELATONIN
1000 DAILY
Qty: 30 TABLET | Refills: 0 | Status: SHIPPED | OUTPATIENT
Start: 2021-11-22 | End: 2021-12-01 | Stop reason: HOSPADM

## 2021-11-23 ENCOUNTER — EVALUATION (OUTPATIENT)
Dept: PHYSICAL THERAPY | Facility: CLINIC | Age: 61
End: 2021-11-23
Payer: COMMERCIAL

## 2021-11-23 DIAGNOSIS — M25.561 CHRONIC PAIN OF RIGHT KNEE: ICD-10-CM

## 2021-11-23 DIAGNOSIS — Z01.818 PRE-OP EXAM: ICD-10-CM

## 2021-11-23 DIAGNOSIS — G89.29 CHRONIC PAIN OF RIGHT KNEE: ICD-10-CM

## 2021-11-23 DIAGNOSIS — M17.11 PRIMARY OSTEOARTHRITIS OF RIGHT KNEE: Primary | ICD-10-CM

## 2021-11-23 LAB — MRSA NOSE QL CULT: NORMAL

## 2021-11-23 PROCEDURE — 97161 PT EVAL LOW COMPLEX 20 MIN: CPT

## 2021-11-23 PROCEDURE — 97530 THERAPEUTIC ACTIVITIES: CPT

## 2021-11-24 ENCOUNTER — OFFICE VISIT (OUTPATIENT)
Dept: FAMILY MEDICINE CLINIC | Facility: CLINIC | Age: 61
End: 2021-11-24
Payer: COMMERCIAL

## 2021-11-24 VITALS
DIASTOLIC BLOOD PRESSURE: 60 MMHG | SYSTOLIC BLOOD PRESSURE: 110 MMHG | OXYGEN SATURATION: 99 % | RESPIRATION RATE: 16 BRPM | BODY MASS INDEX: 35.36 KG/M2 | HEART RATE: 72 BPM | HEIGHT: 70 IN | WEIGHT: 247 LBS | TEMPERATURE: 97.2 F

## 2021-11-24 DIAGNOSIS — Z01.818 PREOPERATIVE EXAMINATION: Primary | ICD-10-CM

## 2021-11-24 DIAGNOSIS — E11.22 TYPE 2 DIABETES MELLITUS WITH STAGE 2 CHRONIC KIDNEY DISEASE AND HYPERTENSION (HCC): ICD-10-CM

## 2021-11-24 DIAGNOSIS — N18.2 TYPE 2 DIABETES MELLITUS WITH STAGE 2 CHRONIC KIDNEY DISEASE AND HYPERTENSION (HCC): ICD-10-CM

## 2021-11-24 DIAGNOSIS — I10 BENIGN ESSENTIAL HYPERTENSION: ICD-10-CM

## 2021-11-24 DIAGNOSIS — M17.11 PRIMARY OSTEOARTHRITIS OF RIGHT KNEE: ICD-10-CM

## 2021-11-24 DIAGNOSIS — I12.9 TYPE 2 DIABETES MELLITUS WITH STAGE 2 CHRONIC KIDNEY DISEASE AND HYPERTENSION (HCC): ICD-10-CM

## 2021-11-24 DIAGNOSIS — E11.40 TYPE 2 DIABETES MELLITUS WITH DIABETIC NEUROPATHY, WITH LONG-TERM CURRENT USE OF INSULIN (HCC): ICD-10-CM

## 2021-11-24 DIAGNOSIS — M06.09 SERONEGATIVE ARTHROPATHY OF MULTIPLE SITES (HCC): ICD-10-CM

## 2021-11-24 DIAGNOSIS — Z01.818 PRE-OP EXAM: ICD-10-CM

## 2021-11-24 DIAGNOSIS — Z79.4 TYPE 2 DIABETES MELLITUS WITH DIABETIC NEUROPATHY, WITH LONG-TERM CURRENT USE OF INSULIN (HCC): ICD-10-CM

## 2021-11-24 PROCEDURE — 1036F TOBACCO NON-USER: CPT | Performed by: FAMILY MEDICINE

## 2021-11-24 PROCEDURE — 99214 OFFICE O/P EST MOD 30 MIN: CPT | Performed by: FAMILY MEDICINE

## 2021-11-24 PROCEDURE — 3008F BODY MASS INDEX DOCD: CPT | Performed by: FAMILY MEDICINE

## 2021-11-24 PROCEDURE — 3725F SCREEN DEPRESSION PERFORMED: CPT | Performed by: FAMILY MEDICINE

## 2021-11-24 RX ORDER — INSULIN GLARGINE 100 [IU]/ML
65 INJECTION, SOLUTION SUBCUTANEOUS DAILY
Qty: 15 ML | Refills: 1
Start: 2021-11-24 | End: 2022-07-25 | Stop reason: SDUPTHER

## 2021-11-29 ENCOUNTER — ANESTHESIA EVENT (OUTPATIENT)
Dept: PERIOP | Facility: HOSPITAL | Age: 61
End: 2021-11-29
Payer: COMMERCIAL

## 2021-11-30 ENCOUNTER — ANESTHESIA (OUTPATIENT)
Dept: PERIOP | Facility: HOSPITAL | Age: 61
End: 2021-11-30
Payer: COMMERCIAL

## 2021-11-30 ENCOUNTER — HOSPITAL ENCOUNTER (OUTPATIENT)
Facility: HOSPITAL | Age: 61
Setting detail: OUTPATIENT SURGERY
Discharge: HOME | End: 2021-12-01
Attending: ORTHOPAEDIC SURGERY | Admitting: ORTHOPAEDIC SURGERY
Payer: COMMERCIAL

## 2021-11-30 ENCOUNTER — APPOINTMENT (OUTPATIENT)
Dept: RADIOLOGY | Facility: HOSPITAL | Age: 61
End: 2021-11-30
Payer: COMMERCIAL

## 2021-11-30 ENCOUNTER — RA CDI HCC (OUTPATIENT)
Dept: OTHER | Facility: HOSPITAL | Age: 61
End: 2021-11-30

## 2021-11-30 DIAGNOSIS — Z96.651 STATUS POST TOTAL KNEE REPLACEMENT USING CEMENT, RIGHT: Primary | ICD-10-CM

## 2021-11-30 LAB
GLUCOSE SERPL-MCNC: 155 MG/DL (ref 65–140)
GLUCOSE SERPL-MCNC: 220 MG/DL (ref 65–140)
GLUCOSE SERPL-MCNC: 245 MG/DL (ref 65–140)
GLUCOSE SERPL-MCNC: 75 MG/DL (ref 65–140)
GLUCOSE SERPL-MCNC: 86 MG/DL (ref 65–140)

## 2021-11-30 PROCEDURE — 73560 X-RAY EXAM OF KNEE 1 OR 2: CPT

## 2021-11-30 PROCEDURE — C1776 JOINT DEVICE (IMPLANTABLE): HCPCS | Performed by: ORTHOPAEDIC SURGERY

## 2021-11-30 PROCEDURE — S2900 ROBOTIC SURGICAL SYSTEM: HCPCS | Performed by: ORTHOPAEDIC SURGERY

## 2021-11-30 PROCEDURE — 99024 POSTOP FOLLOW-UP VISIT: CPT | Performed by: ORTHOPAEDIC SURGERY

## 2021-11-30 PROCEDURE — C1713 ANCHOR/SCREW BN/BN,TIS/BN: HCPCS | Performed by: ORTHOPAEDIC SURGERY

## 2021-11-30 PROCEDURE — 27447 TOTAL KNEE ARTHROPLASTY: CPT | Performed by: ORTHOPAEDIC SURGERY

## 2021-11-30 PROCEDURE — 27447 TOTAL KNEE ARTHROPLASTY: CPT | Performed by: PHYSICIAN ASSISTANT

## 2021-11-30 PROCEDURE — 97163 PT EVAL HIGH COMPLEX 45 MIN: CPT

## 2021-11-30 PROCEDURE — 82948 REAGENT STRIP/BLOOD GLUCOSE: CPT

## 2021-11-30 PROCEDURE — C9290 INJ, BUPIVACAINE LIPOSOME: HCPCS | Performed by: ANESTHESIOLOGY

## 2021-11-30 PROCEDURE — 97116 GAIT TRAINING THERAPY: CPT

## 2021-11-30 DEVICE — ATTUNE KNEE SYSTEM TIBIAL INSERT FIXED BEARING CRUCIATE RETAINING 7 5MM AOX
Type: IMPLANTABLE DEVICE | Site: KNEE | Status: FUNCTIONAL
Brand: ATTUNE

## 2021-11-30 DEVICE — ATTUNE PATELLA MEDIALIZED DOME 38MM CEMENTED AOX
Type: IMPLANTABLE DEVICE | Site: KNEE | Status: FUNCTIONAL
Brand: ATTUNE

## 2021-11-30 DEVICE — ATTUNE KNEE SYSTEM FEMORAL CRUCIATE RETAINING SIZE 7 RIGHT CEMENTED
Type: IMPLANTABLE DEVICE | Site: KNEE | Status: FUNCTIONAL
Brand: ATTUNE

## 2021-11-30 DEVICE — ATTUNE KNEE SYSTEM TIBIAL BASE FIXED BEARING SIZE 8 CEMENTED
Type: IMPLANTABLE DEVICE | Site: KNEE | Status: FUNCTIONAL
Brand: ATTUNE

## 2021-11-30 DEVICE — PALACOS® R IS A FAST-CURING, RADIOPAQUE, POLY(METHYL METHACRYLATE)-BASED BONE CEMENT.PALACOS ® R CONTAINS THE X-RAY CONTRAST MEDIUM ZIRCONIUM DIOXIDE. TO IMPROVE VISIBILITY IN THE SURGICAL FIELD PALACOS ® R HAS BEEN COLOURED WITH CHLOROPHYLL (E141). THE BONE CEMENT IS PREPARED DIRECTLY BEFORE USE BY MIXING A POLYMER POWDER COMPONENT WITH A LIQUID MONOMER COMPONENT. A DUCTILE DOUGH FORMS WHICH CURES WITHIN A FEW MINUTES.
Type: IMPLANTABLE DEVICE | Status: FUNCTIONAL
Brand: PALACOS®

## 2021-11-30 RX ORDER — GLIPIZIDE 2.5 MG/1
10 TABLET, EXTENDED RELEASE ORAL 2 TIMES DAILY
Status: DISCONTINUED | OUTPATIENT
Start: 2021-11-30 | End: 2021-12-01 | Stop reason: HOSPADM

## 2021-11-30 RX ORDER — MAGNESIUM HYDROXIDE 1200 MG/15ML
LIQUID ORAL AS NEEDED
Status: DISCONTINUED | OUTPATIENT
Start: 2021-11-30 | End: 2021-11-30 | Stop reason: HOSPADM

## 2021-11-30 RX ORDER — OXYCODONE HYDROCHLORIDE 5 MG/1
5 TABLET ORAL EVERY 4 HOURS PRN
Status: DISCONTINUED | OUTPATIENT
Start: 2021-11-30 | End: 2021-12-01 | Stop reason: HOSPADM

## 2021-11-30 RX ORDER — SODIUM CHLORIDE 9 MG/ML
75 INJECTION, SOLUTION INTRAVENOUS CONTINUOUS
Status: DISCONTINUED | OUTPATIENT
Start: 2021-11-30 | End: 2021-12-01 | Stop reason: ALTCHOICE

## 2021-11-30 RX ORDER — BUPIVACAINE HYDROCHLORIDE 2.5 MG/ML
INJECTION, SOLUTION EPIDURAL; INFILTRATION; INTRACAUDAL AS NEEDED
Status: DISCONTINUED | OUTPATIENT
Start: 2021-11-30 | End: 2021-11-30 | Stop reason: HOSPADM

## 2021-11-30 RX ORDER — MIDAZOLAM HYDROCHLORIDE 2 MG/2ML
INJECTION, SOLUTION INTRAMUSCULAR; INTRAVENOUS AS NEEDED
Status: DISCONTINUED | OUTPATIENT
Start: 2021-11-30 | End: 2021-11-30

## 2021-11-30 RX ORDER — PROPOFOL 10 MG/ML
INJECTION, EMULSION INTRAVENOUS AS NEEDED
Status: DISCONTINUED | OUTPATIENT
Start: 2021-11-30 | End: 2021-11-30

## 2021-11-30 RX ORDER — ACETAMINOPHEN 325 MG/1
975 TABLET ORAL ONCE
Status: COMPLETED | OUTPATIENT
Start: 2021-11-30 | End: 2021-11-30

## 2021-11-30 RX ORDER — ONDANSETRON 2 MG/ML
INJECTION INTRAMUSCULAR; INTRAVENOUS AS NEEDED
Status: DISCONTINUED | OUTPATIENT
Start: 2021-11-30 | End: 2021-11-30

## 2021-11-30 RX ORDER — HYDROMORPHONE HCL/PF 1 MG/ML
0.5 SYRINGE (ML) INJECTION
Status: DISCONTINUED | OUTPATIENT
Start: 2021-11-30 | End: 2021-11-30 | Stop reason: HOSPADM

## 2021-11-30 RX ORDER — SODIUM CHLORIDE, SODIUM LACTATE, POTASSIUM CHLORIDE, CALCIUM CHLORIDE 600; 310; 30; 20 MG/100ML; MG/100ML; MG/100ML; MG/100ML
125 INJECTION, SOLUTION INTRAVENOUS CONTINUOUS
Status: DISCONTINUED | OUTPATIENT
Start: 2021-11-30 | End: 2021-11-30

## 2021-11-30 RX ORDER — FENTANYL CITRATE/PF 50 MCG/ML
25 SYRINGE (ML) INJECTION
Status: DISCONTINUED | OUTPATIENT
Start: 2021-11-30 | End: 2021-11-30 | Stop reason: HOSPADM

## 2021-11-30 RX ORDER — BUPIVACAINE HYDROCHLORIDE 5 MG/ML
INJECTION, SOLUTION PERINEURAL
Status: DISCONTINUED | OUTPATIENT
Start: 2021-11-30 | End: 2021-11-30

## 2021-11-30 RX ORDER — PROPOFOL 10 MG/ML
INJECTION, EMULSION INTRAVENOUS CONTINUOUS PRN
Status: DISCONTINUED | OUTPATIENT
Start: 2021-11-30 | End: 2021-11-30

## 2021-11-30 RX ORDER — CEFAZOLIN SODIUM 2 G/50ML
2000 SOLUTION INTRAVENOUS ONCE
Status: COMPLETED | OUTPATIENT
Start: 2021-11-30 | End: 2021-11-30

## 2021-11-30 RX ORDER — GABAPENTIN 300 MG/1
300 CAPSULE ORAL ONCE
Status: COMPLETED | OUTPATIENT
Start: 2021-11-30 | End: 2021-11-30

## 2021-11-30 RX ORDER — LIDOCAINE HYDROCHLORIDE 10 MG/ML
INJECTION, SOLUTION EPIDURAL; INFILTRATION; INTRACAUDAL; PERINEURAL AS NEEDED
Status: DISCONTINUED | OUTPATIENT
Start: 2021-11-30 | End: 2021-11-30

## 2021-11-30 RX ORDER — PANTOPRAZOLE SODIUM 40 MG/1
40 TABLET, DELAYED RELEASE ORAL
Status: DISCONTINUED | OUTPATIENT
Start: 2021-11-30 | End: 2021-12-01 | Stop reason: HOSPADM

## 2021-11-30 RX ORDER — HYDROMORPHONE HCL/PF 1 MG/ML
0.5 SYRINGE (ML) INJECTION EVERY 2 HOUR PRN
Status: DISCONTINUED | OUTPATIENT
Start: 2021-11-30 | End: 2021-12-01 | Stop reason: HOSPADM

## 2021-11-30 RX ORDER — GABAPENTIN 100 MG/1
200 CAPSULE ORAL 2 TIMES DAILY
Status: DISCONTINUED | OUTPATIENT
Start: 2021-11-30 | End: 2021-12-01 | Stop reason: HOSPADM

## 2021-11-30 RX ORDER — DEXAMETHASONE SODIUM PHOSPHATE 10 MG/ML
10 INJECTION, SOLUTION INTRAMUSCULAR; INTRAVENOUS ONCE
Status: COMPLETED | OUTPATIENT
Start: 2021-12-01 | End: 2021-12-01

## 2021-11-30 RX ORDER — SCOLOPAMINE TRANSDERMAL SYSTEM 1 MG/1
1 PATCH, EXTENDED RELEASE TRANSDERMAL ONCE
Status: DISCONTINUED | OUTPATIENT
Start: 2021-11-30 | End: 2021-12-01 | Stop reason: HOSPADM

## 2021-11-30 RX ORDER — OXYCODONE HCL 10 MG/1
10 TABLET, FILM COATED, EXTENDED RELEASE ORAL ONCE
Status: COMPLETED | OUTPATIENT
Start: 2021-11-30 | End: 2021-11-30

## 2021-11-30 RX ORDER — ACETAMINOPHEN 325 MG/1
975 TABLET ORAL EVERY 8 HOURS
Status: DISCONTINUED | OUTPATIENT
Start: 2021-11-30 | End: 2021-12-01 | Stop reason: HOSPADM

## 2021-11-30 RX ORDER — CEFAZOLIN SODIUM 2 G/50ML
2000 SOLUTION INTRAVENOUS EVERY 8 HOURS
Status: COMPLETED | OUTPATIENT
Start: 2021-11-30 | End: 2021-12-01

## 2021-11-30 RX ORDER — ALPRAZOLAM 0.25 MG/1
0.25 TABLET ORAL
Status: DISCONTINUED | OUTPATIENT
Start: 2021-11-30 | End: 2021-12-01 | Stop reason: HOSPADM

## 2021-11-30 RX ORDER — OXYCODONE HYDROCHLORIDE 5 MG/1
10 TABLET ORAL EVERY 4 HOURS PRN
Status: DISCONTINUED | OUTPATIENT
Start: 2021-11-30 | End: 2021-12-01 | Stop reason: HOSPADM

## 2021-11-30 RX ORDER — MEPERIDINE HYDROCHLORIDE 25 MG/ML
12.5 INJECTION INTRAMUSCULAR; INTRAVENOUS; SUBCUTANEOUS
Status: DISCONTINUED | OUTPATIENT
Start: 2021-11-30 | End: 2021-11-30 | Stop reason: HOSPADM

## 2021-11-30 RX ORDER — ASPIRIN 325 MG
325 TABLET ORAL 2 TIMES DAILY
Status: DISCONTINUED | OUTPATIENT
Start: 2021-11-30 | End: 2021-12-01 | Stop reason: HOSPADM

## 2021-11-30 RX ORDER — AMLODIPINE BESYLATE 5 MG/1
5 TABLET ORAL
Status: DISCONTINUED | OUTPATIENT
Start: 2021-11-30 | End: 2021-12-01 | Stop reason: HOSPADM

## 2021-11-30 RX ORDER — ATORVASTATIN CALCIUM 20 MG/1
20 TABLET, FILM COATED ORAL DAILY
Status: DISCONTINUED | OUTPATIENT
Start: 2021-11-30 | End: 2021-12-01 | Stop reason: HOSPADM

## 2021-11-30 RX ORDER — INSULIN GLARGINE 100 [IU]/ML
65 INJECTION, SOLUTION SUBCUTANEOUS EVERY MORNING
Status: DISCONTINUED | OUTPATIENT
Start: 2021-12-01 | End: 2021-12-01 | Stop reason: HOSPADM

## 2021-11-30 RX ORDER — ONDANSETRON 2 MG/ML
4 INJECTION INTRAMUSCULAR; INTRAVENOUS EVERY 6 HOURS PRN
Status: DISCONTINUED | OUTPATIENT
Start: 2021-11-30 | End: 2021-12-01 | Stop reason: HOSPADM

## 2021-11-30 RX ORDER — CHLORHEXIDINE GLUCONATE 0.12 MG/ML
15 RINSE ORAL ONCE
Status: COMPLETED | OUTPATIENT
Start: 2021-11-30 | End: 2021-11-30

## 2021-11-30 RX ORDER — MAGNESIUM HYDROXIDE/ALUMINUM HYDROXICE/SIMETHICONE 120; 1200; 1200 MG/30ML; MG/30ML; MG/30ML
30 SUSPENSION ORAL EVERY 6 HOURS PRN
Status: DISCONTINUED | OUTPATIENT
Start: 2021-11-30 | End: 2021-12-01 | Stop reason: HOSPADM

## 2021-11-30 RX ORDER — ONDANSETRON 2 MG/ML
4 INJECTION INTRAMUSCULAR; INTRAVENOUS ONCE AS NEEDED
Status: DISCONTINUED | OUTPATIENT
Start: 2021-11-30 | End: 2021-11-30 | Stop reason: HOSPADM

## 2021-11-30 RX ORDER — BUPIVACAINE HYDROCHLORIDE 7.5 MG/ML
INJECTION, SOLUTION INTRASPINAL AS NEEDED
Status: DISCONTINUED | OUTPATIENT
Start: 2021-11-30 | End: 2021-11-30

## 2021-11-30 RX ORDER — DOCUSATE SODIUM 100 MG/1
100 CAPSULE, LIQUID FILLED ORAL 2 TIMES DAILY
Status: DISCONTINUED | OUTPATIENT
Start: 2021-11-30 | End: 2021-12-01 | Stop reason: HOSPADM

## 2021-11-30 RX ORDER — DEXAMETHASONE SODIUM PHOSPHATE 10 MG/ML
INJECTION, SOLUTION INTRAMUSCULAR; INTRAVENOUS AS NEEDED
Status: DISCONTINUED | OUTPATIENT
Start: 2021-11-30 | End: 2021-11-30

## 2021-11-30 RX ADMIN — BUPIVACAINE HYDROCHLORIDE 10 ML: 5 INJECTION, SOLUTION PERINEURAL at 10:30

## 2021-11-30 RX ADMIN — ACETAMINOPHEN 975 MG: 325 TABLET, FILM COATED ORAL at 22:00

## 2021-11-30 RX ADMIN — SODIUM CHLORIDE 75 ML/HR: 0.9 INJECTION, SOLUTION INTRAVENOUS at 23:45

## 2021-11-30 RX ADMIN — PANTOPRAZOLE SODIUM 40 MG: 40 TABLET, DELAYED RELEASE ORAL at 12:06

## 2021-11-30 RX ADMIN — OXYCODONE HYDROCHLORIDE 10 MG: 10 TABLET, FILM COATED, EXTENDED RELEASE ORAL at 06:38

## 2021-11-30 RX ADMIN — ASPIRIN 325 MG: 325 TABLET ORAL at 22:01

## 2021-11-30 RX ADMIN — MIDAZOLAM 1 MG: 1 INJECTION INTRAMUSCULAR; INTRAVENOUS at 07:20

## 2021-11-30 RX ADMIN — ACETAMINOPHEN 975 MG: 325 TABLET, FILM COATED ORAL at 06:38

## 2021-11-30 RX ADMIN — SODIUM CHLORIDE, SODIUM LACTATE, POTASSIUM CHLORIDE, AND CALCIUM CHLORIDE 125 ML/HR: .6; .31; .03; .02 INJECTION, SOLUTION INTRAVENOUS at 06:49

## 2021-11-30 RX ADMIN — CEFAZOLIN SODIUM 2000 MG: 2 SOLUTION INTRAVENOUS at 23:45

## 2021-11-30 RX ADMIN — ACETAMINOPHEN 975 MG: 325 TABLET, FILM COATED ORAL at 14:28

## 2021-11-30 RX ADMIN — GABAPENTIN 300 MG: 300 CAPSULE ORAL at 06:38

## 2021-11-30 RX ADMIN — PROPOFOL 70 MG: 10 INJECTION, EMULSION INTRAVENOUS at 07:37

## 2021-11-30 RX ADMIN — DEXAMETHASONE SODIUM PHOSPHATE 4 MG: 10 INJECTION, SOLUTION INTRAMUSCULAR; INTRAVENOUS at 07:53

## 2021-11-30 RX ADMIN — PROPOFOL 100 MCG/KG/MIN: 10 INJECTION, EMULSION INTRAVENOUS at 07:37

## 2021-11-30 RX ADMIN — SCOPALAMINE 1 PATCH: 1 PATCH, EXTENDED RELEASE TRANSDERMAL at 06:39

## 2021-11-30 RX ADMIN — BUPIVACAINE HYDROCHLORIDE IN DEXTROSE 1.6 ML: 7.5 INJECTION, SOLUTION SUBARACHNOID at 07:33

## 2021-11-30 RX ADMIN — OXYCODONE HYDROCHLORIDE 5 MG: 5 TABLET ORAL at 13:39

## 2021-11-30 RX ADMIN — CEFAZOLIN SODIUM 2000 MG: 2 SOLUTION INTRAVENOUS at 14:30

## 2021-11-30 RX ADMIN — CEFAZOLIN SODIUM 2000 MG: 2 SOLUTION INTRAVENOUS at 07:30

## 2021-11-30 RX ADMIN — ONDANSETRON 4 MG: 2 INJECTION INTRAMUSCULAR; INTRAVENOUS at 07:53

## 2021-11-30 RX ADMIN — CHLORHEXIDINE GLUCONATE 15 ML: 1.2 RINSE ORAL at 06:39

## 2021-11-30 RX ADMIN — ATORVASTATIN CALCIUM 20 MG: 20 TABLET, FILM COATED ORAL at 12:06

## 2021-11-30 RX ADMIN — INSULIN LISPRO 2 UNITS: 100 INJECTION, SOLUTION INTRAVENOUS; SUBCUTANEOUS at 14:28

## 2021-11-30 RX ADMIN — TRANEXAMIC ACID 50 ML: 1 INJECTION, SOLUTION INTRAVENOUS at 07:52

## 2021-11-30 RX ADMIN — SODIUM CHLORIDE 75 ML/HR: 0.9 INJECTION, SOLUTION INTRAVENOUS at 12:06

## 2021-11-30 RX ADMIN — SODIUM CHLORIDE, SODIUM LACTATE, POTASSIUM CHLORIDE, AND CALCIUM CHLORIDE: .6; .31; .03; .02 INJECTION, SOLUTION INTRAVENOUS at 08:31

## 2021-11-30 RX ADMIN — OXYCODONE HYDROCHLORIDE 5 MG: 5 TABLET ORAL at 20:16

## 2021-11-30 RX ADMIN — MIDAZOLAM 1 MG: 1 INJECTION INTRAMUSCULAR; INTRAVENOUS at 07:30

## 2021-11-30 RX ADMIN — GABAPENTIN 200 MG: 100 CAPSULE ORAL at 17:01

## 2021-11-30 RX ADMIN — LIDOCAINE HYDROCHLORIDE 40 MG: 10 INJECTION, SOLUTION EPIDURAL; INFILTRATION; INTRACAUDAL; PERINEURAL at 07:37

## 2021-11-30 RX ADMIN — INSULIN LISPRO 2 UNITS: 100 INJECTION, SOLUTION INTRAVENOUS; SUBCUTANEOUS at 17:32

## 2021-11-30 RX ADMIN — DOCUSATE SODIUM 100 MG: 100 CAPSULE ORAL at 12:06

## 2021-11-30 RX ADMIN — GLIPIZIDE 10 MG: 2.5 TABLET, FILM COATED, EXTENDED RELEASE ORAL at 22:01

## 2021-12-01 VITALS
BODY MASS INDEX: 35.1 KG/M2 | SYSTOLIC BLOOD PRESSURE: 139 MMHG | RESPIRATION RATE: 16 BRPM | WEIGHT: 245.2 LBS | OXYGEN SATURATION: 93 % | HEART RATE: 65 BPM | DIASTOLIC BLOOD PRESSURE: 68 MMHG | HEIGHT: 70 IN | TEMPERATURE: 98.2 F

## 2021-12-01 LAB
ANION GAP SERPL CALCULATED.3IONS-SCNC: 9 MMOL/L (ref 4–13)
BASOPHILS # BLD AUTO: 0.03 THOUSANDS/ΜL (ref 0–0.1)
BASOPHILS NFR BLD AUTO: 0 % (ref 0–1)
BUN SERPL-MCNC: 25 MG/DL (ref 5–25)
CALCIUM SERPL-MCNC: 8.4 MG/DL (ref 8.3–10.1)
CHLORIDE SERPL-SCNC: 105 MMOL/L (ref 100–108)
CO2 SERPL-SCNC: 28 MMOL/L (ref 21–32)
CREAT SERPL-MCNC: 1.25 MG/DL (ref 0.6–1.3)
EOSINOPHIL # BLD AUTO: 0.02 THOUSAND/ΜL (ref 0–0.61)
EOSINOPHIL NFR BLD AUTO: 0 % (ref 0–6)
ERYTHROCYTE [DISTWIDTH] IN BLOOD BY AUTOMATED COUNT: 13.4 % (ref 11.6–15.1)
GFR SERPL CREATININE-BSD FRML MDRD: 61 ML/MIN/1.73SQ M
GLUCOSE P FAST SERPL-MCNC: 133 MG/DL (ref 65–99)
GLUCOSE SERPL-MCNC: 133 MG/DL (ref 65–140)
GLUCOSE SERPL-MCNC: 150 MG/DL (ref 65–140)
GLUCOSE SERPL-MCNC: 169 MG/DL (ref 65–140)
GLUCOSE SERPL-MCNC: 177 MG/DL (ref 65–140)
GLUCOSE SERPL-MCNC: 215 MG/DL (ref 65–140)
GLUCOSE SERPL-MCNC: 224 MG/DL (ref 65–140)
HCT VFR BLD AUTO: 37.3 % (ref 36.5–49.3)
HGB BLD-MCNC: 12.4 G/DL (ref 12–17)
IMM GRANULOCYTES # BLD AUTO: 0.01 THOUSAND/UL (ref 0–0.2)
IMM GRANULOCYTES NFR BLD AUTO: 0 % (ref 0–2)
LYMPHOCYTES # BLD AUTO: 1.14 THOUSANDS/ΜL (ref 0.6–4.47)
LYMPHOCYTES NFR BLD AUTO: 11 % (ref 14–44)
MCH RBC QN AUTO: 28.5 PG (ref 26.8–34.3)
MCHC RBC AUTO-ENTMCNC: 33.2 G/DL (ref 31.4–37.4)
MCV RBC AUTO: 86 FL (ref 82–98)
MONOCYTES # BLD AUTO: 1.03 THOUSAND/ΜL (ref 0.17–1.22)
MONOCYTES NFR BLD AUTO: 10 % (ref 4–12)
NEUTROPHILS # BLD AUTO: 7.98 THOUSANDS/ΜL (ref 1.85–7.62)
NEUTS SEG NFR BLD AUTO: 79 % (ref 43–75)
NRBC BLD AUTO-RTO: 0 /100 WBCS
PLATELET # BLD AUTO: 238 THOUSANDS/UL (ref 149–390)
PMV BLD AUTO: 11.2 FL (ref 8.9–12.7)
POTASSIUM SERPL-SCNC: 3.8 MMOL/L (ref 3.5–5.3)
RBC # BLD AUTO: 4.35 MILLION/UL (ref 3.88–5.62)
SODIUM SERPL-SCNC: 142 MMOL/L (ref 136–145)
WBC # BLD AUTO: 10.21 THOUSAND/UL (ref 4.31–10.16)

## 2021-12-01 PROCEDURE — 97167 OT EVAL HIGH COMPLEX 60 MIN: CPT

## 2021-12-01 PROCEDURE — 99024 POSTOP FOLLOW-UP VISIT: CPT | Performed by: ORTHOPAEDIC SURGERY

## 2021-12-01 PROCEDURE — 82948 REAGENT STRIP/BLOOD GLUCOSE: CPT

## 2021-12-01 PROCEDURE — 97530 THERAPEUTIC ACTIVITIES: CPT

## 2021-12-01 PROCEDURE — 85025 COMPLETE CBC W/AUTO DIFF WBC: CPT | Performed by: PHYSICIAN ASSISTANT

## 2021-12-01 PROCEDURE — 97116 GAIT TRAINING THERAPY: CPT

## 2021-12-01 PROCEDURE — 97535 SELF CARE MNGMENT TRAINING: CPT

## 2021-12-01 PROCEDURE — 97110 THERAPEUTIC EXERCISES: CPT

## 2021-12-01 PROCEDURE — 80048 BASIC METABOLIC PNL TOTAL CA: CPT | Performed by: PHYSICIAN ASSISTANT

## 2021-12-01 RX ORDER — ACETAMINOPHEN 325 MG/1
975 TABLET ORAL EVERY 8 HOURS
Refills: 0
Start: 2021-12-01 | End: 2022-02-24 | Stop reason: ALTCHOICE

## 2021-12-01 RX ORDER — DOCUSATE SODIUM 100 MG/1
100 CAPSULE, LIQUID FILLED ORAL 2 TIMES DAILY
Qty: 60 CAPSULE | Refills: 0 | Status: SHIPPED | OUTPATIENT
Start: 2021-12-01 | End: 2022-02-24

## 2021-12-01 RX ORDER — OXYCODONE HYDROCHLORIDE 5 MG/1
TABLET ORAL
Qty: 60 TABLET | Refills: 0 | Status: SHIPPED | OUTPATIENT
Start: 2021-12-01 | End: 2022-02-24 | Stop reason: ALTCHOICE

## 2021-12-01 RX ORDER — ASPIRIN 325 MG
325 TABLET ORAL 2 TIMES DAILY
Qty: 84 TABLET | Refills: 0 | Status: SHIPPED | OUTPATIENT
Start: 2021-12-01 | End: 2022-02-24

## 2021-12-01 RX ORDER — NALOXONE HYDROCHLORIDE 4 MG/.1ML
SPRAY NASAL
Qty: 1 EACH | Refills: 0 | Status: SHIPPED | OUTPATIENT
Start: 2021-12-01 | End: 2022-02-24 | Stop reason: ALTCHOICE

## 2021-12-01 RX ADMIN — INSULIN LISPRO 1 UNITS: 100 INJECTION, SOLUTION INTRAVENOUS; SUBCUTANEOUS at 09:48

## 2021-12-01 RX ADMIN — DOCUSATE SODIUM 100 MG: 100 CAPSULE ORAL at 08:26

## 2021-12-01 RX ADMIN — ASPIRIN 325 MG: 325 TABLET ORAL at 08:26

## 2021-12-01 RX ADMIN — OXYCODONE HYDROCHLORIDE 10 MG: 5 TABLET ORAL at 09:47

## 2021-12-01 RX ADMIN — ATORVASTATIN CALCIUM 20 MG: 20 TABLET, FILM COATED ORAL at 08:26

## 2021-12-01 RX ADMIN — ACETAMINOPHEN 975 MG: 325 TABLET, FILM COATED ORAL at 06:10

## 2021-12-01 RX ADMIN — OXYCODONE HYDROCHLORIDE 5 MG: 5 TABLET ORAL at 06:09

## 2021-12-01 RX ADMIN — DEXAMETHASONE SODIUM PHOSPHATE 10 MG: 10 INJECTION, SOLUTION INTRAMUSCULAR; INTRAVENOUS at 09:48

## 2021-12-01 RX ADMIN — GLIPIZIDE 10 MG: 2.5 TABLET, FILM COATED, EXTENDED RELEASE ORAL at 08:26

## 2021-12-01 RX ADMIN — PANTOPRAZOLE SODIUM 40 MG: 40 TABLET, DELAYED RELEASE ORAL at 06:10

## 2021-12-01 RX ADMIN — OXYCODONE HYDROCHLORIDE 5 MG: 5 TABLET ORAL at 15:17

## 2021-12-01 RX ADMIN — INSULIN GLARGINE 65 UNITS: 100 INJECTION, SOLUTION SUBCUTANEOUS at 08:26

## 2021-12-01 RX ADMIN — OXYCODONE HYDROCHLORIDE 5 MG: 5 TABLET ORAL at 00:32

## 2021-12-01 RX ADMIN — INSULIN LISPRO 1 UNITS: 100 INJECTION, SOLUTION INTRAVENOUS; SUBCUTANEOUS at 08:27

## 2021-12-01 RX ADMIN — INSULIN LISPRO 2 UNITS: 100 INJECTION, SOLUTION INTRAVENOUS; SUBCUTANEOUS at 14:18

## 2021-12-01 RX ADMIN — ACETAMINOPHEN 975 MG: 325 TABLET, FILM COATED ORAL at 14:18

## 2021-12-01 RX ADMIN — GABAPENTIN 200 MG: 100 CAPSULE ORAL at 08:26

## 2021-12-02 ENCOUNTER — PATIENT OUTREACH (OUTPATIENT)
Dept: OBGYN CLINIC | Facility: HOSPITAL | Age: 61
End: 2021-12-02

## 2021-12-02 ENCOUNTER — TELEPHONE (OUTPATIENT)
Dept: FAMILY MEDICINE CLINIC | Facility: CLINIC | Age: 61
End: 2021-12-02

## 2021-12-03 ENCOUNTER — OFFICE VISIT (OUTPATIENT)
Dept: PHYSICAL THERAPY | Facility: CLINIC | Age: 61
End: 2021-12-03
Payer: COMMERCIAL

## 2021-12-03 ENCOUNTER — TELEPHONE (OUTPATIENT)
Dept: OBGYN CLINIC | Facility: CLINIC | Age: 61
End: 2021-12-03

## 2021-12-03 DIAGNOSIS — G89.29 CHRONIC PAIN OF RIGHT KNEE: ICD-10-CM

## 2021-12-03 DIAGNOSIS — M17.11 PRIMARY OSTEOARTHRITIS OF RIGHT KNEE: Primary | ICD-10-CM

## 2021-12-03 DIAGNOSIS — M25.561 CHRONIC PAIN OF RIGHT KNEE: ICD-10-CM

## 2021-12-03 LAB
DME PARACHUTE DELIVERY DATE ACTUAL: NORMAL
DME PARACHUTE DELIVERY DATE ACTUAL: NORMAL
DME PARACHUTE DELIVERY DATE REQUESTED: NORMAL
DME PARACHUTE DELIVERY DATE REQUESTED: NORMAL
DME PARACHUTE ITEM DESCRIPTION: NORMAL
DME PARACHUTE ITEM DESCRIPTION: NORMAL
DME PARACHUTE ORDER STATUS: NORMAL
DME PARACHUTE ORDER STATUS: NORMAL
DME PARACHUTE SUPPLIER NAME: NORMAL
DME PARACHUTE SUPPLIER NAME: NORMAL
DME PARACHUTE SUPPLIER PHONE: NORMAL
DME PARACHUTE SUPPLIER PHONE: NORMAL

## 2021-12-03 PROCEDURE — 97110 THERAPEUTIC EXERCISES: CPT

## 2021-12-03 PROCEDURE — 97164 PT RE-EVAL EST PLAN CARE: CPT

## 2021-12-03 PROCEDURE — 97530 THERAPEUTIC ACTIVITIES: CPT

## 2021-12-06 ENCOUNTER — OFFICE VISIT (OUTPATIENT)
Dept: PHYSICAL THERAPY | Facility: CLINIC | Age: 61
End: 2021-12-06
Payer: COMMERCIAL

## 2021-12-06 DIAGNOSIS — G89.29 CHRONIC PAIN OF RIGHT KNEE: ICD-10-CM

## 2021-12-06 DIAGNOSIS — M17.11 PRIMARY OSTEOARTHRITIS OF RIGHT KNEE: Primary | ICD-10-CM

## 2021-12-06 DIAGNOSIS — M25.561 CHRONIC PAIN OF RIGHT KNEE: ICD-10-CM

## 2021-12-06 PROCEDURE — 97140 MANUAL THERAPY 1/> REGIONS: CPT

## 2021-12-06 PROCEDURE — 97110 THERAPEUTIC EXERCISES: CPT

## 2021-12-07 ENCOUNTER — OFFICE VISIT (OUTPATIENT)
Dept: PHYSICAL THERAPY | Facility: CLINIC | Age: 61
End: 2021-12-07
Payer: COMMERCIAL

## 2021-12-07 DIAGNOSIS — M25.561 CHRONIC PAIN OF RIGHT KNEE: ICD-10-CM

## 2021-12-07 DIAGNOSIS — M17.11 PRIMARY OSTEOARTHRITIS OF RIGHT KNEE: Primary | ICD-10-CM

## 2021-12-07 DIAGNOSIS — G89.29 CHRONIC PAIN OF RIGHT KNEE: ICD-10-CM

## 2021-12-07 PROCEDURE — 97140 MANUAL THERAPY 1/> REGIONS: CPT

## 2021-12-07 PROCEDURE — 97110 THERAPEUTIC EXERCISES: CPT

## 2021-12-10 ENCOUNTER — OFFICE VISIT (OUTPATIENT)
Dept: PHYSICAL THERAPY | Facility: CLINIC | Age: 61
End: 2021-12-10
Payer: COMMERCIAL

## 2021-12-10 DIAGNOSIS — M25.561 CHRONIC PAIN OF RIGHT KNEE: ICD-10-CM

## 2021-12-10 DIAGNOSIS — G89.29 CHRONIC PAIN OF RIGHT KNEE: ICD-10-CM

## 2021-12-10 DIAGNOSIS — M17.11 PRIMARY OSTEOARTHRITIS OF RIGHT KNEE: Primary | ICD-10-CM

## 2021-12-10 PROCEDURE — 97140 MANUAL THERAPY 1/> REGIONS: CPT

## 2021-12-10 PROCEDURE — 97110 THERAPEUTIC EXERCISES: CPT

## 2021-12-13 ENCOUNTER — OFFICE VISIT (OUTPATIENT)
Dept: PHYSICAL THERAPY | Facility: CLINIC | Age: 61
End: 2021-12-13
Payer: COMMERCIAL

## 2021-12-13 DIAGNOSIS — M25.561 CHRONIC PAIN OF RIGHT KNEE: ICD-10-CM

## 2021-12-13 DIAGNOSIS — M17.11 PRIMARY OSTEOARTHRITIS OF RIGHT KNEE: Primary | ICD-10-CM

## 2021-12-13 DIAGNOSIS — G89.29 CHRONIC PAIN OF RIGHT KNEE: ICD-10-CM

## 2021-12-13 PROCEDURE — 97140 MANUAL THERAPY 1/> REGIONS: CPT

## 2021-12-13 PROCEDURE — 97110 THERAPEUTIC EXERCISES: CPT

## 2021-12-14 ENCOUNTER — OFFICE VISIT (OUTPATIENT)
Dept: PHYSICAL THERAPY | Facility: CLINIC | Age: 61
End: 2021-12-14
Payer: COMMERCIAL

## 2021-12-14 DIAGNOSIS — M25.561 CHRONIC PAIN OF RIGHT KNEE: ICD-10-CM

## 2021-12-14 DIAGNOSIS — G89.29 CHRONIC PAIN OF RIGHT KNEE: ICD-10-CM

## 2021-12-14 DIAGNOSIS — M17.11 PRIMARY OSTEOARTHRITIS OF RIGHT KNEE: Primary | ICD-10-CM

## 2021-12-14 PROCEDURE — 97110 THERAPEUTIC EXERCISES: CPT

## 2021-12-14 PROCEDURE — 97112 NEUROMUSCULAR REEDUCATION: CPT

## 2021-12-14 PROCEDURE — 97116 GAIT TRAINING THERAPY: CPT

## 2021-12-16 ENCOUNTER — APPOINTMENT (OUTPATIENT)
Dept: RADIOLOGY | Facility: CLINIC | Age: 61
End: 2021-12-16
Payer: COMMERCIAL

## 2021-12-16 ENCOUNTER — OFFICE VISIT (OUTPATIENT)
Dept: OBGYN CLINIC | Facility: CLINIC | Age: 61
End: 2021-12-16

## 2021-12-16 VITALS
HEART RATE: 84 BPM | DIASTOLIC BLOOD PRESSURE: 84 MMHG | SYSTOLIC BLOOD PRESSURE: 134 MMHG | BODY MASS INDEX: 35.07 KG/M2 | WEIGHT: 245 LBS | HEIGHT: 70 IN

## 2021-12-16 DIAGNOSIS — Z96.651 STATUS POST TOTAL RIGHT KNEE REPLACEMENT: Primary | ICD-10-CM

## 2021-12-16 DIAGNOSIS — Z48.89 ENCOUNTER FOR POSTOPERATIVE CARE: ICD-10-CM

## 2021-12-16 DIAGNOSIS — M17.11 PRIMARY OSTEOARTHRITIS OF RIGHT KNEE: ICD-10-CM

## 2021-12-16 PROCEDURE — 73562 X-RAY EXAM OF KNEE 3: CPT

## 2021-12-16 PROCEDURE — 99024 POSTOP FOLLOW-UP VISIT: CPT | Performed by: ORTHOPAEDIC SURGERY

## 2021-12-17 ENCOUNTER — OFFICE VISIT (OUTPATIENT)
Dept: PHYSICAL THERAPY | Facility: CLINIC | Age: 61
End: 2021-12-17
Payer: COMMERCIAL

## 2021-12-17 DIAGNOSIS — M17.11 PRIMARY OSTEOARTHRITIS OF RIGHT KNEE: Primary | ICD-10-CM

## 2021-12-17 DIAGNOSIS — G89.29 CHRONIC PAIN OF RIGHT KNEE: ICD-10-CM

## 2021-12-17 DIAGNOSIS — M25.561 CHRONIC PAIN OF RIGHT KNEE: ICD-10-CM

## 2021-12-17 PROCEDURE — 97140 MANUAL THERAPY 1/> REGIONS: CPT

## 2021-12-17 PROCEDURE — 97110 THERAPEUTIC EXERCISES: CPT

## 2021-12-17 PROCEDURE — 97112 NEUROMUSCULAR REEDUCATION: CPT

## 2021-12-20 ENCOUNTER — OFFICE VISIT (OUTPATIENT)
Dept: PHYSICAL THERAPY | Facility: CLINIC | Age: 61
End: 2021-12-20
Payer: COMMERCIAL

## 2021-12-20 DIAGNOSIS — M17.11 PRIMARY OSTEOARTHRITIS OF RIGHT KNEE: Primary | ICD-10-CM

## 2021-12-20 DIAGNOSIS — M25.561 CHRONIC PAIN OF RIGHT KNEE: ICD-10-CM

## 2021-12-20 DIAGNOSIS — G89.29 CHRONIC PAIN OF RIGHT KNEE: ICD-10-CM

## 2021-12-20 PROCEDURE — 97110 THERAPEUTIC EXERCISES: CPT

## 2021-12-20 PROCEDURE — 97112 NEUROMUSCULAR REEDUCATION: CPT

## 2021-12-20 PROCEDURE — 97140 MANUAL THERAPY 1/> REGIONS: CPT

## 2021-12-21 ENCOUNTER — OFFICE VISIT (OUTPATIENT)
Dept: PHYSICAL THERAPY | Facility: CLINIC | Age: 61
End: 2021-12-21
Payer: COMMERCIAL

## 2021-12-21 DIAGNOSIS — M17.11 PRIMARY OSTEOARTHRITIS OF RIGHT KNEE: Primary | ICD-10-CM

## 2021-12-21 DIAGNOSIS — G89.29 CHRONIC PAIN OF RIGHT KNEE: ICD-10-CM

## 2021-12-21 DIAGNOSIS — M25.561 CHRONIC PAIN OF RIGHT KNEE: ICD-10-CM

## 2021-12-21 PROCEDURE — 97112 NEUROMUSCULAR REEDUCATION: CPT

## 2021-12-21 PROCEDURE — 97110 THERAPEUTIC EXERCISES: CPT

## 2021-12-21 PROCEDURE — 97140 MANUAL THERAPY 1/> REGIONS: CPT

## 2021-12-23 ENCOUNTER — OFFICE VISIT (OUTPATIENT)
Dept: PHYSICAL THERAPY | Facility: CLINIC | Age: 61
End: 2021-12-23
Payer: COMMERCIAL

## 2021-12-23 DIAGNOSIS — M17.11 PRIMARY OSTEOARTHRITIS OF RIGHT KNEE: Primary | ICD-10-CM

## 2021-12-23 DIAGNOSIS — M25.561 CHRONIC PAIN OF RIGHT KNEE: ICD-10-CM

## 2021-12-23 DIAGNOSIS — G89.29 CHRONIC PAIN OF RIGHT KNEE: ICD-10-CM

## 2021-12-23 PROCEDURE — 97110 THERAPEUTIC EXERCISES: CPT

## 2021-12-23 PROCEDURE — 97112 NEUROMUSCULAR REEDUCATION: CPT

## 2021-12-23 PROCEDURE — 97140 MANUAL THERAPY 1/> REGIONS: CPT

## 2021-12-24 DIAGNOSIS — I10 BENIGN ESSENTIAL HYPERTENSION: ICD-10-CM

## 2021-12-24 PROCEDURE — 3066F NEPHROPATHY DOC TX: CPT | Performed by: INTERNAL MEDICINE

## 2021-12-27 ENCOUNTER — OFFICE VISIT (OUTPATIENT)
Dept: PHYSICAL THERAPY | Facility: CLINIC | Age: 61
End: 2021-12-27
Payer: COMMERCIAL

## 2021-12-27 DIAGNOSIS — M17.11 PRIMARY OSTEOARTHRITIS OF RIGHT KNEE: Primary | ICD-10-CM

## 2021-12-27 DIAGNOSIS — G89.29 CHRONIC PAIN OF RIGHT KNEE: ICD-10-CM

## 2021-12-27 DIAGNOSIS — M25.561 CHRONIC PAIN OF RIGHT KNEE: ICD-10-CM

## 2021-12-27 PROCEDURE — 4010F ACE/ARB THERAPY RXD/TAKEN: CPT | Performed by: INTERNAL MEDICINE

## 2021-12-27 PROCEDURE — 97110 THERAPEUTIC EXERCISES: CPT

## 2021-12-27 PROCEDURE — 97112 NEUROMUSCULAR REEDUCATION: CPT

## 2021-12-27 PROCEDURE — 97140 MANUAL THERAPY 1/> REGIONS: CPT

## 2021-12-27 RX ORDER — LOSARTAN POTASSIUM 100 MG/1
TABLET ORAL
Qty: 90 TABLET | Refills: 1 | Status: SHIPPED | OUTPATIENT
Start: 2021-12-27 | End: 2022-03-15 | Stop reason: ALTCHOICE

## 2021-12-28 ENCOUNTER — EVALUATION (OUTPATIENT)
Dept: PHYSICAL THERAPY | Facility: CLINIC | Age: 61
End: 2021-12-28
Payer: COMMERCIAL

## 2021-12-28 ENCOUNTER — TELEPHONE (OUTPATIENT)
Dept: OBGYN CLINIC | Facility: HOSPITAL | Age: 61
End: 2021-12-28

## 2021-12-28 DIAGNOSIS — M25.561 CHRONIC PAIN OF RIGHT KNEE: ICD-10-CM

## 2021-12-28 DIAGNOSIS — M17.11 PRIMARY OSTEOARTHRITIS OF RIGHT KNEE: Primary | ICD-10-CM

## 2021-12-28 DIAGNOSIS — G89.29 CHRONIC PAIN OF RIGHT KNEE: ICD-10-CM

## 2021-12-28 PROCEDURE — 97110 THERAPEUTIC EXERCISES: CPT

## 2021-12-28 PROCEDURE — 97140 MANUAL THERAPY 1/> REGIONS: CPT

## 2021-12-28 PROCEDURE — 97112 NEUROMUSCULAR REEDUCATION: CPT

## 2021-12-30 ENCOUNTER — OFFICE VISIT (OUTPATIENT)
Dept: PHYSICAL THERAPY | Facility: CLINIC | Age: 61
End: 2021-12-30
Payer: COMMERCIAL

## 2021-12-30 ENCOUNTER — OFFICE VISIT (OUTPATIENT)
Dept: OBGYN CLINIC | Facility: CLINIC | Age: 61
End: 2021-12-30

## 2021-12-30 VITALS
WEIGHT: 245 LBS | HEIGHT: 70 IN | DIASTOLIC BLOOD PRESSURE: 74 MMHG | BODY MASS INDEX: 35.07 KG/M2 | HEART RATE: 82 BPM | SYSTOLIC BLOOD PRESSURE: 138 MMHG

## 2021-12-30 DIAGNOSIS — G89.29 CHRONIC PAIN OF RIGHT KNEE: Primary | ICD-10-CM

## 2021-12-30 DIAGNOSIS — Z96.651 AFTERCARE FOLLOWING RIGHT KNEE JOINT REPLACEMENT SURGERY: ICD-10-CM

## 2021-12-30 DIAGNOSIS — Z47.1 AFTERCARE FOLLOWING RIGHT KNEE JOINT REPLACEMENT SURGERY: ICD-10-CM

## 2021-12-30 DIAGNOSIS — M25.561 CHRONIC PAIN OF RIGHT KNEE: Primary | ICD-10-CM

## 2021-12-30 DIAGNOSIS — Z96.651 STATUS POST TOTAL RIGHT KNEE REPLACEMENT: Primary | ICD-10-CM

## 2021-12-30 DIAGNOSIS — M17.11 PRIMARY OSTEOARTHRITIS OF RIGHT KNEE: ICD-10-CM

## 2021-12-30 PROCEDURE — 3008F BODY MASS INDEX DOCD: CPT | Performed by: FAMILY MEDICINE

## 2021-12-30 PROCEDURE — 97110 THERAPEUTIC EXERCISES: CPT | Performed by: PHYSICAL THERAPIST

## 2021-12-30 PROCEDURE — 99024 POSTOP FOLLOW-UP VISIT: CPT | Performed by: ORTHOPAEDIC SURGERY

## 2021-12-30 PROCEDURE — 97140 MANUAL THERAPY 1/> REGIONS: CPT | Performed by: PHYSICAL THERAPIST

## 2021-12-30 PROCEDURE — 97112 NEUROMUSCULAR REEDUCATION: CPT | Performed by: PHYSICAL THERAPIST

## 2022-01-03 ENCOUNTER — OFFICE VISIT (OUTPATIENT)
Dept: PHYSICAL THERAPY | Facility: CLINIC | Age: 62
End: 2022-01-03
Payer: COMMERCIAL

## 2022-01-03 DIAGNOSIS — M25.561 CHRONIC PAIN OF RIGHT KNEE: Primary | ICD-10-CM

## 2022-01-03 DIAGNOSIS — M17.11 PRIMARY OSTEOARTHRITIS OF RIGHT KNEE: ICD-10-CM

## 2022-01-03 DIAGNOSIS — G89.29 CHRONIC PAIN OF RIGHT KNEE: Primary | ICD-10-CM

## 2022-01-03 PROCEDURE — 97110 THERAPEUTIC EXERCISES: CPT

## 2022-01-03 PROCEDURE — 97112 NEUROMUSCULAR REEDUCATION: CPT

## 2022-01-03 PROCEDURE — 97140 MANUAL THERAPY 1/> REGIONS: CPT

## 2022-01-03 NOTE — PROGRESS NOTES
Daily Note     Today's date: 1/3/2022  Patient name: Laquita Traylor  : 1960  MRN: [id number]  Referring provider: Allie Aranda DO  Dx:   Encounter Diagnosis     ICD-10-CM    1  Chronic pain of right knee  M25 561     G89 29    2  Primary osteoarthritis of right knee  M17 11        Start Time: 0800  Stop Time: 0845  Total time in clinic (min): 45 minutes    Subjective: Patient states he saw surgeon last week who was pleased with his flexion ROM, would like him to continue to improve extension  Objective: See treatment diary below      Assessment: Tolerated treatment well  Patient challenged w/ increased height on step downs during today's session; required B UE support and provided w/ CG  Patient with difficulty performing quad set TKE in supine; provided w/ active assist, given vc to control eccentric phase  Pt will continue to benefit from skilled PT to improve functional mobility and activity tolerance  Plan: Continue per plan of care        Date 12/28 12/30/21 1/3/22 12/21/21 12/23/21 12/27   Visit Number 13 14 15 10 11 12   ROM         Knee Flexion AAROM 105 AAROM 100 100 AROM 97 AROM 97  PROM 102 AROM 100  PROM 108   Knee Extension -8 -8 -7 -8 -8 -8            Manual         Patellar mobs KT tape for inf patellar glide x30  KT tape for inf patellar glide 3x30 3x30    KT tape for inf patellar glide x3' 3x30    KTP tape for inf patellar glide x30   STM IASTM distal quad 5' IASTM distal quad 5' IASTM 5' Tibial IR/ER MWM in standing at step Counterstrain: rectus femoris, vastus lateralis x5' IASTM distal quad x5'   R knee LAD w/ belt    Intermittent w/ MHP x5' Intermittent w/ MHP x3'             TherEx         Knee/Hip PROM 5' 5' 5'  H/s contract relax 5'  5' 5'   Active w/u Bike 10' 10' 10' Bike 10' Bike 10' Bike 10'   Quad set, glute set         Ankle pumps         SLR         Hip abduction         Knee ext: SAQ, LAQ         Heel slides 10" x10 15x 2x10 15" x10 w/ PT OP at EOR 15" x10 w/ PT OP at EOR x10   Knee flex str at step 15" x10 10x   15" x10    LLLD knee flex str at wall    rev     Neuro Re-Ed         Step downs 6" step down 10" x15 x15 6" 6" x15  8" x5 4" step down x10 6" step down x5 6" step down 3x5   Step ups 6" x10 x10 6" x15   6" 3x5   Mini squats         Knee flexion/ext contract-relax    Seated 5' 5'    TG squats lvl 15 x15 total, 5" hold at EOR L17 x15 lvl 17 x15   lvl 15 x15 total, 5" holds at EOR   TKE   Supine w/ PT assist x10    Standing x15      TherAct         Patient education         Stair negotiation         Car transfer                           Gait Training             amb w/ SPC x5' amb w/ SPC, band at ankle for knee flex x100'                      Modalities         CP           Precautions: s/p R TKR performed 11/30/21  Past Medical History:   Diagnosis Date    Arthritis     Carpal tunnel syndrome     Corneal abrasion     last assessed - [id number]    Diabetes mellitus ([address] )     Gout     Hiatal hernia     Hyperlipidemia     Hypertension     Impaired fasting glucose     last assessed - [id number]    Kidney stone     last stone 2009    Lumbar spondylosis 10/31/2021    Lumbar spondylosis     PONV (postoperative nausea and vomiting)     Psoriasis     RA (rheumatoid arthritis) (HCC)     Sinus infection     currently under tx PO ABT etc as of 04/17/19    Viremia     Resolved - [id number]

## 2022-01-05 ENCOUNTER — OFFICE VISIT (OUTPATIENT)
Dept: PHYSICAL THERAPY | Facility: CLINIC | Age: 62
End: 2022-01-05
Payer: COMMERCIAL

## 2022-01-05 DIAGNOSIS — M25.561 CHRONIC PAIN OF RIGHT KNEE: ICD-10-CM

## 2022-01-05 DIAGNOSIS — G89.29 CHRONIC PAIN OF RIGHT KNEE: ICD-10-CM

## 2022-01-05 DIAGNOSIS — M17.11 PRIMARY OSTEOARTHRITIS OF RIGHT KNEE: Primary | ICD-10-CM

## 2022-01-05 PROCEDURE — 97112 NEUROMUSCULAR REEDUCATION: CPT

## 2022-01-05 PROCEDURE — 97140 MANUAL THERAPY 1/> REGIONS: CPT

## 2022-01-05 PROCEDURE — 97110 THERAPEUTIC EXERCISES: CPT

## 2022-01-05 NOTE — PROGRESS NOTES
Daily Note     Today's date: 2022  Patient name: Dharmesh Jones  : 1960  MRN: [id number]  Referring provider: Brian Dixon DO  Dx:   Encounter Diagnosis     ICD-10-CM    1  Primary osteoarthritis of right knee  M17 11    2  Chronic pain of right knee  M25 561     G89 29        Start Time: 0800  Stop Time: 0910  Total time in clinic (min): 70 minutes    Subjective: "I'm having a rough day  It feels tighter than it usually does and I'm not sure why "      Objective: See treatment diary below      Assessment: Tolerated treatment well  Limited intensity of CKC training per pt request, holding TG squats and step ups due to increased discomfort at start of today's session  Reviewed HEP during today's session, including addition of h/s stretch for home and increased total end range time for extension with long load long duration stretching, verbalized good understanding  Pt will continue to benefit from skilled PT to improve functional mobility and activity tolerance  Plan: Continue per plan of care        Date 12/28 12/30/21 1/3/22 1/5/22 12/23/21 12/27   Visit Number 13 14 15 16 11 12   ROM         Knee Flexion AAROM 105 AAROM 100 100 102 AROM 97  PROM 102 AROM 100  PROM 108   Knee Extension -8 -8 -7 -8 -8 -8            Manual         Patellar mobs KT tape for inf patellar glide x30  KT tape for inf patellar glide KT tape for inf patellar glide 3' 3x30    KT tape for inf patellar glide x3' 3x30    KTP tape for inf patellar glide x30   STM IASTM distal quad 5' IASTM distal quad 5' IASTM 5' IASTM distal quad 8' Counterstrain: rectus femoris, vastus lateralis x5' IASTM distal quad x5'   R knee LAD w/ belt    Intermittent w/ AP/PA gr III-IV glides Intermittent w/ MHP x3'             TherEx         Knee/Hip PROM 5' 5' 5'  H/s contract relax  5' 5'   Active w/u Bike 10' 10' 10' 10' Bike 10' Bike 10'   Quad set, glute set         Ankle pumps         SLR         Hip abduction         Knee ext: SAQ, LAQ Heel slides 10" x10 15x 2x10 2x10 15" x10 w/ PT OP at EOR x10   Knee flex str at step 15" x10 10x  Flexion str 10" x10    H/S str 10" x10 15" x10    LLLD knee flex str at wall    Knee ext w/ 7 5# x4'    0# x4'     Neuro Re-Ed         Step downs 6" step down 10" x15 x15 6" 6" x15  8" x5 6" x15 6" step down x5 6" step down 3x5   Step ups 6" x10 x10 6" x15   6" 3x5   Mini squats         Knee flexion/ext contract-relax    5' 5'    TG squats lvl 15 x15 total, 5" hold at EOR L17 x15 lvl 17 x15   lvl 15 x15 total, 5" holds at EOR   TKE   Supine w/ PT assist x10    Standing x15 Supine w/ PT assist x10    Standing x20     TherAct         Patient education         Stair negotiation         Car transfer                           Gait Training              amb w/ SPC, band at ankle for knee flex x100'                      Modalities         CP           Precautions: s/p R TKR performed 11/30/21  Past Medical History:   Diagnosis Date    Arthritis     Carpal tunnel syndrome     Corneal abrasion     last assessed - [id number]    Diabetes mellitus ([address] )     Gout     Hiatal hernia     Hyperlipidemia     Hypertension     Impaired fasting glucose     last assessed - [id number]    Kidney stone     last stone 2009    Lumbar spondylosis 10/31/2021    Lumbar spondylosis     PONV (postoperative nausea and vomiting)     Psoriasis     RA (rheumatoid arthritis) (HCC)     Sinus infection     currently under tx PO ABT etc as of 04/17/19    Viremia     Resolved - [id number]

## 2022-01-07 ENCOUNTER — OFFICE VISIT (OUTPATIENT)
Dept: PHYSICAL THERAPY | Facility: CLINIC | Age: 62
End: 2022-01-07
Payer: COMMERCIAL

## 2022-01-07 DIAGNOSIS — M25.561 CHRONIC PAIN OF RIGHT KNEE: ICD-10-CM

## 2022-01-07 DIAGNOSIS — G89.29 CHRONIC PAIN OF RIGHT KNEE: ICD-10-CM

## 2022-01-07 DIAGNOSIS — M17.11 PRIMARY OSTEOARTHRITIS OF RIGHT KNEE: Primary | ICD-10-CM

## 2022-01-07 PROCEDURE — 97112 NEUROMUSCULAR REEDUCATION: CPT

## 2022-01-07 PROCEDURE — 97140 MANUAL THERAPY 1/> REGIONS: CPT

## 2022-01-07 PROCEDURE — 97110 THERAPEUTIC EXERCISES: CPT

## 2022-01-07 NOTE — PROGRESS NOTES
Daily Note     Today's date: 2022  Patient name: Darrick Maria  : 1960  MRN: [id number]  Referring provider: Saeed Bee DO  Dx:   Encounter Diagnosis     ICD-10-CM    1  Primary osteoarthritis of right knee  M17 11    2  Chronic pain of right knee  M25 561     G89 29        Start Time: 0800  Stop Time: 0920  Total time in clinic (min): 80 minutes    Subjective: Patient reports improvement in knee pain since previous session  Patient states he has had long standing edema about anterior aspect of right knee following previous surgery in   Objective: See treatment diary below      Assessment: Tolerated treatment well  Patient with improvement in anterior knee pain and ease of motion following effleurage around patella  Patient with impairment in motor control during ambulation, demonstrated by improvement in knee clicking with addition of medially directed resistance to facilitate glute activation  Initiated clamshells and updated HEP accordingly, pt verbalized good understanding  Pt may benefit from referral to lymphedema due to persistent swelling about anterior knee  Pt will continue to benefit from skilled PT to improve functional mobility and activity tolerance  Plan: Continue per plan of care        Date 12/28 12/30/21 1/3/22 1/5/22 1/7/22    Visit Number 13 14 15 16 17    ROM         Knee Flexion AAROM 105 AAROM 100 100 102 103    Knee Extension -8 -8 -7 -8 -6             Manual         Patellar mobs KT tape for inf patellar glide x30  KT tape for inf patellar glide KT tape for inf patellar glide 3'     STM IASTM distal quad 5' IASTM distal quad 5' IASTM 5' IASTM distal quad 8' IASTM distal quad, hamstring x10'    Effleurage ant knee x5'    R knee LAD w/ belt    Intermittent w/ AP/PA gr III-IV glides              TherEx         Knee/Hip PROM 5' 5' 5'  H/s contract relax      Active w/u Bike 10' 10' 10' 10' 10'    Quad set, glute set         Ankle pumps         SLR         Hip abduction         Knee ext: SAQ, LAQ         Heel slides 10" x10 15x 2x10 2x10 2x10    Knee flex str at step 15" x10 10x  Flexion str 10" x10    H/S str 10" x10 Flexion str 10" x10     H/S str 10" x10    LLLD knee flex str at wall    Knee ext w/ 7 5# x4'    0# x4'     Leg press     65# 2x8  75# x8                      Neuro Re-Ed         Step downs 6" step down 10" x15 x15 6" 6" x15  8" x5 6" x15 6" x15    Step ups 6" x10 x10 6" x15  6" x15    Mini squats         Knee flexion/ext contract-relax    5'     TG squats lvl 15 x15 total, 5" hold at EOR L17 x15 lvl 17 x15  lvl 17 w/ EOR flex/ext 5" holds x15    TKE   Supine w/ PT assist x10    Standing x15 Supine w/ PT assist x10    Standing x20 Supine w/ PT assist x10    Clamshells      x15                  amb w/ abd facilitation 200'    TherAct         Patient education         Stair negotiation         Car transfer                           Gait Training                                    Modalities         CP           Precautions: s/p R TKR performed 11/30/21  Past Medical History:   Diagnosis Date    Arthritis     Carpal tunnel syndrome     Corneal abrasion     last assessed - [id number]    Diabetes mellitus ([address] )     Gout     Hiatal hernia     Hyperlipidemia     Hypertension     Impaired fasting glucose     last assessed - [id number]    Kidney stone     last stone 2009    Lumbar spondylosis 10/31/2021    Lumbar spondylosis     PONV (postoperative nausea and vomiting)     Psoriasis     RA (rheumatoid arthritis) (HCC)     Sinus infection     currently under tx PO ABT etc as of 04/17/19    Viremia     Resolved - [id number]

## 2022-01-10 ENCOUNTER — OFFICE VISIT (OUTPATIENT)
Dept: PHYSICAL THERAPY | Facility: CLINIC | Age: 62
End: 2022-01-10
Payer: COMMERCIAL

## 2022-01-10 DIAGNOSIS — G89.29 CHRONIC PAIN OF RIGHT KNEE: ICD-10-CM

## 2022-01-10 DIAGNOSIS — M17.11 PRIMARY OSTEOARTHRITIS OF RIGHT KNEE: Primary | ICD-10-CM

## 2022-01-10 DIAGNOSIS — M25.561 CHRONIC PAIN OF RIGHT KNEE: ICD-10-CM

## 2022-01-10 PROBLEM — M15.4 EROSIVE OSTEOARTHRITIS: Status: ACTIVE | Noted: 2022-01-10

## 2022-01-10 PROCEDURE — 97110 THERAPEUTIC EXERCISES: CPT | Performed by: PHYSICAL THERAPIST

## 2022-01-10 PROCEDURE — 3066F NEPHROPATHY DOC TX: CPT | Performed by: INTERNAL MEDICINE

## 2022-01-10 PROCEDURE — 97112 NEUROMUSCULAR REEDUCATION: CPT | Performed by: PHYSICAL THERAPIST

## 2022-01-10 PROCEDURE — 97140 MANUAL THERAPY 1/> REGIONS: CPT | Performed by: PHYSICAL THERAPIST

## 2022-01-10 NOTE — PROGRESS NOTES
Daily Note     Today's date: 1/10/2022  Patient name: Milan Givens  : 1960  MRN: [id number]  Referring provider: Isaías Boateng DO  Dx:   Encounter Diagnosis     ICD-10-CM    1  Primary osteoarthritis of right knee  M17 11    2  Chronic pain of right knee  M25 561     G89 29        Start Time: 0800  Stop Time: 0910  Total time in clinic (min): 70 minutes    Subjective: Patient reports "stiff" upon arrival to therapy today  Patient reports minimal soreness following previous therapy session  Objective: See treatment diary below      Assessment: Tolerated treatment well  Patient demonstrating improvements in swelling following MLD performed and also demonstrating improvements in knee ROM following all manuals performed  PT cutting Tubigrip size 4 for patient's lower leg today to aid with swelling, instructing patient to wear during the day and remove at night  Patient reporting "feels good" with tubigrip on, verbalizing understanding with wearing Tubigrip  Pt may benefit from referral to lymphedema due to persistent swelling about anterior knee  Pt will continue to benefit from skilled PT to improve functional mobility and activity tolerance  Plan: Continue per plan of care        Date 12/28 12/30/21 1/3/22 1/5/22 1/7/22 1/10/20   Visit Number 13 14 15 16 17 18   ROM         Knee Flexion AAROM 105 AAROM 100 100 102 103    Knee Extension -8 -8 -7 -8 -6             Manual         Patellar mobs KT tape for inf patellar glide x30  KT tape for inf patellar glide KT tape for inf patellar glide 3'     STM IASTM distal quad 5' IASTM distal quad 5' IASTM 5' IASTM distal quad 8' IASTM distal quad, hamstring x10'    Effleurage ant knee x5' IASTM distal quad, hamstring x10'    Effleurage ant knee x10'   MLD performed by LX PT, CLT   R knee LAD w/ belt    Intermittent w/ AP/PA gr III-IV glides              TherEx         Knee/Hip PROM 5' 5' 5'  H/s contract relax      Active w/u Bike 10' 10' 10' 10' 10' 10' Quad set, glute set         Ankle pumps         SLR         Hip abduction         Knee ext: SAQ, LAQ         Heel slides 10" x10 15x 2x10 2x10 2x10 2x10   Knee flex str at step 15" x10 10x  Flexion str 10" x10    H/S str 10" x10 Flexion str 10" x10     H/S str 10" x10 Flexion str 10" x10    H/s str 10" x10   LLLD knee flex str at wall    Knee ext w/ 7 5# x4'    0# x4'     Leg press     65# 2x8  75# x8 75# 2x10  95# 2x10                     Neuro Re-Ed         Step downs 6" step down 10" x15 x15 6" 6" x15  8" x5 6" x15 6" x15 6" x20   Step ups 6" x10 x10 6" x15  6" x15 6" x20   Mini squats         Knee flexion/ext contract-relax    5'     TG squats lvl 15 x15 total, 5" hold at EOR L17 x15 lvl 17 x15  lvl 17 w/ EOR flex/ext 5" holds x15 lvl 17 w/ EOR flex/ext 5" holds x15   TKE   Supine w/ PT assist x10    Standing x15 Supine w/ PT assist x10    Standing x20 Supine w/ PT assist x10 Supine w/ PT assist x10   Clamshells      x15 2x10                 amb w/ abd facilitation 200'    TherAct         Patient education         Stair negotiation         Car transfer                           Gait Training                                    Modalities         CP           Precautions: s/p R TKR performed 11/30/21  Past Medical History:   Diagnosis Date    Arthritis     Carpal tunnel syndrome     Corneal abrasion     last assessed - [id number]    Diabetes mellitus ([address] )     Gout     Hiatal hernia     Hyperlipidemia     Hypertension     Impaired fasting glucose     last assessed - [id number]    Kidney stone     last stone 2009    Lumbar spondylosis 10/31/2021    Lumbar spondylosis     PONV (postoperative nausea and vomiting)     Psoriasis     RA (rheumatoid arthritis) (HCC)     Sinus infection     currently under tx PO ABT etc as of 04/17/19    Viremia     Resolved - [id number]

## 2022-01-12 ENCOUNTER — OFFICE VISIT (OUTPATIENT)
Dept: NEPHROLOGY | Facility: CLINIC | Age: 62
End: 2022-01-12
Payer: COMMERCIAL

## 2022-01-12 ENCOUNTER — APPOINTMENT (OUTPATIENT)
Dept: PHYSICAL THERAPY | Facility: CLINIC | Age: 62
End: 2022-01-12
Payer: COMMERCIAL

## 2022-01-12 VITALS
DIASTOLIC BLOOD PRESSURE: 78 MMHG | WEIGHT: 259 LBS | HEIGHT: 70 IN | BODY MASS INDEX: 37.08 KG/M2 | SYSTOLIC BLOOD PRESSURE: 140 MMHG

## 2022-01-12 DIAGNOSIS — E11.22 TYPE 2 DIABETES MELLITUS WITH STAGE 2 CHRONIC KIDNEY DISEASE AND HYPERTENSION (HCC): Primary | ICD-10-CM

## 2022-01-12 DIAGNOSIS — E11.49 DIABETES MELLITUS TYPE 2 WITH NEUROLOGICAL MANIFESTATIONS (HCC): ICD-10-CM

## 2022-01-12 DIAGNOSIS — I12.9 TYPE 2 DIABETES MELLITUS WITH STAGE 2 CHRONIC KIDNEY DISEASE AND HYPERTENSION (HCC): Primary | ICD-10-CM

## 2022-01-12 DIAGNOSIS — N18.2 TYPE 2 DIABETES MELLITUS WITH STAGE 2 CHRONIC KIDNEY DISEASE AND HYPERTENSION (HCC): Primary | ICD-10-CM

## 2022-01-12 PROCEDURE — 99213 OFFICE O/P EST LOW 20 MIN: CPT | Performed by: INTERNAL MEDICINE

## 2022-01-12 RX ORDER — PEN NEEDLE, DIABETIC 31 GX5/16"
NEEDLE, DISPOSABLE MISCELLANEOUS
Qty: 200 EACH | Refills: 1 | Status: SHIPPED | OUTPATIENT
Start: 2022-01-12

## 2022-01-12 NOTE — PATIENT INSTRUCTIONS
1  )  Low 2 g sodium diet    2 )  Monitor weights at home    3 )  Avoid NSAIDs (ibuprofen, Motrin, Advil, Aleve, naproxen)    4 )  Monitor blood pressure at home, call if blood pressure greater than 150/90 persistently    5 ) I will plan to discuss all results including blood work, and/or imaging at our next visit, unless there is an urgent indication, in which case I will call you earlier  If you have any questions or concerns about your results, please feel free to call our office

## 2022-01-12 NOTE — PROGRESS NOTES
NEPHROLOGY OFFICE VISIT   Rohit Davis 59 y o  male MRN: [id number]  1/12/2022    Reason for Visit:  Chronic kidney disease    History of Present Illness (HPI):    I had the pleasure of seeing Cristel Garcia today in the renal clinic for the continued management of chronic kidney disease  Since our last visit, he underwent right knee surgery on November 30, his postoperative creatinine was stable at 1 2 mg/dL  His blood pressure been stable  His pain has improved nicely  Still has some swelling the right knee  Currently undergoing physical therapy and has an appointment with orthopedic tomorrow  Is back on his blood pressure medications including his angiotensin receptor blocker and diuretic  Otherwise he has no complaints    ASSESSMENT and PLAN:    Chronic Kidney Disease Stage II  --Imaging:  Check renal ultrasound  --Urinalysis:  From July 2021  No blood positive protein  --Proteinuria:  Albumin/creatinine ratio 1 1 g, which is an improvement from February 2021  --Baseline creatinine: Low 1's  --Etiology:  Presumed be secondary to diabetic kidney disease, hypertension and obesity related renal dysfunction  --Biopsy Proven:  No  --Status:  Stable and at baseline  --Management:  continue current management  --Reducing Cardiovascular Risk Factors:  Simvastatin+ Ezetimibe reduced atherosclerotic events in CKD (SHARP trial); Low dose aspirin safe (if no contraindications exist); smoking is an independent risk factor for Chronic Kidney Disease and progression, strongly recommend smoking cessation     Diabetes mellitus type 2  -hemoglobin A1c: 5 7 (A1C values may be falsely elevated or decreased in those with CKD, assay method should be certified by [employer])  Target A1C between 7-8 5 (will need to be individualized for each patient), and would utilize A1C  in conjunction with continuous glucose monitoring (CGM)    It should also be noted that the A1c with more advanced kidney disease would be inaccurate due to decreased red blood cell life along with anemia   -current medications:  Metformin, glipizide, Basaglar  -proteinuria:  Yes  -retinopathy:  Not reported  -neuropathy:  Yes  -following with endocrinology Dr Sophie Gupta  -please follow with an ophthalmologist and podiatrist every year  -continued self monitoring of blood glucose at home  -Management in CKD Recommendations:   · Metformin:  Avoid if creatinine clearance is less than 30 cc/min (concern for lactic acidosis)  · Sodium-Glucose Cotransporter-2 (SGLT2) Inhibitors: May see an acute drop in the eGFR initially when starting the medication but then a stabilization of the renal function, with slower loss of renal function as compared to placebo  Relative risk of end-stage renal disease, doubling of serum creatinine or death from renal causes were also found to be lower as compared to placebo  ([facility])  DAPA-CKD study, showed that patients with chronic kidney disease regardless of the presence or absence of diabetes the risk of composite of a sustained decline in the estimated GFR of at least 50%, end-stage renal disease or death from renal or cardiovascular causes was significantly lower with Dapagliflozin than with placebo  EMPEROR-Reduced study also showed beneficial effects  If no contraindications exist I would recommend this medication added to the diabetic regiment, if further optimal diabetic control is required  If eGFR < 60 cc/min (avoid ertugliflozin);  If eGFR < 45 cc/min (caution with or avoid dapagliflozin, emphagliflozin), if eGFR  < 30 cc/min (caution or avoid all including canagliflozin, more for efficacy)  · Sulfonylureas:  Preferred short-acting (glipizide, glimepiride, repaglinide), relatively safe in patients with non dialysis CKD  · Thiazolidinedones/Alpha-Gluosidase inhibitors/Dipeptidyl peptidase-4 inhibitors:  Generally not considered first-line agents in CKD (limited data in long-term safety and efficacy)  · Insulin:  Starting dose may need to be lower than what would ordinarily be used, as there is a decrease metabolism of insulin (no dose adjustment if the GFR > 50 mL/min, dose should be reduced to 75% of baseline if GFR 10-50 mL/min, and 50% baseline if GFR < 10 mL/min)     Hypertension  -- Stage II (American College of Cardiology/American Heart Association)  -- with underlying chronic kidney disease and obesity  -- Body mass index is 35 44 kg/m²  -- Volume status: Euvolemic  -- Etiology:  Essential hypertension and obesity  -- Secondary Work-Up:  No  -- Target Goal: < 130/80 (ACC/AHA, CKD with proteinuria, CKD in diabetics) ; if tolerated can target SBP < 120 mmHg in high cardiovascular risk ( Age > 75, CKD, CVD, No Diabetics SPRINT)  -- Lifestyle Modifications: DASH Diet, Weight Loss for ideal body weight, 45 mins of cardiovascular exercise 3 times a week as tolerated, and if no contraindications exist, smoking cessation, limit alcohol use, avoid NSAIDS, monitor blood pressure at home  -- Status:  Slightly above target but has been controlled at home  -- Current antihypertensive regiment:  Losartan 100 mg daily, hydrochlorothiazide 25 mg daily, amlodipine 5 mg  -- Changes:  Continue current regiment       Proteinuria  -- presumed to be secondary to diabetic kidney disease and obesity  -- 24 hour urine protein or urine protein creatinine ratio: Albumin/creatinine ratio 1 1  -- Current Blood Pressure: 140/78  -- current anti proteinuric medications:  Losartan  -- Interventions:  Continue current management  -- General Recommendations:  · RAAS Blockade with high dose ARB or ACEI for target blood pressure < 130/80 as tolerated, with spironolactone as a good adjunct for anti proteinuric effect  Management of hypervolemia for blood pressure control as needed    · Avoid combination ACEI + ARB, due to high adverse effects (ONTARGET study)  · Direct Renin Inhibitor + ACEI or ARB has FDA black box warning for patients with diabetes and GFR < 60 cc/min due to risk for non fatal stroke, renal complications, hyperkalemia and hypotension (ALTITUDE study)  · Moderate dietary protein restriction 0 8 gm/kg  · Recommend statin therapy if no contraindications  · Recommend 1, 25 vitamin-D such as Paricalcitol if appropropriate (VITAL study)        Right knee osteoarthritis  --status post right knee surgery on 11/30/2021    PATIENT INSTRUCTIONS:    Patient Instructions   1 )  Low 2 g sodium diet    2 )  Monitor weights at home    3 )  Avoid NSAIDs (ibuprofen, Motrin, Advil, Aleve, naproxen)    4 )  Monitor blood pressure at home, call if blood pressure greater than 150/90 persistently    5 ) I will plan to discuss all results including blood work, and/or imaging at our next visit, unless there is an urgent indication, in which case I will call you earlier  If you have any questions or concerns about your results, please feel free to call our office  Orders Placed This Encounter   Procedures    Comprehensive metabolic panel     This is a patient instruction: Patient fasting for 8 hours or longer recommended  Standing Status:   Future     Standing Expiration Date:   1/12/2023    CBC     Standing Status:   Future     Standing Expiration Date:   1/12/2023    Microalbumin / creatinine urine ratio     Standing Status:   Future     Standing Expiration Date:   1/12/2023       OBJECTIVE:  Current Weight: Weight - Scale: 117 kg (259 lb)  Vitals:    01/12/22 1612 01/12/22 1626   BP:  140/78   Weight: 117 kg (259 lb)    Height: 5' 10" (1 778 m)     Body mass index is 37 16 kg/m²        REVIEW OF SYSTEMS:    Review of Systems    PHYSICAL EXAM:      Physical Exam    Medications:    Current Outpatient Medications:     acetaminophen (TYLENOL) 325 mg tablet, Take 3 tablets (975 mg total) by mouth every 8 (eight) hours, Disp: , Rfl: 0    ALPRAZolam (XANAX) 0 25 mg tablet, Take 1 tablet (0 25 mg total) by mouth daily at bedtime as needed for anxiety, Disp: 14 tablet, Rfl: 0    amLODIPine (NORVASC) 5 mg tablet, TAKE ONE TABLET BY MOUTH EVERY DAY (Patient taking differently: daily at bedtime  ), Disp: 30 tablet, Rfl: 5    aspirin 325 mg tablet, Take 1 tablet (325 mg total) by mouth 2 (two) times a day, Disp: 84 tablet, Rfl: 0    atorvastatin (LIPITOR) 20 mg tablet, TAKE ONE TABLET BY MOUTH EVERY DAY, Disp: 90 tablet, Rfl: 1    ergocalciferol (VITAMIN D2) 50,000 units, Take 1 capsule (50,000 Units total) by mouth once a week, Disp: 12 capsule, Rfl: 0    gabapentin (Neurontin) 100 mg capsule, Take 1 at bedtime nightly for 1 week then increase to 2 at bedtime nightly for 1 week then increase to 3 at bedtime nightly thereafter , Disp: 90 capsule, Rfl: 3    glipiZIDE (GLUCOTROL XL) 10 mg 24 hr tablet, TAKE TWO TABLETS BY MOUTH EVERY DAY AT BEDTIME (Patient taking differently: 10 mg 2 (two) times a day  ), Disp: 180 tablet, Rfl: 1    hydrochlorothiazide (HYDRODIURIL) 25 mg tablet, Take 1 tablet (25 mg total) by mouth daily, Disp: 90 tablet, Rfl: 3    hydrOXYzine pamoate (VISTARIL) 25 mg capsule, TAKE ONE CAPSULE BY MOUTH EVERY DAY AS NEEDED (INSOMNIA), Disp: 30 capsule, Rfl: 3    insulin glargine (Basaglar KwikPen) 100 units/mL injection pen, Inject 65 Units under the skin daily, Disp: 15 mL, Rfl: 1    losartan (COZAAR) 100 MG tablet, TAKE ONE TABLET BY MOUTH EVERY DAY, Disp: 90 tablet, Rfl: 1    metFORMIN (GLUCOPHAGE) 500 mg tablet, Take 2 tablets (1,000 mg total) by mouth 2 (two) times a day with meals, Disp: 360 tablet, Rfl: 3    sulfaSALAzine (AZULFIDINE) 500 mg tablet, Take 1 tablet twice daily for 3 weeks then increase to 1 tablet 3 times daily for 3 weeks then increase to 2 tablets twice daily thereafter and stay on that   (Patient taking differently: Take 1 tablet twice daily for 3 weeks then increase to 1 tablet 3 times daily for 3 weeks then increase to 2 tablets twice daily thereafter and stay on that note- pt not to start until 3-4- weeks post op per Dr Martinez note ), Disp: 120 tablet, Rfl: 2    B-D ULTRAFINE III SHORT PEN 31G X 8 MM MISC, USE AS DIRECTED, Disp: 200 each, Rfl: 1    Blood Glucose Monitoring Suppl (OneTouch Verio) w/Device KIT, Test blood sugar twice a day, Disp: 1 kit, Rfl: 0    Continuous Blood Gluc Sensor (FreeStyle Ulysses 14 Day Sensor) MISC, Change sensor every 14 days, Disp: 2 each, Rfl: 12    docusate sodium (COLACE) 100 mg capsule, Take 1 capsule (100 mg total) by mouth 2 (two) times a day (Patient not taking: Reported on 12/16/2021 ), Disp: 60 capsule, Rfl: 0    glucose blood (OneTouch Verio) test strip, Test blood sugar twice a day, Disp: 200 each, Rfl: 3    naloxone (NARCAN) 4 mg/0 1 mL nasal spray, Administer 1 spray into a nostril   If no response after 2-3 minutes, give another dose in the other nostril using a new spray , Disp: 1 each, Rfl: 0    OneTouch Delica Lancets 80J MISC, Test blood sugar twice a day, Disp: 200 each, Rfl: 3    oxyCODONE (Roxicodone) 5 immediate release tablet, Take 1-2 tablets by mouth every 4-6 hours as needed for pain (Patient not taking: Reported on 12/16/2021 ), Disp: 60 tablet, Rfl: 0    Laboratory Results:        Invalid input(s): ALBUMIN    Results for orders placed or performed during the hospital encounter of 11/30/21   Tanisha Reza   Result Value Ref Range    Supplier Name [address]     Supplier Phone Number [phone]     Order Status Delivery Successful     Delivery Note      Delivery Request Date 12/01/2021     Date Delivered  12/01/2021     Item Description Tanisha Reza, Adult    [employer]   Result Value Ref Range    Supplier Name [address]     Supplier Phone Number [phone]     Order Status Delivery Successful     Delivery Note      Delivery Request Date 12/01/2021     Date Delivered  12/01/2021     Item Description 3 in 1 [employer]    Basic metabolic panel   Result Value Ref Range    Sodium 142 136 - 145 mmol/L    Potassium 3 8 3 5 - 5 3 mmol/L    Chloride 105 100 - 108 mmol/L    CO2 28 21 - 32 mmol/L    ANION GAP 9 4 - 13 mmol/L    BUN 25 5 - 25 mg/dL    Creatinine 1 25 0 60 - 1 30 mg/dL    Glucose 133 65 - 140 mg/dL    Glucose, Fasting 133 (H) 65 - 99 mg/dL    Calcium 8 4 8 3 - 10 1 mg/dL    eGFR 61 ml/min/1 73sq m   CBC and differential   Result Value Ref Range    WBC 10 21 (H) 4 31 - 10 16 Thousand/uL    RBC 4 35 3 88 - 5 62 Million/uL    Hemoglobin 12 4 12 0 - 17 0 g/dL    Hematocrit 37 3 36 5 - 49 3 %    MCV 86 82 - 98 fL    MCH 28 5 26 8 - 34 3 pg    MCHC 33 2 31 4 - 37 4 g/dL    RDW 13 4 11 6 - 15 1 %    MPV 11 2 8 9 - 12 7 fL    Platelets 783 181 - 133 Thousands/uL    nRBC 0 /100 WBCs    Neutrophils Relative 79 (H) 43 - 75 %    Immat GRANS % 0 0 - 2 %    Lymphocytes Relative 11 (L) 14 - 44 %    Monocytes Relative 10 4 - 12 %    Eosinophils Relative 0 0 - 6 %    Basophils Relative 0 0 - 1 %    Neutrophils Absolute 7 98 (H) 1 85 - 7 62 Thousands/µL    Immature Grans Absolute 0 01 0 00 - 0 20 Thousand/uL    Lymphocytes Absolute 1 14 0 60 - 4 47 Thousands/µL    Monocytes Absolute 1 03 0 17 - 1 22 Thousand/µL    Eosinophils Absolute 0 02 0 00 - 0 61 Thousand/µL    Basophils Absolute 0 03 0 00 - 0 10 Thousands/µL   Fingerstick Glucose (POCT)   Result Value Ref Range    POC Glucose 86 65 - 140 mg/dl   Fingerstick Glucose (POCT)   Result Value Ref Range    POC Glucose 75 65 - 140 mg/dl   Fingerstick Glucose (POCT)   Result Value Ref Range    POC Glucose 245 (H) 65 - 140 mg/dl   Fingerstick Glucose (POCT)   Result Value Ref Range    POC Glucose 220 (H) 65 - 140 mg/dl   Fingerstick Glucose (POCT)   Result Value Ref Range    POC Glucose 155 (H) 65 - 140 mg/dl   Fingerstick Glucose (POCT)   Result Value Ref Range    POC Glucose 150 (H) 65 - 140 mg/dl   Fingerstick Glucose (POCT)   Result Value Ref Range    POC Glucose 177 (H) 65 - 140 mg/dl   Fingerstick Glucose (POCT)   Result Value Ref Range    POC Glucose 169 (H) 65 - 140 mg/dl   Fingerstick Glucose (POCT)   Result Value Ref Range    POC Glucose 215 (H) 65 - 140 mg/dl   Fingerstick Glucose (POCT)   Result Value Ref Range    POC Glucose 224 (H) 65 - 140 mg/dl

## 2022-01-13 ENCOUNTER — OFFICE VISIT (OUTPATIENT)
Dept: OBGYN CLINIC | Facility: CLINIC | Age: 62
End: 2022-01-13

## 2022-01-13 VITALS
HEIGHT: 70 IN | BODY MASS INDEX: 36.39 KG/M2 | SYSTOLIC BLOOD PRESSURE: 130 MMHG | WEIGHT: 254.2 LBS | DIASTOLIC BLOOD PRESSURE: 80 MMHG | HEART RATE: 60 BPM

## 2022-01-13 DIAGNOSIS — Z96.651 STATUS POST TOTAL RIGHT KNEE REPLACEMENT: Primary | ICD-10-CM

## 2022-01-13 DIAGNOSIS — Z47.1 AFTERCARE FOLLOWING RIGHT KNEE JOINT REPLACEMENT SURGERY: ICD-10-CM

## 2022-01-13 DIAGNOSIS — Z96.651 AFTERCARE FOLLOWING RIGHT KNEE JOINT REPLACEMENT SURGERY: ICD-10-CM

## 2022-01-13 PROCEDURE — 99024 POSTOP FOLLOW-UP VISIT: CPT | Performed by: ORTHOPAEDIC SURGERY

## 2022-01-13 NOTE — PROGRESS NOTES
Assessment/Plan:  1  Status post total right knee replacement     2  Aftercare following right knee joint replacement surgery       Scribe Attestation    I,:  Shimon Gill am acting as a scribe while in the presence of the attending physician :       I,:  Belinda Ybarra, DO personally performed the services described in this documentation    as scribed in my presence :         Lilia Vargas is a pleasant 25-year-old male who returns today for follow-up evaluation 6 weeks status post robotic assisted right total knee arthroplasty  I am very pleased with his clinical presentation today in the office  His range of motion is improved and I explained that we do not have to consider manipulation  He should continue with his efforts at physical therapy  He no longer requires DVT prophylaxis  He may return to driving after clearance by his physical therapist   We also discussed a return to his employment duties and I explained that we can consider clearing him for work at his 3 month visit  All of his questions and concerns were addressed today  We will see him back in 6 weeks for his three-month postoperative appointment with x-rays on arrival     Subjective: Follow-up evaluation 6 weeks status post robotic assisted right total knee arthroplasty    Patient ID: Kelsey Bassett is a 59 y o  male who returns today for follow-up evaluation 6 weeks status post robotic assisted right total knee arthroplasty  He reports that he has been doing well  He does continue to experience some stiffness about the knee but reports that his range of motion is improved and he continues to work on this at physical therapy  He does complain of discomfort in his knee at night when attempting to sleep in his bed but reports that this is not painful  He has been sleeping in a recliner  He denies any significant pain about his knee at rest or with activity  He is curious about returning to driving and returning to work    He denies any new injury or trauma  Review of Systems   Constitutional: Negative for activity change, chills, fever and unexpected weight change  HENT: Negative for hearing loss, nosebleeds and sore throat  Eyes: Negative for pain, redness and visual disturbance  Respiratory: Negative for cough, shortness of breath and wheezing  Cardiovascular: Negative for chest pain, palpitations and leg swelling  Gastrointestinal: Negative for abdominal pain, nausea and vomiting  Endocrine: Negative for polyphagia and polyuria  Genitourinary: Negative for dysuria and hematuria  Musculoskeletal: Negative for arthralgias, joint swelling and myalgias  See HPI   Skin: Negative for rash and wound  Neurological: Negative for dizziness, numbness and headaches  Psychiatric/Behavioral: Negative for decreased concentration and suicidal ideas  The patient is not nervous/anxious  Past Medical History:   Diagnosis Date    Arthritis     Carpal tunnel syndrome     Chronic kidney disease     Stage 2 CKD    Corneal abrasion     last assessed - [id number]    [facility] ([email or web address]     Gout     Hiatal hernia     Hyperlipidemia     Hypertension     Impaired fasting glucose     last assessed - [id number]    Kidney stone     last stone 2009    Lumbar spondylosis 10/31/2021    Lumbar spondylosis     PONV (postoperative nausea and vomiting)     Psoriasis     RA (rheumatoid arthritis) ([email or web address]     Sinus infection     currently under tx PO ABT etc as of 04/17/19    Viremia     Resolved - [id number]       Past Surgical History:   Procedure Laterality Date    ANKLE SURGERY Right     tendon arthrotomy    ARTHROTOMY ANKLE      BACK SURGERY  2014    lumbar laminectomy    COLONOSCOPY      COLONOSCOPY N/A 4/19/2019    Procedure: COLONOSCOPY;  Surgeon: Aslhey Webb MD;  Location: [facility];   Service: Gastroenterology    FINGER SURGERY Bilateral     every finger contracture release over a 10 year period trigger finger    INCISION TENDON SHEATH      of a finger    KNEE SURGERY Right     x2 arthrotomy    AR TOTAL KNEE ARTHROPLASTY Right 2021    Procedure: ARTHROPLASTY KNEE TOTAL W ROBOT;  Surgeon: Wong Martinez DO;  Location: [address] [place] [address];  Service: Orthopedics    SHOULDER SURGERY Left     x1 staples, tendon repair secondary to many spontaneous dislocations       Family History   Problem Relation Age of Onset    Hypertension Mother    [employer] Arthritis Mother     Hyperlipidemia Mother    [employer] Lung cancer Mother     Stroke Maternal Grandfather     Diabetes Cousin     COPD Father     Depression Family     Diabetes Family     No Known Problems Sister     No Known Problems Brother     No Known Problems Maternal Aunt     No Known Problems Maternal Uncle     No Known Problems Paternal Aunt     No Known Problems Paternal Uncle     No Known Problems Maternal Grandmother     No Known Problems Paternal Grandmother     No Known Problems Paternal Grandfather        Social History     Occupational History    Not on file   Tobacco Use    Smoking status: Former Smoker     Quit date: 2000     Years since quittin 0    Smokeless tobacco: Never Used    Tobacco comment: Former smoker   Vaping Use    Vaping Use: Never used   Substance and Sexual Activity    Alcohol use: Yes     Comment: rare, once a month     Drug use: No    Sexual activity: Not on file         Current Outpatient Medications:     acetaminophen (TYLENOL) 325 mg tablet, Take 3 tablets (975 mg total) by mouth every 8 (eight) hours, Disp: , Rfl: 0    ALPRAZolam (XANAX) 0 25 mg tablet, Take 1 tablet (0 25 mg total) by mouth daily at bedtime as needed for anxiety, Disp: 14 tablet, Rfl: 0    amLODIPine (NORVASC) 5 mg tablet, TAKE ONE TABLET BY MOUTH EVERY DAY (Patient taking differently: daily at bedtime  ), Disp: 30 tablet, Rfl: 5    aspirin 325 mg tablet, Take 1 tablet (325 mg total) by mouth 2 (two) times a day (Patient not taking: Reported on 1/13/2022 ), Disp: 84 tablet, Rfl: 0    atorvastatin (LIPITOR) 20 mg tablet, TAKE ONE TABLET BY MOUTH EVERY DAY, Disp: 90 tablet, Rfl: 1    B-D ULTRAFINE III SHORT PEN 31G X 8 MM MISC, USE AS DIRECTED, Disp: 200 each, Rfl: 1    Blood Glucose Monitoring Suppl (OneTouch Verio) w/Device KIT, Test blood sugar twice a day, Disp: 1 kit, Rfl: 0    Continuous Blood Gluc Sensor (FreeStyle Ulysses 14 Day Sensor) MISC, Change sensor every 14 days, Disp: 2 each, Rfl: 12    docusate sodium (COLACE) 100 mg capsule, Take 1 capsule (100 mg total) by mouth 2 (two) times a day (Patient not taking: Reported on 12/16/2021 ), Disp: 60 capsule, Rfl: 0    ergocalciferol (VITAMIN D2) 50,000 units, Take 1 capsule (50,000 Units total) by mouth once a week, Disp: 12 capsule, Rfl: 0    gabapentin (Neurontin) 100 mg capsule, Take 1 at bedtime nightly for 1 week then increase to 2 at bedtime nightly for 1 week then increase to 3 at bedtime nightly thereafter , Disp: 90 capsule, Rfl: 3    glipiZIDE (GLUCOTROL XL) 10 mg 24 hr tablet, TAKE TWO TABLETS BY MOUTH EVERY DAY AT BEDTIME (Patient taking differently: 10 mg 2 (two) times a day  ), Disp: 180 tablet, Rfl: 1    glucose blood (OneTouch Verio) test strip, Test blood sugar twice a day, Disp: 200 each, Rfl: 3    hydrochlorothiazide (HYDRODIURIL) 25 mg tablet, Take 1 tablet (25 mg total) by mouth daily, Disp: 90 tablet, Rfl: 3    hydrOXYzine pamoate (VISTARIL) 25 mg capsule, TAKE ONE CAPSULE BY MOUTH EVERY DAY AS NEEDED (INSOMNIA), Disp: 30 capsule, Rfl: 3    insulin glargine (Basaglar KwikPen) 100 units/mL injection pen, Inject 65 Units under the skin daily, Disp: 15 mL, Rfl: 1    losartan (COZAAR) 100 MG tablet, TAKE ONE TABLET BY MOUTH EVERY DAY, Disp: 90 tablet, Rfl: 1    metFORMIN (GLUCOPHAGE) 500 mg tablet, Take 2 tablets (1,000 mg total) by mouth 2 (two) times a day with meals, Disp: 360 tablet, Rfl: 3    naloxone (NARCAN) 4 mg/0 1 mL nasal spray, Administer 1 spray into a nostril  If no response after 2-3 minutes, give another dose in the other nostril using a new spray , Disp: 1 each, Rfl: 0    OneTouch Delica Lancets 24M MISC, Test blood sugar twice a day, Disp: 200 each, Rfl: 3    oxyCODONE (Roxicodone) 5 immediate release tablet, Take 1-2 tablets by mouth every 4-6 hours as needed for pain (Patient not taking: Reported on 12/16/2021 ), Disp: 60 tablet, Rfl: 0    sulfaSALAzine (AZULFIDINE) 500 mg tablet, Take 1 tablet twice daily for 3 weeks then increase to 1 tablet 3 times daily for 3 weeks then increase to 2 tablets twice daily thereafter and stay on that  (Patient taking differently: Take 1 tablet twice daily for 3 weeks then increase to 1 tablet 3 times daily for 3 weeks then increase to 2 tablets twice daily thereafter and stay on that note- pt not to start until 3-4- weeks post op per Dr Martinez note ), Disp: 120 tablet, Rfl: 2    Allergies   Allergen Reactions    Latex Rash     At dentist, lips swollen     Codeine Nausea Only     Reaction Date: [id number]; Objective:  Vitals:    01/13/22 1058   BP: 130/80   Pulse: 60       Body mass index is 36 47 kg/m²  Right Knee Exam     Muscle Strength   The patient has normal right knee strength  Range of Motion   Right knee extension: 5  Right knee flexion: 100  Tests   Varus: negative Valgus: negative  Drawer:  Anterior - negative      Patellar apprehension: negative    Other   Erythema: absent  Scars: present (well healed anterior operative scar)  Sensation: normal  Pulse: present  Swelling: none  Effusion: no effusion present    Comments:  Stable at 5, 30 and 90 degrees  Neurovascularly in tact distally  No warmth or erythema  Patella tracks flat and midline  Minimal steppage gait            Observations     Right Knee   Negative for effusion  Physical Exam  Vitals and nursing note reviewed  Constitutional:       Appearance: He is well-developed  HENT:      Head: Normocephalic and atraumatic  Eyes:      General: No scleral icterus  Conjunctiva/sclera: Conjunctivae normal    Cardiovascular:      Rate and Rhythm: Normal rate  Pulmonary:      Effort: Pulmonary effort is normal  No respiratory distress  Musculoskeletal:      Cervical back: Normal range of motion and neck supple  Right knee: No effusion  Comments: As noted in HPI   Skin:     General: Skin is warm and dry  Neurological:      Mental Status: He is alert and oriented to person, place, and time     Psychiatric:         Behavior: Behavior normal

## 2022-01-14 ENCOUNTER — OFFICE VISIT (OUTPATIENT)
Dept: PHYSICAL THERAPY | Facility: CLINIC | Age: 62
End: 2022-01-14
Payer: COMMERCIAL

## 2022-01-14 DIAGNOSIS — M25.561 CHRONIC PAIN OF RIGHT KNEE: ICD-10-CM

## 2022-01-14 DIAGNOSIS — G89.29 CHRONIC PAIN OF RIGHT KNEE: ICD-10-CM

## 2022-01-14 DIAGNOSIS — M17.11 PRIMARY OSTEOARTHRITIS OF RIGHT KNEE: Primary | ICD-10-CM

## 2022-01-14 PROCEDURE — 97112 NEUROMUSCULAR REEDUCATION: CPT

## 2022-01-14 PROCEDURE — 97140 MANUAL THERAPY 1/> REGIONS: CPT

## 2022-01-14 PROCEDURE — 97110 THERAPEUTIC EXERCISES: CPT

## 2022-01-14 NOTE — PROGRESS NOTES
Daily Note     Today's date: 2022  Patient name: Rocio Ruiz  : 1960  MRN: [id number]  Referring provider: Consuelo Mitchell DO  Dx:   Encounter Diagnosis     ICD-10-CM    1  Primary osteoarthritis of right knee  M17 11    2  Chronic pain of right knee  M25 561     G89 29        Start Time: 0800  Stop Time: 0850  Total time in clinic (min): 50 minutes    Subjective: Patient saw surgeon a few days ago, who stated he is pleased with his progress and gains in ROM  Patient states he is ~85% improved since pre-op status  He states he would be "very confident" in starting to drive  Objective: See treatment diary below      Assessment: Tolerated treatment well  Patient with moderate quad lag during SLR that improved w/ vc  Continued to emphasize end range stretching with both flexion and extension; pt demo improving tolerance  Stair negotiation emphasized quad control, particularly with descending  Pt will continue to benefit from skilled PT to improve functional mobility and activity tolerance  Plan: Continue per plan of care        Date 1/14 12/30/21 1/3/22 1/5/22 1/7/22 1/10/22   Visit Number 19 14 15 16 17 18   ROM         Knee Flexion 105 AAROM 100 100 102 103    Knee Extension -6 -8 -7 -8 -6             Manual         Patellar mobs   KT tape for inf patellar glide KT tape for inf patellar glide 3'     STM Effleurage ant knee x10 IASTM distal quad 5' IASTM 5' IASTM distal quad 8' IASTM distal quad, hamstring x10'    Effleurage ant knee x5' IASTM distal quad, hamstring x10'    Effleurage ant knee x10'   MLD performed by LX PT, CLT   R knee LAD w/ belt    Intermittent w/ AP/PA gr III-IV glides              TherEx         Knee/Hip PROM 5' 5' 5'  H/s contract relax      Active w/u Bike 10' 10' 10' 10' 10' 10'    Quad set, glute set         Ankle pumps         SLR 3x5        Hip abduction         Knee ext: SAQ, LAQ         Heel slides 10" x10 15x 2x10 2x10 2x10 2x10   Knee flex str at step 15" x10 10x  Flexion str 10" x10    H/S str 10" x10 Flexion str 10" x10     H/S str 10" x10 Flexion str 10" x10    H/s str 10" x10   LLLD knee flex str at wall    Knee ext w/ 7 5# x4'    0# x4'     Leg press # 3x10    65# 2x8  75# x8 75# 2x10  95# 2x10                     Neuro Re-Ed         Step downs 6" step down 10" x15 x15 6" 6" x15  8" x5 6" x15 6" x15 6" x20   Step ups 6" x10 x10 6" x15  6" x15 6" x20   Mini squats         Knee flexion/ext contract-relax    5'     TG squats lvl 20 x15 total, 5" hold at EOR L17 x15 lvl 17 x15  lvl 17 w/ EOR flex/ext 5" holds x15 lvl 17 w/ EOR flex/ext 5" holds x15   TKE Standing x30  Supine w/ PT assist x10    Standing x15 Supine w/ PT assist x10    Standing x20 Supine w/ PT assist x10 Supine w/ PT assist x10   Clamshells  x20 bilateral    x15 2x10                 amb w/ abd facilitation 200'    TherAct         Patient education         Stair negotiation         Car transfer                           Gait Training                                    Modalities         CP           Precautions: s/p R TKR performed 11/30/21  Past Medical History:   Diagnosis Date    Arthritis     Carpal tunnel syndrome     Corneal abrasion     last assessed - [id number]    Diabetes mellitus ([address] )     Gout     Hiatal hernia     Hyperlipidemia     Hypertension     Impaired fasting glucose     last assessed - [id number]    Kidney stone     last stone 2009    Lumbar spondylosis 10/31/2021    Lumbar spondylosis     PONV (postoperative nausea and vomiting)     Psoriasis     RA (rheumatoid arthritis) (HCC)     Sinus infection     currently under tx PO ABT etc as of 04/17/19    Viremia     Resolved - [id number]

## 2022-01-18 DIAGNOSIS — G47.00 INSOMNIA, UNSPECIFIED TYPE: ICD-10-CM

## 2022-01-18 RX ORDER — HYDROXYZINE PAMOATE 25 MG/1
CAPSULE ORAL
Qty: 30 CAPSULE | Refills: 3 | Status: SHIPPED | OUTPATIENT
Start: 2022-01-18 | End: 2022-05-12

## 2022-01-19 ENCOUNTER — OFFICE VISIT (OUTPATIENT)
Dept: PHYSICAL THERAPY | Facility: CLINIC | Age: 62
End: 2022-01-19
Payer: COMMERCIAL

## 2022-01-19 DIAGNOSIS — M17.11 PRIMARY OSTEOARTHRITIS OF RIGHT KNEE: Primary | ICD-10-CM

## 2022-01-19 DIAGNOSIS — G89.29 CHRONIC PAIN OF RIGHT KNEE: ICD-10-CM

## 2022-01-19 DIAGNOSIS — M25.561 CHRONIC PAIN OF RIGHT KNEE: ICD-10-CM

## 2022-01-19 PROCEDURE — 97110 THERAPEUTIC EXERCISES: CPT

## 2022-01-19 PROCEDURE — 97112 NEUROMUSCULAR REEDUCATION: CPT

## 2022-01-19 PROCEDURE — 97140 MANUAL THERAPY 1/> REGIONS: CPT

## 2022-01-19 NOTE — PROGRESS NOTES
Daily Note     Today's date: 2022  Patient name: Carlene Garcia  : 1960  MRN: [id number]  Referring provider: Tashia Guzman DO  Dx:   Encounter Diagnosis     ICD-10-CM    1  Primary osteoarthritis of right knee  M17 11    2  Chronic pain of right knee  M25 561     G89 29        Start Time: 0800  Stop Time: 0915  Total time in clinic (min): 75 minutes    Subjective: Patient reports continued knee pain and function since starting physical therapy  He states his limiting factors at this time include driving and taking his dogs for walks  Objective: See treatment diary below      Assessment: Tolerated treatment well  Discussed return to driving with patient during today's session, including starting with short distances, verbalized good understanding  Initiated additional standing activities, including exaggerated gait, facilitating single leg stance time; pia well and pt able to grade UE support across trials  Provided pt w/ manual contact at lateral aspect of knee during squatting to facilitate glute activation  Pt with improvement in ROM and pain following HVLA to fib head  Pt will continue to benefit from skilled PT to improve functional mobility and activity tolerance  Plan: Continue per plan of care        Date 1/14/22 1/19/22  1/5/22 1/7/22 1/10/22   Visit Number 19 20  16 17 18   ROM         Knee Flexion 105 108  102 103    Knee Extension -6 -7  -8 -6             Manual         Patellar mobs    KT tape for inf patellar glide 3'     STM Effleurage ant knee x10 IASTM distal quad, hamstring x10'  IASTM distal quad 8' IASTM distal quad, hamstring x10'    Effleurage ant knee x5' IASTM distal quad, hamstring x10'    Effleurage ant knee x10'   MLD performed by LX PT, CLT   R knee LAD w/ belt  Tibial AP glide, gr III-IV, 3x30    Proximal fib mob in SL, HVLA x3  Intermittent w/ AP/PA gr III-IV glides              TherEx         Knee/Hip PROM 5'        Active w/u Bike 10' 10'  10' 10' 10' Quad set, glute set         Ankle pumps         SLR 3x5        Hip abduction         Knee ext: SAQ, LAQ         Heel slides 10" x10 10" x15  2x10 2x10 2x10   Knee flex str at step 15" x10 15" x10    H/s str, contract-relax10" x10  Flexion str 10" x10    H/S str 10" x10 Flexion str 10" x10     H/S str 10" x10 Flexion str 10" x10    H/s str 10" x10   LLLD knee flex str at wall    Knee ext w/ 7 5# x4'    0# x4'     Leg press # 3x10 105# 3x10   65# 2x8  75# x8 75# 2x10  95# 2x10                     Neuro Re-Ed         Step downs 6" step down 10" x15 6" x15  6" x15 6" x15 6" x20   Step ups 6" x10 6" x15   6" x15 6" x20   Mini squats         Knee flexion/ext contract-relax    5'     TG squats lvl 20 x15 total, 5" hold at EOR Regular squats, 3x8   lvl 17 w/ EOR flex/ext 5" holds x15 lvl 17 w/ EOR flex/ext 5" holds x15   TKE Standing x30 Standing x30  Supine w/ PT assist x10    Standing x20 Supine w/ PT assist x10 Supine w/ PT assist x10   Clamshells  x20 bilateral x30 ea   x15 2x10              Lateral stepping, 3 laps    exagg gait, 5 laps   amb w/ abd facilitation 200'    TherAct         Patient education         Stair negotiation         Car transfer                           Gait Training                                    Modalities         CP           Precautions: s/p R TKR performed 11/30/21  Past Medical History:   Diagnosis Date    Arthritis     Carpal tunnel syndrome     Corneal abrasion     last assessed - [id number]    Diabetes mellitus ([address] )     Gout     Hiatal hernia     Hyperlipidemia     Hypertension     Impaired fasting glucose     last assessed - [id number]    Kidney stone     last stone 2009    Lumbar spondylosis 10/31/2021    Lumbar spondylosis     PONV (postoperative nausea and vomiting)     Psoriasis     RA (rheumatoid arthritis) (HCC)     Sinus infection     currently under tx PO ABT etc as of 04/17/19    Viremia     Resolved - [id number]

## 2022-01-21 ENCOUNTER — OFFICE VISIT (OUTPATIENT)
Dept: PHYSICAL THERAPY | Facility: CLINIC | Age: 62
End: 2022-01-21
Payer: COMMERCIAL

## 2022-01-21 DIAGNOSIS — M25.561 CHRONIC PAIN OF RIGHT KNEE: ICD-10-CM

## 2022-01-21 DIAGNOSIS — M17.11 PRIMARY OSTEOARTHRITIS OF RIGHT KNEE: Primary | ICD-10-CM

## 2022-01-21 DIAGNOSIS — G89.29 CHRONIC PAIN OF RIGHT KNEE: ICD-10-CM

## 2022-01-21 PROCEDURE — 97110 THERAPEUTIC EXERCISES: CPT

## 2022-01-21 PROCEDURE — 97140 MANUAL THERAPY 1/> REGIONS: CPT

## 2022-01-21 PROCEDURE — 97112 NEUROMUSCULAR REEDUCATION: CPT

## 2022-01-21 NOTE — PROGRESS NOTES
Daily Note     Today's date: 2022  Patient name: Bishop Ni  : 1960  MRN: [id number]  Referring provider: Lila Hendrickson DO  Dx:   Encounter Diagnosis     ICD-10-CM    1  Primary osteoarthritis of right knee  M17 11    2  Chronic pain of right knee  M25 561     G89 29        Start Time: 0800  Stop Time: 0915  Total time in clinic (min): 75 minutes    Subjective: Patient with no knew complaints at the start of today's session  Objective: See treatment diary below      Assessment: Tolerated treatment well  Patient with improvement in gait sequencing and decrease in pain following mobilization to fib head and PA taping  Pt educated on wear schedule, verbalized good understanding  Sled push pia well; pt provided with graded vc to improve TKE at heel strike  Pt will continue to benefit from skilled PT to improve functional mobility and activity tolerance  Plan: Continue per plan of care        Date 1/14/22 1/19/22 1/21/22  1/7/22 1/10/22   Visit Number 19 20 21  17 18   ROM         Knee Flexion 105 108 107  103    Knee Extension -6 -7 -6  -6             Manual         Patellar mobs         STM Effleurage ant knee x10 IASTM distal quad, hamstring x10' IASTM distal quad, hamstring x8'  IASTM distal quad, hamstring x10'    Effleurage ant knee x5' IASTM distal quad, hamstring x10'    Effleurage ant knee x10'   MLD performed by LX PT, CLT   R knee LAD w/ belt  Tibial AP glide, gr III-IV, 3x30    Proximal fib mob in SL, HVLA x3 Tibial AP glide, gr III-IV 3x30    prox fib mob in SL HVLA, x2         Stand PA prox fib MWM, x10    prox fib PA leukotape x3'      TherEx         Knee/Hip PROM 5'        Active w/u Bike 10' 10' 10'  10' 10'    Quad set, glute set         Ankle pumps         SLR 3x5        Hip abduction         Knee ext: SAQ, LAQ         Heel slides 10" x10 10" x15 10" x15  2x10 2x10   Knee flex str at step 15" x10 15" x10    H/s str, contract-relax10" x10   Flexion str 10" x10     H/S str 10" x10 Flexion str 10" x10    H/s str 10" x10   LLLD knee flex str at wall         Leg press # 3x10 105# 3x10 105# 3x10  65# 2x8  75# x8 75# 2x10  95# 2x10                     Neuro Re-Ed         Step downs 6" step down 10" x15 6" x15 6" x15  6" x15 6" x20   Step ups 6" x10 6" x15 6" x15  6" x15 6" x20   Mini squats         Knee flexion/ext contract-relax         TG squats lvl 20 x15 total, 5" hold at EOR Regular squats, 3x8 Squats 3x10, yellow loop at knees  lvl 17 w/ EOR flex/ext 5" holds x15 lvl 17 w/ EOR flex/ext 5" holds x15   TKE Standing x30 Standing x30 7 5# cc x30  Supine w/ PT assist x10 Supine w/ PT assist x10   Clamshells  x20 bilateral x30 ea   x15 2x10              Lateral stepping, 3 laps    exagg gait, 5 laps Lateral stepping, 15' x4 laps  amb w/ abd facilitation 200'    Sled push   +30 lbs  20' x4 laps      Resisted walking   2x100'                        TherAct         Patient education         Stair negotiation         Car transfer                           Gait Training                                    Modalities         CP           Precautions: s/p R TKR performed 11/30/21  Past Medical History:   Diagnosis Date    Arthritis     Carpal tunnel syndrome     Corneal abrasion     last assessed - [id number]    Diabetes mellitus ([address] )     Gout     Hiatal hernia     Hyperlipidemia     Hypertension     Impaired fasting glucose     last assessed - [id number]    Kidney stone     last stone 2009    Lumbar spondylosis 10/31/2021    Lumbar spondylosis     PONV (postoperative nausea and vomiting)     Psoriasis     RA (rheumatoid arthritis) (HCC)     Sinus infection     currently under tx PO ABT etc as of 04/17/19    Viremia     Resolved - [id number]

## 2022-01-24 ENCOUNTER — OFFICE VISIT (OUTPATIENT)
Dept: ENDOCRINOLOGY | Facility: CLINIC | Age: 62
End: 2022-01-24
Payer: COMMERCIAL

## 2022-01-24 ENCOUNTER — OFFICE VISIT (OUTPATIENT)
Dept: PHYSICAL THERAPY | Facility: CLINIC | Age: 62
End: 2022-01-24
Payer: COMMERCIAL

## 2022-01-24 VITALS
HEIGHT: 70 IN | SYSTOLIC BLOOD PRESSURE: 130 MMHG | WEIGHT: 246 LBS | DIASTOLIC BLOOD PRESSURE: 80 MMHG | TEMPERATURE: 97 F | BODY MASS INDEX: 35.22 KG/M2 | HEART RATE: 78 BPM

## 2022-01-24 DIAGNOSIS — E78.2 MIXED HYPERLIPIDEMIA: ICD-10-CM

## 2022-01-24 DIAGNOSIS — Z79.4 TYPE 2 DIABETES MELLITUS WITH DIABETIC NEUROPATHY, WITH LONG-TERM CURRENT USE OF INSULIN (HCC): Primary | ICD-10-CM

## 2022-01-24 DIAGNOSIS — E55.9 VITAMIN D DEFICIENCY: ICD-10-CM

## 2022-01-24 DIAGNOSIS — N18.2 STAGE 2 CHRONIC KIDNEY DISEASE: ICD-10-CM

## 2022-01-24 DIAGNOSIS — I10 BENIGN ESSENTIAL HYPERTENSION: ICD-10-CM

## 2022-01-24 DIAGNOSIS — M17.11 PRIMARY OSTEOARTHRITIS OF RIGHT KNEE: Primary | ICD-10-CM

## 2022-01-24 DIAGNOSIS — E16.2 HYPOGLYCEMIA: ICD-10-CM

## 2022-01-24 DIAGNOSIS — E66.01 CLASS 2 SEVERE OBESITY WITH SERIOUS COMORBIDITY AND BODY MASS INDEX (BMI) OF 35.0 TO 35.9 IN ADULT, UNSPECIFIED OBESITY TYPE (HCC): ICD-10-CM

## 2022-01-24 DIAGNOSIS — E11.40 TYPE 2 DIABETES MELLITUS WITH DIABETIC NEUROPATHY, WITH LONG-TERM CURRENT USE OF INSULIN (HCC): Primary | ICD-10-CM

## 2022-01-24 DIAGNOSIS — G89.29 CHRONIC PAIN OF RIGHT KNEE: ICD-10-CM

## 2022-01-24 DIAGNOSIS — Z79.4 TYPE 2 DIABETES MELLITUS TREATED WITH INSULIN (HCC): ICD-10-CM

## 2022-01-24 DIAGNOSIS — E11.9 TYPE 2 DIABETES MELLITUS TREATED WITH INSULIN (HCC): ICD-10-CM

## 2022-01-24 DIAGNOSIS — M25.561 CHRONIC PAIN OF RIGHT KNEE: ICD-10-CM

## 2022-01-24 PROCEDURE — 97112 NEUROMUSCULAR REEDUCATION: CPT

## 2022-01-24 PROCEDURE — 1036F TOBACCO NON-USER: CPT | Performed by: INTERNAL MEDICINE

## 2022-01-24 PROCEDURE — 97140 MANUAL THERAPY 1/> REGIONS: CPT

## 2022-01-24 PROCEDURE — 97110 THERAPEUTIC EXERCISES: CPT

## 2022-01-24 PROCEDURE — 99215 OFFICE O/P EST HI 40 MIN: CPT | Performed by: INTERNAL MEDICINE

## 2022-01-24 PROCEDURE — 3008F BODY MASS INDEX DOCD: CPT | Performed by: INTERNAL MEDICINE

## 2022-01-24 NOTE — PROGRESS NOTES
Daily Note     Today's date: 2022  Patient name: Giacomo Morocho  : 1960  MRN: [id number]  Referring provider: Barry Bertrand DO  Dx:   Encounter Diagnosis     ICD-10-CM    1  Primary osteoarthritis of right knee  M17 11    2  Chronic pain of right knee  M25 561     G89 29        Start Time: 1000  Stop Time: 1055  Total time in clinic (min): 55 minutes    Subjective: Patient states his knee continues to get sore from time to time  He states that he drove here today; reports significant confidence in ability to transfer from gas to brake  Objective: See treatment diary below      Assessment: Tolerated treatment well  Patient with impairment in motor control, demonstrated by reduction in pain noted with addition of resistance for hip abd activation during squats and fwd lunging  Pt demo improving technique with standing progressions, including lateral stepping and exaggerated gait  Pt will continue to benefit from skilled PT to improve functional mobility and activity tolerance  Plan: Continue per plan of care        Date 1/14/22 1/19/22 1/21/22 1/24/22  1/10/22   Visit Number 19 20 21 22  18   ROM         Knee Flexion 105 108 107 107     Knee Extension -6 -7 -6 -7              Manual         Patellar mobs         STM Effleurage ant knee x10 IASTM distal quad, hamstring x10' IASTM distal quad, hamstring x8' IASTM distal quad, hamstring x8'  IASTM distal quad, hamstring x10'    Effleurage ant knee x10'   MLD performed by LX PT, CLT   R knee LAD w/ belt  Tibial AP glide, gr III-IV, 3x30    Proximal fib mob in SL, HVLA x3 Tibial AP glide, gr III-IV 3x30    prox fib mob in SL HVLA, x2 Tibial AP/PA glide, gr III-IV 3x30          Stand PA prox fib MWM, x10    prox fib PA leukotape x3' Stand PA prox fib MWM x10    prox fib PA leuko x3'     TherEx         Knee/Hip PROM 5'        Active w/u Bike 10' 10' 10' 10'  10'    Quad set, glute set         Ankle pumps         SLR 3x5        Hip abduction Knee ext: SAQ, LAQ         Heel slides 10" x10 10" x15 10" x15 10" x15  2x10   Knee flex str at step 15" x10 15" x10    H/s str, contract-relax10" x10    Flexion str 10" x10    H/s str 10" x10   LLLD knee flex str at wall         Leg press # 3x10 105# 3x10 105# 3x10 Single leg, 45# 2x10  55# x10  75# 2x10  95# 2x10                     Neuro Re-Ed         Step downs 6" step down 10" x15 6" x15 6" x15 6" x15  6" x20   Step ups 6" x10 6" x15 6" x15 6" x15  6" x20   Mini squats         Knee flexion/ext contract-relax         TG squats lvl 20 x15 total, 5" hold at EOR Regular squats, 3x8 Squats 3x10, yellow loop at knees Squats, 3x10 yellow loop at knees  lvl 17 w/ EOR flex/ext 5" holds x15   TKE Standing x30 Standing x30 7 5# cc x30   Supine w/ PT assist x10   Clamshells  x20 bilateral x30 ea    2x10              Lateral stepping, 3 laps    exagg gait, 5 laps Lateral stepping, 15' x4 laps Lateral stepping, 50' x4    exagg gait, 50' x2 rounds     Sled push   +30 lbs  20' x4 laps      Resisted walking   2x100'                        TherAct         Patient education         Stair negotiation         Car transfer                           Gait Training                                    Modalities         CP           Precautions: s/p R TKR performed 11/30/21  Past Medical History:   Diagnosis Date    Arthritis     Carpal tunnel syndrome     Corneal abrasion     last assessed - [id number]    Diabetes mellitus ([address] )     Gout     Hiatal hernia     Hyperlipidemia     Hypertension     Impaired fasting glucose     last assessed - [id number]    Kidney stone     last stone 2009    Lumbar spondylosis 10/31/2021    Lumbar spondylosis     PONV (postoperative nausea and vomiting)     Psoriasis     RA (rheumatoid arthritis) (HCC)     Sinus infection     currently under tx PO ABT etc as of 04/17/19    Viremia     Resolved - [id number]

## 2022-01-24 NOTE — PATIENT INSTRUCTIONS
Decrease Lantus to 50 units subcutaneously in the morning   Continue metformin at current dose   Continue glipizide XL 10 mg orally twice a day however please take with meals -breakfast and bedtime   If you continue to have low blood sugars, please decrease Lantus further to 45 units and let me know   Plan for review of blood sugars in 2-3 weeks  Follow-up with your eye doctor follow-up in 3 months with repeat labs    Please call your insurance and inquire which of the following would be covered  - GLP-1  Agonist-Victoza, Bydureon, Byetta, ozempic, trulicity, ryblesus  - SGLT 2 inhibitors -jardiance, farxiga, Invokana, steglarto

## 2022-01-24 NOTE — PROGRESS NOTES
Vanesa Vail 59 y o  male MRN: [id number]    Encounter: [id number]      Assessment/Plan     1  Type 2 diabetes mellitus long-term insulin therapy  2  Hyperglycemia   3  Obesity   4  CKD  5  neuropathy   Well controlled based on Last A1c 5 7% however concerning the patient is having multiple episodes of hypoglycemia  Discussed different medications that can be used for glycemic management-oral hypoglycemic agents as well as injectable insulin and non-insulin medications (GLP 1 agonist) along with risks, benefits, side effect profile  No h/o pancreatitis/ MEN syndrome/ Medullary thyroid cancer     Recommend the following at this time  Decrease Lantus to 50 units subcutaneously in the morning   Continue metformin at current dose   Continue glipizide XL 10 mg orally twice a day -take with meals -breakfast and bedtime   The patient continues to have low blood sugars, advised to decrease dose of Lantus further let me know   Plan for review of blood sugars in 2-3 weeks   Follow-up with Ophthalmology   Repeat A1c, BMP in 3 months    Patient will consider S GLT 2 inhibitors, glp 1 agonist which could help with glycemic at weight loss    5  Hyperlipidemia  - continue statin therapy    6  Hypertension  Blood pressure at goal   - continue current medications including ACE-I/ ARB      CC: Diabetes    History of Present Illness     HPI:  Vanesa Vail is a 59 y o  male presents for a follow-up visit regarding diabetes management     Also has Hypertension, hyperlipidemia obesity, CKD, macroalbuminuria, obesity    Last seen in 04/2021   Last Eye exam:  due for exam     Current regimen:   Lantus 60 units subcutaneously in the morning  Metformin 1 g twice a day   Glipizide XL 10 mg orally twice a day  -  breakfast and bedtime    Overall feels much better with improved BG   Improved neuropathy, taking gabapentin   sulfasalazine for arthropathy - improved, following up with rheum  some constipation, using colace   No CP/SOB  Lost weight  267--> 246 lbs through diet   S/p knee surgery and is hoping that he will soon be able to exercise   Been to [facility] which helped     Statin:  Lipitor 20  ACE-I/ARB:  Losartan 100    Home glucose monitoring: CGM interpretation  freestyle luis - noted on phone   Time percentage CGM worn 79% %   Time in range 81 (goal >65-70%)  High  2%  Very high - 001   <70 - 17%    Average glucose 99 mg/dL  Symptoms of hypoglycemia :  Feels tired     Vitamin D3 -  Maybe 1000 IU daily    All other systems were reviewed and are negative  Review of Systems      Historical Information   Past Medical History:   Diagnosis Date    Arthritis     Carpal tunnel syndrome     Chronic kidney disease     Stage 2 CKD    Corneal abrasion     last assessed - [id number]    Diabetes mellitus ([address] )     Gout     Hiatal hernia     Hyperlipidemia     Hypertension     Impaired fasting glucose     last assessed - [id number]    Kidney stone     last stone 2009    Lumbar spondylosis 10/31/2021    Lumbar spondylosis     PONV (postoperative nausea and vomiting)     Psoriasis     RA (rheumatoid arthritis) ([address]     Sinus infection     currently under tx PO ABT etc as of 04/17/19    Viremia     Resolved - [id number]     Past Surgical History:   Procedure Laterality Date    ANKLE SURGERY Right     tendon arthrotomy    ARTHROTOMY ANKLE      BACK SURGERY  2014    lumbar laminectomy    COLONOSCOPY      COLONOSCOPY N/A 4/19/2019    Procedure: COLONOSCOPY;  Surgeon: Shirley Crabtree MD;  Location: [facility];   Service: Gastroenterology    FINGER SURGERY Bilateral     every finger contracture release over a 10 year period trigger finger    INCISION TENDON SHEATH      of a finger    KNEE SURGERY Right     x2 arthrotomy    OK TOTAL KNEE ARTHROPLASTY Right 11/30/2021    Procedure: ARTHROPLASTY KNEE TOTAL W ROBOT;  Surgeon: Wong Martinez DO;  Location: [facility];  Service: Orthopedics    SHOULDER SURGERY Left     x1 staples, tendon repair secondary to many spontaneous dislocations     Social History   Social History     Substance and Sexual Activity   Alcohol Use Yes    Comment: rare, once a month      Social History     Substance and Sexual Activity   Drug Use No     Social History     Tobacco Use   Smoking Status Former Smoker    Quit date:     Years since quittin 0   Smokeless Tobacco Never Used   Tobacco Comment    Former smoker     Family History:   Family History   Problem Relation Age of Onset    Hypertension Mother     Arthritis Mother     Hyperlipidemia Mother     Lung cancer Mother     Stroke Maternal Grandfather     Diabetes Cousin     COPD Father     Depression Family     Diabetes Family     No Known Problems Sister     No Known Problems Brother     No Known Problems Maternal Aunt     No Known Problems Maternal Uncle     No Known Problems Paternal Aunt     No Known Problems Paternal Uncle     No Known Problems Maternal Grandmother     No Known Problems Paternal Grandmother     No Known Problems Paternal Grandfather        Meds/Allergies   Current Outpatient Medications   Medication Sig Dispense Refill    acetaminophen (TYLENOL) 325 mg tablet Take 3 tablets (975 mg total) by mouth every 8 (eight) hours  0    ALPRAZolam (XANAX) 0 25 mg tablet Take 1 tablet (0 25 mg total) by mouth daily at bedtime as needed for anxiety 14 tablet 0    amLODIPine (NORVASC) 5 mg tablet TAKE ONE TABLET BY MOUTH EVERY DAY (Patient taking differently: daily at bedtime  ) 30 tablet 5    atorvastatin (LIPITOR) 20 mg tablet TAKE ONE TABLET BY MOUTH EVERY DAY 90 tablet 1    B-D ULTRAFINE III SHORT PEN 31G X 8 MM MISC USE AS DIRECTED 200 each 1    Blood Glucose Monitoring Suppl (OneTouch Verio) w/Device KIT Test blood sugar twice a day 1 kit 0    Continuous Blood Gluc Sensor (FreeStyle Ulysses 14 Day Sensor) MISC Change sensor every 14 days 2 each 12    gabapentin (Neurontin) 100 mg capsule Take 1 at bedtime nightly for 1 week then increase to 2 at bedtime nightly for 1 week then increase to 3 at bedtime nightly thereafter  90 capsule 3    glipiZIDE (GLUCOTROL XL) 10 mg 24 hr tablet TAKE TWO TABLETS BY MOUTH EVERY DAY AT BEDTIME (Patient taking differently: 10 mg 2 (two) times a day  ) 180 tablet 1    glucose blood (OneTouch Verio) test strip Test blood sugar twice a day 200 each 3    hydrochlorothiazide (HYDRODIURIL) 25 mg tablet Take 1 tablet (25 mg total) by mouth daily 90 tablet 3    hydrOXYzine pamoate (VISTARIL) 25 mg capsule TAKE ONE CAPSULE BY MOUTH EVERY DAY AS NEEDED FOR INSOMNIA 30 capsule 3    insulin glargine (Basaglar KwikPen) 100 units/mL injection pen Inject 65 Units under the skin daily 15 mL 1    losartan (COZAAR) 100 MG tablet TAKE ONE TABLET BY MOUTH EVERY DAY 90 tablet 1    metFORMIN (GLUCOPHAGE) 500 mg tablet Take 2 tablets (1,000 mg total) by mouth 2 (two) times a day with meals 360 tablet 3    naloxone (NARCAN) 4 mg/0 1 mL nasal spray Administer 1 spray into a nostril  If no response after 2-3 minutes, give another dose in the other nostril using a new spray  1 each 0    OneTouch Delica Lancets 52P MISC Test blood sugar twice a day 200 each 3    sulfaSALAzine (AZULFIDINE) 500 mg tablet Take 1 tablet twice daily for 3 weeks then increase to 1 tablet 3 times daily for 3 weeks then increase to 2 tablets twice daily thereafter and stay on that   (Patient taking differently: Take 1 tablet twice daily for 3 weeks then increase to 1 tablet 3 times daily for 3 weeks then increase to 2 tablets twice daily thereafter and stay on that note- pt not to start until 3-4- weeks post op per Dr Martinez note ) 120 tablet 2    aspirin 325 mg tablet Take 1 tablet (325 mg total) by mouth 2 (two) times a day (Patient not taking: Reported on 1/13/2022 ) 84 tablet 0    docusate sodium (COLACE) 100 mg capsule Take 1 capsule (100 mg total) by mouth 2 (two) times a day (Patient not taking: Reported on 12/16/2021 ) 60 capsule 0    ergocalciferol (VITAMIN D2) 50,000 units Take 1 capsule (50,000 Units total) by mouth once a week (Patient not taking: Reported on 1/24/2022 ) 12 capsule 0    oxyCODONE (Roxicodone) 5 immediate release tablet Take 1-2 tablets by mouth every 4-6 hours as needed for pain (Patient not taking: Reported on 12/16/2021 ) 60 tablet 0     No current facility-administered medications for this visit  Allergies   Allergen Reactions    Latex Rash     At dentist, lips swollen     Codeine Nausea Only     Reaction Date: [id number]; Objective   Vitals: Blood pressure 130/80, pulse 78, temperature (!) 97 °F (36 1 °C), height 5' 10" (1 778 m), weight 112 kg (246 lb)  Physical Exam  Constitutional:       General: He is not in acute distress  Appearance: He is well-developed  He is not diaphoretic  HENT:      Head: Normocephalic and atraumatic  Eyes:      Conjunctiva/sclera: Conjunctivae normal       Pupils: Pupils are equal, round, and reactive to light  Cardiovascular:      Rate and Rhythm: Normal rate and regular rhythm  Heart sounds: Normal heart sounds  No murmur heard  Pulmonary:      Effort: Pulmonary effort is normal  No respiratory distress  Breath sounds: Normal breath sounds  No wheezing  Abdominal:      General: There is no distension  Palpations: Abdomen is soft  Tenderness: There is no abdominal tenderness  There is no guarding  Musculoskeletal:      Cervical back: Normal range of motion and neck supple  Right lower leg: Edema present  Skin:     General: Skin is warm and dry  Findings: No erythema or rash  Neurological:      Mental Status: He is alert and oriented to person, place, and time  Psychiatric:         Behavior: Behavior normal          Thought Content: Thought content normal          The history was obtained from the review of the chart, patient      Lab Results:   Lab Results   Component Value Date/Time    Hemoglobin A1C 5 7 (H) 11/15/2021 10:40 AM    Hemoglobin A1C 6 2 (H) 09/17/2021 11:59 AM    Hemoglobin A1C 9 1 (H) 02/19/2021 10:56 AM    WBC 10 21 (H) 12/01/2021 04:50 AM    WBC 6 81 11/22/2021 08:53 AM    White Blood Cell Count 6 9 11/15/2021 10:40 AM    White Blood Cell Count 7 0 11/15/2021 09:50 AM    White Blood Cell Count 6 7 09/17/2021 11:59 AM    Hemoglobin 12 4 12/01/2021 04:50 AM    Hemoglobin 14 4 11/22/2021 08:53 AM    Hemoglobin 15 4 11/15/2021 10:40 AM    Hemoglobin 15 6 11/15/2021 09:50 AM    Hemoglobin 14 3 09/17/2021 11:59 AM    HCT 45 4 11/15/2021 10:40 AM    HCT 46 7 11/15/2021 09:50 AM    HCT 44 3 09/17/2021 11:59 AM    Hematocrit 37 3 12/01/2021 04:50 AM    Hematocrit 44 1 11/22/2021 08:53 AM    MCV 86 12/01/2021 04:50 AM    MCV 86 11/22/2021 08:53 AM    MCV 84 11/15/2021 10:40 AM    MCV 84 11/15/2021 09:50 AM    MCV 84 09/17/2021 11:59 AM    Platelet Count 461 65/66/1402 10:40 AM    Platelet Count 610 07/57/4853 09:50 AM    Platelet Count 993 63/15/4939 11:59 AM    Platelets 212 15/85/1597 04:50 AM    Platelets 925 51/71/2855 08:53 AM    BUN 25 12/01/2021 04:50 AM    BUN 25 11/22/2021 08:53 AM    BUN 20 11/15/2021 10:40 AM    BUN 20 11/15/2021 09:50 AM    BUN 28 (H) 09/17/2021 11:59 AM    Potassium 3 8 12/01/2021 04:50 AM    Potassium 3 5 11/22/2021 08:53 AM    Potassium 4 1 11/15/2021 10:40 AM    Potassium 3 9 11/15/2021 09:50 AM    Potassium 3 8 09/17/2021 11:59 AM    Chloride 105 12/01/2021 04:50 AM    Chloride 106 11/22/2021 08:53 AM    Chloride 103 11/15/2021 10:40 AM    Chloride 101 11/15/2021 09:50 AM    Chloride 100 09/17/2021 11:59 AM    CO2 28 12/01/2021 04:50 AM    CO2 30 11/22/2021 08:53 AM    CO2 27 11/15/2021 10:40 AM    CO2 27 11/15/2021 09:50 AM    CO2 27 09/17/2021 11:59 AM    Creatinine 1 25 12/01/2021 04:50 AM    Creatinine 1 29 11/22/2021 08:53 AM    Creatinine 1 18 11/15/2021 10:40 AM    Creatinine 1 14 11/15/2021 09:50 AM    Creatinine 1 22 09/17/2021 11:59 AM    AST 23 11/22/2021 08:53 AM AST 21 11/15/2021 10:40 AM    AST 20 11/15/2021 09:50 AM    AST 21 09/17/2021 11:59 AM    ALT 27 11/22/2021 08:53 AM    ALT 20 11/15/2021 10:40 AM    ALT 17 11/15/2021 09:50 AM    ALT 24 09/17/2021 11:59 AM    Albumin 3 4 (L) 11/22/2021 08:53 AM    Albumin 4 2 11/15/2021 10:40 AM    Albumin 4 3 11/15/2021 09:50 AM    Albumin 4 3 09/17/2021 11:59 AM    Globulin, Total 2 9 11/15/2021 10:40 AM    Globulin, Total 2 8 11/15/2021 09:50 AM    Globulin, Total 2 4 09/17/2021 11:59 AM    HDL 41 11/15/2021 10:40 AM    HDL 40 09/17/2021 11:59 AM    HDL 41 02/19/2021 10:56 AM    Triglycerides 80 11/15/2021 10:40 AM    Triglycerides 70 09/17/2021 11:59 AM    Triglycerides 96 02/19/2021 10:56 AM         Imaging Studies: I have personally reviewed pertinent reports  Portions of the record may have been created with voice recognition software  Occasional wrong word or "sound a like" substitutions may have occurred due to the inherent limitations of voice recognition software  Read the chart carefully and recognize, using context, where substitutions have occurred

## 2022-01-26 ENCOUNTER — OFFICE VISIT (OUTPATIENT)
Dept: PHYSICAL THERAPY | Facility: CLINIC | Age: 62
End: 2022-01-26
Payer: COMMERCIAL

## 2022-01-26 DIAGNOSIS — G89.29 CHRONIC PAIN OF RIGHT KNEE: ICD-10-CM

## 2022-01-26 DIAGNOSIS — M25.561 CHRONIC PAIN OF RIGHT KNEE: ICD-10-CM

## 2022-01-26 DIAGNOSIS — M17.11 PRIMARY OSTEOARTHRITIS OF RIGHT KNEE: Primary | ICD-10-CM

## 2022-01-26 PROCEDURE — 97112 NEUROMUSCULAR REEDUCATION: CPT

## 2022-01-26 PROCEDURE — 97110 THERAPEUTIC EXERCISES: CPT

## 2022-01-26 PROCEDURE — 97140 MANUAL THERAPY 1/> REGIONS: CPT

## 2022-01-26 NOTE — PROGRESS NOTES
Daily Note     Today's date: 2022  Patient name: Jan Whaley  : 1960  MRN: [id number]  Referring provider: Clau Carrillo DO  Dx:   Encounter Diagnosis     ICD-10-CM    1  Primary osteoarthritis of right knee  M17 11    2  Chronic pain of right knee  M25 561     G89 29        Start Time: 0800  Stop Time: 0900  Total time in clinic (min): 60 minutes    Subjective: Patient reports experiencing an increase in anterior knee pain after performing sustained hold with 4# weight at knee yesterday  Objective: See treatment diary below      Assessment: Tolerated treatment well  Patient educated on utilization of LLLD stretching, especially for extension following increased knee pain with use of 4# weight yesterday  Pt encouraged to stay in "stretch zone" rather than "pain zone" and increasing total end range time, verbalized good understanding  Pt challenged with glute progressions during today's session  Pt will continue to benefit from skilled PT to improve functional mobility and activity tolerance  Plan: Continue per plan of care  Re-eval nv       Date 22    Visit Number 19 20 21 22 23    ROM         Knee Flexion 105 108 107 107 107    Knee Extension -6 -7 -6 -7 -7             Manual         Patellar mobs         STM Effleurage ant knee x10 IASTM distal quad, hamstring x10' IASTM distal quad, hamstring x8' IASTM distal quad, hamstring x8' IASTM distal quad, hamstring x8'    R knee LAD w/ belt  Tibial AP glide, gr III-IV, 3x30    Proximal fib mob in SL, HVLA x3 Tibial AP glide, gr III-IV 3x30    prox fib mob in SL HVLA, x2 Tibial AP/PA glide, gr III-IV 3x30   Tibial AP/PA glide, gr III-IV       Stand PA prox fib MWM, x10    prox fib PA leukotape x3' Stand PA prox fib MWM x10    prox fib PA leuko x3'     TherEx         Knee/Hip PROM 5'        Active w/u Bike 10' 10' 10' 10' 10'    Quad set, glute set         Ankle pumps         SLR 3x5        Hip abduction Hip burners 2x10 ea    Knee ext: SAQ, LAQ         Heel slides 10" x10 10" x15 10" x15 10" x15     Knee flex str at step 15" x10 15" x10    H/s str, contract-relax10" x10   15" x10    LLLD knee flex str at wall         Leg press # 3x10 105# 3x10 105# 3x10 Single leg, 45# 2x10  55# x10 Single leg, 55# 3x10                      Neuro Re-Ed         Step downs 6" step down 10" x15 6" x15 6" x15 6" x15 6" x10    Step ups 6" x10 6" x15 6" x15 6" x15 6" x10  8" 2x10    Mini squats         Knee flexion/ext contract-relax         TG squats lvl 20 x15 total, 5" hold at EOR Regular squats, 3x8 Squats 3x10, yellow loop at knees Squats, 3x10 yellow loop at knees Squats, x30    TKE Standing x30 Standing x30 7 5# cc x30      Clamshells  x20 bilateral x30 ea                  Lateral stepping, 3 laps    exagg gait, 5 laps Lateral stepping, 15' x4 laps Lateral stepping, 50' x4    exagg gait, 48' x2 rounds Lateral stepping, 50' x4    exagg gait, 50' x2    Sled push   +30 lbs  20' x4 laps      Resisted walking   2x100'                        TherAct         Patient education         Stair negotiation         Car transfer                           Gait Training                                    Modalities         CP           Precautions: s/p R TKR performed 11/30/21  Past Medical History:   Diagnosis Date    Arthritis     Carpal tunnel syndrome     Corneal abrasion     last assessed - [id number]    [address] [facility] [address] [address]     Gout     Hiatal hernia     Hyperlipidemia     Hypertension     Impaired fasting glucose     last assessed - [id number]    Kidney stone     last stone 2009    Lumbar spondylosis 10/31/2021    Lumbar spondylosis     PONV (postoperative nausea and vomiting)     Psoriasis     RA (rheumatoid arthritis) (HCC)     Sinus infection     currently under tx PO ABT etc as of 04/17/19    Viremia     Resolved - [id number]

## 2022-01-28 ENCOUNTER — EVALUATION (OUTPATIENT)
Dept: PHYSICAL THERAPY | Facility: CLINIC | Age: 62
End: 2022-01-28
Payer: COMMERCIAL

## 2022-01-28 DIAGNOSIS — M17.11 PRIMARY OSTEOARTHRITIS OF RIGHT KNEE: Primary | ICD-10-CM

## 2022-01-28 DIAGNOSIS — G89.29 CHRONIC PAIN OF RIGHT KNEE: ICD-10-CM

## 2022-01-28 DIAGNOSIS — M25.561 CHRONIC PAIN OF RIGHT KNEE: ICD-10-CM

## 2022-01-28 PROCEDURE — 97110 THERAPEUTIC EXERCISES: CPT

## 2022-01-28 PROCEDURE — 97112 NEUROMUSCULAR REEDUCATION: CPT

## 2022-01-28 PROCEDURE — 97140 MANUAL THERAPY 1/> REGIONS: CPT

## 2022-01-28 NOTE — PROGRESS NOTES
Re-Evaluation     Today's date: 2022  Patient name: Shayla Conroy  : 1960  MRN: [id number]  Referring provider: Michael Malone DO  Dx:   Encounter Diagnosis     ICD-10-CM    1  Primary osteoarthritis of right knee  M17 11    2  Chronic pain of right knee  M25 561     G89 29        Start Time: 0800  Stop Time: 0845  Total time in clinic (min): 45 minutes    Subjective: Patient reports continued improvement in knee pain and function with physical therapy  He states that he has returned to driving short distances, which has been going well  Patient reports experiencing intermittent anterolateral knee pain with sustained ambulation and while trying to perform LLLD stretching into extension       Goals  Short Term Goals (5 weeks from pre-op): - ALL MET   - Patient will improve time on TUG by 2 9 seconds to facilitate improved safety in all ambulation  - Patient will be independent in basic HEP 2-3 weeks  - Patient will demonstrate >100 degrees of knee ROM for reciprocal stair negotiation  - Patient will have 0/10 pain at rest  - Patient will demonstrate >1/3 improvement in MMT grade as applicable    Long Term Goals (9 weeks from pre-op): - GOALS EXTENDED AT RE-EVAL ON 22, SET FOR 4 WEEKS  - Patient will be independent in a comprehensive home exercise program - MET  - Patient will ambulation with AD PRN - MET  - Patient will independently ambulate >1000 feet (community ambulation) - MET  - Patient will self-report >75% improvement in function - PROGRESSED  - Patient functional goal: Patient will return to going on walks with his dogs without knee pain - NOT MET  - Patient functional goal: Patient will independently perform ADLs, including donning/doffing socks and pants - MET      Objective: See treatment diary below    LE MMT   Strength at re-eval on 21 (22)  R Hip Flexion: 3/5  4/5 (4/5)  R Hip Extension: 4-/5  4/5 (4+/5)  R Hip Abduction: 3+/5  4/5 (4/5)  R Hip Adduction: 4+/5  4+/5 (5/5)  R Hip IR: 4-/5   4/5 (4/5)  R Hip ER: 4-/5   4/5 (4/5)  R Knee Extension: 3/5  4/5 (4/5)  R Knee Flexion: 3/5  4/5 (4+/5)      LE ROM  - L Knee Flexion: WFL degrees   R Knee Flexion: 70 degrees  - L Knee Extension: WFL degrees   R Knee Extension: -15 degrees    Right knee ROM at re-eval on 21 (22)  - Flexion: 105 AAROM  (108 degrees)  - Extension: -8  (-6 degrees)      Skin Check  - Incision covered with post-operative dressing without redness or adverse reaction noted  Swelling noted over medial aspect of knee   At re-eval on 21: continued edema noted into right ankle as well as anterior knee; healing incision over anterior aspect of knee   At re-eval on 22: continued edema in right knee noted; well healed incision noted over anterior aspect of knee    Knee Comments  - Gait Assessment: step-to pattern with RW; decreased gait speed and step length   At re-eval on 21: step through sequencing noted with utilization of SPC; pt w/ significant improvement in knee flexion through swing phase and improved gait speed   At re-eval on 22: improved gait speed and sequencing noted; improved heel strike noted following gait training during todday's session  - TU 30 seconds with RW   At re-eval on 21: 16 11 seconds with SPC   At re-eval on 22: 11 74 seconds with no AD  - Stair Negotiation: step-to pattern with use of SPC   At re-eval on 21: pt able to negotiate 6" steps w/ step over step sequencing w/ vc for quad/glute activation   At re-eval on 22: independently negotiates stairs      Assessment: Patient has made gains in strength, range of motion, and functional mobility since starting physical therapy   He has demonstrated significant improvement in gait sequencing, sit <> stand transfers, and ability to negotiate stairs since previous re-eval  Despite these gains, he demonstrates continued deficits in motor control, particularly of glutes, and deficits in range of motion  As the patient is progressing toward previously set goals, he remains a strong candidate to continue to benefit from skilled PT services  PT POC and HEP were updated during today's session and pt agreeable to continue  Plan: Continue per plan of care        Date 1/14/22 1/19/22 1/21/22 1/24/22 1/26/22 1/28/22   Visit Number 19 20 21 22 23 24   ROM         Knee Flexion 105 108 107 107 107 108   Knee Extension -6 -7 -6 -7 -7 -6            Manual         Patellar mobs         STM Effleurage ant knee x10 IASTM distal quad, hamstring x10' IASTM distal quad, hamstring x8' IASTM distal quad, hamstring x8' IASTM distal quad, hamstring x8' IASTM distal quad, hamstring x8'   R knee LAD w/ belt  Tibial AP glide, gr III-IV, 3x30    Proximal fib mob in SL, HVLA x3 Tibial AP glide, gr III-IV 3x30    prox fib mob in SL HVLA, x2 Tibial AP/PA glide, gr III-IV 3x30   Tibial AP/PA glide, gr III-IV Tibial AP/PA glide, gr III-iV      Stand PA prox fib MWM, x10    prox fib PA leukotape x3' Stand PA prox fib MWM x10    prox fib PA leuko x3'     TherEx         Knee/Hip PROM 5'        Active w/u Bike 10' 10' 10' 10' 10' 5'   Quad set, glute set         Ankle pumps         SLR 3x5        Hip abduction     Hip burners 2x10 ea    Knee ext: SAQ, LAQ         Heel slides 10" x10 10" x15 10" x15 10" x15     Knee flex str at step 15" x10 15" x10    H/s str, contract-relax10" x10   15" x10    LLLD knee flex str at wall         Leg press # 3x10 105# 3x10 105# 3x10 Single leg, 45# 2x10  55# x10 Single leg, 55# 3x10 Double leg, 115# 2x10    Single leg, 55-65# 2x10                     Neuro Re-Ed         Step downs 6" step down 10" x15 6" x15 6" x15 6" x15 6" x10 6" x10   Step ups 6" x10 6" x15 6" x15 6" x15 6" x10  8" 2x10 6" x10  8" x30   Mini squats         Knee flexion/ext contract-relax         TG squats lvl 20 x15 total, 5" hold at EOR Regular squats, 3x8 Squats 3x10, yellow loop at knees Squats, 3x10 yellow loop at knees Squats, x30    TKE Standing x30 Standing x30 7 5# cc x30      Clamshells  x20 bilateral x30 ea                  Lateral stepping, 3 laps    exagg gait, 5 laps Lateral stepping, 15' x4 laps Lateral stepping, 50' x4    exagg gait, 48' x2 rounds Lateral stepping, 50' x4    exagg gait, 48' x2 Lateral stepping, 50' x4    Ambulation with posterior resistance at ankle for heel strike 1000'   Sled push   +30 lbs  20' x4 laps      Resisted walking   2x100'                        TherAct         Patient education         Stair negotiation         Car transfer                           Gait Training                                    Modalities         CP           Precautions: s/p R TKR performed 11/30/21  Past Medical History:   Diagnosis Date    Arthritis     Carpal tunnel syndrome     Corneal abrasion     last assessed - [id number]    Diabetes mellitus ([facility] [address] [facility]     Gout     Hiatal hernia     Hyperlipidemia     Hypertension     Impaired fasting glucose     last assessed - [id number]    Kidney stone     last stone 2009    Lumbar spondylosis 10/31/2021    Lumbar spondylosis     PONV (postoperative nausea and vomiting)     Psoriasis     RA (rheumatoid arthritis) (HCC)     Sinus infection     currently under tx PO ABT etc as of 04/17/19    Viremia     Resolved - [id number]

## 2022-02-01 ENCOUNTER — OFFICE VISIT (OUTPATIENT)
Dept: PHYSICAL THERAPY | Facility: CLINIC | Age: 62
End: 2022-02-01
Payer: COMMERCIAL

## 2022-02-01 DIAGNOSIS — M25.561 CHRONIC PAIN OF RIGHT KNEE: Primary | ICD-10-CM

## 2022-02-01 DIAGNOSIS — M17.11 PRIMARY OSTEOARTHRITIS OF RIGHT KNEE: ICD-10-CM

## 2022-02-01 DIAGNOSIS — G89.29 CHRONIC PAIN OF RIGHT KNEE: Primary | ICD-10-CM

## 2022-02-01 PROCEDURE — 97110 THERAPEUTIC EXERCISES: CPT | Performed by: PHYSICAL THERAPIST

## 2022-02-01 PROCEDURE — 97140 MANUAL THERAPY 1/> REGIONS: CPT | Performed by: PHYSICAL THERAPIST

## 2022-02-01 PROCEDURE — 97112 NEUROMUSCULAR REEDUCATION: CPT | Performed by: PHYSICAL THERAPIST

## 2022-02-01 NOTE — PROGRESS NOTES
Daily Note     Today's date: 2022  Patient name: Britta Blue  : 1960  MRN: [id number]  Referring provider: Sebastián Ospina DO  Dx:   Encounter Diagnosis     ICD-10-CM    1  Chronic pain of right knee  M25 561     G89 29    2  Primary osteoarthritis of right knee  M17 11                   Subjective: Pt reports overall he feels he is making good progress however "straightening the knee especially during my homework " Pt reports soreness afterward for several hours  The next morning he feels nice and loose  Objective: See treatment diary below  Heel slides performed pre and post manual intervention and alternating manual extension with active extension with heel elevated during today's manuals  Assessment: Tolerated treatment well  Patient remains well motivated throughout therex, demo inc indpeendence with therex  Pt demo ongoing limitations wiht progression due to painand tissue resistance however is steadily progressing toward est LTGs  Plan: Continue per plan of care        Date 22   Visit Number 20 21 22 23 24 25   ROM         Knee Flexion 108 107 107 107 108 109 [facility]   Knee Extension -7 -6 -7 -7 -6 -5            Manual         Patellar mobs         STM IASTM distal quad, hamstring x10' IASTM distal quad, hamstring x8' IASTM distal quad, hamstring x8' IASTM distal quad, hamstring x8' IASTM distal quad, hamstring x8' 8'   R knee LAD w/ belt Tibial AP glide, gr III-IV, 3x30    Proximal fib mob in SL, HVLA x3 Tibial AP glide, gr III-IV 3x30    prox fib mob in SL HVLA, x2 Tibial AP/PA glide, gr III-IV 3x30   Tibial AP/PA glide, gr III-IV Tibial AP/PA glide, gr III-iV Tibial AP/PA glide, gr III-iV     Stand PA prox fib MWM, x10    prox fib PA leukotape x3' Stand PA prox fib MWM x10    prox fib PA leuko x3'      TherEx         Knee/Hip PROM         Active w/u 10' 10' 10' 10' 5' 5'   Quad set, glute set         Ankle pumps         SLR Hip abduction    Hip burners 2x10 ea     Knee ext: SAQ, LAQ         Heel slides 10" x15 10" x15 10" x15      Knee flex str at step 15" x10    H/s str, contract-relax10" x10   15" x10     LLLD knee flex str at wall         Leg press 105# 3x10 105# 3x10 Single leg, 45# 2x10  55# x10 Single leg, 55# 3x10 Double leg, 115# 2x10    Single leg, 55-65# 2x10 Double leg, 115# x10  125# x10    Single leg, 65# 2x10                     Neuro Re-Ed         Step downs 6" x15 6" x15 6" x15 6" x10 6" x10 x15   Step ups 6" x15 6" x15 6" x15 6" x10  8" 2x10 6" x10  8" x30 x25 8"   Mini squats         Knee flexion/ext contract-relax         TG squats Regular squats, 3x8 Squats 3x10, yellow loop at knees Squats, 3x10 yellow loop at knees Squats, x30     TKE Standing x30 7 5# cc x30       Clamshells  x30 ea                  Lateral stepping, 3 laps    exagg gait, 5 laps Lateral stepping, 15' x4 laps Lateral stepping, 50' x4    exagg gait, 48' x2 rounds Lateral stepping, 50' x4    exagg gait, 48' x2 Lateral stepping, 50' x4    Ambulation with posterior resistance at ankle for heel strike 1000' Lateral stepping, 50' x5   Sled push  +30 lbs  20' x4 laps       Resisted walking  2x100'                         TherAct         Patient education         Stair negotiation         Car transfer                           Gait Training                                    Modalities         CP           Precautions: s/p R TKR performed 11/30/21  Past Medical History:   Diagnosis Date    Arthritis     Carpal tunnel syndrome     Corneal abrasion     last assessed - [id number]    Diabetes mellitus ([address] )     Gout     Hiatal hernia     Hyperlipidemia     Hypertension     Impaired fasting glucose     last assessed - [id number]    Kidney stone     last stone 2009    Lumbar spondylosis 10/31/2021    Lumbar spondylosis     PONV (postoperative nausea and vomiting)     Psoriasis     [facility]     Sinus infection currently under tx PO ABT etc as of 04/17/19    Viremia     Resolved - [id number]

## 2022-02-03 ENCOUNTER — TELEPHONE (OUTPATIENT)
Dept: FAMILY MEDICINE CLINIC | Facility: CLINIC | Age: 62
End: 2022-02-03

## 2022-02-03 ENCOUNTER — OFFICE VISIT (OUTPATIENT)
Dept: PHYSICAL THERAPY | Facility: CLINIC | Age: 62
End: 2022-02-03
Payer: COMMERCIAL

## 2022-02-03 DIAGNOSIS — M25.561 CHRONIC PAIN OF RIGHT KNEE: Primary | ICD-10-CM

## 2022-02-03 DIAGNOSIS — G89.29 CHRONIC PAIN OF RIGHT KNEE: Primary | ICD-10-CM

## 2022-02-03 DIAGNOSIS — M17.11 PRIMARY OSTEOARTHRITIS OF RIGHT KNEE: ICD-10-CM

## 2022-02-03 PROCEDURE — 97112 NEUROMUSCULAR REEDUCATION: CPT | Performed by: PHYSICAL THERAPIST

## 2022-02-03 PROCEDURE — 97140 MANUAL THERAPY 1/> REGIONS: CPT | Performed by: PHYSICAL THERAPIST

## 2022-02-03 PROCEDURE — 97110 THERAPEUTIC EXERCISES: CPT | Performed by: PHYSICAL THERAPIST

## 2022-02-03 NOTE — PROGRESS NOTES
Daily Note     Today's date: 2/3/2022  Patient name: Francis Buckley  : 1960  MRN: [id number]  Referring provider: Gianfranco Arguello DO  Dx:   Encounter Diagnosis     ICD-10-CM    1  Chronic pain of right knee  M25 561     G89 29    2  Primary osteoarthritis of right knee  M17 11                   Subjective: Pt acknowledges his "usual" stiffness and soreness in knee  He acknowledges soreness and stiffness following today's session  Pt also acknowledges good comprehension of HEP addition  Objective: See treatment diary below  Discussed adding ext stretch in sitting with supported heel however discussed limiting time frame to avoid significant pain increase  Assessment: Tolerated treatment well  Patient cont to demo swelling in knee and shank, pitting  Pt demo outstanding otivation and is steadily progressing ROm however pain continues to limit progression as well as tissue reisstance  Dec adhesions noticed superior incision  Plan: Continue per plan of care        Date 1/21/22 1/24/22 1/26/22 1/28/22 2/1/22 2/3   Visit Number 21 22 23 24 25 26   ROM         Knee Flexion 107 107 107 108 109 AAROM 108 AAROM   Knee Extension -6 -7 -7 -6 -5 -5            Manual         Patellar mobs         STM IASTM distal quad, hamstring x8' IASTM distal quad, hamstring x8' IASTM distal quad, hamstring x8' IASTM distal quad, hamstring x8' 8' 4'   R knee LAD w/ belt Tibial AP glide, gr III-IV 3x30    prox fib mob in SL HVLA, x2 Tibial AP/PA glide, gr III-IV 3x30   Tibial AP/PA glide, gr III-IV Tibial AP/PA glide, gr III-iV Tibial AP/PA glide, gr III-iV     Stand PA prox fib MWM, x10    prox fib PA leukotape x3' Stand PA prox fib MWM x10    prox fib PA leuko x3'       TherEx         Knee/Hip PROM         Active w/u 10' 10' 10' 5' 5' 5'   Quad set, glute set      2x10   Ankle pumps         SLR         Hip abduction   Hip burners 2x10 ea      Knee ext: SAQ, LAQ         Heel slides 10" x15 10" x15    x15   Knee flex str at step   15" x10   x15   LLLD knee flex str at wall         Leg press 105# 3x10 Single leg, 45# 2x10  55# x10 Single leg, 55# 3x10 Double leg, 115# 2x10    Single leg, 55-65# 2x10 Double leg, 115# x10  125# x10    Single leg, 65# 2x10 Double leg, 125# 2x10      Single leg, 75# 2x10                     Neuro Re-Ed         Step downs 6" x15 6" x15 6" x10 6" x10 x15 x20   Step ups 6" x15 6" x15 6" x10  8" 2x10 6" x10  8" x30 x25 8" x20   Mini squats         Knee flexion/ext contract-relax         TG squats Squats 3x10, yellow loop at knees Squats, 3x10 yellow loop at knees Squats, x30      TKE 7 5# cc x30        Clamshells                    Lateral stepping, 15' x4 laps Lateral stepping, 50' x4    exagg gait, 48' x2 rounds Lateral stepping, 50' x4    exagg gait, 48' x2 Lateral stepping, 50' x4    Ambulation with posterior resistance at ankle for heel strike 1000' Lateral stepping, 50' x5 30' x 6   Sled push +30 lbs  20' x4 laps        Resisted walking 2x100'                          TherAct         Patient education         Stair negotiation         Car transfer                           Gait Training                                    Modalities         CP           Precautions: s/p R TKR performed 11/30/21  Past Medical History:   Diagnosis Date    Arthritis     Carpal tunnel syndrome     Corneal abrasion     last assessed - [id number]    Diabetes mellitus ([address] )     Gout     Hiatal hernia     Hyperlipidemia     Hypertension     Impaired fasting glucose     last assessed - [id number]    Kidney stone     last stone 2009    Lumbar spondylosis 10/31/2021    Lumbar spondylosis     PONV (postoperative nausea and vomiting)     Psoriasis     RA (rheumatoid arthritis) (HCC)     Sinus infection     currently under tx PO ABT etc as of 04/17/19    Viremia     Resolved - [id number]

## 2022-02-03 NOTE — TELEPHONE ENCOUNTER
[employer] [phone]  Fax [phone]  Contract expires 4/7/22  Notification to pt will be mailed by company zheng    [facility]

## 2022-02-08 ENCOUNTER — OFFICE VISIT (OUTPATIENT)
Dept: PHYSICAL THERAPY | Facility: CLINIC | Age: 62
End: 2022-02-08
Payer: COMMERCIAL

## 2022-02-08 DIAGNOSIS — G89.29 CHRONIC PAIN OF RIGHT KNEE: Primary | ICD-10-CM

## 2022-02-08 DIAGNOSIS — M17.11 PRIMARY OSTEOARTHRITIS OF RIGHT KNEE: ICD-10-CM

## 2022-02-08 DIAGNOSIS — M25.561 CHRONIC PAIN OF RIGHT KNEE: Primary | ICD-10-CM

## 2022-02-08 PROCEDURE — 97140 MANUAL THERAPY 1/> REGIONS: CPT

## 2022-02-08 PROCEDURE — 97110 THERAPEUTIC EXERCISES: CPT

## 2022-02-08 PROCEDURE — 97112 NEUROMUSCULAR REEDUCATION: CPT

## 2022-02-08 NOTE — PROGRESS NOTES
Daily Note     Today's date: 2022  Patient name: Britta Blue  : 1960  MRN: [id number]  Referring provider: Sebastián Ospina DO  Dx:   Encounter Diagnosis     ICD-10-CM    1  Chronic pain of right knee  M25 561     G89 29    2  Primary osteoarthritis of right knee  M17 11        Start Time: 09  Stop Time: 1030  Total time in clinic (min): 60 minutes    Subjective: Patient with no new complaints at the start of today's session  He states that he usually experiences anteromedial knee pain with ambulation  Objective: See treatment diary below      Assessment: Tolerated treatment well  Incorporated superior patellar glide and tibial IR mob during TKE, which resulted in reduction in knee pain and improvement in heel strike during ambulation post  Pt w/ improving quad control; encouraged pt to add OP to quad sets at home, verbalized good understanding  Patient will continue to benefit from skilled PT to improve functional mobility and activity tolerance  Plan: Continue per plan of care        Date 2/7/22  1/26/22 1/28/22 2/1/22 2/3   Visit Number 27  23 24 25 26   ROM         Knee Flexion 109  107 108 109 AAROM 108 AAROM   Knee Extension -5  -7 -6 -5 -5            Manual         Patellar mobs         STM IASTM distal quad, hamstring x10'  IASTM distal quad, hamstring x8' IASTM distal quad, hamstring x8' 8' 4'   R knee LAD w/ belt AP glide to distal femur, 3x30  Tibial AP/PA glide, gr III-IV Tibial AP/PA glide, gr III-iV Tibial AP/PA glide, gr III-iV             TherEx         Knee/Hip PROM         Active w/u 10'  10' 5' 5' 5'   Quad set, glute set [employer] set w/ OP x10    Regular x10     2x10   Ankle pumps         SLR         Hip abduction   Hip burners 2x10 ea      Knee ext: SAQ, LAQ LAQ x30        Heel slides x15     x15   Knee flex str at step   15" x10   x15   LLLD knee flex str at wall         Leg press Double leg, 125# 3x10  Single leg, 55# 3x10 Double leg, 115# 2x10    Single leg, 55-65# 2x10 Double leg, 115# x10  125# x10    Single leg, 65# 2x10 Double leg, 125# 2x10      Single leg, 75# 2x10                     Neuro Re-Ed         Step downs   6" x10 6" x10 x15 x20   Step ups 8" x20  6" x10  8" 2x10 6" x10  8" x30 x25 8" x20   Mini squats         Knee flexion/ext contract-relax         TG squats   Squats, x30      TKE 7 5# cc, 2x10    2x10 w/ superior patellar glide, tibial IR        Clamshells                    Lateral stepping, 100' ea    exagg gait, 48' x2  Lateral stepping, 50' x4    exagg gait, 48' x2 Lateral stepping, 50' x4    Ambulation with posterior resistance at ankle for heel strike 1000' Lateral stepping, 50' x5 30' x 6   Sled push         Resisted walking                           TherAct         Patient education         Stair negotiation         Car transfer                           Gait Training                                    Modalities         CP           Precautions: s/p R TKR performed 11/30/21  Past Medical History:   Diagnosis Date    Arthritis     Carpal tunnel syndrome     Corneal abrasion     last assessed - [id number]    Diabetes mellitus ([address]     Gout     Hiatal hernia     Hyperlipidemia     Hypertension     Impaired fasting glucose     last assessed - [id number]    Kidney stone     last stone 2009    Lumbar spondylosis 10/31/2021    Lumbar spondylosis     PONV (postoperative nausea and vomiting)     Psoriasis     RA (rheumatoid arthritis) (HCC)     Sinus infection     currently under tx PO ABT etc as of 04/17/19    Viremia     Resolved - [id number]

## 2022-02-10 ENCOUNTER — OFFICE VISIT (OUTPATIENT)
Dept: PHYSICAL THERAPY | Facility: CLINIC | Age: 62
End: 2022-02-10
Payer: COMMERCIAL

## 2022-02-10 DIAGNOSIS — M25.561 CHRONIC PAIN OF RIGHT KNEE: Primary | ICD-10-CM

## 2022-02-10 DIAGNOSIS — M17.11 PRIMARY OSTEOARTHRITIS OF RIGHT KNEE: ICD-10-CM

## 2022-02-10 DIAGNOSIS — G89.29 CHRONIC PAIN OF RIGHT KNEE: Primary | ICD-10-CM

## 2022-02-10 PROCEDURE — 97110 THERAPEUTIC EXERCISES: CPT

## 2022-02-10 PROCEDURE — 97140 MANUAL THERAPY 1/> REGIONS: CPT

## 2022-02-10 PROCEDURE — 97112 NEUROMUSCULAR REEDUCATION: CPT

## 2022-02-10 NOTE — PROGRESS NOTES
Daily Note     Today's date: 2/10/2022   Patient name: Becka Vega  : 1960  MRN: [id number]  Referring provider: Bertram Carr DO  Dx:   Encounter Diagnosis     ICD-10-CM    1  Chronic pain of right knee  M25 561     G89 29    2  Primary osteoarthritis of right knee  M17 11        Start Time: 0930  Stop Time: 1015  Total time in clinic (min): 45 minutes    Subjective: Patient reports continued stiffness about right knee since previous session, although he has been experiencing reduction in pain over the last few days  Objective: See treatment diary below      Assessment: Tolerated treatment well  Patient with reduction in anterior knee stiffness/pain following cupping to distal quad and superior scar  Patient with difficulty achieving end range with TKE due to quad weakness  Pt will continue to benefit from skilled PT to improve functional mobility and activity tolerance  Plan: Continue per plan of care        Date 2/7/22 2/10/22  1/28/22 2/1/22 2/3   Visit Number 27 28  24 25 26   ROM         Knee Flexion 109 109  108 109 AAROM 108 AAROM   Knee Extension -5 -5  -6 -5 -5            Manual         Patellar mobs         STM IASTM distal quad, hamstring x10' IASTM distal quad, h/s x5'      Cupping to distal quad, scar mobilization x5' total (passive knee flex/ext, RI)  IASTM distal quad, hamstring x8' 8' 4'   R knee LAD w/ belt AP glide to distal femur, 3x30   Tibial AP/PA glide, gr III-iV Tibial AP/PA glide, gr III-iV             TherEx         Knee/Hip PROM         Active w/u 10' 10'  5' 5' 5'   Quad set, glute set [employer] set w/ OP x10    Regular x10 Quad set w/ OP x19    2x10   Ankle pumps         SLR         Hip abduction         Knee ext: SAQ, LAQ LAQ x30 LAQ w/ cupping       Heel slides x15 x20    x15   Knee flex str at step      x15   LLLD knee flex str at wall         Leg press Double leg, 125# 3x10   Double leg, 115# 2x10    Single leg, 55-65# 2x10 Double leg, 115# x10  125# x10    Single leg, 65# 2x10 Double leg, 125# 2x10      Single leg, 75# 2x10                     Neuro Re-Ed         Step downs    6" x10 x15 x20   Step ups 8" x20   6" x10  8" x30 x25 8" x20   Mini squats         Knee flexion/ext contract-relax         TG squats  Lvl 18, 5" x15       TKE 7 5# cc, 2x10    2x10 w/ superior patellar glide, tibial IR 7 5# cc, x10 w/ superior patellar, tibial IR glides       Clamshells                    Lateral stepping, 100' ea    exagg gait, 48' x2 Lateral stepping, 100' ea  Lateral stepping, 50' x4    Ambulation with posterior resistance at ankle for heel strike 1000' Lateral stepping, 50' x5 30' x 6   Sled push         Resisted walking                           TherAct         Patient education         Stair negotiation         Car transfer                           Gait Training                                    Modalities         CP           Precautions: s/p R TKR performed 11/30/21  Past Medical History:   Diagnosis Date    Arthritis     Carpal tunnel syndrome     Corneal abrasion     last assessed - [id number]    Diabetes mellitus ([address]     Gout     Hiatal hernia     Hyperlipidemia     Hypertension     Impaired fasting glucose     last assessed - [id number]    Kidney stone     last stone 2009    Lumbar spondylosis 10/31/2021    Lumbar spondylosis     PONV (postoperative nausea and vomiting)     Psoriasis     RA (rheumatoid arthritis) (HCC)     Sinus infection     currently under tx PO ABT etc as of 04/17/19    Viremia     Resolved - [id number]

## 2022-02-15 ENCOUNTER — OFFICE VISIT (OUTPATIENT)
Dept: PHYSICAL THERAPY | Facility: CLINIC | Age: 62
End: 2022-02-15
Payer: COMMERCIAL

## 2022-02-15 DIAGNOSIS — M17.11 PRIMARY OSTEOARTHRITIS OF RIGHT KNEE: ICD-10-CM

## 2022-02-15 DIAGNOSIS — G89.29 CHRONIC PAIN OF RIGHT KNEE: Primary | ICD-10-CM

## 2022-02-15 DIAGNOSIS — M25.561 CHRONIC PAIN OF RIGHT KNEE: Primary | ICD-10-CM

## 2022-02-15 PROCEDURE — 97110 THERAPEUTIC EXERCISES: CPT

## 2022-02-15 PROCEDURE — 97140 MANUAL THERAPY 1/> REGIONS: CPT

## 2022-02-15 PROCEDURE — 97112 NEUROMUSCULAR REEDUCATION: CPT

## 2022-02-15 NOTE — PROGRESS NOTES
Daily Note     Today's date: 2/15/2022  Patient name: Becka Vega  : 1960  MRN: [id number]  Referring provider: Bertram Carr DO  Dx:   Encounter Diagnosis     ICD-10-CM    1  Chronic pain of right knee  M25 561     G89 29    2  Primary osteoarthritis of right knee  M17 11        Start Time: 925  Stop Time: 1015  Total time in clinic (min): 50 minutes    Subjective: "I'm worried I'm going backward " Patient reports experiencing significant pain at anterolateral aspect of right knee over the past few days after attempting to perform knee extension stretching with weight  He reports continued difficulty with sleeping  Objective: See treatment diary below      Assessment: Patient with hypertonicity about right hamstring that improved following cupping  ROM grossly similar to that of previous sessions despite patient concerns  Limited intensity of today's session due to irritability of sx and pt request  Patient encouraged to decrease duration of LLLD and perform at increase frequency, verbalized good understanding  Patient will continue to benefit from skilled PT to improve functional mobility and activity tolerance  Plan: Continue per plan of care        Date 2/7/22 2/10/22 2/15/22  2/1/22 2/3   Visit Number 27 28 29  25 26   ROM         Knee Flexion 109 109 109  109 AAROM 108 AAROM   Knee Extension -5 -5 -8  -5 -5            Manual         Patellar mobs         STM IASTM distal quad, hamstring x10' IASTM distal quad, h/s x5'      Cupping to distal quad, scar mobilization x5' total (passive knee flex/ext, RI) Cupping to distal h/s, prox gastroc w/ passive SLR in L SL x8'  8' 4'   R knee LAD w/ belt AP glide to distal femur, 3x30  AP glide to distal femur 3x30  Tibial AP/PA glide, gr III-iV             TherEx         Knee/Hip PROM   Heel prop x5'      Active w/u 10' 10' 10'  5' 5'   Quad set, glute set [employer] set w/ OP x10    Regular x10 Quad set w/ OP x10 Quad set x10   2x10   Ankle pumps SLR         Hip abduction         Knee ext: SAQ, LAQ LAQ x30 LAQ w/ cupping       Heel slides x15 x20    x15   Knee flex str at step      x15   LLLD knee flex str at wall   Cupping to h/s w/ SLR 10' total      Leg press Double leg, 125# 3x10    Double leg, 115# x10  125# x10    Single leg, 65# 2x10 Double leg, 125# 2x10      Single leg, 75# 2x10                     Neuro Re-Ed         Step downs   6" x20  x15 x20   Step ups 8" x20  6" x20  x25 8" x20   Mini squats         Knee flexion/ext contract-relax         TG squats  Lvl 18, 5" x15       TKE 7 5# cc, 2x10    2x10 w/ superior patellar glide, tibial IR 7 5# cc, x10 w/ superior patellar, tibial IR glides       Clamshells                    Lateral stepping, 100' ea    exagg gait, 48' x2 Lateral stepping, 100' ea   Lateral stepping, 50' x5 30' x 6   Sled push         Resisted walking                           TherAct         Patient education         Stair negotiation         Car transfer                           Gait Training                                    Modalities         CP           Precautions: s/p R TKR performed 11/30/21  Past Medical History:   Diagnosis Date    Arthritis     Carpal tunnel syndrome     Corneal abrasion     last assessed - [id number]    Diabetes mellitus ([address] )     Gout     Hiatal hernia     Hyperlipidemia     Hypertension     Impaired fasting glucose     last assessed - [id number]    Kidney stone     last stone 2009    Lumbar spondylosis 10/31/2021    Lumbar spondylosis     PONV (postoperative nausea and vomiting)     Psoriasis     RA (rheumatoid arthritis) (HCC)     Sinus infection     currently under tx PO ABT etc as of 04/17/19    Viremia     Resolved - [id number]

## 2022-02-17 ENCOUNTER — OFFICE VISIT (OUTPATIENT)
Dept: PHYSICAL THERAPY | Facility: CLINIC | Age: 62
End: 2022-02-17
Payer: COMMERCIAL

## 2022-02-17 DIAGNOSIS — M25.561 CHRONIC PAIN OF RIGHT KNEE: Primary | ICD-10-CM

## 2022-02-17 DIAGNOSIS — G89.29 CHRONIC PAIN OF RIGHT KNEE: Primary | ICD-10-CM

## 2022-02-17 DIAGNOSIS — M17.11 PRIMARY OSTEOARTHRITIS OF RIGHT KNEE: ICD-10-CM

## 2022-02-17 PROCEDURE — 97140 MANUAL THERAPY 1/> REGIONS: CPT

## 2022-02-17 PROCEDURE — 97112 NEUROMUSCULAR REEDUCATION: CPT

## 2022-02-17 PROCEDURE — 97110 THERAPEUTIC EXERCISES: CPT

## 2022-02-17 NOTE — PROGRESS NOTES
Daily Note     Today's date: 2022  Patient name: Stefan Arzate  : 1960  MRN: [id number]  Referring provider: Manoj Arambula DO  Dx:   Encounter Diagnosis     ICD-10-CM    1  Chronic pain of right knee  M25 561     G89 29    2  Primary osteoarthritis of right knee  M17 11        Start Time: 0930  Stop Time: 1015  Total time in clinic (min): 45 minutes    Subjective: Patient reports improvement in knee pain since previous session, although he still experiences pain at rest along scars from prior meniscectomy  Objective: See treatment diary below      Assessment: Tolerated treatment well  Patient with improvement in gait sequencing and pain about anterior knee following cupping to hamstring and at healed scars from meniscectomy  Pt will continue to benefit from skilled PT to improve functional mobility and activity tolerance  Plan: Continue per plan of care        Date 2/7/22 2/10/22 2/15/22 2/17/22  2/3   Visit Number 27 28 29 30  26   ROM         Knee Flexion 109 109 109 110  108 AAROM   Knee Extension -5 -5 -8 -8  -5            Manual         Patellar mobs         STM IASTM distal quad, hamstring x10' IASTM distal quad, h/s x5'      Cupping to distal quad, scar mobilization x5' total (passive knee flex/ext, RI) Cupping to distal h/s, prox gastroc w/ passive SLR in L SL x8' Cupping to distal h/s, prox gastroc w/ active TKE 2x10    Cupping to distal quad/scars w/ TKE  4'   R knee LAD w/ belt AP glide to distal femur, 3x30  AP glide to distal femur 3x30               TherEx         Knee/Hip PROM   Heel prop x5'      Active w/u 10' 10' 10' 10'  5'   Quad set, glute set Quad set w/ OP x10    Regular x10 Quad set w/ OP x10 Quad set x10 Quad set x15    Supine TKE w/ assist x15  2x10   Ankle pumps         SLR         Hip abduction         Knee ext: SAQ, LAQ LAQ x30 LAQ w/ cupping  LAQ x30     Heel slides x15 x20    x15   Knee flex str at step      x15   LLLD knee flex str at wall   Cupping to h/s w/ SLR 10' total      Leg press Double leg, 125# 3x10     Double leg, 125# 2x10      Single leg, 75# 2x10                     Neuro Re-Ed         Step downs   6" x20   x20   Step ups 8" x20  6" x20   x20   Mini squats         Knee flexion/ext contract-relax         TG squats  Lvl 18, 5" x15  lvl 22, x20     TKE 7 5# cc, 2x10    2x10 w/ superior patellar glide, tibial IR 7 5# cc, x10 w/ superior patellar, tibial IR glides  7 5# cc 3x10     Clamshells                    Lateral stepping, 100' ea    exagg gait, 48' x2 Lateral stepping, 100' ea    30' x 6   Sled push         Resisted walking                           TherAct         Patient education         Stair negotiation         Car transfer                           Gait Training                                    Modalities         CP           Precautions: s/p R TKR performed 11/30/21  Past Medical History:   Diagnosis Date    Arthritis     Carpal tunnel syndrome     Corneal abrasion     last assessed - [id number]    Diabetes mellitus ([address] )     Gout     Hiatal hernia     Hyperlipidemia     Hypertension     Impaired fasting glucose     last assessed - [id number]    Kidney stone     last stone 2009    Lumbar spondylosis 10/31/2021    Lumbar spondylosis     PONV (postoperative nausea and vomiting)     Psoriasis     RA (rheumatoid arthritis) (HCC)     Sinus infection     currently under tx PO ABT etc as of 04/17/19    Viremia     Resolved - [id number]

## 2022-02-20 LAB
ALBUMIN SERPL-MCNC: 4.2 G/DL (ref 3.8–4.8)
ALBUMIN/GLOB SERPL: 1.6 {RATIO} (ref 1.2–2.2)
ALP SERPL-CCNC: 71 IU/L (ref 44–121)
ALT SERPL-CCNC: 14 IU/L (ref 0–44)
AST SERPL-CCNC: 20 IU/L (ref 0–40)
BASOPHILS # BLD AUTO: 0.1 X10E3/UL (ref 0–0.2)
BASOPHILS NFR BLD AUTO: 1 %
BILIRUB SERPL-MCNC: 0.4 MG/DL (ref 0–1.2)
BUN SERPL-MCNC: 24 MG/DL (ref 8–27)
BUN/CREAT SERPL: 23 (ref 10–24)
CALCIUM SERPL-MCNC: 9.6 MG/DL (ref 8.6–10.2)
CHLORIDE SERPL-SCNC: 102 MMOL/L (ref 96–106)
CHOLEST SERPL-MCNC: 147 MG/DL (ref 100–199)
CO2 SERPL-SCNC: 25 MMOL/L (ref 20–29)
CREAT SERPL-MCNC: 1.04 MG/DL (ref 0.76–1.27)
EOSINOPHIL # BLD AUTO: 0.3 X10E3/UL (ref 0–0.4)
EOSINOPHIL NFR BLD AUTO: 5 %
ERYTHROCYTE [DISTWIDTH] IN BLOOD BY AUTOMATED COUNT: 14.4 % (ref 11.6–15.4)
GLOBULIN SER-MCNC: 2.6 G/DL (ref 1.5–4.5)
GLUCOSE SERPL-MCNC: 75 MG/DL (ref 65–99)
HBA1C MFR BLD: 5.3 % (ref 4.8–5.6)
HCT VFR BLD AUTO: 45 % (ref 37.5–51)
HDLC SERPL-MCNC: 46 MG/DL
HGB BLD-MCNC: 14.4 G/DL (ref 13–17.7)
IMM GRANULOCYTES # BLD: 0 X10E3/UL (ref 0–0.1)
IMM GRANULOCYTES NFR BLD: 0 %
LDLC SERPL CALC-MCNC: 86 MG/DL (ref 0–99)
LYMPHOCYTES # BLD AUTO: 1.1 X10E3/UL (ref 0.7–3.1)
LYMPHOCYTES NFR BLD AUTO: 16 %
MCH RBC QN AUTO: 27.2 PG (ref 26.6–33)
MCHC RBC AUTO-ENTMCNC: 32 G/DL (ref 31.5–35.7)
MCV RBC AUTO: 85 FL (ref 79–97)
MICRODELETION SYND BLD/T FISH: NORMAL
MICRODELETION SYND BLD/T FISH: NORMAL
MONOCYTES # BLD AUTO: 0.6 X10E3/UL (ref 0.1–0.9)
MONOCYTES NFR BLD AUTO: 9 %
NEUTROPHILS # BLD AUTO: 4.5 X10E3/UL (ref 1.4–7)
NEUTROPHILS NFR BLD AUTO: 69 %
PLATELET # BLD AUTO: 299 X10E3/UL (ref 150–450)
POTASSIUM SERPL-SCNC: 3.6 MMOL/L (ref 3.5–5.2)
PROT SERPL-MCNC: 6.8 G/DL (ref 6–8.5)
RBC # BLD AUTO: 5.3 X10E6/UL (ref 4.14–5.8)
SL AMB EGFR AFRICAN AMERICAN: 88 ML/MIN/1.73
SL AMB EGFR NON AFRICAN AMERICAN: 76 ML/MIN/1.73
SODIUM SERPL-SCNC: 144 MMOL/L (ref 134–144)
TRIGL SERPL-MCNC: 78 MG/DL (ref 0–149)
WBC # BLD AUTO: 6.6 X10E3/UL (ref 3.4–10.8)

## 2022-02-20 PROCEDURE — 3044F HG A1C LEVEL LT 7.0%: CPT | Performed by: FAMILY MEDICINE

## 2022-02-22 ENCOUNTER — OFFICE VISIT (OUTPATIENT)
Dept: PHYSICAL THERAPY | Facility: CLINIC | Age: 62
End: 2022-02-22
Payer: COMMERCIAL

## 2022-02-22 DIAGNOSIS — G89.29 CHRONIC PAIN OF RIGHT KNEE: Primary | ICD-10-CM

## 2022-02-22 DIAGNOSIS — M17.11 PRIMARY OSTEOARTHRITIS OF RIGHT KNEE: ICD-10-CM

## 2022-02-22 DIAGNOSIS — M25.561 CHRONIC PAIN OF RIGHT KNEE: Primary | ICD-10-CM

## 2022-02-22 PROCEDURE — 97140 MANUAL THERAPY 1/> REGIONS: CPT

## 2022-02-22 PROCEDURE — 97112 NEUROMUSCULAR REEDUCATION: CPT

## 2022-02-22 PROCEDURE — 97110 THERAPEUTIC EXERCISES: CPT

## 2022-02-22 PROCEDURE — 3066F NEPHROPATHY DOC TX: CPT | Performed by: FAMILY MEDICINE

## 2022-02-22 NOTE — PROGRESS NOTES
Daily Note     Today's date: 2022  Patient name: Alvarado Garcia  : 1960  MRN: [id number]  Referring provider: Shilpa Busby DO  Dx:   Encounter Diagnosis     ICD-10-CM    1  Chronic pain of right knee  M25 561     G89 29    2  Primary osteoarthritis of right knee  M17 11        Start Time: 930  Stop Time: 1020  Total time in clinic (min): 50 minutes    Subjective: Patient reports improvement in right knee pain since previous session  He states he experiences greatest exacerbation of knee pain following stretching into extension  He reports improved confidence and ability to perform functional activities since start of care, including stair negotiation, walking, and squatting  Objective: See treatment diary below      Assessment: Tolerated treatment well  Patient with improvement in quality and comfort with knee flexion following cupping and scar mobilization lateral to knee cap from prior menisectomy  Pt encouraged to perform quad set w/ self-OP due to pain exacerbated with LLLD stretching, verbalized good understanding  Pt with significant improvement in ability to demonstrate good quad control with stair negotiation  Pt will continue to benefit from skilled PT to improve functional mobility and initiate return to work  Plan: Continue per plan of care        Date 2/7/22 2/10/22 2/15/22 2/17/22 2/22/22    Visit Number 27 28 29 30 31    ROM         Knee Flexion 109 109 109 110 112    Knee Extension -5 -5 -8 -8 -7             Manual         Patellar mobs         STM IASTM distal quad, hamstring x10' IASTM distal quad, h/s x5'      Cupping to distal quad, scar mobilization x5' total (passive knee flex/ext, RI) Cupping to distal h/s, prox gastroc w/ passive SLR in L SL x8' Cupping to distal h/s, prox gastroc w/ active TKE 2x10    Cupping to distal quad/scars w/ TKE Cupping to distal quad, lateral scar mobilization w/ partial range LAQ 10'    R knee LAD w/ belt AP glide to distal femur, 3x30 AP glide to distal femur 3x30               TherEx         Knee/Hip PROM   Heel prop x5'      Active w/u 10' 10' 10' 10' 10'    Quad set, glute set Quad set w/ OP x10    Regular x10 Quad set w/ OP x10 Quad set x10 Quad set x15    Supine TKE w/ assist [employer] w/ self-OP x20      Ankle pumps         SLR         Hip abduction         Knee ext: SAQ, LAQ LAQ x30 LAQ w/ cupping  LAQ x30 LAQ x30    Heel slides x15 x20       Knee flex str at step         LLLD knee flex str at wall   Cupping to h/s w/ SLR 10' total      Leg press Double leg, 125# 3x10                          Neuro Re-Ed         Step downs   6" x20  6" x20    Step ups 8" x20  6" x20  6" x20    Mini squats         Knee flexion/ext contract-relax         TG squats  Lvl 18, 5" x15  lvl 22, x20     TKE 7 5# cc, 2x10    2x10 w/ superior patellar glide, tibial IR 7 5# cc, x10 w/ superior patellar, tibial IR glides  7 5# cc 3x10     Clamshells                    Lateral stepping, 100' ea    exagg gait, 50' x2 Lateral stepping, 100' ea       Sled push         Resisted walking                           TherAct         Patient education         Stair negotiation         Car transfer                           Gait Training                                    Modalities         CP           Precautions: s/p R TKR performed 11/30/21  Past Medical History:   Diagnosis Date    Arthritis     Carpal tunnel syndrome     Corneal abrasion     last assessed - [id number]    Diabetes mellitus ([address] )     Gout     Hiatal hernia     Hyperlipidemia     Hypertension     Impaired fasting glucose     last assessed - [id number]    Kidney stone     last stone 2009    Lumbar spondylosis 10/31/2021    Lumbar spondylosis     PONV (postoperative nausea and vomiting)     Psoriasis     RA (rheumatoid arthritis) (HCC)     Sinus infection     currently under tx PO ABT etc as of 04/17/19    Viremia     Resolved - [id number]

## 2022-02-23 LAB
LEFT EYE DIABETIC RETINOPATHY: NORMAL
RIGHT EYE DIABETIC RETINOPATHY: NORMAL

## 2022-02-24 ENCOUNTER — OFFICE VISIT (OUTPATIENT)
Dept: PHYSICAL THERAPY | Facility: CLINIC | Age: 62
End: 2022-02-24
Payer: COMMERCIAL

## 2022-02-24 ENCOUNTER — OFFICE VISIT (OUTPATIENT)
Dept: FAMILY MEDICINE CLINIC | Facility: CLINIC | Age: 62
End: 2022-02-24
Payer: COMMERCIAL

## 2022-02-24 ENCOUNTER — OFFICE VISIT (OUTPATIENT)
Dept: OBGYN CLINIC | Facility: CLINIC | Age: 62
End: 2022-02-24

## 2022-02-24 ENCOUNTER — APPOINTMENT (OUTPATIENT)
Dept: RADIOLOGY | Facility: CLINIC | Age: 62
End: 2022-02-24
Payer: COMMERCIAL

## 2022-02-24 VITALS
HEIGHT: 70 IN | DIASTOLIC BLOOD PRESSURE: 72 MMHG | BODY MASS INDEX: 35.15 KG/M2 | HEART RATE: 84 BPM | WEIGHT: 245.5 LBS | SYSTOLIC BLOOD PRESSURE: 140 MMHG

## 2022-02-24 VITALS
OXYGEN SATURATION: 97 % | RESPIRATION RATE: 18 BRPM | BODY MASS INDEX: 35.07 KG/M2 | HEIGHT: 70 IN | SYSTOLIC BLOOD PRESSURE: 132 MMHG | WEIGHT: 245 LBS | DIASTOLIC BLOOD PRESSURE: 78 MMHG | HEART RATE: 97 BPM | TEMPERATURE: 97 F

## 2022-02-24 DIAGNOSIS — Z79.4 TYPE 2 DIABETES MELLITUS WITH DIABETIC NEUROPATHY, WITH LONG-TERM CURRENT USE OF INSULIN (HCC): Primary | ICD-10-CM

## 2022-02-24 DIAGNOSIS — G89.29 CHRONIC PAIN OF RIGHT KNEE: Primary | ICD-10-CM

## 2022-02-24 DIAGNOSIS — M25.561 CHRONIC PAIN OF RIGHT KNEE: Primary | ICD-10-CM

## 2022-02-24 DIAGNOSIS — E11.40 TYPE 2 DIABETES MELLITUS WITH DIABETIC NEUROPATHY, WITH LONG-TERM CURRENT USE OF INSULIN (HCC): Primary | ICD-10-CM

## 2022-02-24 DIAGNOSIS — Z96.651 AFTERCARE FOLLOWING RIGHT KNEE JOINT REPLACEMENT SURGERY: ICD-10-CM

## 2022-02-24 DIAGNOSIS — E78.2 MIXED HYPERLIPIDEMIA: ICD-10-CM

## 2022-02-24 DIAGNOSIS — M17.11 PRIMARY OSTEOARTHRITIS OF RIGHT KNEE: ICD-10-CM

## 2022-02-24 DIAGNOSIS — Z96.651 STATUS POST TOTAL RIGHT KNEE REPLACEMENT: Primary | ICD-10-CM

## 2022-02-24 DIAGNOSIS — I10 BENIGN ESSENTIAL HYPERTENSION: ICD-10-CM

## 2022-02-24 DIAGNOSIS — Z96.651 STATUS POST TOTAL RIGHT KNEE REPLACEMENT: ICD-10-CM

## 2022-02-24 DIAGNOSIS — Z47.1 AFTERCARE FOLLOWING RIGHT KNEE JOINT REPLACEMENT SURGERY: ICD-10-CM

## 2022-02-24 PROBLEM — R06.02 SHORTNESS OF BREATH: Status: RESOLVED | Noted: 2018-02-05 | Resolved: 2022-02-24

## 2022-02-24 PROBLEM — Z12.11 SCREEN FOR COLON CANCER: Status: RESOLVED | Noted: 2019-03-19 | Resolved: 2022-02-24

## 2022-02-24 PROBLEM — R06.02 EXERTIONAL SHORTNESS OF BREATH: Status: RESOLVED | Noted: 2018-02-05 | Resolved: 2022-02-24

## 2022-02-24 PROCEDURE — 73562 X-RAY EXAM OF KNEE 3: CPT

## 2022-02-24 PROCEDURE — 3008F BODY MASS INDEX DOCD: CPT | Performed by: FAMILY MEDICINE

## 2022-02-24 PROCEDURE — 97110 THERAPEUTIC EXERCISES: CPT

## 2022-02-24 PROCEDURE — 99024 POSTOP FOLLOW-UP VISIT: CPT | Performed by: ORTHOPAEDIC SURGERY

## 2022-02-24 PROCEDURE — 99214 OFFICE O/P EST MOD 30 MIN: CPT | Performed by: FAMILY MEDICINE

## 2022-02-24 PROCEDURE — 97140 MANUAL THERAPY 1/> REGIONS: CPT

## 2022-02-24 PROCEDURE — 1036F TOBACCO NON-USER: CPT | Performed by: FAMILY MEDICINE

## 2022-02-24 NOTE — ASSESSMENT & PLAN NOTE
Lab Results   Component Value Date    HGBA1C 5 3 02/19/2022     Well controlled and having low sugars  Recommended that he cut down his insulin from 45 units to 40 units

## 2022-02-24 NOTE — PROGRESS NOTES
Assessment/Plan:    1  Type 2 diabetes mellitus with diabetic neuropathy, with long-term current use of insulin [facility])  Assessment & Plan:    Lab Results   Component Value Date    HGBA1C 5 3 02/19/2022     Well controlled and having low sugars  Recommended that he cut down his insulin from 45 units to 40 units    Orders:  -     Hemoglobin A1C; Future; Expected date: 08/08/2022  -     Hemoglobin A1C    2  Mixed hyperlipidemia  Assessment & Plan:  Stable   Continue atorvastatin 20 mg daily    Orders:  -     Comprehensive metabolic panel; Future; Expected date: 08/08/2022  -     Lipid Panel with Direct LDL reflex; Future; Expected date: 08/08/2022  -     Comprehensive metabolic panel  -     Lipid Panel with Direct LDL reflex    3  Benign essential hypertension  Assessment & Plan:  Well controlled  Continue hydrochlorothiazide 25 mg daily and losartan 100 mg daily    Orders:  -     CBC; Future; Expected date: 08/08/2022  -     Comprehensive metabolic panel; Future; Expected date: 08/08/2022  -     Lipid Panel with Direct LDL reflex; Future; Expected date: 08/08/2022  -     CBC  -     Comprehensive metabolic panel  -     Lipid Panel with Direct LDL reflex          There are no Patient Instructions on file for this visit  Return in about 6 months (around 8/24/2022)  Subjective:      Patient ID: Unknown Bailee is a 59 y o  male  Chief Complaint   Patient presents with    Follow-up     3mo f/u  Jayla Prude       He is needing to eat int he evening because his sugars are dropping to 65  He went to the eye doctor yesterday         The following portions of the patient's history were reviewed and updated as appropriate: allergies, current medications, past family history, past medical history, past social history, past surgical history and problem list     Review of Systems      Current Outpatient Medications   Medication Sig Dispense Refill    ALPRAZolam (XANAX) 0 25 mg tablet Take 1 tablet (0 25 mg total) by mouth daily at bedtime as needed for anxiety 14 tablet 0    amLODIPine (NORVASC) 5 mg tablet TAKE ONE TABLET BY MOUTH EVERY DAY (Patient taking differently: daily at bedtime  ) 30 tablet 5    atorvastatin (LIPITOR) 20 mg tablet TAKE ONE TABLET BY MOUTH EVERY DAY 90 tablet 1    B-D ULTRAFINE III SHORT PEN 31G X 8 MM MISC USE AS DIRECTED 200 each 1    Blood Glucose Monitoring Suppl (OneTouch Verio) w/Device KIT Test blood sugar twice a day 1 kit 0    Continuous Blood Gluc Sensor (FreeStyle Ulysses 14 Day Sensor) MISC Change sensor every 14 days 2 each 12    gabapentin (Neurontin) 100 mg capsule Take 1 at bedtime nightly for 1 week then increase to 2 at bedtime nightly for 1 week then increase to 3 at bedtime nightly thereafter  90 capsule 3    glipiZIDE (GLUCOTROL XL) 10 mg 24 hr tablet TAKE TWO TABLETS BY MOUTH EVERY DAY AT BEDTIME (Patient taking differently: 10 mg 2 (two) times a day  ) 180 tablet 1    glucose blood (OneTouch Verio) test strip Test blood sugar twice a day 200 each 3    hydrOXYzine pamoate (VISTARIL) 25 mg capsule TAKE ONE CAPSULE BY MOUTH EVERY DAY AS NEEDED FOR INSOMNIA 30 capsule 3    insulin glargine (Basaglar KwikPen) 100 units/mL injection pen Inject 65 Units under the skin daily (Patient taking differently: Inject 45 Units under the skin daily  ) 15 mL 1    losartan (COZAAR) 100 MG tablet TAKE ONE TABLET BY MOUTH EVERY DAY 90 tablet 1    metFORMIN (GLUCOPHAGE) 500 mg tablet Take 2 tablets (1,000 mg total) by mouth 2 (two) times a day with meals 360 tablet 3    OneTouch Delica Lancets 70J MISC Test blood sugar twice a day 200 each 3    sulfaSALAzine (AZULFIDINE) 500 mg tablet Take 1 tablet twice daily for 3 weeks then increase to 1 tablet 3 times daily for 3 weeks then increase to 2 tablets twice daily thereafter and stay on that   (Patient taking differently: Take 1 tablet twice daily for 3 weeks then increase to 1 tablet 3 times daily for 3 weeks then increase to 2 tablets twice daily thereafter and stay on that note- pt not to start until 3-4- weeks post op per Dr Martinez note ) 120 tablet 2    aspirin 325 mg tablet Take 1 tablet (325 mg total) by mouth 2 (two) times a day (Patient not taking: Reported on 1/13/2022 ) 84 tablet 0    docusate sodium (COLACE) 100 mg capsule Take 1 capsule (100 mg total) by mouth 2 (two) times a day (Patient not taking: Reported on 12/16/2021 ) 60 capsule 0    hydrochlorothiazide (HYDRODIURIL) 25 mg tablet Take 1 tablet (25 mg total) by mouth daily 90 tablet 3     No current facility-administered medications for this visit  Objective:    /78   Pulse 97   Temp (!) 97 °F (36 1 °C)   Resp 18   Ht 5' 10" (1 778 m)   Wt 111 kg (245 lb)   SpO2 97%   BMI 35 15 kg/m²        Physical Exam  Vitals and nursing note reviewed  Constitutional:       Appearance: He is well-developed  HENT:      Head: Normocephalic and atraumatic  Right Ear: Tympanic membrane and external ear normal       Left Ear: Tympanic membrane and external ear normal    Cardiovascular:      Rate and Rhythm: Normal rate and regular rhythm  Heart sounds: Normal heart sounds  No murmur heard  Pulmonary:      Effort: Pulmonary effort is normal  No respiratory distress  Breath sounds: Normal breath sounds  No wheezing or rales  Musculoskeletal:      Right lower leg: No edema  Left lower leg: No edema                  Bernardo Sanchez DO

## 2022-02-24 NOTE — PROGRESS NOTES
Assessment/Plan:  1  Status post total right knee replacement  XR knee 3 vw right non injury   2  Aftercare following right knee joint replacement surgery       Moraima Zurita is a very pleasant 57-year-old gentleman presenting today for follow-up 3 months after undergoing a robotic assisted right total knee arthroplasty  He has done very well in his recovery  The prosthesis remains stable on imaging and exam   He does lack approximately 5° of extension, but this still marks a significant improvement in motion from his preoperative flexion contracture due to his posttraumatic osteoarthritis  He has done very well to regain flexion and we feel that his current motion would allow him to perform most activities of daily living and enjoyment without any restriction  We feel that it is reasonable for him to transition to his home exercise program at this point  He may now return to work without any restrictions  We did discuss the need for antibiotic prophylaxis before any dental or GI procedures, and he will notify our office of any upcoming appointments  We will plan to see him back in 9 months, which will estella the anniversary of his surgery  He will need x-rays on arrival of his right knee  He expressed understanding all his questions were addressed today    Subjective: 3 months s/p robotic assisted right TKA    Patient ID: Carlene Jose is a 59 y o  male  Moraima Zurita is a pleasant 57-year-old gentleman presenting today for follow-up 3 months after the aforementioned surgery  He has continued working diligently with physical therapy an effort to regain range of motion  He does continue to lack about 5-6 degrees of extension, but functionally is able to perform activities of daily living  He is pleased with the flexion that he has gotten  He has noted some swelling in the knee, but it is not significantly limiting  He is looking forward to returning to work in the near future    He denies any new injuries      Review of Systems   Constitutional: Negative  HENT: Negative  Eyes: Negative  Respiratory: Negative  Cardiovascular: Negative  Gastrointestinal: Negative  Endocrine: Negative  Genitourinary: Negative  Musculoskeletal: Negative  Skin: Negative  Allergic/Immunologic: Negative  Neurological: Negative  Hematological: Negative  Psychiatric/Behavioral: Negative  Past Medical History:   Diagnosis Date    Arthritis     Carpal tunnel syndrome     Chronic kidney disease     Stage 2 CKD    Corneal abrasion     last assessed - [id number]    Diabetes mellitus ([address] )     Gout     Hiatal hernia     Hyperlipidemia     Hypertension     Impaired fasting glucose     last assessed - [id number]    Kidney stone     last stone 2009    Lumbar spondylosis 10/31/2021    Lumbar spondylosis     PONV (postoperative nausea and vomiting)     Psoriasis     RA (rheumatoid arthritis) ([address] )     Sinus infection     currently under tx PO ABT etc as of 04/17/19    Viremia     Resolved - [id number]       Past Surgical History:   Procedure Laterality Date    ANKLE SURGERY Right     tendon arthrotomy    ARTHROTOMY ANKLE      BACK SURGERY  2014    lumbar laminectomy    COLONOSCOPY      COLONOSCOPY N/A 4/19/2019    Procedure: COLONOSCOPY;  Surgeon: Filiberto Carr MD;  Location: [place] [address] [facility];   Service: Gastroenterology    FINGER SURGERY Bilateral     every finger contracture release over a 10 year period trigger finger    INCISION TENDON SHEATH      of a finger    KNEE SURGERY Right     x2 arthrotomy    LA TOTAL KNEE ARTHROPLASTY Right 11/30/2021    Procedure: ARTHROPLASTY KNEE TOTAL W ROBOT;  Surgeon: Gustavo Barnes DO;  Location: [facility];  Service: Orthopedics    SHOULDER SURGERY Left     x1 staples, tendon repair secondary to many spontaneous dislocations       Family History   Problem Relation Age of Onset    Hypertension Mother    Mavis Cousin Arthritis Mother     Hyperlipidemia Mother     Lung cancer Mother     Stroke Maternal Grandfather     Diabetes Cousin     COPD Father     Depression Family     Diabetes Family     No Known Problems Sister     No Known Problems Brother     No Known Problems Maternal Aunt     No Known Problems Maternal Uncle     No Known Problems Paternal Aunt     No Known Problems Paternal Uncle     No Known Problems Maternal Grandmother     No Known Problems Paternal Grandmother     No Known Problems Paternal Grandfather        Social History     Occupational History    Not on file   Tobacco Use    Smoking status: Former Smoker     Quit date:      Years since quittin 1    Smokeless tobacco: Never Used    Tobacco comment: Former smoker   Vaping Use    Vaping Use: Never used   Substance and Sexual Activity    Alcohol use: Yes     Comment: rare, once a month     Drug use: No    Sexual activity: Not on file         Current Outpatient Medications:     ALPRAZolam (XANAX) 0 25 mg tablet, Take 1 tablet (0 25 mg total) by mouth daily at bedtime as needed for anxiety, Disp: 14 tablet, Rfl: 0    amLODIPine (NORVASC) 5 mg tablet, TAKE ONE TABLET BY MOUTH EVERY DAY (Patient taking differently: daily at bedtime  ), Disp: 30 tablet, Rfl: 5    atorvastatin (LIPITOR) 20 mg tablet, TAKE ONE TABLET BY MOUTH EVERY DAY, Disp: 90 tablet, Rfl: 1    B-D ULTRAFINE III SHORT PEN 31G X 8 MM MISC, USE AS DIRECTED, Disp: 200 each, Rfl: 1    Blood Glucose Monitoring Suppl (OneTouch Verio) w/Device KIT, Test blood sugar twice a day, Disp: 1 kit, Rfl: 0    Continuous Blood Gluc Sensor (FreeStyle Ulysses 14 Day Sensor) MISC, Change sensor every 14 days, Disp: 2 each, Rfl: 12    gabapentin (Neurontin) 100 mg capsule, Take 1 at bedtime nightly for 1 week then increase to 2 at bedtime nightly for 1 week then increase to 3 at bedtime nightly thereafter , Disp: 90 capsule, Rfl: 3    glipiZIDE (GLUCOTROL XL) 10 mg 24 hr tablet, TAKE TWO TABLETS BY MOUTH EVERY DAY AT BEDTIME (Patient taking differently: 10 mg 2 (two) times a day  ), Disp: 180 tablet, Rfl: 1    glucose blood (OneTouch Verio) test strip, Test blood sugar twice a day, Disp: 200 each, Rfl: 3    hydrochlorothiazide (HYDRODIURIL) 25 mg tablet, Take 1 tablet (25 mg total) by mouth daily, Disp: 90 tablet, Rfl: 3    hydrOXYzine pamoate (VISTARIL) 25 mg capsule, TAKE ONE CAPSULE BY MOUTH EVERY DAY AS NEEDED FOR INSOMNIA, Disp: 30 capsule, Rfl: 3    insulin glargine (Basaglar KwikPen) 100 units/mL injection pen, Inject 65 Units under the skin daily (Patient taking differently: Inject 45 Units under the skin daily  ), Disp: 15 mL, Rfl: 1    losartan (COZAAR) 100 MG tablet, TAKE ONE TABLET BY MOUTH EVERY DAY, Disp: 90 tablet, Rfl: 1    metFORMIN (GLUCOPHAGE) 500 mg tablet, Take 2 tablets (1,000 mg total) by mouth 2 (two) times a day with meals, Disp: 360 tablet, Rfl: 3    OneTouch Delica Lancets 54M MISC, Test blood sugar twice a day, Disp: 200 each, Rfl: 3    sulfaSALAzine (AZULFIDINE) 500 mg tablet, Take 1 tablet twice daily for 3 weeks then increase to 1 tablet 3 times daily for 3 weeks then increase to 2 tablets twice daily thereafter and stay on that  (Patient taking differently: Take 1 tablet twice daily for 3 weeks then increase to 1 tablet 3 times daily for 3 weeks then increase to 2 tablets twice daily thereafter and stay on that note- pt not to start until 3-4- weeks post op per Dr Martinez note ), Disp: 120 tablet, Rfl: 2    Allergies   Allergen Reactions    Latex Rash     At dentist, lips swollen     Codeine Nausea Only     Reaction Date: [id number]; Objective:  Vitals:    02/24/22 1034   BP: 140/72   Pulse: 84       Body mass index is 35 23 kg/m²  Right Knee Exam     Muscle Strength   The patient has normal right knee strength  Tenderness   The patient is experiencing no tenderness  Range of Motion   Extension: abnormal Right knee extension: 5  Flexion: normal Right knee flexion: 125  Tests   Varus: negative Valgus: negative  Drawer:  Anterior - negative      Patellar apprehension: negative    Other   Erythema: absent  Scars: present (well healed anterior operative scar)  Sensation: normal  Pulse: present  Swelling: none  Effusion: no effusion present    Comments:  Stable at 5, 30 and 90 degrees  Neurovascularly in tact distally  No warmth or erythema  Patella tracks flat and midline without crepitance  Minimal steppage gait without assistive device  Firm endpoint with extension, soft endpoint with flexion            Observations     Right Knee   Negative for effusion  Physical Exam  Vitals and nursing note reviewed  Constitutional:       Appearance: He is well-developed  Comments: Body mass index is 35 23 kg/m²  HENT:      Head: Normocephalic and atraumatic  Right Ear: External ear normal       Left Ear: External ear normal    Cardiovascular:      Rate and Rhythm: Normal rate  Pulmonary:      Effort: Pulmonary effort is normal    Abdominal:      Palpations: Abdomen is soft  Musculoskeletal:      Cervical back: Normal range of motion  Right knee: No effusion  Comments: See ortho exam   Skin:     General: Skin is warm and dry  Neurological:      General: No focal deficit present  Mental Status: He is alert and oriented to person, place, and time  Mental status is at baseline  Psychiatric:         Mood and Affect: Mood normal          Behavior: Behavior normal          Thought Content: Thought content normal          Judgment: Judgment normal        I have personally reviewed pertinent films in PACS of the x-rays taken today of his right knee compared them to previous films  There has been no change in alignment of the total knee prosthesis which is well aligned and well cemented with no signs of loosening, periprosthetic fracture, or other interval complication

## 2022-02-24 NOTE — LETTER
February 24, 2022     Patient: Brigida Pearson   YOB: 1960   Date of Visit: 2/24/2022       To Whom it May Concern:    Francisca Perfecto is under my professional care  He was seen in my office on 2/24/2022  He may return to work on Monday, 2/28/22 without restrictions       If you have any questions or concerns, please don't hesitate to call           Sincerely,          Keyon Acharya DO        CC: No Recipients

## 2022-02-24 NOTE — PROGRESS NOTES
Daily Note / Potential Discharge Note    Today's date: 2022  Patient name: Angie Devi  : 1960  MRN: [id number]  Referring provider: Vickey Ag DO  Dx:   Encounter Diagnosis     ICD-10-CM    1  Chronic pain of right knee  M25 561     G89 29    2  Primary osteoarthritis of right knee  M17 11        Start Time: 1115  Stop Time: 1200  Total time in clinic (min): 45 minutes    Subjective: Patient saw surgeon prior to today's session, who I pleased with his strength and range of motion, and cleared him to return to work  Patient reports significant improvement in functional mobility, including with stairs, squatting, driving, and ambulation  He states that he plans to return to work this upcoming week, which involves a 2 hour drive each direction  He states that he is unsure that his new schedule will allow him to continue with PT services  Objective: See treatment diary below      Assessment: Tolerated treatment well  Patient with continued improvement in quality of right knee ROM and tolerance to activity following MT, particularly with cupping over prior menisectomy scars  Patient was heavily education on importance of breaks during trips to/from work due to possibility of knee stiffness, verbalized good understanding  Patient continued improvement and tolerance to standing progressions  Patient's PT episode will remain open for approx 4 weeks in the event of exacerbation of pain and/or stiffness upon return to work  Pt verbalized good understanding of updates to PT POC  Plan: Continue per plan of care  Potential d/c pending tolerance to return to work       Date 2/7/22 2/10/22 2/15/22 2/17/22 2/22/22 2/24/22   Visit Number [phone] 29 30 31 32   ROM         Knee Flexion 109 109 109 110 112 112   Knee Extension -5 -5 -8 -8 -7 -7            Manual         Patellar mobs         STM IASTM distal quad, hamstring x10' IASTM distal quad, h/s x5'      Cupping to distal quad, scar mobilization x5' total (passive knee flex/ext, RI) Cupping to distal h/s, prox gastroc w/ passive SLR in L SL x8' Cupping to distal h/s, prox gastroc w/ active TKE 2x10    Cupping to distal quad/scars w/ TKE Cupping to distal quad, lateral scar mobilization w/ partial range LAQ 10' Cupping to distal quad, scar mobilization, ITB x10'   R knee LAD w/ belt AP glide to distal femur, 3x30  AP glide to distal femur 3x30               TherEx         Knee/Hip PROM   Heel prop x5'      Active w/u 10' 10' 10' 10' 10' 5'   Quad set, glute set Quad set w/ OP x10    Regular x10 Quad set w/ OP x10 Quad set x10 Quad set x15    Supine TKE w/ assist  set w/ self-OP x20   Quad set w/ self-OP x20   Ankle pumps         SLR         Hip abduction         Knee ext: SAQ, LAQ LAQ x30 LAQ w/ cupping  LAQ x30 LAQ x30 LAQ w/ cupping x30   Heel slides x15 x20       Knee flex str at step         LLLD knee flex str at wall   Cupping to h/s w/ SLR 10' total      Leg press Double leg, 125# 3x10                          Neuro Re-Ed         Step downs   6" x20  6" x20 6" x20   Step ups 8" x20  6" x20  6" x20 6" x20   Mini squats         Knee flexion/ext contract-relax         TG squats  Lvl 18, 5" x15  lvl 22, x20     TKE 7 5# cc, 2x10    2x10 w/ superior patellar glide, tibial IR 7 5# cc, x10 w/ superior patellar, tibial IR glides  7 5# cc 3x10     Clamshells                    Lateral stepping, 100' ea    exagg gait, 50' x2 Lateral stepping, 100' ea       Sled push         Resisted walking                           TherAct         Patient education         Stair negotiation         Car transfer                           Gait Training                                    Modalities         CP           Precautions: s/p R TKR performed 11/30/21  Past Medical History:   Diagnosis Date    Arthritis     Carpal tunnel syndrome     Corneal abrasion     last assessed - [id number]    Diabetes mellitus ([address] )     Gout     Hiatal hernia     Hyperlipidemia     Hypertension     Impaired fasting glucose     last assessed - [id number]    Kidney stone     last stone 2009    Lumbar spondylosis 10/31/2021    Lumbar spondylosis     PONV (postoperative nausea and vomiting)     Psoriasis     RA [facility]oid [facility]) [facility]     Sinus infection     currently under tx PO ABT etc as of 04/17/19    Viremia     Resolved - [id number]

## 2022-02-25 ENCOUNTER — HOSPITAL ENCOUNTER (OUTPATIENT)
Dept: RADIOLOGY | Facility: HOSPITAL | Age: 62
Discharge: HOME | End: 2022-02-25
Payer: COMMERCIAL

## 2022-02-25 DIAGNOSIS — E11.49 DIABETES MELLITUS TYPE 2 WITH NEUROLOGICAL MANIFESTATIONS (HCC): ICD-10-CM

## 2022-02-25 DIAGNOSIS — E11.40 TYPE 2 DIABETES MELLITUS WITH DIABETIC NEUROPATHY, WITH LONG-TERM CURRENT USE OF INSULIN (HCC): ICD-10-CM

## 2022-02-25 DIAGNOSIS — I10 BENIGN ESSENTIAL HYPERTENSION: ICD-10-CM

## 2022-02-25 DIAGNOSIS — Z79.4 TYPE 2 DIABETES MELLITUS WITH DIABETIC NEUROPATHY, WITH LONG-TERM CURRENT USE OF INSULIN (HCC): ICD-10-CM

## 2022-02-25 PROCEDURE — 76770 US EXAM ABDO BACK WALL COMP: CPT

## 2022-02-25 RX ORDER — GLIPIZIDE 10 MG/1
TABLET, FILM COATED, EXTENDED RELEASE ORAL
Qty: 180 TABLET | Refills: 1 | Status: SHIPPED | OUTPATIENT
Start: 2022-02-25

## 2022-02-25 RX ORDER — HYDROCHLOROTHIAZIDE 25 MG/1
TABLET ORAL
Qty: 90 TABLET | Refills: 3 | Status: SHIPPED | OUTPATIENT
Start: 2022-02-25 | End: 2022-03-15 | Stop reason: ALTCHOICE

## 2022-03-01 ENCOUNTER — TELEPHONE (OUTPATIENT)
Dept: ADMINISTRATIVE | Facility: OTHER | Age: 62
End: 2022-03-01

## 2022-03-01 NOTE — TELEPHONE ENCOUNTER
----- Message from Onelia Cloud DO sent at 2/24/2022  8:54 AM EST -----  02/24/22 8:54 AM    Hello, our patient Bridger Jean has had Diabetic Eye Exam completed/performed  Please assist in updating the patient chart by making an External outreach to [facility] facility located in [place], [place] on [address]  The date of service is 2//23/22      Thank you,  Onelia Cloud DO  [facility]

## 2022-03-01 NOTE — TELEPHONE ENCOUNTER
Upon review of the In Basket request and the patient's chart, initial outreach has been made via fax, please see Contacts section for details        [phone]    Thank you  Aline Perry MA

## 2022-03-01 NOTE — LETTER
Diabetic Eye Exam Form    Date Requested: 22  Patient: Melbourne Councilman  Patient : 1960   Referring Provider: Leida Arambula DO      DIABETIC Eye Exam Date _______________________________    Type of Exam MUST be documented for Diabetic Eye Exams  Please CHECK ONE  Dilated Retinal Exam      Optomap-Iris Exam      Fundus Photography Completed       Left Eye - Please check Retinopathy or No Retinopathy AND Type      Exam did show retinopathy    Exam did not show retinopathy         Mild     Proliferative           Moderate    Severe            None         Right Eye - Please check Retinopathy or No Retinopathy AND Type      Exam did show retinopathy    Exam did not show retinopathy         Mild     Proliferative        Moderate    Severe        None       Comments __________________________________________________________    Practice Providing Exam ______________________________________________    Exam Performed By (print name) _______________________________________      Provider Signature ___________________________________________________    These reports are needed for  compliance  Please fax this completed form and a copy of the Diabetic Eye Exam report to our office located at [place] [address] as soon as possible via [phone] yasmin Asif: Phone [phone]    We thank you for your assistance in treating our mutual patient

## 2022-03-09 ENCOUNTER — TELEPHONE (OUTPATIENT)
Dept: FAMILY MEDICINE CLINIC | Facility: CLINIC | Age: 62
End: 2022-03-09

## 2022-03-09 NOTE — TELEPHONE ENCOUNTER
Pt was just seen, wants to discuss medication adjustments  Change from Losartan and amlodipine  Would tito to discuss the list Would like a call back from Dr Tyler Rosa

## 2022-03-10 NOTE — TELEPHONE ENCOUNTER
As a final attempt, a third outreach has been made via telephone call  Please see Contacts section for details  This encounter will be closed and completed by end of day  Should we receive the requested information because of previous outreach attempts, the requested patient's chart will be updated appropriately        Spoke to Chad Lorenz - sending over    Thank you  [employer] [employer], [place]

## 2022-03-10 NOTE — TELEPHONE ENCOUNTER
3/10/2022 8:54 AM returned call to [address]  Left message on his voicemail to call the office     Isha Mcmullen DO

## 2022-03-11 NOTE — TELEPHONE ENCOUNTER
3/11/2022 12:21 PM returned call to Miri Manning  I see he is coming in next week    Left message on his voicemail to call the office     Dorothea Oakley, DO

## 2022-03-14 NOTE — TELEPHONE ENCOUNTER
Upon review of the In Basket request we were able to locate, review, and update the patient chart as requested for Diabetic Eye Exam     Any additional questions or concerns should be emailed to the Practice Liaisons via [email or web address] email, please do not reply via In Basket      Thank you  Ban Anguiano MA

## 2022-03-15 ENCOUNTER — OFFICE VISIT (OUTPATIENT)
Dept: FAMILY MEDICINE CLINIC | Facility: CLINIC | Age: 62
End: 2022-03-15
Payer: COMMERCIAL

## 2022-03-15 VITALS
DIASTOLIC BLOOD PRESSURE: 76 MMHG | TEMPERATURE: 97.4 F | HEART RATE: 90 BPM | OXYGEN SATURATION: 99 % | HEIGHT: 70 IN | SYSTOLIC BLOOD PRESSURE: 126 MMHG | WEIGHT: 243 LBS | BODY MASS INDEX: 34.79 KG/M2 | RESPIRATION RATE: 18 BRPM

## 2022-03-15 DIAGNOSIS — I10 BENIGN ESSENTIAL HYPERTENSION: Primary | ICD-10-CM

## 2022-03-15 PROCEDURE — 4010F ACE/ARB THERAPY RXD/TAKEN: CPT | Performed by: FAMILY MEDICINE

## 2022-03-15 PROCEDURE — 1036F TOBACCO NON-USER: CPT | Performed by: FAMILY MEDICINE

## 2022-03-15 PROCEDURE — 3008F BODY MASS INDEX DOCD: CPT | Performed by: FAMILY MEDICINE

## 2022-03-15 PROCEDURE — 3725F SCREEN DEPRESSION PERFORMED: CPT | Performed by: FAMILY MEDICINE

## 2022-03-15 PROCEDURE — 3074F SYST BP LT 130 MM HG: CPT | Performed by: FAMILY MEDICINE

## 2022-03-15 PROCEDURE — 99213 OFFICE O/P EST LOW 20 MIN: CPT | Performed by: FAMILY MEDICINE

## 2022-03-15 RX ORDER — ENALAPRIL MALEATE 20 MG/1
40 TABLET ORAL DAILY
Qty: 60 TABLET | Refills: 3 | Status: SHIPPED | OUTPATIENT
Start: 2022-03-15 | End: 2022-06-16 | Stop reason: ALTCHOICE

## 2022-03-15 NOTE — ASSESSMENT & PLAN NOTE
He undergoing ongoing treatment for his erectile dysfunction  His specialist recommended changing his ARB to and ACE inhibitor to help the treatment  We also discussed that his HCTZ may be interfering as well  Since he has been losing weight and his pressure is well controlled will stop the HCTZ    Losartan 100 mg changed to enalapril 40 mg a day    Will recheck pressure in 1 month

## 2022-03-15 NOTE — TELEPHONE ENCOUNTER
3/15/2022 spoke with patient this morning during his appointment    Message complete  George Sorenson, DO

## 2022-03-15 NOTE — PROGRESS NOTES
Assessment/Plan:    1  Benign essential hypertension  Assessment & Plan:  He undergoing ongoing treatment for his erectile dysfunction  His specialist recommended changing his ARB to and ACE inhibitor to help the treatment  We also discussed that his HCTZ may be interfering as well  Since he has been losing weight and his pressure is well controlled will stop the HCTZ  Losartan 100 mg changed to enalapril 40 mg a day    Will recheck pressure in 1 month    Orders:  -     enalapril (VASOTEC) 20 mg tablet; Take 2 tablets (40 mg total) by mouth daily         Patient Instructions   Stop hydrochlorothiazide and losartan   Start enalapril 40 mg a day        Return in about 1 month (around 4/15/2022) for Blood pressure check  needs nursing appointment for 1st Shingrix   Subjective:      Patient ID: Yanick Lopez is a 59 y o  male  Chief Complaint   Patient presents with    Follow-up     medication review  Carin Stacy does not wish to take off shoes and socks  Knee replacement        He has been ongoing issues with Erectile dysfunction with his diabetes    He was seeing Dr Shaan Teresa and he was going to a specialist        The following portions of the patient's history were reviewed and updated as appropriate:  past social history    Review of Systems      Current Outpatient Medications   Medication Sig Dispense Refill    ALPRAZolam (XANAX) 0 25 mg tablet Take 1 tablet (0 25 mg total) by mouth daily at bedtime as needed for anxiety 14 tablet 0    amLODIPine (NORVASC) 5 mg tablet TAKE ONE TABLET BY MOUTH EVERY DAY (Patient taking differently: daily at bedtime  ) 30 tablet 5    atorvastatin (LIPITOR) 20 mg tablet TAKE ONE TABLET BY MOUTH EVERY DAY 90 tablet 1    B-D ULTRAFINE III SHORT PEN 31G X 8 MM MISC USE AS DIRECTED 200 each 1    Blood Glucose Monitoring Suppl (OneTouch Verio) w/Device KIT Test blood sugar twice a day 1 kit 0    Continuous Blood Gluc Sensor (FreeStyle Ulysses 14 Day Sensor) MISC Change sensor every 14 days 2 each 12    gabapentin (Neurontin) 100 mg capsule Take 1 at bedtime nightly for 1 week then increase to 2 at bedtime nightly for 1 week then increase to 3 at bedtime nightly thereafter  90 capsule 3    glipiZIDE (GLUCOTROL XL) 10 mg 24 hr tablet TAKE TWO TABLETS BY MOUTH EVERY DAY AT BEDTIME 180 tablet 1    glucose blood (OneTouch Verio) test strip Test blood sugar twice a day 200 each 3    hydrOXYzine pamoate (VISTARIL) 25 mg capsule TAKE ONE CAPSULE BY MOUTH EVERY DAY AS NEEDED FOR INSOMNIA 30 capsule 3    insulin glargine (Basaglar KwikPen) 100 units/mL injection pen Inject 65 Units under the skin daily (Patient taking differently: Inject 45 Units under the skin daily  ) 15 mL 1    metFORMIN (GLUCOPHAGE) 500 mg tablet Take 2 tablets (1,000 mg total) by mouth 2 (two) times a day with meals 360 tablet 3    OneTouch Delica Lancets 11M MISC Test blood sugar twice a day 200 each 3    sulfaSALAzine (AZULFIDINE) 500 mg tablet Take 1 tablet twice daily for 3 weeks then increase to 1 tablet 3 times daily for 3 weeks then increase to 2 tablets twice daily thereafter and stay on that  (Patient taking differently: Take 1 tablet twice daily for 3 weeks then increase to 1 tablet 3 times daily for 3 weeks then increase to 2 tablets twice daily thereafter and stay on that note- pt not to start until 3-4- weeks post op per Dr Martinez note ) 120 tablet 2    enalapril (VASOTEC) 20 mg tablet Take 2 tablets (40 mg total) by mouth daily 60 tablet 3     No current facility-administered medications for this visit  Objective:    /76   Pulse 90   Temp (!) 97 4 °F (36 3 °C)   Resp 18   Ht 5' 10" (1 778 m)   Wt 110 kg (243 lb)   SpO2 99%   BMI 34 87 kg/m²      Physical Exam  Vitals and nursing note reviewed  Constitutional:       Appearance: He is well-developed  HENT:      Head: Normocephalic and atraumatic        Right Ear: Tympanic membrane and external ear normal       Left Ear: Tympanic membrane and external ear normal    Cardiovascular:      Rate and Rhythm: Normal rate and regular rhythm  Heart sounds: Normal heart sounds  No murmur heard  Pulmonary:      Effort: Pulmonary effort is normal  No respiratory distress  Breath sounds: Normal breath sounds  No wheezing or rales               Spring Song, DO

## 2022-04-05 ENCOUNTER — RA CDI HCC (OUTPATIENT)
Dept: OTHER | Facility: HOSPITAL | Age: 62
End: 2022-04-05

## 2022-04-05 DIAGNOSIS — E78.2 MIXED HYPERLIPIDEMIA: ICD-10-CM

## 2022-04-05 RX ORDER — ATORVASTATIN CALCIUM 20 MG/1
TABLET, FILM COATED ORAL
Qty: 90 TABLET | Refills: 1 | Status: SHIPPED | OUTPATIENT
Start: 2022-04-05

## 2022-04-05 NOTE — PROGRESS NOTES
[facility]  coding opportunities     Provider accept, dx added to problem list     Chart Reviewed number of suggestions sent to Provider: 1   Based on clinical documentation indicated in your record, it appears that the patient may have the following condition;    E11 319 T2DM with unspecified diabetic retinopathy without macular edema ([facility] )  * Please see eye exam dated 2/23/22     If this is correct, please document and assess at your next visit 4/12/22    Patients Insurance        Commercial Insurance: [employer]

## 2022-04-06 PROBLEM — E11.319 TYPE 2 DIABETES MELLITUS WITH DIABETIC RETINOPATHY (HCC): Status: ACTIVE | Noted: 2021-11-19

## 2022-04-11 PROBLEM — N52.8 OTHER MALE ERECTILE DYSFUNCTION: Status: ACTIVE | Noted: 2022-04-11

## 2022-04-11 PROCEDURE — 3066F NEPHROPATHY DOC TX: CPT | Performed by: FAMILY MEDICINE

## 2022-04-12 ENCOUNTER — TELEPHONE (OUTPATIENT)
Dept: NEPHROLOGY | Facility: CLINIC | Age: 62
End: 2022-04-12

## 2022-04-12 ENCOUNTER — OFFICE VISIT (OUTPATIENT)
Dept: FAMILY MEDICINE CLINIC | Facility: CLINIC | Age: 62
End: 2022-04-12
Payer: COMMERCIAL

## 2022-04-12 VITALS
HEART RATE: 71 BPM | HEIGHT: 70 IN | BODY MASS INDEX: 35.07 KG/M2 | WEIGHT: 245 LBS | DIASTOLIC BLOOD PRESSURE: 70 MMHG | RESPIRATION RATE: 18 BRPM | TEMPERATURE: 98.4 F | OXYGEN SATURATION: 99 % | SYSTOLIC BLOOD PRESSURE: 140 MMHG

## 2022-04-12 DIAGNOSIS — N18.2 TYPE 2 DIABETES MELLITUS WITH STAGE 2 CHRONIC KIDNEY DISEASE AND HYPERTENSION (HCC): Primary | ICD-10-CM

## 2022-04-12 DIAGNOSIS — E11.22 TYPE 2 DIABETES MELLITUS WITH STAGE 2 CHRONIC KIDNEY DISEASE AND HYPERTENSION (HCC): Primary | ICD-10-CM

## 2022-04-12 DIAGNOSIS — I10 BENIGN ESSENTIAL HYPERTENSION: ICD-10-CM

## 2022-04-12 DIAGNOSIS — E78.2 MIXED HYPERLIPIDEMIA: ICD-10-CM

## 2022-04-12 DIAGNOSIS — I12.9 TYPE 2 DIABETES MELLITUS WITH STAGE 2 CHRONIC KIDNEY DISEASE AND HYPERTENSION (HCC): Primary | ICD-10-CM

## 2022-04-12 DIAGNOSIS — M06.09 SERONEGATIVE ARTHROPATHY OF MULTIPLE SITES (HCC): ICD-10-CM

## 2022-04-12 PROCEDURE — 3077F SYST BP >= 140 MM HG: CPT | Performed by: FAMILY MEDICINE

## 2022-04-12 PROCEDURE — 3008F BODY MASS INDEX DOCD: CPT | Performed by: FAMILY MEDICINE

## 2022-04-12 PROCEDURE — 1036F TOBACCO NON-USER: CPT | Performed by: FAMILY MEDICINE

## 2022-04-12 PROCEDURE — 3078F DIAST BP <80 MM HG: CPT | Performed by: FAMILY MEDICINE

## 2022-04-12 PROCEDURE — 99214 OFFICE O/P EST MOD 30 MIN: CPT | Performed by: FAMILY MEDICINE

## 2022-04-12 PROCEDURE — 3725F SCREEN DEPRESSION PERFORMED: CPT | Performed by: FAMILY MEDICINE

## 2022-04-12 RX ORDER — TAMSULOSIN HYDROCHLORIDE 0.4 MG/1
CAPSULE ORAL DAILY
COMMUNITY
Start: 2022-04-06

## 2022-04-12 NOTE — TELEPHONE ENCOUNTER
Pt called office asking about the US results and if ok to wait until September for an appointment with Dr Otto Webb

## 2022-04-12 NOTE — ASSESSMENT & PLAN NOTE
Lab Results   Component Value Date    HGBA1C 5 3 02/19/2022     Very well controlled  Dose of lantus decreased from 35 to 30 units a day  If his sugars continue to run low he will decrease his insulin further down to 25 units  Current goal is to get him off of his insulin    He has improved significantly with diet modification

## 2022-04-12 NOTE — PROGRESS NOTES
Assessment/Plan:    1  Type 2 diabetes mellitus with stage 2 chronic kidney disease and hypertension ([facility] [address] )  Assessment & Plan:    Lab Results   Component Value Date    HGBA1C 5 3 02/19/2022     Very well controlled  Dose of lantus decreased from 35 to 30 units a day  If his sugars continue to run low he will decrease his insulin further down to 25 units  Current goal is to get him off of his insulin  He has improved significantly with diet modification      2  Benign essential hypertension  Assessment & Plan:  Well controlled  Continue enalapril 40 mg daily      3  Mixed hyperlipidemia  Assessment & Plan:  Stable   Continue atorvastatin 20 mg daily      4  Seronegative arthropathy of multiple sites [facility])  Assessment & Plan:  managed by Dr Mariam Carvajal     Patient declined to take his socks and shoes off today  BMI Counseling: Body mass index is 35 15 kg/m²  The BMI is above normal  Nutrition recommendations include moderation in carbohydrate intake  Rationale for BMI follow-up plan is due to patient being overweight or obese  Depression Screening and Follow-up Plan: Patient was screened for depression during today's encounter  They screened negative with a PHQ-2 score of 0      patient has labs about home to get hemoglobin A1c, CMP and lipid panel done before his next appointment    There are no Patient Instructions on file for this visit  Return in about 3 months (around 7/12/2022) for Diabetic follow up  Subjective:      Patient ID: Kristen Lockett is a 59 y o  male  Chief Complaint   Patient presents with    Follow-up     blood pressure check   [facility]       Patient reports that he is feeling much better now he is watching his diet  His weight has plateaued  He did have 1 sugar down to the 60s  He has been taking 33-35 units of Lantus daily        The following portions of the patient's history were reviewed and updated as appropriate: allergies, current medications, past family history, past medical history, past social history, past surgical history and problem list     Review of Systems      Current Outpatient Medications   Medication Sig Dispense Refill    ALPRAZolam (XANAX) 0 25 mg tablet Take 1 tablet (0 25 mg total) by mouth daily at bedtime as needed for anxiety 14 tablet 0    amLODIPine (NORVASC) 5 mg tablet TAKE ONE TABLET BY MOUTH EVERY DAY (Patient taking differently: daily at bedtime  ) 30 tablet 5    atorvastatin (LIPITOR) 20 mg tablet TAKE ONE TABLET BY MOUTH EVERY DAY 90 tablet 1    B-D ULTRAFINE III SHORT PEN 31G X 8 MM MISC USE AS DIRECTED 200 each 1    Blood Glucose Monitoring Suppl (OneTouch Verio) w/Device KIT Test blood sugar twice a day 1 kit 0    Continuous Blood Gluc Sensor (FreeStyle Ulysses 14 Day Sensor) MISC Change sensor every 14 days 2 each 12    enalapril (VASOTEC) 20 mg tablet Take 2 tablets (40 mg total) by mouth daily 60 tablet 3    gabapentin (Neurontin) 100 mg capsule Take 3 capsules (300 mg total) by mouth daily at bedtime 90 capsule 5    glipiZIDE (GLUCOTROL XL) 10 mg 24 hr tablet TAKE TWO TABLETS BY MOUTH EVERY DAY AT BEDTIME 180 tablet 1    glucose blood (OneTouch Verio) test strip Test blood sugar twice a day 200 each 3    hydrOXYzine pamoate (VISTARIL) 25 mg capsule TAKE ONE CAPSULE BY MOUTH EVERY DAY AS NEEDED FOR INSOMNIA 30 capsule 3    insulin glargine (Basaglar KwikPen) 100 units/mL injection pen Inject 65 Units under the skin daily (Patient taking differently: Inject 45 Units under the skin daily  ) 15 mL 1    metFORMIN (GLUCOPHAGE) 500 mg tablet Take 2 tablets (1,000 mg total) by mouth 2 (two) times a day with meals 360 tablet 3    OneTouch Delica Lancets 08H MISC Test blood sugar twice a day 200 each 3    sulfaSALAzine (AZULFIDINE) 500 mg tablet Take 1 tablet twice daily for 3 weeks then increase to 1 tablet 3 times daily for 3 weeks then increase to 2 tablets twice daily thereafter and stay on that   (Patient taking differently: Take 1 tablet twice daily for 3 weeks then increase to 1 tablet 3 times daily for 3 weeks then increase to 2 tablets twice daily thereafter and stay on that note- pt not to start until 3-4- weeks post op per Dr Martinez note ) 120 tablet 2    tamsulosin (FLOMAX) 0 4 mg daily       No current facility-administered medications for this visit  Objective:    /70   Pulse 71   Temp 98 4 °F (36 9 °C)   Resp 18   Ht 5' 10" (1 778 m)   Wt 111 kg (245 lb)   SpO2 99%   BMI 35 15 kg/m²        Physical Exam  Vitals and nursing note reviewed  Constitutional:       Appearance: He is well-developed  HENT:      Head: Normocephalic and atraumatic  Right Ear: Tympanic membrane and external ear normal       Left Ear: Tympanic membrane and external ear normal    Cardiovascular:      Rate and Rhythm: Normal rate and regular rhythm  Heart sounds: Normal heart sounds  No murmur heard  Pulmonary:      Effort: Pulmonary effort is normal  No respiratory distress  Breath sounds: Normal breath sounds  No wheezing or rales  Musculoskeletal:      Right lower leg: No edema  Left lower leg: No edema                  Ann Reus, DO

## 2022-05-12 DIAGNOSIS — I10 BENIGN ESSENTIAL HYPERTENSION: ICD-10-CM

## 2022-05-12 DIAGNOSIS — G47.00 INSOMNIA, UNSPECIFIED TYPE: ICD-10-CM

## 2022-05-12 RX ORDER — HYDROXYZINE PAMOATE 25 MG/1
CAPSULE ORAL
Qty: 30 CAPSULE | Refills: 3 | Status: SHIPPED | OUTPATIENT
Start: 2022-05-12 | End: 2022-05-31 | Stop reason: ALTCHOICE

## 2022-05-12 RX ORDER — AMLODIPINE BESYLATE 5 MG/1
TABLET ORAL
Qty: 30 TABLET | Refills: 5 | Status: SHIPPED | OUTPATIENT
Start: 2022-05-12

## 2022-05-31 ENCOUNTER — OFFICE VISIT (OUTPATIENT)
Dept: FAMILY MEDICINE CLINIC | Facility: CLINIC | Age: 62
End: 2022-05-31
Payer: COMMERCIAL

## 2022-05-31 VITALS
RESPIRATION RATE: 18 BRPM | WEIGHT: 232.8 LBS | TEMPERATURE: 96.5 F | OXYGEN SATURATION: 99 % | SYSTOLIC BLOOD PRESSURE: 132 MMHG | DIASTOLIC BLOOD PRESSURE: 76 MMHG | HEART RATE: 72 BPM | BODY MASS INDEX: 33.33 KG/M2 | HEIGHT: 70 IN

## 2022-05-31 DIAGNOSIS — K13.70 ORAL MUCOSAL LESION: Primary | ICD-10-CM

## 2022-05-31 PROCEDURE — 99213 OFFICE O/P EST LOW 20 MIN: CPT | Performed by: FAMILY MEDICINE

## 2022-05-31 PROCEDURE — 3075F SYST BP GE 130 - 139MM HG: CPT | Performed by: FAMILY MEDICINE

## 2022-05-31 PROCEDURE — 3008F BODY MASS INDEX DOCD: CPT | Performed by: FAMILY MEDICINE

## 2022-05-31 PROCEDURE — 1036F TOBACCO NON-USER: CPT | Performed by: FAMILY MEDICINE

## 2022-05-31 PROCEDURE — 3078F DIAST BP <80 MM HG: CPT | Performed by: FAMILY MEDICINE

## 2022-05-31 NOTE — PROGRESS NOTES
Assessment/Plan:    1  Oral mucosal lesion  Comments:  Patient to follow-up with oral pathologist in 2 days  Orders:  -     nystatin (MYCOSTATIN) 500,000 units/5 mL suspension; Apply 5 mL (500,000 Units total) to the mouth or throat 4 (four) times a day for 7 days  We also discussed that his vomiting could be related to his increased dose of sulfasalazine       There are no Patient Instructions on file for this visit  No follow-ups on file  Subjective:      Patient ID: Elizabeth Sargent is a 59 y o  male  Chief Complaint   Patient presents with    Nausea     Started about 6 weeks ago, over that time period got sick 6 times, got sick 3 times yesterday nm  lpn    metallic taste in mouth     Started about 6 weeks ago nm  lpn       He has been getting sick for the past 6 weeks  He actually vomited  He has had a metallic taste in his mouth  The dentist saw lesions in his mouth  He is going to see an oral pathologist     He has continued to vomit         The following portions of the patient's history were reviewed and updated as appropriate:  past social history    Review of Systems      Current Outpatient Medications   Medication Sig Dispense Refill    ALPRAZolam (XANAX) 0 25 mg tablet Take 1 tablet (0 25 mg total) by mouth daily at bedtime as needed for anxiety 14 tablet 0    amLODIPine (NORVASC) 5 mg tablet TAKE ONE TABLET BY MOUTH EVERY DAY 30 tablet 5    atorvastatin (LIPITOR) 20 mg tablet TAKE ONE TABLET BY MOUTH EVERY DAY 90 tablet 1    B-D ULTRAFINE III SHORT PEN 31G X 8 MM MISC USE AS DIRECTED 200 each 1    Blood Glucose Monitoring Suppl (OneTouch Verio) w/Device KIT Test blood sugar twice a day 1 kit 0    Continuous Blood Gluc Sensor (FreeStyle Ulysses 14 Day Sensor) MISC Change sensor every 14 days 2 each 12    enalapril (VASOTEC) 20 mg tablet Take 2 tablets (40 mg total) by mouth daily 60 tablet 3    gabapentin (Neurontin) 100 mg capsule Take 3 capsules (300 mg total) by mouth daily at bedtime 90 capsule 5    glipiZIDE (GLUCOTROL XL) 10 mg 24 hr tablet TAKE TWO TABLETS BY MOUTH EVERY DAY AT BEDTIME 180 tablet 1    glucose blood (OneTouch Verio) test strip Test blood sugar twice a day 200 each 3    insulin glargine (Basaglar KwikPen) 100 units/mL injection pen Inject 65 Units under the skin daily (Patient taking differently: Inject 22 Units under the skin daily) 15 mL 1    metFORMIN (GLUCOPHAGE) 500 mg tablet Take 2 tablets (1,000 mg total) by mouth 2 (two) times a day with meals 360 tablet 3    nystatin (MYCOSTATIN) 500,000 units/5 mL suspension Apply 5 mL (500,000 Units total) to the mouth or throat 4 (four) times a day for 7 days 140 mL 0    OneTouch Delica Lancets 73I MISC Test blood sugar twice a day 200 each 3    sulfaSALAzine (AZULFIDINE) 500 mg tablet Take 1 tablet twice daily for 3 weeks then increase to 1 tablet 3 times daily for 3 weeks then increase to 2 tablets twice daily thereafter and stay on that  (Patient taking differently: Take 1 tablet twice daily for 3 weeks then increase to 1 tablet 3 times daily for 3 weeks then increase to 2 tablets twice daily thereafter and stay on that note- pt not to start until 3-4- weeks post op per Dr Martinez note) 120 tablet 2    tamsulosin (FLOMAX) 0 4 mg daily       No current facility-administered medications for this visit  Objective:    /76   Pulse 72   Temp (!) 96 5 °F (35 8 °C)   Resp 18   Ht 5' 10" (1 778 m)   Wt 106 kg (232 lb 12 8 oz)   SpO2 99%   BMI 33 40 kg/m²      Physical Exam  Vitals and nursing note reviewed  Constitutional:       Appearance: He is well-developed  HENT:      Head: Normocephalic and atraumatic  Right Ear: Tympanic membrane and external ear normal       Left Ear: Tympanic membrane and external ear normal       Mouth/Throat:      Comments: White patch right buccal mucosa  Cardiovascular:      Rate and Rhythm: Normal rate and regular rhythm  Heart sounds: Normal heart sounds   No murmur heard  Pulmonary:      Effort: Pulmonary effort is normal  No respiratory distress  Breath sounds: Normal breath sounds  No wheezing or rales  Musculoskeletal:      Right lower leg: No edema  Left lower leg: No edema               Yossi Ch DO

## 2022-06-09 DIAGNOSIS — K13.70 ORAL MUCOSAL LESION: Primary | ICD-10-CM

## 2022-06-16 ENCOUNTER — OFFICE VISIT (OUTPATIENT)
Dept: FAMILY MEDICINE CLINIC | Facility: CLINIC | Age: 62
End: 2022-06-16
Payer: COMMERCIAL

## 2022-06-16 VITALS
RESPIRATION RATE: 20 BRPM | BODY MASS INDEX: 33.93 KG/M2 | HEART RATE: 76 BPM | WEIGHT: 237 LBS | TEMPERATURE: 97.4 F | SYSTOLIC BLOOD PRESSURE: 130 MMHG | HEIGHT: 70 IN | OXYGEN SATURATION: 98 % | DIASTOLIC BLOOD PRESSURE: 78 MMHG

## 2022-06-16 DIAGNOSIS — K13.70 ORAL MUCOSAL LESION: ICD-10-CM

## 2022-06-16 DIAGNOSIS — I10 BENIGN ESSENTIAL HYPERTENSION: Primary | ICD-10-CM

## 2022-06-16 PROCEDURE — 99213 OFFICE O/P EST LOW 20 MIN: CPT | Performed by: FAMILY MEDICINE

## 2022-06-16 PROCEDURE — 3078F DIAST BP <80 MM HG: CPT | Performed by: FAMILY MEDICINE

## 2022-06-16 PROCEDURE — 3725F SCREEN DEPRESSION PERFORMED: CPT | Performed by: FAMILY MEDICINE

## 2022-06-16 PROCEDURE — 3008F BODY MASS INDEX DOCD: CPT | Performed by: FAMILY MEDICINE

## 2022-06-16 PROCEDURE — 1036F TOBACCO NON-USER: CPT | Performed by: FAMILY MEDICINE

## 2022-06-16 PROCEDURE — 3075F SYST BP GE 130 - 139MM HG: CPT | Performed by: FAMILY MEDICINE

## 2022-06-16 PROCEDURE — 4010F ACE/ARB THERAPY RXD/TAKEN: CPT | Performed by: FAMILY MEDICINE

## 2022-06-16 RX ORDER — OLMESARTAN MEDOXOMIL 20 MG/1
20 TABLET ORAL DAILY
Qty: 30 TABLET | Refills: 3 | Status: SHIPPED | OUTPATIENT
Start: 2022-06-16 | End: 2022-07-25 | Stop reason: SDUPTHER

## 2022-06-16 NOTE — PROGRESS NOTES
Assessment/Plan:    1  Benign essential hypertension  Assessment & Plan:  Pathologist suspects that his lesion could be related to the change of his medication from losartan to enalapril  Will change to olmesartan and see how he does  Orders:  -     olmesartan (BENICAR) 20 mg tablet; Take 1 tablet (20 mg total) by mouth daily    2  Oral mucosal lesion  Comments:  Lesion has improved significantly advised to stop nystatin           There are no Patient Instructions on file for this visit  Return for Scheduled follow up in July 25th, cancel July 11th appointment   Subjective:      Patient ID: Amy Nicole is a 59 y o  male  Chief Complaint   Patient presents with    bad taste in mouth      [id number]       He did not have a biopsy but did see a pathologist   She thinks it could have been related to his medication changed  Patient notes that his nausea and bad taste started any switch from losartan to enalapril            The following portions of the patient's history were reviewed and updated as appropriate:  past social history    Review of Systems      Current Outpatient Medications   Medication Sig Dispense Refill    ALPRAZolam (XANAX) 0 25 mg tablet Take 1 tablet (0 25 mg total) by mouth daily at bedtime as needed for anxiety 14 tablet 0    amLODIPine (NORVASC) 5 mg tablet TAKE ONE TABLET BY MOUTH EVERY DAY 30 tablet 5    atorvastatin (LIPITOR) 20 mg tablet TAKE ONE TABLET BY MOUTH EVERY DAY 90 tablet 1    B-D ULTRAFINE III SHORT PEN 31G X 8 MM MISC USE AS DIRECTED 200 each 1    Blood Glucose Monitoring Suppl (OneTouch Verio) w/Device KIT Test blood sugar twice a day 1 kit 0    Continuous Blood Gluc Sensor (FreeStyle Ulysses 14 Day Sensor) MISC Change sensor every 14 days 2 each 12    gabapentin (Neurontin) 100 mg capsule Take 3 capsules (300 mg total) by mouth daily at bedtime 90 capsule 5    glipiZIDE (GLUCOTROL XL) 10 mg 24 hr tablet TAKE TWO TABLETS BY MOUTH EVERY DAY AT BEDTIME 180 tablet 1    glucose blood (OneTouch Verio) test strip Test blood sugar twice a day 200 each 3    insulin glargine (Basaglar KwikPen) 100 units/mL injection pen Inject 65 Units under the skin daily (Patient taking differently: Inject 22 Units under the skin daily) 15 mL 1    metFORMIN (GLUCOPHAGE) 500 mg tablet Take 2 tablets (1,000 mg total) by mouth 2 (two) times a day with meals 360 tablet 3    nystatin (MYCOSTATIN) 500,000 units/5 mL suspension Apply 5 mL (500,000 Units total) to the mouth or throat 4 (four) times a day for 7 days 140 mL 0    olmesartan (BENICAR) 20 mg tablet Take 1 tablet (20 mg total) by mouth daily 30 tablet 3    OneTouch Delica Lancets 62W MISC Test blood sugar twice a day 200 each 3    tamsulosin (FLOMAX) 0 4 mg daily      sulfaSALAzine (AZULFIDINE) 500 mg tablet Take 1 tablet twice daily for 3 weeks then increase to 1 tablet 3 times daily for 3 weeks then increase to 2 tablets twice daily thereafter and stay on that  (Patient taking differently: Take 1 tablet twice daily for 3 weeks then increase to 1 tablet 3 times daily for 3 weeks then increase to 2 tablets twice daily thereafter and stay on that note- pt not to start until 3-4- weeks post op per Dr Martinez note) 120 tablet 2     No current facility-administered medications for this visit  Objective:    /78   Pulse 76   Temp (!) 97 4 °F (36 3 °C)   Resp 20   Ht 5' 10" (1 778 m)   Wt 108 kg (237 lb)   SpO2 98%   BMI 34 01 kg/m²      Physical Exam  Vitals and nursing note reviewed  Constitutional:       Appearance: He is well-developed  HENT:      Head: Normocephalic and atraumatic  Right Ear: Tympanic membrane and external ear normal       Left Ear: Tympanic membrane and external ear normal       Mouth/Throat:      Pharynx: Oropharynx is clear  No oropharyngeal exudate or posterior oropharyngeal erythema  Cardiovascular:      Rate and Rhythm: Normal rate and regular rhythm        Heart sounds: Normal heart sounds  No murmur heard  Pulmonary:      Effort: Pulmonary effort is normal  No respiratory distress  Breath sounds: Normal breath sounds  No wheezing or rales  Musculoskeletal:      Right lower leg: No edema  Left lower leg: No edema               Jaguar Carroll DO

## 2022-06-16 NOTE — ASSESSMENT & PLAN NOTE
Pathologist suspects that his lesion could be related to the change of his medication from losartan to enalapril  Will change to olmesartan and see how he does

## 2022-07-02 LAB
ALBUMIN SERPL-MCNC: 4.1 G/DL (ref 3.8–4.8)
ALBUMIN/CREAT UR: 2143 MG/G CREAT (ref 0–29)
ALBUMIN/GLOB SERPL: 2.2 {RATIO} (ref 1.2–2.2)
ALP SERPL-CCNC: 69 IU/L (ref 44–121)
ALT SERPL-CCNC: 15 IU/L (ref 0–44)
AST SERPL-CCNC: 21 IU/L (ref 0–40)
BILIRUB SERPL-MCNC: 0.4 MG/DL (ref 0–1.2)
BUN SERPL-MCNC: 24 MG/DL (ref 8–27)
BUN/CREAT SERPL: 23 (ref 10–24)
CALCIUM SERPL-MCNC: 9.5 MG/DL (ref 8.6–10.2)
CHLORIDE SERPL-SCNC: 103 MMOL/L (ref 96–106)
CHOLEST SERPL-MCNC: 139 MG/DL (ref 100–199)
CO2 SERPL-SCNC: 25 MMOL/L (ref 20–29)
CREAT SERPL-MCNC: 1.06 MG/DL (ref 0.76–1.27)
CREAT UR-MCNC: 141.6 MG/DL
EGFR: 79 ML/MIN/1.73
ERYTHROCYTE [DISTWIDTH] IN BLOOD BY AUTOMATED COUNT: 14.3 % (ref 11.6–15.4)
EST. AVERAGE GLUCOSE BLD GHB EST-MCNC: 94 MG/DL
GLOBULIN SER-MCNC: 1.9 G/DL (ref 1.5–4.5)
GLUCOSE SERPL-MCNC: 101 MG/DL (ref 65–99)
HBA1C MFR BLD: 4.9 % (ref 4.8–5.6)
HCT VFR BLD AUTO: 42.1 % (ref 37.5–51)
HDLC SERPL-MCNC: 47 MG/DL
HGB BLD-MCNC: 13.8 G/DL (ref 13–17.7)
LDLC SERPL CALC-MCNC: 75 MG/DL (ref 0–99)
LDLC/HDLC SERPL: 1.6 RATIO (ref 0–3.6)
MCH RBC QN AUTO: 29.5 PG (ref 26.6–33)
MCHC RBC AUTO-ENTMCNC: 32.8 G/DL (ref 31.5–35.7)
MCV RBC AUTO: 90 FL (ref 79–97)
MICROALBUMIN UR-MCNC: 3033.8 UG/ML
MICRODELETION SYND BLD/T FISH: NORMAL
PLATELET # BLD AUTO: 215 X10E3/UL (ref 150–450)
POTASSIUM SERPL-SCNC: 4.1 MMOL/L (ref 3.5–5.2)
PROT SERPL-MCNC: 6 G/DL (ref 6–8.5)
RBC # BLD AUTO: 4.68 X10E6/UL (ref 4.14–5.8)
SL AMB VLDL CHOLESTEROL CALC: 17 MG/DL (ref 5–40)
SODIUM SERPL-SCNC: 143 MMOL/L (ref 134–144)
TRIGL SERPL-MCNC: 89 MG/DL (ref 0–149)
WBC # BLD AUTO: 6 X10E3/UL (ref 3.4–10.8)

## 2022-07-02 PROCEDURE — 3062F POS MACROALBUMINURIA REV: CPT | Performed by: FAMILY MEDICINE

## 2022-07-02 PROCEDURE — 3044F HG A1C LEVEL LT 7.0%: CPT | Performed by: FAMILY MEDICINE

## 2022-07-07 DIAGNOSIS — E08.65 DIABETES MELLITUS DUE TO UNDERLYING CONDITION WITH HYPERGLYCEMIA, WITH LONG-TERM CURRENT USE OF INSULIN (HCC): ICD-10-CM

## 2022-07-07 DIAGNOSIS — Z79.4 DIABETES MELLITUS DUE TO UNDERLYING CONDITION WITH HYPERGLYCEMIA, WITH LONG-TERM CURRENT USE OF INSULIN (HCC): ICD-10-CM

## 2022-07-11 PROCEDURE — 3066F NEPHROPATHY DOC TX: CPT | Performed by: FAMILY MEDICINE

## 2022-07-13 ENCOUNTER — TELEPHONE (OUTPATIENT)
Dept: ENDOCRINOLOGY | Facility: CLINIC | Age: 62
End: 2022-07-13

## 2022-07-13 DIAGNOSIS — Z79.4 TYPE 2 DIABETES MELLITUS WITH DIABETIC NEUROPATHY, WITH LONG-TERM CURRENT USE OF INSULIN (HCC): Primary | ICD-10-CM

## 2022-07-13 DIAGNOSIS — E11.40 TYPE 2 DIABETES MELLITUS WITH DIABETIC NEUROPATHY, WITH LONG-TERM CURRENT USE OF INSULIN (HCC): Primary | ICD-10-CM

## 2022-07-13 DIAGNOSIS — I10 BENIGN ESSENTIAL HYPERTENSION: ICD-10-CM

## 2022-07-13 DIAGNOSIS — E78.2 MIXED HYPERLIPIDEMIA: ICD-10-CM

## 2022-07-15 ENCOUNTER — TELEPHONE (OUTPATIENT)
Dept: NEPHROLOGY | Facility: CLINIC | Age: 62
End: 2022-07-15

## 2022-07-15 NOTE — TELEPHONE ENCOUNTER
Patient called requesting you review his labs from 7/1/22 due to worsening proteinuria  He would like any advice you have on how to deal with the issue

## 2022-07-17 PROBLEM — K05.30 PERIODONTITIS: Status: ACTIVE | Noted: 2022-07-17

## 2022-07-18 NOTE — TELEPHONE ENCOUNTER
LM for patient to call back to go over med info and schedule f/u   Dr Manda Pereira has opening 8/9 at 4:00

## 2022-07-19 ENCOUNTER — DOCUMENTATION (OUTPATIENT)
Dept: NEPHROLOGY | Facility: CLINIC | Age: 62
End: 2022-07-19

## 2022-07-19 DIAGNOSIS — R80.1 PERSISTENT PROTEINURIA: Primary | ICD-10-CM

## 2022-07-19 DIAGNOSIS — N18.2 CHRONIC KIDNEY DISEASE (CKD) STAGE G2/A2, MILDLY DECREASED GLOMERULAR FILTRATION RATE (GFR) BETWEEN 60-89 ML/MIN/1.73 SQUARE METER AND ALBUMINURIA CREATININE RATIO BETWEEN 30-299 MG/G: ICD-10-CM

## 2022-07-19 NOTE — TELEPHONE ENCOUNTER
Pt advised that kidney function is stable but still has protein in urine  Per Dr Arcadio Allison, D/C Amlodipine  Increase Benicar to 40 mg  QD  Repeat BMP and microalbumin/creatinine ratio in 1-2 weeks    Lab order mailed to patient for [employer]

## 2022-07-21 ENCOUNTER — TELEPHONE (OUTPATIENT)
Dept: OBGYN CLINIC | Facility: CLINIC | Age: 62
End: 2022-07-21

## 2022-07-21 ENCOUNTER — TELEPHONE (OUTPATIENT)
Dept: DERMATOLOGY | Facility: CLINIC | Age: 62
End: 2022-07-21

## 2022-07-21 NOTE — TELEPHONE ENCOUNTER
Patient called back and confirmed appt for 7/22 @ 9am
Spoke to patient, he confirmed his appt tomorrow  
patient contacted office and lvm  He wants to see Dr Shantel Michael asa regarding his right TKA  I did attempt to call patient back and lvm that I did place him in the only opening tomorrow  I did ask that he contact me to confirm this appt 
No

## 2022-07-21 NOTE — TELEPHONE ENCOUNTER
Pt left vm requested cb for an appt, I cb and offered Sept appt, he stated he works 2 hrs away will need to check his schedule and cb

## 2022-07-22 ENCOUNTER — OFFICE VISIT (OUTPATIENT)
Dept: OBGYN CLINIC | Facility: CLINIC | Age: 62
End: 2022-07-22
Payer: COMMERCIAL

## 2022-07-22 ENCOUNTER — APPOINTMENT (OUTPATIENT)
Dept: RADIOLOGY | Facility: CLINIC | Age: 62
End: 2022-07-22
Payer: COMMERCIAL

## 2022-07-22 VITALS
DIASTOLIC BLOOD PRESSURE: 75 MMHG | SYSTOLIC BLOOD PRESSURE: 126 MMHG | WEIGHT: 245 LBS | HEART RATE: 88 BPM | BODY MASS INDEX: 35.07 KG/M2 | TEMPERATURE: 98.2 F | RESPIRATION RATE: 19 BRPM | HEIGHT: 70 IN

## 2022-07-22 DIAGNOSIS — Z96.651 STATUS POST TOTAL RIGHT KNEE REPLACEMENT: ICD-10-CM

## 2022-07-22 DIAGNOSIS — R60.0 BILATERAL LEG EDEMA: Primary | ICD-10-CM

## 2022-07-22 PROCEDURE — 99214 OFFICE O/P EST MOD 30 MIN: CPT | Performed by: ORTHOPAEDIC SURGERY

## 2022-07-22 PROCEDURE — 73562 X-RAY EXAM OF KNEE 3: CPT

## 2022-07-22 PROCEDURE — 3078F DIAST BP <80 MM HG: CPT | Performed by: ORTHOPAEDIC SURGERY

## 2022-07-22 PROCEDURE — 3074F SYST BP LT 130 MM HG: CPT | Performed by: ORTHOPAEDIC SURGERY

## 2022-07-22 RX ORDER — AMOXICILLIN 500 MG/1
2000 TABLET, FILM COATED ORAL
Qty: 4 TABLET | Refills: 2 | Status: SHIPPED | OUTPATIENT
Start: 2022-07-22 | End: 2022-10-20

## 2022-07-22 NOTE — PROGRESS NOTES
Assessment/Plan:  1  Bilateral leg edema     2  Status post total right knee replacement  XR knee 3 vw right non injury    amoxicillin (AMOXIL) 500 MG tablet     Scribe Attestation    I,:  Selena Sheffield PA-C am acting as a scribe while in the presence of the attending physician :       I,:  Laura Emanuel, DO personally performed the services described in this documentation    as scribed in my presence :         Horatio Osler is a very pleasant 69-year-old gentleman presenting today for follow-up 8 months after undergoing a robotic assisted right total knee arthroplasty  We reassured him today that his current symptoms are unlikely to be directly related to his total knee arthroplasty  The total knee arthroplasty remained stable on imaging and exam   He does have bilateral lower extremity pitting edema, which is likely due to his prolonged sitting with driving and then sedentary work  Additionally, the soreness that he feels at the end of the day may be due to activity, which can still be normal as the prosthesis matures  At this time, there is no concern of loosening, infection, or failure  Would like him to follow up in 4 months for his regularly scheduled annual evaluation with x-rays on arrival of his right knee  He does understand the need for antibiotic prophylaxis and a prescription was sent for him today  All of his questions were addressed    Subjective:  Right knee follow-up    Patient ID: Nate Tripp is a 59 y o  male  Horatio Osler is a very pleasant 69-year-old gentleman presenting today for follow-up 8 months after undergoing a robotic assisted right total knee arthroplasty  He is here today for an interval check due to edema that he has been noticing at the end of the day his right leg  He also has some in the left leg  He denies any new injuries  He does not have any significant pain in the knee, but does have some soreness towards the end of the day    He does sit in a car for long drives to work and sits for long periods at work  He has noted swelling in both legs which have been limiting his flexion of the right knee  He denies any feelings of instability  He denies any fevers or chills or recent illnesses  He denies any paresthesias in the lower extremities    Review of Systems   Constitutional: Negative  HENT: Negative  Eyes: Negative  Respiratory: Negative  Cardiovascular: Negative  Gastrointestinal: Negative  Endocrine: Negative  Genitourinary: Negative  Musculoskeletal: Positive for arthralgias, joint swelling and myalgias  Skin: Negative  Allergic/Immunologic: Negative  Neurological: Negative  Hematological: Negative  Psychiatric/Behavioral: Negative  Past Medical History:   Diagnosis Date    Arthritis     Carpal tunnel syndrome     Chronic kidney disease     Stage 2 CKD    Corneal abrasion     last assessed - [id number]    Diabetes mellitus ([facility] [address] [facility]     Gout     Hiatal hernia     Hyperlipidemia     Hypertension     Impaired fasting glucose     last assessed - [id number]    Kidney stone     last stone 2009    Lumbar spondylosis 10/31/2021    Lumbar spondylosis     Numbness of fingers of both hands     PONV (postoperative nausea and vomiting)     Psoriasis     RA [facility]umatoid arthritis) [facility] [address] [facility]     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[facility] [address] [facility]     Sinus infection     currently under tx PO ABT etc as of 04/17/19    Viremia     Resolved - [id number]       Past Surgical History:   Procedure Laterality Date    ANKLE SURGERY Right     tendon arthrotomy    ARTHROTOMY ANKLE      BACK SURGERY  2014    lumbar laminectomy    COLONOSCOPY      COLONOSCOPY N/A 4/19/2019    Procedure: COLONOSCOPY;  Surgeon: Silvana Land MD;  Location: [place] [address] [facility];   Service: Gastroenterology    FINGER SURGERY Bilateral     every finger contracture release over a 10 year period trigger finger    INCISION TENDON SHEATH      of a finger    KNEE SURGERY Right x2 arthrotomy    WI TOTAL KNEE ARTHROPLASTY Right 2021    Procedure: ARTHROPLASTY KNEE TOTAL W ROBOT;  Surgeon: Aleisha Jeter DO;  Location: [facility];  Service: Orthopedics    SHOULDER SURGERY Left     x1 staples, tendon repair secondary to many spontaneous dislocations       Family History   Problem Relation Age of Onset    Hypertension Mother    Kenji Abt Arthritis Mother     Hyperlipidemia Mother    Kenji Abt Lung cancer Mother     Stroke Maternal Grandfather     Diabetes Cousin     COPD Father     Depression Family     Diabetes Family     No Known Problems Sister     No Known Problems Brother     No Known Problems Maternal Aunt     No Known Problems Maternal Uncle     No Known Problems Paternal Aunt     No Known Problems Paternal Uncle     No Known Problems Maternal Grandmother     No Known Problems Paternal Grandmother     No Known Problems Paternal Grandfather        Social History     Occupational History    Not on file   Tobacco Use    Smoking status: Former Smoker     Quit date: 2000     Years since quittin 5    Smokeless tobacco: Never Used    Tobacco comment: Former smoker   Vaping Use    Vaping Use: Never used   Substance and Sexual Activity    Alcohol use: Yes     Comment: rare, once a month     Drug use: No    Sexual activity: Not on file         Current Outpatient Medications:     ALPRAZolam (XANAX) 0 25 mg tablet, Take 1 tablet (0 25 mg total) by mouth daily at bedtime as needed for anxiety, Disp: 14 tablet, Rfl: 0    amLODIPine (NORVASC) 5 mg tablet, TAKE ONE TABLET BY MOUTH EVERY DAY, Disp: 30 tablet, Rfl: 5    amoxicillin (AMOXIL) 500 MG tablet, Take 4 tablets (2,000 mg total) by mouth 60 minutes pre-procedure, Disp: 4 tablet, Rfl: 2    atorvastatin (LIPITOR) 20 mg tablet, TAKE ONE TABLET BY MOUTH EVERY DAY, Disp: 90 tablet, Rfl: 1    B-D ULTRAFINE III SHORT PEN 31G X 8 MM MISC, USE AS DIRECTED, Disp: 200 each, Rfl: 1    Blood Glucose Monitoring Suppl (OneTouch Verio) w/Device KIT, Test blood sugar twice a day, Disp: 1 kit, Rfl: 0    Continuous Blood Gluc Sensor (FreeStyle Ulysses 14 Day Sensor) MISC, Change sensor every 14 days, Disp: 2 each, Rfl: 12    gabapentin (Neurontin) 100 mg capsule, Take 3 capsules (300 mg total) by mouth daily at bedtime, Disp: 90 capsule, Rfl: 5    glipiZIDE (GLUCOTROL XL) 10 mg 24 hr tablet, TAKE TWO TABLETS BY MOUTH EVERY DAY AT BEDTIME, Disp: 180 tablet, Rfl: 1    glucose blood (OneTouch Verio) test strip, Test blood sugar twice a day, Disp: 200 each, Rfl: 3    insulin glargine (Basaglar KwikPen) 100 units/mL injection pen, Inject 65 Units under the skin daily (Patient taking differently: Inject 22 Units under the skin daily), Disp: 15 mL, Rfl: 1    metFORMIN (GLUCOPHAGE) 500 mg tablet, Take 2 tablets (1,000 mg total) by mouth 2 (two) times a day with meals, Disp: 360 tablet, Rfl: 3    olmesartan (BENICAR) 20 mg tablet, Take 1 tablet (20 mg total) by mouth daily (Patient taking differently: Take 40 mg by mouth daily), Disp: 30 tablet, Rfl: 3    OneTouch Delica Lancets 23O MISC, Test blood sugar twice a day, Disp: 200 each, Rfl: 3    sulfaSALAzine (AZULFIDINE) 500 mg tablet, Take 1 tablet twice daily for 3 weeks then increase to 1 tablet 3 times daily for 3 weeks then increase to 2 tablets twice daily thereafter and stay on that  (Patient taking differently: Take 1 tablet twice daily for 3 weeks then increase to 1 tablet 3 times daily for 3 weeks then increase to 2 tablets twice daily thereafter and stay on that note- pt not to start until 3-4- weeks post op per Dr Martinez note), Disp: 120 tablet, Rfl: 2    tamsulosin (FLOMAX) 0 4 mg, daily, Disp: , Rfl:     Allergies   Allergen Reactions    Latex Rash     At dentist, lips swollen     Codeine Nausea Only     Reaction Date: [id number]; Objective:  Vitals:    07/22/22 0852   BP: 126/75   Pulse: 88   Resp: 19   Temp: 98 2 °F (36 8 °C)       Body mass index is 35 15 kg/m²      Right Knee Exam     Muscle Strength   The patient has normal right knee strength  Tenderness   The patient is experiencing no tenderness  Range of Motion   Extension: abnormal Right knee extension: 5  Flexion: abnormal Right knee flexion: 110  Tests   Varus: negative Valgus: negative  Drawer:  Anterior - negative      Patellar apprehension: negative    Other   Erythema: absent  Scars: present (well healed anterior operative scar)  Sensation: normal  Pulse: present  Swelling: moderate (pitting edema)  Effusion: no effusion present    Comments:  Stable at 5, 30 and 90 degrees  Neurovascularly in tact distally  No increased warmth or erythema  Patella tracks flat and midline without crepitance  Minimal steppage gait without assistive device  Moderate pitting edema RLE            Observations     Right Knee   Negative for effusion  Physical Exam  Vitals and nursing note reviewed  Constitutional:       Appearance: Normal appearance  He is well-developed  Comments: Body mass index is 35 15 kg/m²  HENT:      Head: Normocephalic and atraumatic  Right Ear: External ear normal       Left Ear: External ear normal    Eyes:      Extraocular Movements: Extraocular movements intact  Conjunctiva/sclera: Conjunctivae normal    Cardiovascular:      Rate and Rhythm: Normal rate  Pulses: Normal pulses  Pulmonary:      Effort: Pulmonary effort is normal    Abdominal:      Palpations: Abdomen is soft  Musculoskeletal:      Cervical back: Normal range of motion  Right knee: No effusion  Comments: See ortho exam   Skin:     General: Skin is warm and dry  Neurological:      General: No focal deficit present  Mental Status: He is alert and oriented to person, place, and time  Mental status is at baseline  Psychiatric:         Mood and Affect: Mood normal          Behavior: Behavior normal          Thought Content:  Thought content normal          Judgment: Judgment normal        I have personally reviewed pertinent films in PACS of the x-rays taken today of his right knee compared them to previous postoperative films  There has been no change in alignment of the total knee prosthesis which remains well aligned and well cemented    There is no periprosthetic fracture, lucencies, or other interval complications

## 2022-07-25 ENCOUNTER — OFFICE VISIT (OUTPATIENT)
Dept: FAMILY MEDICINE CLINIC | Facility: CLINIC | Age: 62
End: 2022-07-25
Payer: COMMERCIAL

## 2022-07-25 VITALS
HEART RATE: 69 BPM | DIASTOLIC BLOOD PRESSURE: 68 MMHG | BODY MASS INDEX: 34.65 KG/M2 | TEMPERATURE: 97.2 F | HEIGHT: 70 IN | RESPIRATION RATE: 16 BRPM | SYSTOLIC BLOOD PRESSURE: 120 MMHG | OXYGEN SATURATION: 95 % | WEIGHT: 242 LBS

## 2022-07-25 DIAGNOSIS — N18.2 TYPE 2 DIABETES MELLITUS WITH STAGE 2 CHRONIC KIDNEY DISEASE AND HYPERTENSION (HCC): Primary | ICD-10-CM

## 2022-07-25 DIAGNOSIS — E11.22 TYPE 2 DIABETES MELLITUS WITH STAGE 2 CHRONIC KIDNEY DISEASE AND HYPERTENSION (HCC): Primary | ICD-10-CM

## 2022-07-25 DIAGNOSIS — I10 BENIGN ESSENTIAL HYPERTENSION: ICD-10-CM

## 2022-07-25 DIAGNOSIS — Z79.4 TYPE 2 DIABETES MELLITUS WITH DIABETIC NEUROPATHY, WITH LONG-TERM CURRENT USE OF INSULIN (HCC): ICD-10-CM

## 2022-07-25 DIAGNOSIS — I12.9 TYPE 2 DIABETES MELLITUS WITH STAGE 2 CHRONIC KIDNEY DISEASE AND HYPERTENSION (HCC): Primary | ICD-10-CM

## 2022-07-25 DIAGNOSIS — E11.40 TYPE 2 DIABETES MELLITUS WITH DIABETIC NEUROPATHY, WITH LONG-TERM CURRENT USE OF INSULIN (HCC): ICD-10-CM

## 2022-07-25 DIAGNOSIS — E78.2 MIXED HYPERLIPIDEMIA: ICD-10-CM

## 2022-07-25 PROCEDURE — 99214 OFFICE O/P EST MOD 30 MIN: CPT | Performed by: FAMILY MEDICINE

## 2022-07-25 PROCEDURE — 4010F ACE/ARB THERAPY RXD/TAKEN: CPT | Performed by: FAMILY MEDICINE

## 2022-07-25 RX ORDER — OLMESARTAN MEDOXOMIL 20 MG/1
40 TABLET ORAL DAILY
Qty: 180 TABLET | Refills: 1 | Status: SHIPPED | OUTPATIENT
Start: 2022-07-25

## 2022-07-25 RX ORDER — HYDROXYZINE PAMOATE 25 MG/1
CAPSULE ORAL
COMMUNITY
Start: 2022-07-14

## 2022-07-25 RX ORDER — INSULIN GLARGINE 100 [IU]/ML
10 INJECTION, SOLUTION SUBCUTANEOUS DAILY
Qty: 15 ML | Refills: 1
Start: 2022-07-25

## 2022-07-25 NOTE — ASSESSMENT & PLAN NOTE
Lab Results   Component Value Date    HGBA1C 4 9 07/01/2022     Very well controlled  Dose of basaglar decreased to 10 units daily   I asked him to send me a log of his sugars and about a month to see if we can get him off of insulin altogether  He has been really careful with his diet and is wearing his sugar monitor at all times

## 2022-07-25 NOTE — PROGRESS NOTES
Assessment/Plan:    1  Type 2 diabetes mellitus with stage 2 chronic kidney disease and hypertension ([facility] [address] )  Assessment & Plan:    Lab Results   Component Value Date    HGBA1C 4 9 07/01/2022     Very well controlled  Dose of basaglar decreased to 10 units daily   I asked him to send me a log of his sugars and about a month to see if we can get him off of insulin altogether  He has been really careful with his diet and is wearing his sugar monitor at all times  2  Benign essential hypertension  Assessment & Plan:  Blood pressure is well controlled  Medications have been adjusted by his nephrologist    Orders:  -     olmesartan (BENICAR) 20 mg tablet; Take 2 tablets (40 mg total) by mouth daily    3  Mixed hyperlipidemia  Assessment & Plan:  Stable   Continue atorvastatin 20 mg daily      4  Type 2 diabetes mellitus with diabetic neuropathy, with long-term current use of insulin ([facility])  -     Insulin Glargine Solostar (Basaglar KwikPen) 100 UNIT/ML SOPN; Inject 10 Units under the skin daily      PSA done by his Urologist           There are no Patient Instructions on file for this visit  Return in about 6 months (around 1/25/2023) for Diabetic follow up  Subjective:      Patient ID: Vidal Taveras is a 59 y o  male  Chief Complaint   Patient presents with    Follow-up     Tashia Peterson MA         He is still having the weird taste in his mouth  His dentist thinks it may be related to his dental work  He continues to watch his diet carefully and is watching his sugars closely  He would like to lose more weight and is frustrated that he is stuck        The following portions of the patient's history were reviewed and updated as appropriate:  past social history    Review of Systems      Current Outpatient Medications   Medication Sig Dispense Refill    amoxicillin (AMOXIL) 500 MG tablet Take 4 tablets (2,000 mg total) by mouth 60 minutes pre-procedure 4 tablet 2    atorvastatin (LIPITOR) 20 mg tablet TAKE ONE TABLET BY MOUTH EVERY DAY 90 tablet 1    B-D ULTRAFINE III SHORT PEN 31G X 8 MM MISC USE AS DIRECTED 200 each 1    Blood Glucose Monitoring Suppl (OneTouch Verio) w/Device KIT Test blood sugar twice a day 1 kit 0    Continuous Blood Gluc Sensor (FreeStyle Ulysses 14 Day Sensor) MISC Change sensor every 14 days 2 each 12    gabapentin (Neurontin) 100 mg capsule Take 3 capsules (300 mg total) by mouth daily at bedtime 90 capsule 5    glipiZIDE (GLUCOTROL XL) 10 mg 24 hr tablet TAKE TWO TABLETS BY MOUTH EVERY DAY AT BEDTIME 180 tablet 1    glucose blood (OneTouch Verio) test strip Test blood sugar twice a day 200 each 3    hydrOXYzine pamoate (VISTARIL) 25 mg capsule       Insulin Glargine Solostar (Basaglar KwikPen) 100 UNIT/ML SOPN Inject 10 Units under the skin daily 15 mL 1    metFORMIN (GLUCOPHAGE) 500 mg tablet Take 2 tablets (1,000 mg total) by mouth 2 (two) times a day with meals 360 tablet 3    olmesartan (BENICAR) 20 mg tablet Take 2 tablets (40 mg total) by mouth daily 180 tablet 1    OneTouch Delica Lancets 53G MISC Test blood sugar twice a day 200 each 3    sulfaSALAzine (AZULFIDINE) 500 mg tablet Take 1 tablet twice daily for 3 weeks then increase to 1 tablet 3 times daily for 3 weeks then increase to 2 tablets twice daily thereafter and stay on that   (Patient taking differently: Take 1 tablet twice daily for 3 weeks then increase to 1 tablet 3 times daily for 3 weeks then increase to 2 tablets twice daily thereafter and stay on that note- pt not to start until 3-4- weeks post op per Dr Martinez note) 120 tablet 2    tamsulosin (FLOMAX) 0 4 mg daily      ALPRAZolam (XANAX) 0 25 mg tablet Take 1 tablet (0 25 mg total) by mouth daily at bedtime as needed for anxiety (Patient not taking: Reported on 7/25/2022) 14 tablet 0    amLODIPine (NORVASC) 5 mg tablet TAKE ONE TABLET BY MOUTH EVERY DAY (Patient not taking: Reported on 7/25/2022) 30 tablet 5     No current facility-administered medications for this visit  Objective:    /68   Pulse 69   Temp (!) 97 2 °F (36 2 °C)   Resp 16   Ht 5' 10" (1 778 m)   Wt 110 kg (242 lb)   SpO2 95%   BMI 34 72 kg/m²      Physical Exam  Vitals and nursing note reviewed  Constitutional:       Appearance: He is well-developed  HENT:      Head: Normocephalic and atraumatic  Right Ear: Tympanic membrane and external ear normal       Left Ear: Tympanic membrane and external ear normal    Cardiovascular:      Rate and Rhythm: Normal rate and regular rhythm  Pulses: no weak pulses          Dorsalis pedis pulses are 2+ on the right side and 2+ on the left side  Posterior tibial pulses are 2+ on the right side and 2+ on the left side  Heart sounds: Normal heart sounds  No murmur heard  Pulmonary:      Effort: Pulmonary effort is normal  No respiratory distress  Breath sounds: Normal breath sounds  No wheezing or rales  Musculoskeletal:      Right lower leg: No edema  Left lower leg: No edema  Feet:      Right foot:      Skin integrity: No ulcer, skin breakdown, erythema, warmth, callus or dry skin  Left foot:      Skin integrity: No ulcer, skin breakdown, erythema, warmth, callus or dry skin  Patient's shoes and socks removed  Right Foot/Ankle   Right Foot Inspection  Skin Exam: skin normal  Skin not intact, no dry skin, no warmth, no callus, no erythema, no maceration, no abnormal color, no pre-ulcer, no ulcer and no callus  Toe Exam: ROM and strength within normal limits  Sensory   Monofilament testing: intact    Vascular  Capillary refills: < 3 seconds  The right DP pulse is 2+  The right PT pulse is 2+  Left Foot/Ankle  Left Foot Inspection  Skin Exam: skin normal  Skin not intact, no dry skin, no warmth, no erythema, no maceration, normal color, no pre-ulcer, no ulcer and no callus  Toe Exam: ROM and strength within normal limits       Sensory Monofilament testing: intact    Vascular  Capillary refills: < 3 seconds  The left DP pulse is 2+  The left PT pulse is 2+       Assign Risk Category  No deformity present  No loss of protective sensation  No weak pulses  Risk: 0       Raj Griffin,

## 2022-07-29 DIAGNOSIS — E11.40 TYPE 2 DIABETES MELLITUS WITH DIABETIC NEUROPATHY, WITH LONG-TERM CURRENT USE OF INSULIN (HCC): ICD-10-CM

## 2022-07-29 DIAGNOSIS — Z79.4 TYPE 2 DIABETES MELLITUS WITH DIABETIC NEUROPATHY, WITH LONG-TERM CURRENT USE OF INSULIN (HCC): ICD-10-CM

## 2022-07-29 RX ORDER — FLASH GLUCOSE SENSOR
KIT MISCELLANEOUS
Qty: 2 EACH | Refills: 12 | Status: SHIPPED | OUTPATIENT
Start: 2022-07-29

## 2022-08-03 ENCOUNTER — TELEPHONE (OUTPATIENT)
Dept: FAMILY MEDICINE CLINIC | Facility: CLINIC | Age: 62
End: 2022-08-03

## 2022-08-03 DIAGNOSIS — R43.8 METALLIC TASTE: Primary | ICD-10-CM

## 2022-08-03 NOTE — TELEPHONE ENCOUNTER
----- Message from Joleen Alexander sent at 8/3/2022  3:41 PM EDT -----  Regarding: FW: Metalic taste in my mouth    ----- Message -----  From: Selene Zimmerman  Sent: 8/3/2022   3:40 PM EDT  To: [facility]  Subject: Metalic taste in my mouth                        The metalic taste in my mouthis still there! I vomited again yesterday  Is there anything else I can try?

## 2022-08-03 NOTE — TELEPHONE ENCOUNTER
Please let him know Dr Mitzy Tinsley is out but I would recommend an ENT eval, I put an order for Dr Dellar Fabry in his chart

## 2022-08-07 LAB
ALBUMIN/CREAT UR: 3008 MG/G CREAT (ref 0–29)
BUN SERPL-MCNC: 21 MG/DL (ref 8–27)
BUN/CREAT SERPL: 20 (ref 10–24)
CALCIUM SERPL-MCNC: 9.4 MG/DL (ref 8.6–10.2)
CHLORIDE SERPL-SCNC: 104 MMOL/L (ref 96–106)
CO2 SERPL-SCNC: 24 MMOL/L (ref 20–29)
CREAT SERPL-MCNC: 1.03 MG/DL (ref 0.76–1.27)
CREAT UR-MCNC: 131.9 MG/DL
EGFR: 82 ML/MIN/1.73
GLUCOSE SERPL-MCNC: 85 MG/DL (ref 65–99)
MICROALBUMIN UR-MCNC: 3967.3 UG/ML
POTASSIUM SERPL-SCNC: 3.9 MMOL/L (ref 3.5–5.2)
SODIUM SERPL-SCNC: 142 MMOL/L (ref 134–144)

## 2022-08-07 PROCEDURE — 3062F POS MACROALBUMINURIA REV: CPT | Performed by: NURSE PRACTITIONER

## 2022-08-18 NOTE — PROGRESS NOTES
NEPHROLOGY OFFICE VISIT   Coby Torres 59 y o  male MRN: [id number]  8/19/2022    Reason for Visit: Follow up    INTERVAL HISTORY and SUBJECTIVE:    I had the pleasure of seeing Stef Ayala today in the renal clinic  He is a 64-year-old male who follows with Dr Dejan Avalos for management of CKD stage 2  He was last seen in the nephrology clinic January 2022  Reports metallic which is quite severe and has been affecting his appetite  Sometimes he vomits at the end of the day  He has been examined by several doctors  He was also treated for thrush  There has been no significant improvement  He had right knee replacement December 2021 subsequently he has intermittent lower extremity edema/knee swelling  No other issues or complaints    Last labs obtained on 08/05/2022  Reviewed with Ramirez    ASSESSMENT AND PLAN:  Chronic kidney disease, stage II:  · Baseline creatinine:  Low 1s  · Since last office visit several blood tests creatinine between 1 03-1 07 mg/dL  · Renal function stable  GFR 82  · Etiology:  Likely diabetic kidney disease, hypertensive disease and obesity related renal dysfunction   · Renal ultrasound February 2022: Normal-sized kidneys, normal echogenicity and contour  No calculi  No hydronephrosis left kidney  Mild fullness of the right renal pelvis noted  · Plan  · Control modifiable risk factors    Proteinuria:    · Microalbumin creatinine ratio 3000 with prior levels near 1-2000 since 2019      · On losartan  · Etiology:  Likely due to diabetic nephropathy, possibly related to obesity  · Plan:  · Continue angiotensin receptor blocker, dose recently increased  · Control blood pressure and blood sugar    Blood pressure/volume status:  · Essential hypertension, possible metabolic syndrome  · Medications:  Olmesartan 40 mg daily, HCTZ 25 mg daily  · Goal blood pressure less than 130/80 considering proteinuria    Other CKD monitoring:  · Levels within normal limits    Diabetes mellitus:  · Persistent proteinuria  · No retinopathy  · Neuropathy present  · A1c 4 9 %  Improved control     Metallic taste:  Suspect sulfasalazine  He will discuss with Dr Juan F Waterman his rheumatologist     PATIENT INSTRUCTIONS:    Patient Instructions   Your seen today for continued management of kidney disease and protein excretion/proteinuria  At this time kidney function is stable but protein levels have increased in the urine  Maintain increased dose of olmesartan  Plan/recommendations:   No change in medications at this time   Please check with your rheumatologist regarding sulfasalazine possibly causing metallic taste   Good blood pressure control in good blood sugar control essential to prevent progression of kidney disease   Follow-up blood work and urine studies before next visit        Orders Placed This Encounter   Procedures    Microalbumin / creatinine urine ratio     Standing Status:   Future     Standing Expiration Date:   1/1/2023    CBC     Standing Status:   Future     Standing Expiration Date:   1/1/2023    Protein / creatinine ratio, urine     Standing Status:   Future     Standing Expiration Date:   1/1/2023    Comprehensive metabolic panel     This is a patient instruction: Patient fasting for 8 hours or longer recommended  Standing Status:   Future     Standing Expiration Date:   1/1/2023    Urinalysis with microscopic     Standing Status:   Future     Standing Expiration Date:   1/1/2023    Magnesium     Standing Status:   Future     Standing Expiration Date:   1/1/2023    Vitamin D 25 hydroxy     Standing Status:   Future     Standing Expiration Date:   1/1/2023       OBJECTIVE:  Current Weight: Weight - Scale: 110 kg (242 lb)  Vitals:    08/19/22 0753   BP: 134/80   BP Location: Left arm   Patient Position: Sitting   Cuff Size: Standard   Pulse: 70   Weight: 110 kg (242 lb)   Height: 5' 10" (1 778 m)    Body mass index is 34 72 kg/m²        REVIEW OF SYSTEMS:    Review of Systems   Constitutional: Negative for activity change, appetite change, chills and fever  HENT: Negative for congestion, ear pain and sore throat  Metallic taste   Eyes: Negative for pain and visual disturbance  Respiratory: Negative for cough and shortness of breath  Cardiovascular: Positive for leg swelling (Right LE s/p knee replacemnt)  Negative for chest pain and palpitations  Gastrointestinal: Negative for abdominal pain, constipation, diarrhea, nausea and vomiting  Endocrine: Negative  Genitourinary: Negative for dysuria and hematuria  Musculoskeletal: Positive for arthralgias and joint swelling  Negative for back pain  Skin: Negative for color change and rash  Neurological: Negative for dizziness, seizures, syncope, weakness and headaches  Hematological: Does not bruise/bleed easily  All other systems reviewed and are negative  PHYSICAL EXAM:      Physical Exam  Constitutional:       General: He is not in acute distress  Appearance: Normal appearance  He is well-developed  He is not ill-appearing, toxic-appearing or diaphoretic  HENT:      Head: Normocephalic and atraumatic  Nose: Nose normal  No congestion  Eyes:      General: No scleral icterus  Right eye: No discharge  Left eye: No discharge  Extraocular Movements: Extraocular movements intact  Conjunctiva/sclera: Conjunctivae normal       Pupils: Pupils are equal, round, and reactive to light  Neck:      Thyroid: No thyromegaly  Vascular: No carotid bruit or JVD  Trachea: No tracheal deviation  Cardiovascular:      Rate and Rhythm: Normal rate and regular rhythm  Heart sounds: No murmur heard  No friction rub  No gallop  Pulmonary:      Effort: Pulmonary effort is normal       Breath sounds: Normal breath sounds  Abdominal:      General: Bowel sounds are normal  There is no distension  Palpations: Abdomen is soft  There is no mass  Tenderness: There is no abdominal tenderness  There is no guarding or rebound  Musculoskeletal:         General: No swelling, tenderness or signs of injury  Normal range of motion  Cervical back: Normal range of motion and neck supple  No rigidity or tenderness  Right lower leg: Edema present  Left lower leg: No edema  Skin:     General: Skin is warm and dry  Coloration: Skin is not jaundiced or pale  Findings: No erythema or rash  Neurological:      Mental Status: He is alert and oriented to person, place, and time  Psychiatric:         Mood and Affect: Mood normal          Behavior: Behavior normal          Thought Content:  Thought content normal          Judgment: Judgment normal          Medications:    Current Outpatient Medications:     aspirin (ECOTRIN LOW STRENGTH) 81 mg EC tablet, Take 81 mg by mouth daily, Disp: , Rfl:     atorvastatin (LIPITOR) 20 mg tablet, TAKE ONE TABLET BY MOUTH EVERY DAY, Disp: 90 tablet, Rfl: 1    gabapentin (Neurontin) 100 mg capsule, Take 3 capsules (300 mg total) by mouth daily at bedtime, Disp: 90 capsule, Rfl: 5    glipiZIDE (GLUCOTROL XL) 10 mg 24 hr tablet, TAKE TWO TABLETS BY MOUTH EVERY DAY AT BEDTIME, Disp: 180 tablet, Rfl: 1    Insulin Glargine Solostar (Basaglar KwikPen) 100 UNIT/ML SOPN, Inject 10 Units under the skin daily, Disp: 15 mL, Rfl: 1    metFORMIN (GLUCOPHAGE) 500 mg tablet, Take 2 tablets (1,000 mg total) by mouth 2 (two) times a day with meals, Disp: 360 tablet, Rfl: 3    olmesartan (BENICAR) 20 mg tablet, Take 2 tablets (40 mg total) by mouth daily, Disp: 180 tablet, Rfl: 1    sulfaSALAzine (AZULFIDINE) 500 mg tablet, Take 3 tablets (1,500 mg total) by mouth 2 (two) times a day, Disp: 540 tablet, Rfl: 1    ALPRAZolam (XANAX) 0 25 mg tablet, Take 1 tablet (0 25 mg total) by mouth daily at bedtime as needed for anxiety (Patient not taking: No sig reported), Disp: 14 tablet, Rfl: 0    amLODIPine (NORVASC) 5 mg tablet, TAKE ONE TABLET BY MOUTH EVERY DAY (Patient not taking: No sig reported), Disp: 30 tablet, Rfl: 5    amoxicillin (AMOXIL) 500 MG tablet, Take 4 tablets (2,000 mg total) by mouth 60 minutes pre-procedure, Disp: 4 tablet, Rfl: 2    B-D ULTRAFINE III SHORT PEN 31G X 8 MM MISC, USE AS DIRECTED, Disp: 200 each, Rfl: 1    Blood Glucose Monitoring Suppl (OneTouch Verio) w/Device KIT, Test blood sugar twice a day, Disp: 1 kit, Rfl: 0    Continuous Blood Gluc Sensor (FreeStyle Ulysses 14 Day Sensor) MISC, CHANGE SENSOR EVERY 14 DAYS, Disp: 2 each, Rfl: 12    glucose blood (OneTouch Verio) test strip, Test blood sugar twice a day, Disp: 200 each, Rfl: 3    hydrOXYzine pamoate (VISTARIL) 25 mg capsule, , Disp: , Rfl:     OneTouch Delica Lancets 47H MISC, Test blood sugar twice a day, Disp: 200 each, Rfl: 3    tamsulosin (FLOMAX) 0 4 mg, 0 4 mg daily, Disp: , Rfl:     Laboratory Results:        Invalid input(s): ALBUMIN    Results for orders placed or performed in visit on 07/19/22   Microalbumin / creatinine urine ratio   Result Value Ref Range    Creatinine, Urine 131 9 Not Estab  mg/dL    Microalbum  Ermias Whitaker 1,559 7 Not Estab  ug/mL    Microalb/Creat Ratio 3,008 (H) 0 - 29 mg/g creat   Basic metabolic panel   Result Value Ref Range    Glucose, Random 85 65 - 99 mg/dL    BUN 21 8 - 27 mg/dL    Creatinine 1 03 0 76 - 1 27 mg/dL    eGFR 82 >59 mL/min/1 73    SL AMB BUN/CREATININE RATIO 20 10 - 24    Sodium 142 134 - 144 mmol/L    Potassium 3 9 3 5 - 5 2 mmol/L    Chloride 104 96 - 106 mmol/L    CO2 24 20 - 29 mmol/L    CALCIUM 9 4 8 6 - 10 2 mg/dL

## 2022-08-19 ENCOUNTER — OFFICE VISIT (OUTPATIENT)
Dept: NEPHROLOGY | Facility: CLINIC | Age: 62
End: 2022-08-19
Payer: COMMERCIAL

## 2022-08-19 VITALS
HEART RATE: 70 BPM | HEIGHT: 70 IN | SYSTOLIC BLOOD PRESSURE: 134 MMHG | DIASTOLIC BLOOD PRESSURE: 80 MMHG | BODY MASS INDEX: 34.65 KG/M2 | WEIGHT: 242 LBS

## 2022-08-19 DIAGNOSIS — N18.2 STAGE 2 CHRONIC KIDNEY DISEASE: ICD-10-CM

## 2022-08-19 DIAGNOSIS — I10 BENIGN ESSENTIAL HYPERTENSION: Primary | ICD-10-CM

## 2022-08-19 DIAGNOSIS — N18.2 TYPE 2 DIABETES MELLITUS WITH STAGE 2 CHRONIC KIDNEY DISEASE AND HYPERTENSION (HCC): ICD-10-CM

## 2022-08-19 DIAGNOSIS — R80.1 PERSISTENT PROTEINURIA: ICD-10-CM

## 2022-08-19 DIAGNOSIS — E11.22 TYPE 2 DIABETES MELLITUS WITH STAGE 2 CHRONIC KIDNEY DISEASE AND HYPERTENSION (HCC): ICD-10-CM

## 2022-08-19 DIAGNOSIS — E78.2 MIXED HYPERLIPIDEMIA: ICD-10-CM

## 2022-08-19 DIAGNOSIS — I12.9 TYPE 2 DIABETES MELLITUS WITH STAGE 2 CHRONIC KIDNEY DISEASE AND HYPERTENSION (HCC): ICD-10-CM

## 2022-08-19 DIAGNOSIS — E55.9 VITAMIN D DEFICIENCY: ICD-10-CM

## 2022-08-19 DIAGNOSIS — K76.0 FATTY LIVER: ICD-10-CM

## 2022-08-19 PROCEDURE — 99214 OFFICE O/P EST MOD 30 MIN: CPT | Performed by: NURSE PRACTITIONER

## 2022-08-19 PROCEDURE — 3066F NEPHROPATHY DOC TX: CPT | Performed by: NURSE PRACTITIONER

## 2022-08-19 PROCEDURE — 3079F DIAST BP 80-89 MM HG: CPT | Performed by: NURSE PRACTITIONER

## 2022-08-19 PROCEDURE — 3075F SYST BP GE 130 - 139MM HG: CPT | Performed by: NURSE PRACTITIONER

## 2022-08-19 RX ORDER — ASPIRIN 81 MG/1
81 TABLET ORAL DAILY
COMMUNITY

## 2022-08-19 NOTE — PATIENT INSTRUCTIONS
Your seen today for continued management of kidney disease and protein excretion/proteinuria  At this time kidney function is stable but protein levels have increased in the urine  Maintain increased dose of olmesartan  Plan/recommendations:  No change in medications at this time  Please check with your rheumatologist regarding sulfasalazine possibly causing metallic taste  Good blood pressure control in good blood sugar control essential to prevent progression of kidney disease  Follow-up blood work and urine studies before next visit  Please call with any questions or concerns  Optimally blood pressure control should be less than 130/80  Omron brand recommended     Obtain home blood pressure readings as follows: In the morning please take blood pressure reading before medications  Please sit quietly at least 5 minutes before taking blood pressure reading  Make sure your arm is supported at heart level  In the evening please take blood pressure reading between 7-10 p m  prior to any evening medications and at least an hour after eating dinner  Sit quietly for at least 5 minutes  Document readings twice a day for 1 week prior to office visit   Document heart rate along with blood pressure reading   General instructions:    Make sure your sitting comfortably with back supported and both feet on the floor  Do not cross your legs  Do not talk during blood pressure measurement  Avoid coffee or caffeine at least 30 minutes prior to measurement  Do not take blood pressure measurement within 30 minutes of exercising  Do not smoke cigarettes  If you are taking a reading while standing make sure your arm is supported at heart level and not dangling at your side    Make sure the cuff is properly positioned above the crease at your elbow joint-   Check  guidelines

## 2022-08-25 DIAGNOSIS — E11.49 DIABETES MELLITUS TYPE 2 WITH NEUROLOGICAL MANIFESTATIONS (HCC): ICD-10-CM

## 2022-08-25 RX ORDER — GLIPIZIDE 10 MG/1
TABLET, FILM COATED, EXTENDED RELEASE ORAL
Qty: 180 TABLET | Refills: 1 | Status: SHIPPED | OUTPATIENT
Start: 2022-08-25

## 2022-09-29 DIAGNOSIS — E78.2 MIXED HYPERLIPIDEMIA: ICD-10-CM

## 2022-09-29 RX ORDER — ATORVASTATIN CALCIUM 20 MG/1
TABLET, FILM COATED ORAL
Qty: 90 TABLET | Refills: 1 | Status: SHIPPED | OUTPATIENT
Start: 2022-09-29

## 2022-10-04 ENCOUNTER — HOSPITAL ENCOUNTER (OUTPATIENT)
Dept: RADIOLOGY | Facility: HOSPITAL | Age: 62
Discharge: HOME | End: 2022-10-04
Payer: COMMERCIAL

## 2022-10-04 DIAGNOSIS — M06.09 RHEUMATOID ARTHRITIS WITHOUT RHEUMATOID FACTOR, MULTIPLE SITES (HCC): ICD-10-CM

## 2022-10-04 PROCEDURE — 76882 US LMTD JT/FCL EVL NVASC XTR: CPT

## 2022-12-17 DIAGNOSIS — I10 BENIGN ESSENTIAL HYPERTENSION: ICD-10-CM

## 2022-12-19 ENCOUNTER — TELEPHONE (OUTPATIENT)
Dept: NEPHROLOGY | Facility: CLINIC | Age: 62
End: 2022-12-19

## 2022-12-19 RX ORDER — OLMESARTAN MEDOXOMIL 20 MG/1
TABLET ORAL
Qty: 180 TABLET | Refills: 1 | Status: SHIPPED | OUTPATIENT
Start: 2022-12-19

## 2022-12-19 NOTE — TELEPHONE ENCOUNTER
Reschedule Appointment   Person speaking to  Patient   Date of original appointment 12/28/22    New appointment date 3/7/23   Patient on dialysis no   Location [place]   Provider Dr Geeta Gavin    Additional Information

## 2023-01-12 ENCOUNTER — OFFICE VISIT (OUTPATIENT)
Dept: FAMILY MEDICINE CLINIC | Facility: CLINIC | Age: 63
End: 2023-01-12

## 2023-01-12 VITALS
TEMPERATURE: 97.6 F | WEIGHT: 250 LBS | BODY MASS INDEX: 35.79 KG/M2 | HEIGHT: 70 IN | SYSTOLIC BLOOD PRESSURE: 142 MMHG | OXYGEN SATURATION: 98 % | DIASTOLIC BLOOD PRESSURE: 86 MMHG | HEART RATE: 64 BPM | RESPIRATION RATE: 18 BRPM

## 2023-01-12 DIAGNOSIS — M06.09 RHEUMATOID ARTHRITIS WITHOUT RHEUMATOID FACTOR, MULTIPLE SITES (HCC): ICD-10-CM

## 2023-01-12 DIAGNOSIS — Z12.5 PROSTATE CANCER SCREENING: ICD-10-CM

## 2023-01-12 DIAGNOSIS — E11.22 TYPE 2 DIABETES MELLITUS WITH STAGE 2 CHRONIC KIDNEY DISEASE AND HYPERTENSION (HCC): ICD-10-CM

## 2023-01-12 DIAGNOSIS — I12.9 TYPE 2 DIABETES MELLITUS WITH STAGE 2 CHRONIC KIDNEY DISEASE AND HYPERTENSION (HCC): ICD-10-CM

## 2023-01-12 DIAGNOSIS — N18.2 TYPE 2 DIABETES MELLITUS WITH STAGE 2 CHRONIC KIDNEY DISEASE AND HYPERTENSION (HCC): ICD-10-CM

## 2023-01-12 DIAGNOSIS — I10 BENIGN ESSENTIAL HYPERTENSION: Primary | ICD-10-CM

## 2023-01-12 DIAGNOSIS — E11.49 DIABETES MELLITUS TYPE 2 WITH NEUROLOGICAL MANIFESTATIONS (HCC): ICD-10-CM

## 2023-01-12 RX ORDER — PEN NEEDLE, DIABETIC 31 GX5/16"
NEEDLE, DISPOSABLE MISCELLANEOUS
Qty: 200 EACH | Refills: 1 | Status: SHIPPED | OUTPATIENT
Start: 2023-01-12

## 2023-01-12 RX ORDER — AMOXICILLIN 500 MG/1
TABLET, FILM COATED ORAL
COMMUNITY
Start: 2023-01-09

## 2023-01-12 RX ORDER — AMLODIPINE BESYLATE 5 MG/1
5 TABLET ORAL DAILY
Qty: 90 TABLET | Refills: 1 | Status: SHIPPED | OUTPATIENT
Start: 2023-01-12

## 2023-01-12 NOTE — ASSESSMENT & PLAN NOTE
Blood pressure was significantly high at dentist  Not as high here, but still not controlled  He has gained some weight back  Amlodipine 5 mg a day restarted  Will recheck pressure at his upcomming appointment in a few weeks

## 2023-01-12 NOTE — ASSESSMENT & PLAN NOTE
Having significant hand pain since stopping sulfasalazine, but his taste issues improved with stopping the medication  Will need follow up with rheumatology

## 2023-01-12 NOTE — PROGRESS NOTES
Name: Rocio Ruiz      : 1960      MRN: [id number]  Encounter Provider: Carie Crowder DO  Encounter Date: 2023   Encounter department: [address]     1  Benign essential hypertension  Assessment & Plan:  Blood pressure was significantly high at dentist  Not as high here, but still not controlled  He has gained some weight back  Amlodipine 5 mg a day restarted  Will recheck pressure at his upcomming appointment in a few weeks  Orders:  -     Comprehensive metabolic panel; Future; Expected date: 2023  -     CBC and Platelet; Future; Expected date: 2023  -     Lipid Panel with Direct LDL reflex; Future; Expected date: 2023  -     Comprehensive metabolic panel  -     CBC and Platelet  -     Lipid Panel with Direct LDL reflex  -     amLODIPine (NORVASC) 5 mg tablet; Take 1 tablet (5 mg total) by mouth daily    2  Rheumatoid arthritis without rheumatoid factor, multiple sites [facility])  Assessment & Plan:  Having significant hand pain since stopping sulfasalazine, but his taste issues improved with stopping the medication  Will need follow up with rheumatology      3  Type 2 diabetes mellitus with stage 2 chronic kidney disease and hypertension ([facility] [address] )  Assessment & Plan:    Lab Results   Component Value Date    HGBA1C 4 9 2022     Was very controlled, but gained weight back with recent taste issues with COVID 19       Orders:  -     Hemoglobin A1C; Future; Expected date: 2023  -     Microalbumin / creatinine urine ratio; Future; Expected date: 2023  -     Hemoglobin A1C  -     Microalbumin / creatinine urine ratio    4  Prostate cancer screening  Comments:  reports he has not had a PSA done by another doctor, lab ordered   Orders:  -     PSA Total (Reflex To Free); Future  -     PSA Total (Reflex To Free)    Return for Scheduled follow up in       He was at the dentist and his blood pressure was up to 208/105    He has stopped the sulfasalazine and the bad taste in his mouth went away  He then lost his taste when he had COVID 19 so he was eating more or differently than he should       Review of Systems    Current Outpatient Medications on File Prior to Visit   Medication Sig   • aspirin (ECOTRIN LOW STRENGTH) 81 mg EC tablet Take 81 mg by mouth daily   • atorvastatin (LIPITOR) 20 mg tablet TAKE ONE TABLET BY MOUTH EVERY DAY   • Blood Glucose Monitoring Suppl (OneTouch Verio) w/Device KIT Test blood sugar twice a day   • Continuous Blood Gluc Sensor (FreeStyle Ulysses 14 Day Sensor) MISC CHANGE SENSOR EVERY 14 DAYS   • gabapentin (NEURONTIN) 100 mg capsule TAKE THREE CAPSULES BY MOUTH EVERY DAY AT BEDTIME   • glipiZIDE (GLUCOTROL XL) 10 mg 24 hr tablet TAKE TWO TABLETS BY MOUTH EVERY DAY AT BEDTIME   • glucose blood (OneTouch Verio) test strip Test blood sugar twice a day   • Insulin Glargine Solostar (Basaglar KwikPen) 100 UNIT/ML SOPN Inject 10 Units under the skin daily   • metFORMIN (GLUCOPHAGE) 500 mg tablet Take 2 tablets (1,000 mg total) by mouth 2 (two) times a day with meals   • olmesartan (BENICAR) 20 mg tablet TAKE TWO TABLETS BY MOUTH EVERY DAY   • OneTouch Delica Lancets 99O MISC Test blood sugar twice a day   • tamsulosin (FLOMAX) 0 4 mg 0 4 mg daily   • [DISCONTINUED] B-D ULTRAFINE III SHORT PEN 31G X 8 MM MISC USE AS DIRECTED   • ALPRAZolam (XANAX) 0 25 mg tablet Take 1 tablet (0 25 mg total) by mouth daily at bedtime as needed for anxiety (Patient not taking: No sig reported)   • amoxicillin (AMOXIL) 500 MG tablet    • B-D ULTRAFINE III SHORT PEN 31G X 8 MM MISC USE AS DIRECTED   • [DISCONTINUED] amLODIPine (NORVASC) 5 mg tablet TAKE ONE TABLET BY MOUTH EVERY DAY (Patient not taking: No sig reported)   • [DISCONTINUED] hydrOXYzine pamoate (VISTARIL) 25 mg capsule  (Patient not taking: Reported on 8/19/2022)   • [DISCONTINUED] sulfaSALAzine (AZULFIDINE) 500 mg tablet Take 3 tablets (1,500 mg total) by mouth 2 (two) times a day (Patient not taking: Reported on 1/12/2023)       Objective     /86   Pulse 64   Temp 97 6 °F (36 4 °C)   Resp 18   Ht 5' 10" (1 778 m)   Wt 113 kg (250 lb)   SpO2 98%   BMI 35 87 kg/m²     Physical Exam  Vitals and nursing note reviewed  Constitutional:       Appearance: He is well-developed  HENT:      Head: Normocephalic and atraumatic  Right Ear: Tympanic membrane and external ear normal       Left Ear: Tympanic membrane and external ear normal    Cardiovascular:      Rate and Rhythm: Normal rate and regular rhythm  Heart sounds: Normal heart sounds  No murmur heard  Pulmonary:      Effort: Pulmonary effort is normal  No respiratory distress  Breath sounds: Normal breath sounds  No wheezing or rales  Musculoskeletal:      Right lower leg: No edema  Left lower leg: No edema         Skippy Moras, DO

## 2023-01-12 NOTE — TELEPHONE ENCOUNTER
Pt is currently at the dentist and his bp reading is 210/108  Pt is not experiencing any chest pain, shoulder pain, headache, or shortness of breath  Spoke w/Dr Yajaira Fox who said to have him come right over to be seen     I told pt to come right over, let him know he may have to wait, and overbooked him for 10:30am   Arabella Morgan

## 2023-01-12 NOTE — ASSESSMENT & PLAN NOTE
Lab Results   Component Value Date    HGBA1C 4 9 07/01/2022     Was very controlled, but gained weight back with recent taste issues with COVID 19

## 2023-01-18 ENCOUNTER — OFFICE VISIT (OUTPATIENT)
Dept: OBGYN CLINIC | Facility: CLINIC | Age: 63
End: 2023-01-18

## 2023-01-18 ENCOUNTER — APPOINTMENT (OUTPATIENT)
Dept: RADIOLOGY | Facility: CLINIC | Age: 63
End: 2023-01-18

## 2023-01-18 VITALS
WEIGHT: 250 LBS | HEART RATE: 70 BPM | SYSTOLIC BLOOD PRESSURE: 150 MMHG | BODY MASS INDEX: 35.79 KG/M2 | DIASTOLIC BLOOD PRESSURE: 80 MMHG | HEIGHT: 70 IN

## 2023-01-18 DIAGNOSIS — Z96.651 STATUS POST TOTAL RIGHT KNEE REPLACEMENT: Primary | ICD-10-CM

## 2023-01-18 DIAGNOSIS — Z96.651 STATUS POST TOTAL RIGHT KNEE REPLACEMENT: ICD-10-CM

## 2023-01-18 NOTE — PROGRESS NOTES
Assessment/Plan:  1  Status post total right knee replacement  XR knee 3 vw right non injury        Scribe Attestation    I,:  Nanette Scott am acting as a scribe while in the presence of the attending physician :       I,:  Samara Hayden, DO personally performed the services described in this documentation    as scribed in my presence :         Avtar Sigala is a pleasant 40-year-old gentleman who returns today for follow-up evaluation 1 year status post right total knee arthroplasty  I am very pleased with his imaging and his clinical presentation today in the office  He may continue with all activity to his tolerance  He does understand that he requires prophylactic antibiotics prior to any dental procedure moving forward  All of his questions and concerns were addressed today  We will see him back as needed  Subjective: Follow-up evaluation 1 year status post right total knee arthroplasty    Patient ID: Raymon Griffin is a 57 y o  male who returns today for follow-up evaluation 1 year status post right total knee arthroplasty  He reports that he has been doing very well  He has been participating in all desired activity of daily living and enjoyment without limitation  He does report some start up stiffness at times but this quickly resolves  He is very pleased with the results of his procedure and reports that it has "changed his life"  He denies any new injury or trauma  Review of Systems   Constitutional: Positive for activity change  Negative for chills, fever and unexpected weight change  HENT: Negative for hearing loss, nosebleeds and sore throat  Eyes: Negative for pain, redness and visual disturbance  Respiratory: Negative for cough, shortness of breath and wheezing  Cardiovascular: Negative for chest pain, palpitations and leg swelling  Gastrointestinal: Negative for abdominal pain, nausea and vomiting  Endocrine: Negative for polyphagia and polyuria     Genitourinary: Negative for dysuria and hematuria  Musculoskeletal: Negative for arthralgias, joint swelling and myalgias  See HPI   Skin: Negative for rash and wound  Neurological: Negative for dizziness, numbness and headaches  Psychiatric/Behavioral: Negative for decreased concentration and suicidal ideas  The patient is not nervous/anxious  Past Medical History:   Diagnosis Date   • Arthritis    • Carpal tunnel syndrome    • Chronic kidney disease     Stage 2 CKD   • Corneal abrasion     last assessed - [id number]   • Diabetes mellitus ([facility] [address] )    • Gout    • Hiatal hernia    • Hyperlipidemia    • Hypertension    • Impaired fasting glucose     last assessed - [id number]   • Kidney stone     last stone 2009   • Lumbar spondylosis 10/31/2021   • Lumbar spondylosis    • Numbness of fingers of both hands    • PONV (postoperative nausea and vomiting)    • Psoriasis    • RA (rheumatoid arthritis) ([facility] [address] )    • 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[facility] )    • Sinus infection     currently under tx PO ABT etc as of 04/17/19   • Viremia     Resolved - [id number]       Past Surgical History:   Procedure Laterality Date   • ANKLE SURGERY Right     tendon arthrotomy   • ARTHROTOMY ANKLE     • BACK SURGERY  2014    lumbar laminectomy   • COLONOSCOPY     • COLONOSCOPY N/A 4/19/2019    Procedure: COLONOSCOPY;  Surgeon: Carie Esteves MD;  Location: [place] [address] GI LAB;   Service: Gastroenterology   • FINGER SURGERY Bilateral     every finger contracture release over a 10 year period trigger finger   • INCISION TENDON SHEATH      of a finger   • KNEE SURGERY Right     x2 arthrotomy   • GA ARTHRP KNE CONDYLE&PLATU MEDIAL&LAT COMPARTMENTS Right 11/30/2021    Procedure: ARTHROPLASTY KNEE TOTAL W ROBOT;  Surgeon: Sánchez Burrell DO;  Location: [place] [facility];  Service: Orthopedics   • SHOULDER SURGERY Left     x1 staples, tendon repair secondary to many spontaneous dislocations       Family History   Problem Relation Age of Onset   • Hypertension Mother    • Arthritis Mother    • Hyperlipidemia Mother    • Lung cancer Mother    • Stroke Maternal Grandfather    • Diabetes Cousin    • COPD Father    • Depression Family    • Diabetes Family    • No Known Problems Sister    • No Known Problems Brother    • No Known Problems Maternal Aunt    • No Known Problems Maternal Uncle    • No Known Problems Paternal Aunt    • No Known Problems Paternal Uncle    • No Known Problems Maternal Grandmother    • No Known Problems Paternal Grandmother    • No Known Problems Paternal Grandfather        Social History     Occupational History   • Not on file   Tobacco Use   • Smoking status: Former     Packs/day:      Types: Cigarettes     Start date: 0     Quit date:      Years since quittin 0   • Smokeless tobacco: Never   • Tobacco comments:     Former smoker   Vaping Use   • Vaping Use: Never used   Substance and Sexual Activity   • Alcohol use: Yes     Comment: rare, once a month    • Drug use: No   • Sexual activity: Not on file         Current Outpatient Medications:   •  ALPRAZolam (XANAX) 0 25 mg tablet, Take 1 tablet (0 25 mg total) by mouth daily at bedtime as needed for anxiety (Patient not taking: No sig reported), Disp: 14 tablet, Rfl: 0  •  amLODIPine (NORVASC) 5 mg tablet, Take 1 tablet (5 mg total) by mouth daily, Disp: 90 tablet, Rfl: 1  •  amoxicillin (AMOXIL) 500 MG tablet, , Disp: , Rfl:   •  aspirin (ECOTRIN LOW STRENGTH) 81 mg EC tablet, Take 81 mg by mouth daily, Disp: , Rfl:   •  atorvastatin (LIPITOR) 20 mg tablet, TAKE ONE TABLET BY MOUTH EVERY DAY, Disp: 90 tablet, Rfl: 1  •  B-D ULTRAFINE III SHORT PEN 31G X 8 MM MISC, USE AS DIRECTED, Disp: 200 each, Rfl: 1  •  Blood Glucose Monitoring Suppl (OneTouch Verio) w/Device KIT, Test blood sugar twice a day, Disp: 1 kit, Rfl: 0  •  Continuous Blood Gluc Sensor (FreeStyle Ulysses 14 Day Sensor) MISC, CHANGE SENSOR EVERY 14 DAYS, Disp: 2 each, Rfl: 12  •  gabapentin (NEURONTIN) 100 mg capsule, TAKE THREE CAPSULES BY MOUTH EVERY DAY AT BEDTIME, Disp: 90 capsule, Rfl: 5  •  glipiZIDE (GLUCOTROL XL) 10 mg 24 hr tablet, TAKE TWO TABLETS BY MOUTH EVERY DAY AT BEDTIME, Disp: 180 tablet, Rfl: 1  •  glucose blood (OneTouch Verio) test strip, Test blood sugar twice a day, Disp: 200 each, Rfl: 3  •  Insulin Glargine Solostar (Basaglar KwikPen) 100 UNIT/ML SOPN, Inject 10 Units under the skin daily, Disp: 15 mL, Rfl: 1  •  metFORMIN (GLUCOPHAGE) 500 mg tablet, Take 2 tablets (1,000 mg total) by mouth 2 (two) times a day with meals, Disp: 360 tablet, Rfl: 3  •  olmesartan (BENICAR) 20 mg tablet, TAKE TWO TABLETS BY MOUTH EVERY DAY, Disp: 180 tablet, Rfl: 1  •  OneTouch Delica Lancets 64W MISC, Test blood sugar twice a day, Disp: 200 each, Rfl: 3  •  tamsulosin (FLOMAX) 0 4 mg, 0 4 mg daily, Disp: , Rfl:     Allergies   Allergen Reactions   • Latex Rash     At dentist, lips swollen    • Codeine Nausea Only     Reaction Date: 03Oct2005; Objective:  Vitals:    01/18/23 0719   BP: 150/80   Pulse: 70       Body mass index is 35 87 kg/m²  Right Knee Exam     Tenderness   The patient is experiencing no tenderness  Range of Motion   Right knee extension: 2  Flexion:  120 normal     Tests   Varus: negative Valgus: negative  Patellar apprehension: negative    Other   Erythema: absent  Scars: present  Sensation: normal  Pulse: present  Swelling: none  Effusion: no effusion present    Comments:  Stable at 0, 30 and 90 degrees  Neurovascularly in tact distally  No warmth or erythema  Patella tracks flat and midline          Observations     Right Knee   Negative for effusion  Physical Exam  Vitals and nursing note reviewed  Constitutional:       Appearance: Normal appearance  He is well-developed  HENT:      Head: Normocephalic and atraumatic  Right Ear: External ear normal       Left Ear: External ear normal    Eyes:      General: No scleral icterus       Extraocular Movements: Extraocular movements intact  Conjunctiva/sclera: Conjunctivae normal    Cardiovascular:      Rate and Rhythm: Normal rate  Pulmonary:      Effort: Pulmonary effort is normal  No respiratory distress  Musculoskeletal:      Cervical back: Normal range of motion and neck supple  Right knee: No effusion  Comments: As noted in HPI   Skin:     General: Skin is warm and dry  Neurological:      Mental Status: He is alert and oriented to person, place, and time  Psychiatric:         Behavior: Behavior normal          I have personally reviewed pertinent films in PACS  X-ray of the right knee obtained on 1/18/2023 reviewed demonstrating a well-positioned and aligned total knee arthroplasty without evidence of failure  There is no new fracture, dislocation, lytic or blastic lesion  This document was created using speech voice recognition software  Grammatical errors, random word insertions, pronoun errors, and incomplete sentences are an occasional consequence of this system due to software limitations, ambient noise, and hardware issues  Any formal questions or concerns about content, text, or information contained within the body of this dictation should be directly addressed to the provider for clarification

## 2023-01-25 LAB
ALBUMIN SERPL-MCNC: 3.9 G/DL (ref 3.8–4.8)
ALBUMIN/CREAT UR: 3502 MG/G CREAT (ref 0–29)
ALBUMIN/GLOB SERPL: 1.7 {RATIO} (ref 1.2–2.2)
ALP SERPL-CCNC: 74 IU/L (ref 44–121)
ALT SERPL-CCNC: 22 IU/L (ref 0–44)
AST SERPL-CCNC: 22 IU/L (ref 0–40)
BILIRUB SERPL-MCNC: 0.5 MG/DL (ref 0–1.2)
BUN SERPL-MCNC: 19 MG/DL (ref 8–27)
BUN/CREAT SERPL: 15 (ref 10–24)
CALCIUM SERPL-MCNC: 9.3 MG/DL (ref 8.6–10.2)
CHLORIDE SERPL-SCNC: 104 MMOL/L (ref 96–106)
CHOLEST SERPL-MCNC: 143 MG/DL (ref 100–199)
CO2 SERPL-SCNC: 27 MMOL/L (ref 20–29)
CREAT SERPL-MCNC: 1.25 MG/DL (ref 0.76–1.27)
CREAT UR-MCNC: 117.3 MG/DL
EGFR: 64 ML/MIN/1.73
ERYTHROCYTE [DISTWIDTH] IN BLOOD BY AUTOMATED COUNT: 13.9 % (ref 11.6–15.4)
EST. AVERAGE GLUCOSE BLD GHB EST-MCNC: 154 MG/DL
GLOBULIN SER-MCNC: 2.3 G/DL (ref 1.5–4.5)
GLUCOSE SERPL-MCNC: 109 MG/DL (ref 70–99)
HBA1C MFR BLD: 7 % (ref 4.8–5.6)
HCT VFR BLD AUTO: 48.2 % (ref 37.5–51)
HDLC SERPL-MCNC: 42 MG/DL
HGB BLD-MCNC: 16.2 G/DL (ref 13–17.7)
LDLC SERPL CALC-MCNC: 82 MG/DL (ref 0–99)
LDLC/HDLC SERPL: 2 RATIO (ref 0–3.6)
MCH RBC QN AUTO: 28.2 PG (ref 26.6–33)
MCHC RBC AUTO-ENTMCNC: 33.6 G/DL (ref 31.5–35.7)
MCV RBC AUTO: 84 FL (ref 79–97)
MICROALBUMIN UR-MCNC: 4107.5 UG/ML
MICRODELETION SYND BLD/T FISH: NORMAL
MICRODELETION SYND BLD/T FISH: NORMAL
PLATELET # BLD AUTO: 231 X10E3/UL (ref 150–450)
POTASSIUM SERPL-SCNC: 4 MMOL/L (ref 3.5–5.2)
PROT SERPL-MCNC: 6.2 G/DL (ref 6–8.5)
PSA SERPL-MCNC: 0.5 NG/ML (ref 0–4)
RBC # BLD AUTO: 5.74 X10E6/UL (ref 4.14–5.8)
SL AMB REFLEX CRITERIA: NORMAL
SL AMB VLDL CHOLESTEROL CALC: 19 MG/DL (ref 5–40)
SODIUM SERPL-SCNC: 143 MMOL/L (ref 134–144)
TRIGL SERPL-MCNC: 105 MG/DL (ref 0–149)
WBC # BLD AUTO: 5.8 X10E3/UL (ref 3.4–10.8)

## 2023-01-26 ENCOUNTER — TELEPHONE (OUTPATIENT)
Dept: OBGYN CLINIC | Facility: HOSPITAL | Age: 63
End: 2023-01-26

## 2023-01-26 DIAGNOSIS — Z96.651 AFTERCARE FOLLOWING RIGHT KNEE JOINT REPLACEMENT SURGERY: Primary | ICD-10-CM

## 2023-01-26 DIAGNOSIS — Z47.1 AFTERCARE FOLLOWING RIGHT KNEE JOINT REPLACEMENT SURGERY: Primary | ICD-10-CM

## 2023-01-26 LAB
25(OH)D3+25(OH)D2 SERPL-MCNC: 16.5 NG/ML (ref 30–100)
ALBUMIN SERPL-MCNC: 3.9 G/DL (ref 3.8–4.8)
ALBUMIN/CREAT UR: 3689 MG/G CREAT (ref 0–29)
ALBUMIN/GLOB SERPL: 1.6 {RATIO} (ref 1.2–2.2)
ALP SERPL-CCNC: 77 IU/L (ref 44–121)
ALT SERPL-CCNC: 20 IU/L (ref 0–44)
APPEARANCE UR: CLEAR
AST SERPL-CCNC: 20 IU/L (ref 0–40)
BACTERIA URNS QL MICRO: NORMAL
BASOPHILS # BLD AUTO: 0 X10E3/UL (ref 0–0.2)
BASOPHILS NFR BLD AUTO: 1 %
BILIRUB SERPL-MCNC: 0.5 MG/DL (ref 0–1.2)
BILIRUB UR QL STRIP: NEGATIVE
BUN SERPL-MCNC: 19 MG/DL (ref 8–27)
BUN/CREAT SERPL: 15 (ref 10–24)
CALCIUM SERPL-MCNC: 9.3 MG/DL (ref 8.6–10.2)
CASTS URNS QL MICRO: NORMAL /LPF
CHLORIDE SERPL-SCNC: 103 MMOL/L (ref 96–106)
CO2 SERPL-SCNC: 25 MMOL/L (ref 20–29)
COLOR UR: YELLOW
CREAT SERPL-MCNC: 1.24 MG/DL (ref 0.76–1.27)
CREAT UR-MCNC: 119.4 MG/DL
EGFR: 65 ML/MIN/1.73
EOSINOPHIL # BLD AUTO: 0.3 X10E3/UL (ref 0–0.4)
EOSINOPHIL NFR BLD AUTO: 6 %
EPI CELLS #/AREA URNS HPF: NORMAL /HPF (ref 0–10)
ERYTHROCYTE [DISTWIDTH] IN BLOOD BY AUTOMATED COUNT: 14.2 % (ref 11.6–15.4)
GLOBULIN SER-MCNC: 2.5 G/DL (ref 1.5–4.5)
GLUCOSE SERPL-MCNC: 110 MG/DL (ref 70–99)
GLUCOSE UR QL: ABNORMAL
HCT VFR BLD AUTO: 47.4 % (ref 37.5–51)
HGB BLD-MCNC: 15.9 G/DL (ref 13–17.7)
HGB UR QL STRIP: ABNORMAL
IMM GRANULOCYTES # BLD: 0 X10E3/UL (ref 0–0.1)
IMM GRANULOCYTES NFR BLD: 0 %
KETONES UR QL STRIP: NEGATIVE
LEUKOCYTE ESTERASE UR QL STRIP: NEGATIVE
LYMPHOCYTES # BLD AUTO: 1 X10E3/UL (ref 0.7–3.1)
LYMPHOCYTES NFR BLD AUTO: 17 %
MAGNESIUM SERPL-MCNC: 1.7 MG/DL (ref 1.6–2.3)
MCH RBC QN AUTO: 28.2 PG (ref 26.6–33)
MCHC RBC AUTO-ENTMCNC: 33.5 G/DL (ref 31.5–35.7)
MCV RBC AUTO: 84 FL (ref 79–97)
MICRO URNS: ABNORMAL
MICROALBUMIN UR-MCNC: 4405.1 UG/ML
MONOCYTES # BLD AUTO: 0.5 X10E3/UL (ref 0.1–0.9)
MONOCYTES NFR BLD AUTO: 9 %
NEUTROPHILS # BLD AUTO: 3.9 X10E3/UL (ref 1.4–7)
NEUTROPHILS NFR BLD AUTO: 67 %
NITRITE UR QL STRIP: NEGATIVE
PH UR STRIP: 6 [PH] (ref 5–7.5)
PLATELET # BLD AUTO: 232 X10E3/UL (ref 150–450)
POTASSIUM SERPL-SCNC: 3.8 MMOL/L (ref 3.5–5.2)
PROT SERPL-MCNC: 6.4 G/DL (ref 6–8.5)
PROT UR QL STRIP: ABNORMAL
PROT UR-MCNC: 683.3 MG/DL
PROT/CREAT UR: 5723 MG/G CREAT (ref 0–200)
RBC # BLD AUTO: 5.63 X10E6/UL (ref 4.14–5.8)
RBC #/AREA URNS HPF: NORMAL /HPF (ref 0–2)
SODIUM SERPL-SCNC: 142 MMOL/L (ref 134–144)
SP GR UR: 1.02 (ref 1–1.03)
UROBILINOGEN UR STRIP-ACNC: 0.2 MG/DL (ref 0.2–1)
WBC # BLD AUTO: 5.8 X10E3/UL (ref 3.4–10.8)
WBC #/AREA URNS HPF: NORMAL /HPF (ref 0–5)

## 2023-01-26 RX ORDER — AMOXICILLIN 500 MG/1
2000 TABLET, FILM COATED ORAL
Qty: 4 TABLET | Refills: 0 | Status: SHIPPED | OUTPATIENT
Start: 2023-01-26 | End: 2023-01-27

## 2023-01-26 NOTE — TELEPHONE ENCOUNTER
Caller: Patient     Doctor: Hesham Magdaleno    Reason for call: Patient has a dentist appt at 10am and realized he is out of Amoxicillin  Can we please call in the Rx      Luis Shi    Call back#: [phone]

## 2023-01-30 ENCOUNTER — OFFICE VISIT (OUTPATIENT)
Dept: FAMILY MEDICINE CLINIC | Facility: CLINIC | Age: 63
End: 2023-01-30

## 2023-01-30 VITALS
HEART RATE: 68 BPM | TEMPERATURE: 97.9 F | BODY MASS INDEX: 36.65 KG/M2 | DIASTOLIC BLOOD PRESSURE: 70 MMHG | HEIGHT: 70 IN | RESPIRATION RATE: 16 BRPM | SYSTOLIC BLOOD PRESSURE: 162 MMHG | OXYGEN SATURATION: 96 % | WEIGHT: 256 LBS

## 2023-01-30 DIAGNOSIS — R80.9 TYPE 2 DIABETES MELLITUS WITH MICROALBUMINURIA, WITH LONG-TERM CURRENT USE OF INSULIN (HCC): Primary | ICD-10-CM

## 2023-01-30 DIAGNOSIS — E78.2 MIXED HYPERLIPIDEMIA: ICD-10-CM

## 2023-01-30 DIAGNOSIS — Z79.899 ENCOUNTER FOR LONG-TERM CURRENT USE OF MEDICATION: ICD-10-CM

## 2023-01-30 DIAGNOSIS — E55.9 VITAMIN D DEFICIENCY: ICD-10-CM

## 2023-01-30 DIAGNOSIS — E11.29 TYPE 2 DIABETES MELLITUS WITH MICROALBUMINURIA, WITH LONG-TERM CURRENT USE OF INSULIN (HCC): Primary | ICD-10-CM

## 2023-01-30 DIAGNOSIS — Z79.4 TYPE 2 DIABETES MELLITUS WITH MICROALBUMINURIA, WITH LONG-TERM CURRENT USE OF INSULIN (HCC): Primary | ICD-10-CM

## 2023-01-30 DIAGNOSIS — Z79.4 TYPE 2 DIABETES MELLITUS WITH DIABETIC NEUROPATHY, WITH LONG-TERM CURRENT USE OF INSULIN (HCC): ICD-10-CM

## 2023-01-30 DIAGNOSIS — E11.40 TYPE 2 DIABETES MELLITUS WITH DIABETIC NEUROPATHY, WITH LONG-TERM CURRENT USE OF INSULIN (HCC): ICD-10-CM

## 2023-01-30 DIAGNOSIS — I10 BENIGN ESSENTIAL HYPERTENSION: ICD-10-CM

## 2023-01-30 RX ORDER — AMLODIPINE BESYLATE 10 MG/1
10 TABLET ORAL DAILY
Qty: 90 TABLET | Refills: 1 | Status: SHIPPED | OUTPATIENT
Start: 2023-01-30

## 2023-01-30 RX ORDER — ERGOCALCIFEROL 1.25 MG/1
50000 CAPSULE ORAL WEEKLY
Qty: 12 CAPSULE | Refills: 0 | Status: SHIPPED | OUTPATIENT
Start: 2023-01-30 | End: 2023-04-18

## 2023-01-30 RX ORDER — INSULIN GLARGINE 100 [IU]/ML
10 INJECTION, SOLUTION SUBCUTANEOUS DAILY
Qty: 15 ML | Refills: 1 | Status: SHIPPED | OUTPATIENT
Start: 2023-01-30

## 2023-01-30 NOTE — PROGRESS NOTES
Name: Mayra Rasheed      : 1960      MRN: [id number]  Encounter Provider: Treasure Andrew DO  Encounter Date: 2023   Encounter department: [address] [place] [address]     1  Type 2 diabetes mellitus with microalbuminuria, with long-term current use of insulin ([facility])  Assessment & Plan:    Lab Results   Component Value Date    HGBA1C 7 0 (H) 2023   Hemoglobin A1c is up to 7 0  He is going to get back to his diet  Discussed elevated microalbuminuria and his need for better blood pressure and sugar control    Orders:  -     Hemoglobin A1C; Future; Expected date: 04/15/2023  -     Hemoglobin A1C    2  Benign essential hypertension  Assessment & Plan:  Not controlled  Continue Olmesartan 40 mg a day  Amlodipine dose increased from 5 to 10 mg a day  Will see nephrology in early March, will have them recheck his pressure then  He is also working on diet and weight loss    Orders:  -     amLODIPine (NORVASC) 10 mg tablet; Take 1 tablet (10 mg total) by mouth daily  -     CBC; Future; Expected date: 04/15/2023  -     Comprehensive metabolic panel; Future; Expected date: 04/15/2023  -     Lipid Panel with Direct LDL reflex; Future; Expected date: 04/15/2023  -     CBC  -     Comprehensive metabolic panel  -     Lipid Panel with Direct LDL reflex    3  Mixed hyperlipidemia  Assessment & Plan:  Stable  Continue atorvastatin 20 mg a day    Orders:  -     Lipid Panel with Direct LDL reflex; Future; Expected date: 04/15/2023  -     Lipid Panel with Direct LDL reflex    4  Vitamin D deficiency  -     ergocalciferol (ERGOCALCIFEROL) 1 25 MG (28857 UT) capsule; Take 1 capsule (50,000 Units total) by mouth once a week for 12 doses    5  Type 2 diabetes mellitus with diabetic neuropathy, with long-term current use of insulin ([facility])  -     Insulin Glargine Solostar (Basaglar KwikPen) 100 UNIT/ML SOPN; Inject 0 1 mL (10 Units total) under the skin daily    6   Encounter for long-term current use of medication  -     Vitamin B12; Future; Expected date: 04/15/2023  -     Vitamin B12        Return in about 3 months (around 4/30/2023) for Next scheduled follow up  Subjective      He is still having pain in his hands from his arthritis and has an appointment with rheumatology scheduled  While he was having mouth issues his diet got all messed up  He is now getting back on track       Review of Systems    Current Outpatient Medications on File Prior to Visit   Medication Sig   • aspirin (ECOTRIN LOW STRENGTH) 81 mg EC tablet Take 81 mg by mouth daily   • atorvastatin (LIPITOR) 20 mg tablet TAKE ONE TABLET BY MOUTH EVERY DAY   • B-D ULTRAFINE III SHORT PEN 31G X 8 MM MISC USE AS DIRECTED   • Blood Glucose Monitoring Suppl (OneTouch Verio) w/Device KIT Test blood sugar twice a day   • Continuous Blood Gluc Sensor (Collectricyle Ulysses 14 Day Sensor) MISC CHANGE SENSOR EVERY 14 DAYS   • gabapentin (NEURONTIN) 100 mg capsule TAKE THREE CAPSULES BY MOUTH EVERY DAY AT BEDTIME   • glipiZIDE (GLUCOTROL XL) 10 mg 24 hr tablet TAKE TWO TABLETS BY MOUTH EVERY DAY AT BEDTIME   • glucose blood (OneTouch Verio) test strip Test blood sugar twice a day   • metFORMIN (GLUCOPHAGE) 500 mg tablet Take 2 tablets (1,000 mg total) by mouth 2 (two) times a day with meals   • olmesartan (BENICAR) 20 mg tablet TAKE TWO TABLETS BY MOUTH EVERY DAY   • OneTouch Delica Lancets 98M MISC Test blood sugar twice a day   • tamsulosin (FLOMAX) 0 4 mg 0 4 mg daily   • [DISCONTINUED] amLODIPine (NORVASC) 5 mg tablet Take 1 tablet (5 mg total) by mouth daily   • [DISCONTINUED] Insulin Glargine Solostar (Basaglar KwikPen) 100 UNIT/ML SOPN Inject 10 Units under the skin daily   • ALPRAZolam (XANAX) 0 25 mg tablet Take 1 tablet (0 25 mg total) by mouth daily at bedtime as needed for anxiety (Patient not taking: Reported on 7/25/2022)       Objective     /70   Pulse 68   Temp 97 9 °F (36 6 °C)   Resp 16   Ht 5' 10" (1 778 m)   Wt 116 kg (256 lb)   SpO2 96%   BMI 36 73 kg/m²     Physical Exam  Vitals and nursing note reviewed  Constitutional:       Appearance: He is well-developed  HENT:      Head: Normocephalic and atraumatic  Right Ear: Tympanic membrane and external ear normal       Left Ear: Tympanic membrane and external ear normal    Cardiovascular:      Rate and Rhythm: Normal rate and regular rhythm  Heart sounds: Normal heart sounds  No murmur heard  Pulmonary:      Effort: Pulmonary effort is normal  No respiratory distress  Breath sounds: Normal breath sounds  No wheezing or rales  Musculoskeletal:      Right lower leg: No edema  Left lower leg: No edema         Dighton Lovejoy, DO

## 2023-01-31 NOTE — PATIENT INSTRUCTIONS
Recent Results (from the past 672 hour(s))   Hemoglobin A1C    Collection Time: 01/24/23  8:07 AM   Result Value Ref Range    Hemoglobin A1C 7 0 (H) 4 8 - 5 6 %    Estimated Average Glucose 154 mg/dL   Comprehensive metabolic panel    Collection Time: 01/24/23  8:07 AM   Result Value Ref Range    Glucose, Random 109 (H) 70 - 99 mg/dL    BUN 19 8 - 27 mg/dL    Creatinine 1 25 0 76 - 1 27 mg/dL    eGFR 64 >59 mL/min/1 73    SL AMB BUN/CREATININE RATIO 15 10 - 24    Sodium 143 134 - 144 mmol/L    Potassium 4 0 3 5 - 5 2 mmol/L    Chloride 104 96 - 106 mmol/L    CO2 27 20 - 29 mmol/L    CALCIUM 9 3 8 6 - 10 2 mg/dL    Protein, Total 6 2 6 0 - 8 5 g/dL    Albumin 3 9 3 8 - 4 8 g/dL    Globulin, Total 2 3 1 5 - 4 5 g/dL    Albumin/Globulin Ratio 1 7 1 2 - 2 2    TOTAL BILIRUBIN 0 5 0 0 - 1 2 mg/dL    Alk Phos Isoenzymes 74 44 - 121 IU/L    AST 22 0 - 40 IU/L    ALT 22 0 - 44 IU/L   CBC and Platelet    Collection Time: 01/24/23  8:07 AM   Result Value Ref Range    White Blood Cell Count 5 8 3 4 - 10 8 x10E3/uL    Red Blood Cell Count 5 74 4 14 - 5 80 x10E6/uL    Hemoglobin 16 2 13 0 - 17 7 g/dL    HCT 48 2 37 5 - 51 0 %    MCV 84 79 - 97 fL    MCH 28 2 26 6 - 33 0 pg    MCHC 33 6 31 5 - 35 7 g/dL    RDW 13 9 11 6 - 15 4 %    Platelet Count 662 718 - 450 x10E3/uL   Microalbumin / creatinine urine ratio    Collection Time: 01/24/23  8:07 AM   Result Value Ref Range    Creatinine, Urine 117 3 Not Estab  mg/dL    Microalbum  ,U,Random 4,107 5 Not Estab  ug/mL    Microalb/Creat Ratio 3,502 (H) 0 - 29 mg/g creat   Lipid Panel with Direct LDL reflex    Collection Time: 01/24/23  8:07 AM   Result Value Ref Range    Cholesterol, Total 143 100 - 199 mg/dL    Triglycerides 105 0 - 149 mg/dL    HDL 42 >39 mg/dL    VLDL Cholesterol Calculated 19 5 - 40 mg/dL    LDL Calculated 82 0 - 99 mg/dL    LDl/HDL Ratio 2 0 0 0 - 3 6 ratio   [facility] Kidney Stone Panel    Collection Time: 01/24/23  8:07 AM   Result Value Ref Range Interpretation Note    Cardiovascular Report    Collection Time: 01/24/23  8:07 AM   Result Value Ref Range    Interpretation Note    PSA Total (Reflex To Free)    Collection Time: 01/24/23  8:11 AM   Result Value Ref Range    Prostate Specific Antigen Total 0 5 0 0 - 4 0 ng/mL    Reflex Criteria Comment    [place] Default    Collection Time: 01/24/23  8:14 AM   Result Value Ref Range    White Blood Cell Count 5 8 3 4 - 10 8 x10E3/uL    Red Blood Cell Count 5 63 4 14 - 5 80 x10E6/uL    Hemoglobin 15 9 13 0 - 17 7 g/dL    HCT 47 4 37 5 - 51 0 %    MCV 84 79 - 97 fL    MCH 28 2 26 6 - 33 0 pg    MCHC 33 5 31 5 - 35 7 g/dL    RDW 14 2 11 6 - 15 4 %    Platelet Count 471 393 - 450 x10E3/uL    Neutrophils 67 Not Estab  %    Lymphocytes 17 Not Estab  %    Monocytes 9 Not Estab  %    Eosinophils 6 Not Estab  %    Basophils PCT 1 Not Estab  %    Neutrophils (Absolute) 3 9 1 4 - 7 0 x10E3/uL    Lymphocytes (Absolute) 1 0 0 7 - 3 1 x10E3/uL    Monocytes (Absolute) 0 5 0 1 - 0 9 x10E3/uL    Eosinophils (Absolute) 0 3 0 0 - 0 4 x10E3/uL    Basophils ABS 0 0 0 0 - 0 2 x10E3/uL    Immature Granulocytes 0 Not Estab  %    Immature Granulocytes (Absolute) 0 0 0 0 - 0 1 x10E3/uL   Comprehensive metabolic panel    Collection Time: 01/24/23  8:14 AM   Result Value Ref Range    Glucose, Random 110 (H) 70 - 99 mg/dL    BUN 19 8 - 27 mg/dL    Creatinine 1 24 0 76 - 1 27 mg/dL    eGFR 65 >59 mL/min/1 73    SL AMB BUN/CREATININE RATIO 15 10 - 24    Sodium 142 134 - 144 mmol/L    Potassium 3 8 3 5 - 5 2 mmol/L    Chloride 103 96 - 106 mmol/L    CO2 25 20 - 29 mmol/L    CALCIUM 9 3 8 6 - 10 2 mg/dL    Protein, Total 6 4 6 0 - 8 5 g/dL    Albumin 3 9 3 8 - 4 8 g/dL    Globulin, Total 2 5 1 5 - 4 5 g/dL    Albumin/Globulin Ratio 1 6 1 2 - 2 2    TOTAL BILIRUBIN 0 5 0 0 - 1 2 mg/dL    Alk Phos Isoenzymes 77 44 - 121 IU/L    AST 20 0 - 40 IU/L    ALT 20 0 - 44 IU/L   UA (URINE) with reflex to Scope    Collection Time: 01/24/23  8:14 AM Result Value Ref Range    Specific Gravity 1 020 1 005 - 1 030    Ph 6 0 5 0 - 7 5    Color UA Yellow Yellow    Urine Appearance Clear Clear    Leukocyte Esterase Negative Negative    Protein 3+ (A) Negative/Trace    Glucose, 24 HR Urine Trace (A) Negative    Ketone, Urine Negative Negative    Blood, Urine Trace (A) Negative    Bilirubin, Urine Negative Negative    Urobilinogen Urine 0 2 0 2 - 1 0 mg/dL    SL AMB NITRITES URINE, QUAL  Negative Negative    Microscopic Examination See below:    Microscopic Examination    Collection Time: 01/24/23  8:14 AM   Result Value Ref Range    SL AMB WBC, URINE None seen 0 - 5 /hpf    RBC, Urine 0-2 0 - 2 /hpf    Epithelial Cells (non renal) None seen 0 - 10 /hpf    Casts None seen None seen /lpf    Bacteria, Urine None seen None seen/Few   Protein / creatinine ratio, urine    Collection Time: 01/24/23  8:14 AM   Result Value Ref Range    Creatinine, Urine 119 4 Not Estab  mg/dL    Total Protein, Urine 683 3 Not Estab  mg/dL    Prot/Creat Ratio, Ur 5,723 (H) 0 - 200 mg/g creat   Microalbumin / creatinine urine ratio    Collection Time: 01/24/23  8:14 AM   Result Value Ref Range    Microalbum  ,U,Random 4,405 1 Not Estab  ug/mL    Microalb/Creat Ratio 3,689 (H) 0 - 29 mg/g creat   Vitamin D 25 hydroxy    Collection Time: 01/24/23  8:14 AM   Result Value Ref Range    25-HYDROXY VIT D 16 5 (L) 30 0 - 100 0 ng/mL   Magnesium    Collection Time: 01/24/23  8:14 AM   Result Value Ref Range    Magnesium, Serum 1 7 1 6 - 2 3 mg/dL        Once you finish the prescription vitamin D for 12 weeks, start Vitamin D3 2000 units a day

## 2023-01-31 NOTE — ASSESSMENT & PLAN NOTE
Lab Results   Component Value Date    HGBA1C 7 0 (H) 01/24/2023   Hemoglobin A1c is up to 7 0  He is going to get back to his diet  Discussed elevated microalbuminuria and his need for better blood pressure and sugar control

## 2023-01-31 NOTE — ASSESSMENT & PLAN NOTE
Not controlled  Continue Olmesartan 40 mg a day  Amlodipine dose increased from 5 to 10 mg a day  Will see nephrology in early March, will have them recheck his pressure then    He is also working on diet and weight loss

## 2023-02-05 DIAGNOSIS — I10 BENIGN ESSENTIAL HYPERTENSION: ICD-10-CM

## 2023-02-06 RX ORDER — OLMESARTAN MEDOXOMIL 20 MG/1
40 TABLET ORAL DAILY
Qty: 180 TABLET | Refills: 0 | Status: SHIPPED | OUTPATIENT
Start: 2023-02-06

## 2023-02-25 DIAGNOSIS — E11.49 DIABETES MELLITUS TYPE 2 WITH NEUROLOGICAL MANIFESTATIONS (HCC): ICD-10-CM

## 2023-02-27 RX ORDER — GLIPIZIDE 10 MG/1
TABLET, FILM COATED, EXTENDED RELEASE ORAL
Qty: 180 TABLET | Refills: 1 | Status: SHIPPED | OUTPATIENT
Start: 2023-02-27

## 2023-03-07 ENCOUNTER — OFFICE VISIT (OUTPATIENT)
Dept: NEPHROLOGY | Facility: CLINIC | Age: 63
End: 2023-03-07

## 2023-03-07 VITALS
SYSTOLIC BLOOD PRESSURE: 114 MMHG | BODY MASS INDEX: 37.37 KG/M2 | WEIGHT: 261 LBS | HEIGHT: 70 IN | DIASTOLIC BLOOD PRESSURE: 82 MMHG | HEART RATE: 76 BPM

## 2023-03-07 DIAGNOSIS — E11.29 TYPE 2 DIABETES MELLITUS WITH MICROALBUMINURIA, WITH LONG-TERM CURRENT USE OF INSULIN (HCC): Primary | ICD-10-CM

## 2023-03-07 DIAGNOSIS — N18.2 STAGE 2 CHRONIC KIDNEY DISEASE: ICD-10-CM

## 2023-03-07 DIAGNOSIS — R80.1 PERSISTENT PROTEINURIA: ICD-10-CM

## 2023-03-07 DIAGNOSIS — E66.01 CLASS 2 SEVERE OBESITY DUE TO EXCESS CALORIES WITH SERIOUS COMORBIDITY AND BODY MASS INDEX (BMI) OF 35.0 TO 35.9 IN ADULT (HCC): ICD-10-CM

## 2023-03-07 DIAGNOSIS — I12.9 TYPE 2 DIABETES MELLITUS WITH STAGE 2 CHRONIC KIDNEY DISEASE AND HYPERTENSION (HCC): ICD-10-CM

## 2023-03-07 DIAGNOSIS — E11.22 TYPE 2 DIABETES MELLITUS WITH STAGE 2 CHRONIC KIDNEY DISEASE AND HYPERTENSION (HCC): ICD-10-CM

## 2023-03-07 DIAGNOSIS — Z79.4 TYPE 2 DIABETES MELLITUS WITH MICROALBUMINURIA, WITH LONG-TERM CURRENT USE OF INSULIN (HCC): Primary | ICD-10-CM

## 2023-03-07 DIAGNOSIS — E55.9 VITAMIN D DEFICIENCY: ICD-10-CM

## 2023-03-07 DIAGNOSIS — R80.9 TYPE 2 DIABETES MELLITUS WITH MICROALBUMINURIA, WITH LONG-TERM CURRENT USE OF INSULIN (HCC): Primary | ICD-10-CM

## 2023-03-07 DIAGNOSIS — N18.2 TYPE 2 DIABETES MELLITUS WITH STAGE 2 CHRONIC KIDNEY DISEASE AND HYPERTENSION (HCC): ICD-10-CM

## 2023-03-07 NOTE — PROGRESS NOTES
NEPHROLOGY OFFICE VISIT   Juancho Funk 57 y o  male MRN: [id number]  3/7/2023    Reason for Visit: Chronic kidney disease with proteinuria    History of Present Illness (HPI):    I had the pleasure of seeing Blanquita Lewis today in the renal clinic for the continued management of chronic kidney disease with proteinuria  Since our last visit, there has been no ER visits or hospitilizations  He currently has no complaints at this time and is feeling well  Patient denies any chest pain, shortness of breath and swelling  The last blood work was done on January, which we have reviewed together  Unfortunately he has gained almost 20 pounds since our last visit  He still continues have lower extremity edema  His edema is worse at the end of the day and slightly better in the morning  Does not check his blood pressure at home  Reports no dizziness or lightheadedness and no shortness of breath  His hemoglobin A1c is 7 but the trend is upwards as it was 4 9 before  His taste has improved after being off the sulfasalazine  We discussed about starting Jardiance and the benefits it would have on his renal function as well as also helping with the proteinuria  I also told him we would check serologies just to evaluate for any other etiologies of the proteinuria as it is worsening slightly  We discussed about weight loss and exercise  I did recommend a dietitian but he would like to see if he can do it on his own first before proceeding with that  ASSESSMENT and PLAN:    Chronic Kidney Disease Stage II with nephrotic range proteinuria (Y7X2)  --Imaging: Renal ultrasound from February 2022  Right kidney 12 2 cm, left kidney 11 9 cm  No evidence of hydronephrosis and no masses  No cyst noted  --Urinalysis: From January 2023  3+ protein  Trace glucose  No significant microscopic hematuria  No casts and no bacteria were seen  --Proteinuria: Macroalbuminuria worsening as the most recent ratio was 3 6 g/g  Urine protein creatinine ratio was 5 7 g/g  --Baseline creatinine: Low 1's  --Etiology:  Presumed be secondary to diabetic kidney disease, hypertension and obesity related renal dysfunction  --Biopsy Proven:  No  --Status:  Stable and at baseline, but worsening proteinuria  --Management: Start Jardiance 10 mg daily, check serologies including DEBORA, ANCA, hepatitis panel, complements, serum protein electrophoresis, phospholipase A2 receptor antibodies, wrongly recommend weight loss, continue angiotensin receptor blocker  --Reducing Cardiovascular Risk Factors:  Simvastatin+ Ezetimibe reduced atherosclerotic events in CKD (SHARP trial); Low dose aspirin safe (if no contraindications exist); smoking is an independent risk factor for Chronic Kidney Disease and progression, strongly recommend smoking cessation     Diabetes mellitus type 2  -hemoglobin A1c: 7 (A1C values may be falsely elevated or decreased in those with CKD, assay method should be certified by [facility] glycohemoglobin standardization Program)  Target A1C between 7-8 5 (will need to be individualized for each patient), and would utilize A1C  in conjunction with continuous glucose monitoring (CGM)   It should also be noted that the A1c with more advanced kidney disease would be inaccurate due to decreased red blood cell life along with anemia   -current medications:  Metformin, glipizide, Basaglar    Start Jardiance 10 mg daily  -proteinuria:  Yes  -retinopathy:  Not reported  -neuropathy:  Yes  -following with endocrinology Dr Mahsa Melton  -please follow with an ophthalmologist and podiatrist every year  -continued self monitoring of blood glucose at home  -Management in CKD Recommendations:   • Metformin:  Avoid if creatinine clearance is less than 30 cc/min (concern for lactic acidosis)  • Sodium-Glucose Cotransporter-2 (SGLT2) Inhibitors:  May see an acute drop in the eGFR initially when starting the medication but then a stabilization of the renal function, with slower loss of renal function as compared to placebo   Relative risk of end-stage renal disease, doubling of serum creatinine or death from renal causes were also found to be lower as compared to placebo  ([facility])   DAPA-CKD study, showed that patients with chronic kidney disease regardless of the presence or absence of diabetes the risk of composite of a sustained decline in the estimated GFR of at least 50%, end-stage renal disease or death from renal or cardiovascular causes was significantly lower with Dapagliflozin than with placebo  EMPEROR-Reduced study also showed beneficial effects    If no contraindications exist I would recommend this medication added to the diabetic regiment, if further optimal diabetic control is required  If eGFR < 60 cc/min (avoid ertugliflozin); If eGFR < 45 cc/min (caution with or avoid dapagliflozin, emphagliflozin), if eGFR  < 30 cc/min (caution or avoid all including canagliflozin, more for efficacy)  • Sulfonylureas:  Preferred short-acting (glipizide, glimepiride, repaglinide), relatively safe in patients with non dialysis CKD  • Thiazolidinedones/Alpha-Gluosidase inhibitors/Dipeptidyl peptidase-4 inhibitors:  Generally not considered first-line agents in CKD (limited data in long-term safety and efficacy)  • Insulin:  Starting dose may need to be lower than what would ordinarily be used, as there is a decrease metabolism of insulin (no dose adjustment if the GFR > 50 mL/min, dose should be reduced to 75% of baseline if GFR 10-50 mL/min, and 50% baseline if GFR < 10 mL/min)     Hypertension  -- Stage II (American College of Cardiology/American Heart Association)  -- with underlying chronic kidney disease and obesity  -- Body mass index is 35 44 kg/m²    -- Volume status: Euvolemic  -- Etiology:  Essential hypertension and obesity  -- Secondary Work-Up:  No  -- Target Goal: < 130/80 (ACC/AHA, CKD with proteinuria, CKD in diabetics) ; if tolerated can target SBP < 120 mmHg in high cardiovascular risk ( Age > 75, CKD, CVD, No Diabetics SPRINT)  -- Lifestyle Modifications: DASH Diet, Weight Loss for ideal body weight, 45 mins of cardiovascular exercise 3 times a week as tolerated, and if no contraindications exist, smoking cessation, limit alcohol use, avoid NSAIDS, monitor blood pressure at home  -- Status:  Blood pressure at target  -- Current antihypertensive regiment: Amlodipine 10 mg daily, olmesartan 40 mg daily  -- Changes:  Continue current regiment  May possibly reduce or discontinue the amlodipine in the future if he continues to have lower extremity edema    Vitamin D deficiency  --Vitamin D 25-hydroxy level less than 15  --Ergocalciferol 50,000 units weekly     Proteinuria  -- presumed to be secondary to diabetic kidney disease and obesity  -- 24 hour urine protein or urine protein creatinine ratio: Macroalbuminuria/creatinine ratio 3 6 g/g with a urine protein creatinine ratio of 5 7 g/g  -- Current Blood Pressure: 114/82  -- current anti proteinuric medications:  Losartan  -- Interventions: Needs weight loss, low 2 g sodium diet, start Jardiance  -- General Recommendations:  • RAAS Blockade with high dose ARB or ACEI for target blood pressure < 130/80 as tolerated, with spironolactone as a good adjunct for anti proteinuric effect   Management of hypervolemia for blood pressure control as needed  • Avoid combination ACEI + ARB, due to high adverse effects (ONTARGET study)  • Direct Renin Inhibitor + ACEI or ARB has FDA black box warning for patients with diabetes and GFR < 60 cc/min due to risk for non fatal stroke, renal complications, hyperkalemia and hypotension (ALTITUDE study)  • Moderate dietary protein restriction 0 8 gm/kg  • Recommend statin therapy if no contraindications     • Recommend 1, 25 vitamin-D such as Paricalcitol if appropropriate (VITAL study)        Right knee osteoarthritis  --status post right knee surgery on 11/30/2021    Obesity  -- BMI 37 45  --Recommend decreasing portion sizes, encouraging healthy choices of fruits and vegetables, consuming healthier snacks and moderation in carbohydrate intake  Exercise recommendations; 3-5 times a week, the American Heart Association recommends 150 minutes a week moderate exercise  --Strongly recommend evaluation by nutritionist, declined  --Overweight and obesity have been associated with increased mortality and morbidity  Associated with a reduction in life expectancy, associated with increased all cause and cardiovascular mortality  --Metabolic risks, including increased risk of diabetes type 2, insulin resistance with hyperinsulinemia (weight loss of 5 to 7% associated with a decreased risk of type 2 diabetes among individuals with prediabetes and weight loss of 15% can lead to dramatic improvement of diabetes in nearly half of people)  Dyslipidemia, hypertension and heart disease are all increased in patients with obesity  Along with increased risk of stroke increased risk of multiple cancer types  Can also be associated with increased renal dysfunction, strongest risk factor for new onset chronic kidney disease  There is also a degree of compensatory hyperfiltration to meet high in the metabolic demands of increased body weight  This can also result in increased increased risk for nephrolithiasis  PATIENT INSTRUCTIONS:    Patient Instructions   1 )  Low 2 g sodium diet    2 )  Monitor weights at home    3 )  Avoid NSAIDs (ibuprofen, Motrin, Advil, Aleve, naproxen)    4 )  Monitor blood pressure at home, call if blood pressure greater than 150/90 persistently    5 ) I will plan to discuss all results including blood work, and/or imaging at our next visit, unless there is an urgent indication, in which case I will call you earlier  If you have any questions or concerns about your results, please feel free to call our office      6 ) SGLT2 Inhibitors such as [place] and [place], have been shown and studied to reduce the decline of kidney function and reduce cardiovascular outcomes  We recommend you start this medication  Please stop this medication during any period of illness, during any perioperative period  Please maintain good foot care and avoid keto diet  Maintain adequate hydration  And watch out for hypoglycemia  7 )  Repeat blood test and urine studies in 1 month, after starting the new medication  Orders Placed This Encounter   Procedures   • Protein electrophoresis, serum     Standing Status:   Future     Number of Occurrences:   1     Standing Expiration Date:   3/7/2024   • Immunoglobulin free LT chains blood     Standing Status:   Future     Number of Occurrences:   1     Standing Expiration Date:   3/7/2024   • DEBORA Screen w/ Reflex to Titer/Pattern     Standing Status:   Future     Standing Expiration Date:   3/7/2024   • Anti-neutrophilic cytoplasmic antibody     Standing Status:   Future     Number of Occurrences:   1     Standing Expiration Date:   3/7/2024   • Hepatitis panel, acute     Standing Status:   Future     Number of Occurrences:   1     Standing Expiration Date:   3/7/2024   • Phospholipase A2 Receptor Antibodies     Standing Status:   Future     Number of Occurrences:   1     Standing Expiration Date:   3/7/2024   • Glomerular basement membrane antibodies     Standing Status:   Future     Number of Occurrences:   1     Standing Expiration Date:   3/7/2024   • C3, C4, Complement CH50     Standing Status:   Future     Standing Expiration Date:   3/7/2024   • Basic metabolic panel     This is a patient instruction: Patient fasting for 8 hours or longer recommended       Standing Status:   Future     Number of Occurrences:   1     Standing Expiration Date:   3/7/2024   • Microalbumin / creatinine urine ratio     Standing Status:   Future     Number of Occurrences:   1     Standing Expiration Date:   3/7/2024   • Comprehensive metabolic panel     This is a patient instruction: Patient fasting for 8 hours or longer recommended  Standing Status:   Future     Number of Occurrences:   1     Standing Expiration Date:   3/7/2024   • Microalbumin / creatinine urine ratio     Standing Status:   Future     Number of Occurrences:   1     Standing Expiration Date:   3/7/2024   • Cystatin C With eGFR     Standing Status:   Future     Standing Expiration Date:   3/7/2024       OBJECTIVE:  Current Weight: Weight - Scale: 118 kg (261 lb)  Vitals:    03/07/23 0816   BP: 114/82   BP Location: Left arm   Patient Position: Sitting   Cuff Size: Large   Pulse: 76   Weight: 118 kg (261 lb)   Height: 5' 10" (1 778 m)    Body mass index is 37 45 kg/m²  REVIEW OF SYSTEMS:    Review of Systems   Constitutional: Positive for unexpected weight change  Negative for activity change and fever  Respiratory: Negative for cough, chest tightness, shortness of breath and wheezing  Cardiovascular: Positive for leg swelling  Negative for chest pain  Gastrointestinal: Negative for abdominal pain, diarrhea, nausea and vomiting  Endocrine: Negative for polyuria  Genitourinary: Negative for difficulty urinating, dysuria, flank pain, frequency and urgency  Skin: Negative for rash  Neurological: Negative for dizziness, syncope, light-headedness and headaches  PHYSICAL EXAM:      Physical Exam  Vitals and nursing note reviewed  Constitutional:       General: He is not in acute distress  Appearance: He is well-developed  He is obese  HENT:      Head: Normocephalic and atraumatic  Eyes:      General: No scleral icterus  Conjunctiva/sclera: Conjunctivae normal       Pupils: Pupils are equal, round, and reactive to light  Cardiovascular:      Rate and Rhythm: Normal rate and regular rhythm  Heart sounds: S1 normal and S2 normal  No murmur heard  No friction rub  No gallop  Pulmonary:      Effort: Pulmonary effort is normal  No respiratory distress        Breath sounds: Normal breath sounds  No wheezing or rales  Abdominal:      General: Bowel sounds are normal       Palpations: Abdomen is soft  Tenderness: There is no abdominal tenderness  There is no rebound  Musculoskeletal:         General: Normal range of motion  Cervical back: Normal range of motion and neck supple  Right lower leg: Edema present  Left lower leg: Edema present  Skin:     Findings: No rash  Neurological:      Mental Status: He is alert and oriented to person, place, and time     Psychiatric:         Behavior: Behavior normal          Medications:    Current Outpatient Medications:   •  amLODIPine (NORVASC) 10 mg tablet, Take 1 tablet (10 mg total) by mouth daily, Disp: 90 tablet, Rfl: 1  •  aspirin (ECOTRIN LOW STRENGTH) 81 mg EC tablet, Take 81 mg by mouth daily, Disp: , Rfl:   •  atorvastatin (LIPITOR) 20 mg tablet, TAKE ONE TABLET BY MOUTH EVERY DAY, Disp: 90 tablet, Rfl: 1  •  Empagliflozin (Jardiance) 10 MG TABS tablet, Take 1 tablet (10 mg total) by mouth every morning, Disp: 30 tablet, Rfl: 3  •  ergocalciferol (ERGOCALCIFEROL) 1 25 MG (84541 UT) capsule, Take 1 capsule (50,000 Units total) by mouth once a week for 12 doses, Disp: 12 capsule, Rfl: 0  •  gabapentin (NEURONTIN) 100 mg capsule, TAKE THREE CAPSULES BY MOUTH EVERY DAY AT BEDTIME, Disp: 90 capsule, Rfl: 2  •  glipiZIDE (GLUCOTROL XL) 10 mg 24 hr tablet, TAKE TWO TABLETS BY MOUTH EVERY DAY AT BEDTIME, Disp: 180 tablet, Rfl: 1  •  Insulin Glargine Solostar (Basaglar KwikPen) 100 UNIT/ML SOPN, Inject 0 1 mL (10 Units total) under the skin daily, Disp: 15 mL, Rfl: 1  •  metFORMIN (GLUCOPHAGE) 500 mg tablet, Take 2 tablets (1,000 mg total) by mouth 2 (two) times a day with meals, Disp: 360 tablet, Rfl: 3  •  olmesartan (BENICAR) 20 mg tablet, Take 2 tablets (40 mg total) by mouth daily, Disp: 180 tablet, Rfl: 0  •  tamsulosin (FLOMAX) 0 4 mg, 0 4 mg daily, Disp: , Rfl:   •  ALPRAZolam (XANAX) 0 25 mg tablet, Take 1 tablet (0 25 mg total) by mouth daily at bedtime as needed for anxiety (Patient not taking: Reported on 7/25/2022), Disp: 14 tablet, Rfl: 0  •  B-D ULTRAFINE III SHORT PEN 31G X 8 MM MISC, USE AS DIRECTED, Disp: 200 each, Rfl: 1  •  Blood Glucose Monitoring Suppl (OneTouch Verio) w/Device KIT, Test blood sugar twice a day, Disp: 1 kit, Rfl: 0  •  Continuous Blood Gluc Sensor (FreeStyle Ulysses 14 Day Sensor) MISC, CHANGE SENSOR EVERY 14 DAYS, Disp: 2 each, Rfl: 12  •  glucose blood (OneTouch Verio) test strip, Test blood sugar twice a day, Disp: 200 each, Rfl: 3  •  OneTouch Delica Lancets 58T MISC, Test blood sugar twice a day, Disp: 200 each, Rfl: 3    Laboratory Results:        Invalid input(s): ALBUMIN    Results for orders placed or performed in visit on 01/24/23   [place] [place] Default   Result Value Ref Range    White Blood Cell Count 5 8 3 4 - 10 8 x10E3/uL    Red Blood Cell Count 5 63 4 14 - 5 80 x10E6/uL    Hemoglobin 15 9 13 0 - 17 7 g/dL    HCT 47 4 37 5 - 51 0 %    MCV 84 79 - 97 fL    MCH 28 2 26 6 - 33 0 pg    MCHC 33 5 31 5 - 35 7 g/dL    RDW 14 2 11 6 - 15 4 %    Platelet Count 679 801 - 450 x10E3/uL    Neutrophils 67 Not Estab  %    Lymphocytes 17 Not Estab  %    Monocytes 9 Not Estab  %    Eosinophils 6 Not Estab  %    Basophils PCT 1 Not Estab  %    Neutrophils (Absolute) 3 9 1 4 - 7 0 x10E3/uL    Lymphocytes (Absolute) 1 0 0 7 - 3 1 x10E3/uL    Monocytes (Absolute) 0 5 0 1 - 0 9 x10E3/uL    Eosinophils (Absolute) 0 3 0 0 - 0 4 x10E3/uL    Basophils ABS 0 0 0 0 - 0 2 x10E3/uL    Immature Granulocytes 0 Not Estab  %    Immature Granulocytes (Absolute) 0 0 0 0 - 0 1 x10E3/uL   Comprehensive metabolic panel   Result Value Ref Range    Glucose, Random 110 (H) 70 - 99 mg/dL    BUN 19 8 - 27 mg/dL    Creatinine 1 24 0 76 - 1 27 mg/dL    eGFR 65 >59 mL/min/1 73    SL AMB BUN/CREATININE RATIO 15 10 - 24    Sodium 142 134 - 144 mmol/L    Potassium 3 8 3 5 - 5 2 mmol/L    Chloride 103 96 - 106 mmol/L    CO2 25 20 - 29 mmol/L    CALCIUM 9 3 8 6 - 10 2 mg/dL    Protein, Total 6 4 6 0 - 8 5 g/dL    Albumin 3 9 3 8 - 4 8 g/dL    Globulin, Total 2 5 1 5 - 4 5 g/dL    Albumin/Globulin Ratio 1 6 1 2 - 2 2    TOTAL BILIRUBIN 0 5 0 0 - 1 2 mg/dL    Alk Phos Isoenzymes 77 44 - 121 IU/L    AST 20 0 - 40 IU/L    ALT 20 0 - 44 IU/L   UA (URINE) with reflex to Scope   Result Value Ref Range    Specific Gravity 1 020 1 005 - 1 030    Ph 6 0 5 0 - 7 5    Color UA Yellow Yellow    Urine Appearance Clear Clear    Leukocyte Esterase Negative Negative    Protein 3+ (A) Negative/Trace    Glucose, 24 HR Urine Trace (A) Negative    Ketone, Urine Negative Negative    Blood, Urine Trace (A) Negative    Bilirubin, Urine Negative Negative    Urobilinogen Urine 0 2 0 2 - 1 0 mg/dL    SL AMB NITRITES URINE, QUAL  Negative Negative    Microscopic Examination See below:    Microscopic Examination   Result Value Ref Range    SL AMB WBC, URINE None seen 0 - 5 /hpf    RBC, Urine 0-2 0 - 2 /hpf    Epithelial Cells (non renal) None seen 0 - 10 /hpf    Casts None seen None seen /lpf    Bacteria, Urine None seen None seen/Few   Protein / creatinine ratio, urine   Result Value Ref Range    Creatinine, Urine 119 4 Not Estab  mg/dL    Total Protein, Urine 683 3 Not Estab  mg/dL    Prot/Creat Ratio, Ur 5,723 (H) 0 - 200 mg/g creat   Microalbumin / creatinine urine ratio   Result Value Ref Range    Microalbum  ,U,Random 4,405 1 Not Estab  ug/mL    Microalb/Creat Ratio 3,689 (H) 0 - 29 mg/g creat   Vitamin D 25 hydroxy   Result Value Ref Range    25-HYDROXY VIT D 16 5 (L) 30 0 - 100 0 ng/mL   Magnesium   Result Value Ref Range    Magnesium, Serum 1 7 1 6 - 2 3 mg/dL

## 2023-03-07 NOTE — PATIENT INSTRUCTIONS
1  )  Low 2 g sodium diet    2 )  Monitor weights at home    3 )  Avoid NSAIDs (ibuprofen, Motrin, Advil, Aleve, naproxen)    4 )  Monitor blood pressure at home, call if blood pressure greater than 150/90 persistently    5 ) I will plan to discuss all results including blood work, and/or imaging at our next visit, unless there is an urgent indication, in which case I will call you earlier  If you have any questions or concerns about your results, please feel free to call our office  6 ) SGLT2 Inhibitors such as Rose Marie Manifold and Jardiance, have been shown and studied to reduce the decline of kidney function and reduce cardiovascular outcomes  We recommend you start this medication  Please stop this medication during any period of illness, during any perioperative period  Please maintain good foot care and avoid keto diet  Maintain adequate hydration  And watch out for hypoglycemia  7 )  Repeat blood test and urine studies in 1 month, after starting the new medication

## 2023-03-30 DIAGNOSIS — E78.2 MIXED HYPERLIPIDEMIA: ICD-10-CM

## 2023-03-30 RX ORDER — ATORVASTATIN CALCIUM 20 MG/1
TABLET, FILM COATED ORAL
Qty: 90 TABLET | Refills: 1 | Status: SHIPPED | OUTPATIENT
Start: 2023-03-30

## 2023-04-02 PROBLEM — M06.09 SERONEGATIVE ARTHROPATHY OF MULTIPLE SITES (HCC): Status: ACTIVE | Noted: 2023-04-02

## 2023-04-23 LAB
ALBUMIN SERPL-MCNC: 4.1 G/DL (ref 3.8–4.8)
ALBUMIN/GLOB SERPL: 1.8 {RATIO} (ref 1.2–2.2)
ALP SERPL-CCNC: 70 IU/L (ref 44–121)
ALT SERPL-CCNC: 22 IU/L (ref 0–44)
AST SERPL-CCNC: 23 IU/L (ref 0–40)
BILIRUB SERPL-MCNC: 0.6 MG/DL (ref 0–1.2)
BUN SERPL-MCNC: 19 MG/DL (ref 8–27)
BUN/CREAT SERPL: 13 (ref 10–24)
CALCIUM SERPL-MCNC: 9.4 MG/DL (ref 8.6–10.2)
CHLORIDE SERPL-SCNC: 106 MMOL/L (ref 96–106)
CHOLEST SERPL-MCNC: 111 MG/DL (ref 100–199)
CO2 SERPL-SCNC: 22 MMOL/L (ref 20–29)
CREAT SERPL-MCNC: 1.43 MG/DL (ref 0.76–1.27)
EGFR: 55 ML/MIN/1.73
ERYTHROCYTE [DISTWIDTH] IN BLOOD BY AUTOMATED COUNT: 14.7 % (ref 11.6–15.4)
EST. AVERAGE GLUCOSE BLD GHB EST-MCNC: 126 MG/DL
GLOBULIN SER-MCNC: 2.3 G/DL (ref 1.5–4.5)
GLUCOSE SERPL-MCNC: 124 MG/DL (ref 70–99)
HBA1C MFR BLD: 6 % (ref 4.8–5.6)
HCT VFR BLD AUTO: 44.9 % (ref 37.5–51)
HDLC SERPL-MCNC: 48 MG/DL
HGB BLD-MCNC: 15.2 G/DL (ref 13–17.7)
LDLC SERPL CALC-MCNC: 48 MG/DL (ref 0–99)
LDLC/HDLC SERPL: 1 RATIO (ref 0–3.6)
MCH RBC QN AUTO: 28.9 PG (ref 26.6–33)
MCHC RBC AUTO-ENTMCNC: 33.9 G/DL (ref 31.5–35.7)
MCV RBC AUTO: 85 FL (ref 79–97)
MICRODELETION SYND BLD/T FISH: NORMAL
MICRODELETION SYND BLD/T FISH: NORMAL
PLATELET # BLD AUTO: 280 X10E3/UL (ref 150–450)
POTASSIUM SERPL-SCNC: 4.1 MMOL/L (ref 3.5–5.2)
PROT SERPL-MCNC: 6.4 G/DL (ref 6–8.5)
RBC # BLD AUTO: 5.26 X10E6/UL (ref 4.14–5.8)
SL AMB VLDL CHOLESTEROL CALC: 15 MG/DL (ref 5–40)
SODIUM SERPL-SCNC: 144 MMOL/L (ref 134–144)
TRIGL SERPL-MCNC: 71 MG/DL (ref 0–149)
VIT B12 SERPL-MCNC: 337 PG/ML (ref 232–1245)
WBC # BLD AUTO: 5.3 X10E3/UL (ref 3.4–10.8)

## 2023-04-24 ENCOUNTER — EVALUATION (OUTPATIENT)
Dept: OCCUPATIONAL THERAPY | Facility: CLINIC | Age: 63
End: 2023-04-24

## 2023-04-24 DIAGNOSIS — E11.40 TYPE 2 DIABETES MELLITUS WITH DIABETIC NEUROPATHY, WITH LONG-TERM CURRENT USE OF INSULIN (HCC): Primary | ICD-10-CM

## 2023-04-24 DIAGNOSIS — M06.09 SERONEGATIVE ARTHROPATHY OF MULTIPLE SITES (HCC): ICD-10-CM

## 2023-04-24 DIAGNOSIS — Z79.4 TYPE 2 DIABETES MELLITUS WITH DIABETIC NEUROPATHY, WITH LONG-TERM CURRENT USE OF INSULIN (HCC): Primary | ICD-10-CM

## 2023-04-24 NOTE — PROGRESS NOTES
OT Evaluation     Today's date: 2023  Patient name: Mook Morocho  : 1960  MRN: [id number]  Referring provider: Lyla Uribe MD  Dx:   Encounter Diagnosis     ICD-10-CM    1  Type 2 diabetes mellitus with diabetic neuropathy, with long-term current use of insulin (HCC)  E11 40 Ambulatory Referral to Occupational Therapy    Z79 4       2  Seronegative arthropathy of multiple sites [facility])  M06 09 Ambulatory Referral to Occupational Therapy                     Assessment  Assessment details: Patient is a 57 y o  RHD male who presents for OT IE and treatment for b/l numbness and tingling as well as pain to hands  Patient reports having symptoms develop over the last several years  Patient notes its worse at night or with certain activities especially in the cold  Patient referred by Dr G[facility] to initiate treatment including hand therapy  Impairments: abnormal or restricted ROM, impaired physical strength, lacks appropriate home exercise program and pain with function  Understanding of Dx/Px/POC: good   Prognosis: good    Goals  Short term goals 2-4 weeks  Establish HEP to enhance performance with ADLs  Improve active range of motion of BUE by 20  to assist with UE dressing  In crease  strength by 10 lbs  to enhance gripping hand tools  Long Term goals by discharge  Establish final home exercise program to enhance maximal functional level with ADLs  Achieve functional active range of motion of BUE for full return to household chores  Achieve functional strength of BUE for full return to high level ADLs            Plan  Patient would benefit from: OT eval and skilled occupational therapy  Planned modality interventions: thermotherapy: hydrocollator packs, cryotherapy, TENS and unattended electrical stimulation  Planned therapy interventions: manual therapy, joint mobilization, strengthening, stretching, therapeutic activities, therapeutic exercise, home exercise program, graded exercise, graded activity, functional ROM exercises and flexibility  Frequency: 1x week  Duration in weeks: 8  Plan of Care beginning date: 2023  Plan of Care expiration date: 2023  Treatment plan discussed with: patient        Subjective Evaluation    History of Present Illness  Mechanism of injury: Patient is a 57 y o  RHD male who presents for OT IE and treatment for b/l numbness and tingling as well as pain to hands  Patient reports having symptoms develop over the last several years  Patient notes its worse at night or with certain activities especially in the cold  Patient referred by Dr Shaw Bright to initiate treatment including hand therapy      Pain  Current pain ratin  At best pain ratin  At worst pain rating: 10    Patient Goals  Patient goals for therapy: decreased edema, decreased pain, increased motion, return to sport/leisure activities, independence with ADLs/IADLs and increased strength          Objective     Active Range of Motion     Left Wrist   Wrist flexion: 55 degrees   Wrist extension: 62 degrees   Radial deviation: 15 degrees   Ulnar deviation: 15 degrees     Right Wrist   Wrist flexion: 60 degrees   Wrist extension: 60 degrees   Radial deviation: 13 degrees   Ulnar deviation: 30 degrees     Strength/Myotome Testing     Left Wrist/Hand      (2nd hand position)     Trial 1: 90    Thumb Strength  Key/Lateral Pinch     Trial 1: 20    Right Wrist/Hand      (2nd hand position)     Trial 1: 70    Thumb Strength   Key/Lateral Pinch     Trial 1: 20               Auth Tracker  Auth Status Total   Visits  Expiration date Co-Insurance   pending                                  Visit Tracker  Date                                                                                         PMHx:   has a past medical history of Arthritis, Carpal tunnel syndrome, Chronic kidney disease, Corneal abrasion, Diabetes mellitus ([address] ), Gout, Hiatal hernia, Hyperlipidemia, Hypertension, Impaired fasting glucose, Kidney stone, Lumbar spondylosis (10/31/2021), Lumbar spondylosis, Numbness of fingers of both hands, PONV (postoperative nausea and vomiting), Psoriasis, RA (rheumatoid arthritis) ([facility] [address] ), Seronegative arthropathy of multiple sites [facility]), Sinus infection, and Viremia  Precautions:        Manuals HEP 4/24/2023                       Ther Ex     Education on HEP and dx  x5min   AROM tendon glides x x10   AROM table top extension x x10   AROM digit add/abduction x x10   AROM wrist flex/ext/RD/UD x x10        PROM wrist flex/ext x x3 10 sec             Ther Act                     Modalities     MHP  5 min           Assessment:   Patient tolerated session well  Patient demonstrates strength ROM deficits upon assessment today  Patient session focused on patient education on anatomy and physiology concerning current dx, techniques for decreasing deficits through HEP, and appropriate use of modalities  Patient educated on HEP to include modalities and ROM HEP  with verbal instructions and handouts for patient reference  Patient educated on treatment plan at this time  Patient benefiting from skilled hand therapy OT to reduce deficits to improve independence with daily activities      Plan:   Focus on ROM to improve ability to complete daily activites with ease    POC 4/24/23 - 6/19/23

## 2023-04-25 ENCOUNTER — RA CDI HCC (OUTPATIENT)
Dept: OTHER | Facility: HOSPITAL | Age: 63
End: 2023-04-25

## 2023-04-25 NOTE — PROGRESS NOTES
[facility]  coding opportunities          Chart Reviewed number of suggestions sent to Provider: 2   E11 22, N18 31  E11 319    Patients Insurance        Commercial Insurance: [employer]

## 2023-04-26 ENCOUNTER — OFFICE VISIT (OUTPATIENT)
Dept: FAMILY MEDICINE CLINIC | Facility: CLINIC | Age: 63
End: 2023-04-26

## 2023-04-26 VITALS
DIASTOLIC BLOOD PRESSURE: 68 MMHG | RESPIRATION RATE: 16 BRPM | TEMPERATURE: 97.3 F | HEART RATE: 81 BPM | OXYGEN SATURATION: 98 % | SYSTOLIC BLOOD PRESSURE: 110 MMHG | BODY MASS INDEX: 34.22 KG/M2 | WEIGHT: 239 LBS | HEIGHT: 70 IN

## 2023-04-26 DIAGNOSIS — R10.31 RIGHT LOWER QUADRANT ABDOMINAL PAIN: Primary | ICD-10-CM

## 2023-04-26 NOTE — PROGRESS NOTES
Assessment/Plan:    Reviewed symptomatic treatment  Consider CT if pain continues or if he develops any bowel changes, appetite changes, etc      1  Right lower quadrant abdominal pain          There are no Patient Instructions on file for this visit  Return if symptoms worsen or fail to improve  Subjective:      Patient ID: Emily Chamorro is a 57 y o  male  Chief Complaint   Patient presents with    Pain     RLQ pain  Started last night  Blo[place] MaMARKUS barrera         Overnight, he started to develop a right lower abdominal pain  Feels like a muscle pull  Hurts with movement, noticed it when laying down at night  No associated bowel habit changes, diarrhea, n/v, or appetite changes   Not taking anything OTC      The following portions of the patient's history were reviewed and updated as appropriate: allergies, current medications, past family history, past medical history, past social history, past surgical history and problem list     Review of Systems   Constitutional: Negative  Respiratory: Negative  Gastrointestinal: Positive for abdominal pain  Negative for abdominal distention, constipation, diarrhea, nausea and vomiting  Genitourinary: Negative            Current Outpatient Medications   Medication Sig Dispense Refill    ALPRAZolam (XANAX) 0 25 mg tablet Take 1 tablet (0 25 mg total) by mouth daily at bedtime as needed for anxiety 14 tablet 0    amLODIPine (NORVASC) 10 mg tablet Take 1 tablet (10 mg total) by mouth daily (Patient taking differently: Take 5 mg by mouth daily) 90 tablet 1    aspirin (ECOTRIN LOW STRENGTH) 81 mg EC tablet Take 81 mg by mouth daily      atorvastatin (LIPITOR) 20 mg tablet TAKE ONE TABLET BY MOUTH EVERY DAY 90 tablet 1    B-D ULTRAFINE III SHORT PEN 31G X 8 MM MISC USE AS DIRECTED 200 each 1    Blood Glucose Monitoring Suppl (OneTouch Verio) w/Device KIT Test blood sugar twice a day 1 kit 0    Continuous Blood Gluc Sensor (FreeStyle Ulysses 14 Day "Sensor) MISC CHANGE SENSOR EVERY 14 DAYS 2 each 12    Empagliflozin (Jardiance) 10 MG TABS tablet Take 1 tablet (10 mg total) by mouth every morning 30 tablet 3    ergocalciferol (ERGOCALCIFEROL) 1 25 MG (44830 UT) capsule Take 1 capsule (50,000 Units total) by mouth once a week for 12 doses 12 capsule 0    gabapentin (NEURONTIN) 100 mg capsule Take 1 capsule (100 mg total) by mouth 4 (four) times a day 360 capsule 0    glipiZIDE (GLUCOTROL XL) 10 mg 24 hr tablet TAKE TWO TABLETS BY MOUTH EVERY DAY AT BEDTIME 180 tablet 1    glucose blood (OneTouch Verio) test strip Test blood sugar twice a day 200 each 3    hydroxychloroquine (PLAQUENIL) 200 mg tablet Take 1 tablet (200 mg total) by mouth 2 (two) times a day with meals 60 tablet 3    Insulin Glargine Solostar (Basaglar KwikPen) 100 UNIT/ML SOPN Inject 0 1 mL (10 Units total) under the skin daily (Patient taking differently: Inject 35 Units under the skin daily) 15 mL 1    metFORMIN (GLUCOPHAGE) 500 mg tablet Take 2 tablets (1,000 mg total) by mouth 2 (two) times a day with meals 360 tablet 3    olmesartan (BENICAR) 20 mg tablet Take 2 tablets (40 mg total) by mouth daily 180 tablet 0    OneTouch Delica Lancets 97D MISC Test blood sugar twice a day 200 each 3    tamsulosin (FLOMAX) 0 4 mg 0 4 mg daily       No current facility-administered medications for this visit  Objective:    /68   Pulse 81   Temp (!) 97 3 °F (36 3 °C)   Resp 16   Ht 5' 10 25\" (1 784 m)   Wt 108 kg (239 lb)   SpO2 98%   BMI 34 05 kg/m²        Physical Exam  Vitals and nursing note reviewed  Constitutional:       Appearance: Normal appearance  He is well-developed  Cardiovascular:      Rate and Rhythm: Normal rate and regular rhythm  Pulses: Normal pulses  Heart sounds: Normal heart sounds  No murmur heard  Pulmonary:      Effort: Pulmonary effort is normal       Breath sounds: Normal breath sounds     Abdominal:      General: Bowel sounds are normal  " There is no distension  Tenderness: There is no abdominal tenderness  There is no guarding or rebound  Hernia: No hernia is present  Skin:     General: Skin is warm and dry  Neurological:      Mental Status: He is alert     Psychiatric:         Mood and Affect: Mood normal          Behavior: Behavior normal                 SERA Sutherland

## 2023-04-27 LAB
ALBUMIN SERPL ELPH-MCNC: 3.5 G/DL (ref 2.9–4.4)
ALBUMIN/CREAT UR: 2625 MG/G CREAT (ref 0–29)
ALBUMIN/GLOB SERPL: 1.3 {RATIO} (ref 0.7–1.7)
ALPHA1 GLOB SERPL ELPH-MCNC: 0.2 G/DL (ref 0–0.4)
ALPHA2 GLOB SERPL ELPH-MCNC: 0.9 G/DL (ref 0.4–1)
ANA SER QL: NEGATIVE
B-GLOBULIN SERPL ELPH-MCNC: 0.8 G/DL (ref 0.7–1.3)
BUN SERPL-MCNC: 18 MG/DL (ref 8–27)
BUN/CREAT SERPL: 13 (ref 10–24)
C-ANCA TITR SER IF: NORMAL TITER
C3 SERPL-MCNC: 113 MG/DL (ref 82–167)
C4 SERPL-MCNC: 22 MG/DL (ref 12–38)
CALCIUM SERPL-MCNC: 9.6 MG/DL (ref 8.6–10.2)
CH50 SERPL-ACNC: >60 U/ML
CHLORIDE SERPL-SCNC: 105 MMOL/L (ref 96–106)
CO2 SERPL-SCNC: 23 MMOL/L (ref 20–29)
CREAT SERPL-MCNC: 1.42 MG/DL (ref 0.76–1.27)
CREAT UR-MCNC: 85.9 MG/DL
EGFR: 55 ML/MIN/1.73
GAMMA GLOB SERPL ELPH-MCNC: 0.8 G/DL (ref 0.4–1.8)
GBM AB SER IA-ACNC: <0.2 UNITS (ref 0–0.9)
GLOBULIN SER CALC-MCNC: 2.7 G/DL (ref 2.2–3.9)
GLUCOSE SERPL-MCNC: 124 MG/DL (ref 70–99)
HAV IGM SERPL QL IA: NEGATIVE
HBV CORE IGM SERPL QL IA: NEGATIVE
HBV SURFACE AG SERPL QL IA: NEGATIVE
HCV AB S/CO SERPL IA: NON REACTIVE
KAPPA LC FREE SER-MCNC: 35.9 MG/L (ref 3.3–19.4)
KAPPA LC FREE/LAMBDA FREE SER: 1.29 {RATIO} (ref 0.26–1.65)
LABORATORY COMMENT REPORT: NORMAL
LAMBDA LC FREE SERPL-MCNC: 27.8 MG/L (ref 5.7–26.3)
M PROTEIN SERPL ELPH-MCNC: NORMAL G/DL
MICROALBUMIN UR-MCNC: 2254.8 UG/ML
MYELOPEROXIDASE AB SER IA-ACNC: <0.2 UNITS (ref 0–0.9)
P-ANCA ATYPICAL TITR SER IF: NORMAL TITER
P-ANCA TITR SER IF: NORMAL TITER
PLA2R IGG SER IA-ACNC: <1.8 RU/ML (ref 0–19.9)
POTASSIUM SERPL-SCNC: 4.1 MMOL/L (ref 3.5–5.2)
PROT SERPL-MCNC: 6.2 G/DL (ref 6–8.5)
PROTEINASE3 AB SER IA-ACNC: <0.2 UNITS (ref 0–0.9)
SL AMB INTERPRETATION: NORMAL
SODIUM SERPL-SCNC: 144 MMOL/L (ref 134–144)

## 2023-05-01 ENCOUNTER — OFFICE VISIT (OUTPATIENT)
Dept: FAMILY MEDICINE CLINIC | Facility: CLINIC | Age: 63
End: 2023-05-01

## 2023-05-01 VITALS
DIASTOLIC BLOOD PRESSURE: 60 MMHG | HEART RATE: 67 BPM | TEMPERATURE: 97.4 F | BODY MASS INDEX: 34.36 KG/M2 | HEIGHT: 70 IN | WEIGHT: 240 LBS | RESPIRATION RATE: 16 BRPM | OXYGEN SATURATION: 96 % | SYSTOLIC BLOOD PRESSURE: 144 MMHG

## 2023-05-01 DIAGNOSIS — N18.2 TYPE 2 DIABETES MELLITUS WITH STAGE 2 CHRONIC KIDNEY DISEASE AND HYPERTENSION (HCC): Primary | ICD-10-CM

## 2023-05-01 DIAGNOSIS — E11.22 TYPE 2 DIABETES MELLITUS WITH STAGE 2 CHRONIC KIDNEY DISEASE AND HYPERTENSION (HCC): Primary | ICD-10-CM

## 2023-05-01 DIAGNOSIS — I10 BENIGN ESSENTIAL HYPERTENSION: ICD-10-CM

## 2023-05-01 DIAGNOSIS — I12.9 TYPE 2 DIABETES MELLITUS WITH STAGE 2 CHRONIC KIDNEY DISEASE AND HYPERTENSION (HCC): Primary | ICD-10-CM

## 2023-05-01 DIAGNOSIS — G56.03 BILATERAL CARPAL TUNNEL SYNDROME: ICD-10-CM

## 2023-05-01 NOTE — ASSESSMENT & PLAN NOTE
Lab Results   Component Value Date    HGBA1C 6 0 (H) 04/22/2023     Well controlled   Has eye exam scheduled, form given  Continue basaglar 10 units a day

## 2023-05-01 NOTE — PROGRESS NOTES
Name: Elizabet Jennings      : 1960      MRN: [id number]  Encounter Provider: Elen Roblero DO  Encounter Date: 2023   Encounter department: [facility]     1  Type 2 diabetes mellitus with stage 2 chronic kidney disease and hypertension ([facility] )  Assessment & Plan:    Lab Results   Component Value Date    HGBA1C 6 0 (H) 2023     Well controlled   Has eye exam scheduled, form given  Continue basaglar 10 units a day    Orders:  -     Hemoglobin A1C; Future; Expected date: 10/13/2023  -     Hemoglobin A1C    2  Benign essential hypertension  Assessment & Plan:  Blood pressure is well controlled  Continue olmesartan 20 mg twice daily    Orders:  -     CBC; Future; Expected date: 10/13/2023  -     Comprehensive metabolic panel; Future; Expected date: 10/13/2023  -     Lipid Panel with Direct LDL reflex; Future; Expected date: 10/13/2023  -     CBC  -     Comprehensive metabolic panel  -     Lipid Panel with Direct LDL reflex    3  Bilateral carpal tunnel syndrome  -     Ambulatory referral to Orthopedic Surgery; Future        Return in about 6 months (around 2023) for Next scheduled follow up  Subjective      He is down to 10 units of his basaglar  He was having low blood sugars before he started tapering down the dose  He is back to normal now that his medications from his rheumatologist have been stabilized  Continues to have stress at home but things are improving greatly      Review of Systems    Current Outpatient Medications on File Prior to Visit   Medication Sig    ALPRAZolam (XANAX) 0 25 mg tablet Take 1 tablet (0 25 mg total) by mouth daily at bedtime as needed for anxiety    amLODIPine (NORVASC) 10 mg tablet Take 1 tablet (10 mg total) by mouth daily (Patient taking differently: Take 5 mg by mouth daily)    aspirin (ECOTRIN LOW STRENGTH) 81 mg EC tablet Take 81 mg by mouth daily    atorvastatin (LIPITOR) 20 mg tablet TAKE ONE TABLET BY MOUTH "EVERY DAY    B-D ULTRAFINE III SHORT PEN 31G X 8 MM MISC USE AS DIRECTED    Blood Glucose Monitoring Suppl (OneTouch Verio) w/Device KIT Test blood sugar twice a day    Continuous Blood Gluc Sensor (FreeStyle Ulysses 14 Day Sensor) MISC CHANGE SENSOR EVERY 14 DAYS    Empagliflozin (Jardiance) 10 MG TABS tablet Take 1 tablet (10 mg total) by mouth every morning    ergocalciferol (ERGOCALCIFEROL) 1 25 MG (60681 UT) capsule Take 1 capsule (50,000 Units total) by mouth once a week for 12 doses    gabapentin (NEURONTIN) 100 mg capsule Take 1 capsule (100 mg total) by mouth 4 (four) times a day    glipiZIDE (GLUCOTROL XL) 10 mg 24 hr tablet TAKE TWO TABLETS BY MOUTH EVERY DAY AT BEDTIME    glucose blood (OneTouch Verio) test strip Test blood sugar twice a day    hydroxychloroquine (PLAQUENIL) 200 mg tablet Take 1 tablet (200 mg total) by mouth 2 (two) times a day with meals    Insulin Glargine Solostar (Basaglar KwikPen) 100 UNIT/ML SOPN Inject 0 1 mL (10 Units total) under the skin daily (Patient taking differently: Inject 35 Units under the skin daily)    metFORMIN (GLUCOPHAGE) 500 mg tablet Take 2 tablets (1,000 mg total) by mouth 2 (two) times a day with meals    olmesartan (BENICAR) 20 mg tablet Take 2 tablets (40 mg total) by mouth daily    OneTouch Delica Lancets 04C MISC Test blood sugar twice a day    tamsulosin (FLOMAX) 0 4 mg 0 4 mg daily       Objective     /60   Pulse 67   Temp (!) 97 4 °F (36 3 °C)   Resp 16   Ht 5' 10 25\" (1 784 m)   Wt 109 kg (240 lb)   SpO2 96%   BMI 34 19 kg/m²     Physical Exam  Vitals and nursing note reviewed  Constitutional:       Appearance: He is well-developed  HENT:      Head: Normocephalic and atraumatic  Right Ear: Tympanic membrane and external ear normal       Left Ear: Tympanic membrane and external ear normal    Cardiovascular:      Rate and Rhythm: Normal rate and regular rhythm  Heart sounds: Normal heart sounds   No murmur " heard   Pulmonary:      Effort: Pulmonary effort is normal  No respiratory distress  Breath sounds: Normal breath sounds  No wheezing or rales  Musculoskeletal:      Right lower leg: No edema  Left lower leg: No edema         Cristi Burton, DO

## 2023-05-08 DIAGNOSIS — I10 BENIGN ESSENTIAL HYPERTENSION: ICD-10-CM

## 2023-05-08 RX ORDER — OLMESARTAN MEDOXOMIL 20 MG/1
TABLET ORAL
Qty: 180 TABLET | Refills: 3 | Status: SHIPPED | OUTPATIENT
Start: 2023-05-08

## 2023-05-15 ENCOUNTER — TELEPHONE (OUTPATIENT)
Dept: PSYCHIATRY | Facility: CLINIC | Age: 63
End: 2023-05-15

## 2023-05-30 ENCOUNTER — TELEPHONE (OUTPATIENT)
Dept: PSYCHIATRY | Facility: CLINIC | Age: 63
End: 2023-05-30

## 2023-05-30 NOTE — TELEPHONE ENCOUNTER
"Behavioral Health Outpatient Intake Questions    Referred By   : PCP    Please advise interviewee that they need to answer all questions truthfully to allow for best care, and any misrepresentations of information may affect their ability to be seen at this clinic   => Was this discussed? Yes     If Minor Child (under age 25)    Who is/are the legal guardian(s) of the child? Is there a custody agreement? No     • If \"YES\"- Custody orders must be obtained prior to scheduling the first appointment  • In addition, Consent to Treatment must be signed by all legal guardians prior to scheduling the first appointment    • If \"NO\"- Consent to Treatment must be signed by all legal guardians prior to scheduling the first appointment    Behavioral Health Outpatient Intake History -     Presenting Problem (in patient's own words): Anxiety,wife had an affair     Are there any communication barriers for this patient? No                                               If yes, please describe barriers: no  • If there is a unique situation, please refer to [address] [place] [address] for final determination  Are you taking any psychiatric medications? No   •   If \"YES\" -What are they no   •   If \"YES\" -Who prescribes? Has the Patient previously received outpatient Talk Therapy or Medication Management from [place] [address]  No     •    If \"YES\"- When, Where and with Whom? •    If \"NO\" -Has Patient received these services elsewhere? •   If \"YES\" -When, Where, and with Whom? Has the Patient abused alcohol or other substances in the last 6 months ? No  No concerns of substance abuse are reported  •  If \"YES\" -What substance, How much, How often? •  If illegal substance: Refer to [facility] (for SONJA) or [facility]  •  If Alcohol in excess of 10 drinks per week:  Refer to [facility] (for SONJA) or [address] [place] [address]-     Is this treatment court ordered?  No   If \"yes \"send to :  • Talk " "Therapy : Send to [address] [place] [address] for final determination   • Med Management: Send to Dr Claude Montero for final determination     Has the Patient been convicted of a felony? No   If \"Yes\" send to -When, What? • Talk Therapy : Send to [address] [place] [address] for final determination   • Med Management: Send to Dr Claude Montero for final determination     ACCEPTED as a patient Yes  • If \"Yes\" Appointment Date: 7/11/2023 at 6:00pm with Adwoa Kesha     Referred Elsewhere? No  • If “Yes” - (Where? Ex: [facility], [facility], [address] [place] [address], etc )       Name of Insurance Co: [address] [place] [address] ID# [id number]  Insurance Phone #   If ins is primary or secondary? If patient is a minor, parents information such as Name, D  O B of guarantor    "

## 2023-06-03 DIAGNOSIS — E11.40 TYPE 2 DIABETES MELLITUS WITH DIABETIC NEUROPATHY, WITH LONG-TERM CURRENT USE OF INSULIN (HCC): ICD-10-CM

## 2023-06-03 DIAGNOSIS — Z79.4 TYPE 2 DIABETES MELLITUS WITH DIABETIC NEUROPATHY, WITH LONG-TERM CURRENT USE OF INSULIN (HCC): ICD-10-CM

## 2023-06-03 RX ORDER — INSULIN GLARGINE 100 [IU]/ML
10 INJECTION, SOLUTION SUBCUTANEOUS DAILY
Qty: 3 ML | Refills: 3 | Status: SHIPPED | OUTPATIENT
Start: 2023-06-03

## 2023-06-24 DIAGNOSIS — E08.65 DIABETES MELLITUS DUE TO UNDERLYING CONDITION WITH HYPERGLYCEMIA, WITH LONG-TERM CURRENT USE OF INSULIN (HCC): ICD-10-CM

## 2023-06-24 DIAGNOSIS — Z79.4 DIABETES MELLITUS DUE TO UNDERLYING CONDITION WITH HYPERGLYCEMIA, WITH LONG-TERM CURRENT USE OF INSULIN (HCC): ICD-10-CM

## 2023-06-27 ENCOUNTER — OFFICE VISIT (OUTPATIENT)
Dept: FAMILY MEDICINE CLINIC | Facility: CLINIC | Age: 63
End: 2023-06-27
Payer: COMMERCIAL

## 2023-06-27 VITALS
HEIGHT: 70 IN | RESPIRATION RATE: 18 BRPM | SYSTOLIC BLOOD PRESSURE: 114 MMHG | BODY MASS INDEX: 33.07 KG/M2 | DIASTOLIC BLOOD PRESSURE: 72 MMHG | HEART RATE: 60 BPM | TEMPERATURE: 97.2 F | OXYGEN SATURATION: 98 % | WEIGHT: 231 LBS

## 2023-06-27 DIAGNOSIS — E11.29 TYPE 2 DIABETES MELLITUS WITH MICROALBUMINURIA, WITH LONG-TERM CURRENT USE OF INSULIN (HCC): ICD-10-CM

## 2023-06-27 DIAGNOSIS — L08.9: Primary | ICD-10-CM

## 2023-06-27 DIAGNOSIS — E78.2 MIXED HYPERLIPIDEMIA: ICD-10-CM

## 2023-06-27 DIAGNOSIS — S90.425A: Primary | ICD-10-CM

## 2023-06-27 DIAGNOSIS — R80.9 TYPE 2 DIABETES MELLITUS WITH MICROALBUMINURIA, WITH LONG-TERM CURRENT USE OF INSULIN (HCC): ICD-10-CM

## 2023-06-27 DIAGNOSIS — Z79.4 TYPE 2 DIABETES MELLITUS WITH MICROALBUMINURIA, WITH LONG-TERM CURRENT USE OF INSULIN (HCC): ICD-10-CM

## 2023-06-27 DIAGNOSIS — I10 BENIGN ESSENTIAL HYPERTENSION: ICD-10-CM

## 2023-06-27 PROCEDURE — 99214 OFFICE O/P EST MOD 30 MIN: CPT | Performed by: NURSE PRACTITIONER

## 2023-06-27 RX ORDER — CEPHALEXIN 500 MG/1
500 CAPSULE ORAL EVERY 12 HOURS SCHEDULED
Qty: 20 CAPSULE | Refills: 0 | Status: SHIPPED | OUTPATIENT
Start: 2023-06-27 | End: 2023-07-07

## 2023-06-27 NOTE — PATIENT INSTRUCTIONS
Cephalexin (By mouth)   Cephalexin (yae-i-GCM-in)  Treats infections  This medicine is a cephalosporin antibiotic  Brand Name(s): Keflex   There may be other brand names for this medicine  When This Medicine Should Not Be Used: This medicine is not right for everyone  Do not use this medicine if you had an allergic reaction to cephalexin or another cephalosporin medicine  How to Use This Medicine:   Capsule, Tablet, Tablet for Suspension, Liquid  Your doctor will tell you how much medicine to use  Do not use more than directed  Read and follow the patient instructions that come with this medicine  Talk to your doctor or pharmacist if you have any questions  You may take your medicine with food or milk to avoid stomach upset  Oral liquid: Shake well just before use  Measure the oral liquid medicine with a marked measuring spoon, oral syringe, or medicine cup  Take all of the medicine in your prescription to clear up your infection, even if you feel better after the first few doses  Missed dose: Take a dose as soon as you remember  If it is almost time for your next dose, wait until then and take a regular dose  Do not take extra medicine to make up for a missed dose  Capsule or tablet: Store at room temperature away from heat, moisture, and direct light  Oral liquid: Store in the refrigerator for 14 days  After 14 days, throw away any unused medicine  Do not freeze  Drugs and Foods to Avoid:   Ask your doctor or pharmacist before using any other medicine, including over-the-counter medicines, vitamins, and herbal products  Some medicines and foods can affect how cephalexin works  Tell your doctor if you are also using  Metformin  Probenecid  Warnings While Using This Medicine:   Tell your doctor if you are pregnant or breastfeeding, or if you have kidney disease, liver disease, or a history of digestive problems, such as colitis  Tell your doctor if you are allergic to penicillin    This medicine can OFFICE VISIT    Katheryn is a 3  y.o. 6  m.o. female    History given by mother     CC:   Chief Complaint   Patient presents with   • Suture / Staple Removal        HPI: Katheryn presents for staple removal from laceration. Placed on 2/9. Denies fever/pain/drainage. Mom is applying antibiotic ointment to the area. She has not been washing it, but just letting water run over it in the shower.     Also reports pain with urination since yesterday. No fever. Reports some vaginal irritation and redness. No vomiting. No diarrhea. No discharge noted.        REVIEW OF SYSTEMS:  As documented in HPI. All other systems were reviewed and are negative.     PMH:   Past Medical History:   Diagnosis Date   • Acute ear infection    • Eczema    • Healthy pediatric patient    • Thyroid disease      Allergies: Patient has no known allergies.  PSH: No past surgical history on file.  FHx:    Family History   Problem Relation Age of Onset   • Diabetes Mother 8        Type 1   • No Known Problems Father      Soc: lives with family   Social History     Lifestyle   • Physical activity     Days per week: Not on file     Minutes per session: Not on file   • Stress: Not on file   Relationships   • Social connections     Talks on phone: Not on file     Gets together: Not on file     Attends Oriental orthodox service: Not on file     Active member of club or organization: Not on file     Attends meetings of clubs or organizations: Not on file     Relationship status: Not on file   • Intimate partner violence     Fear of current or ex partner: Not on file     Emotionally abused: Not on file     Physically abused: Not on file     Forced sexual activity: Not on file   Other Topics Concern   • Toilet training problems Not Asked   • Second-hand smoke exposure No   • Violence concerns Not Asked   • Poor oral hygiene Not Asked   • Family concerns vehicle safety Not Asked   • Alcohol/drug concerns Not Asked   Social History Narrative    ** Merged History  "Encounter **        Lives with Mom and Dad - only child              PHYSICAL EXAM:   Reviewed vital signs and growth parameters in EMR.   BP 96/50 (BP Location: Right arm, Patient Position: Sitting)   Pulse 128   Temp 36.3 °C (97.4 °F)   Resp 28   Ht 1.095 m (3' 7.11\")   Wt 16.2 kg (35 lb 11.4 oz)   BMI 13.51 kg/m²   Length - No height on file for this encounter.  Weight - 10 %ile (Z= -1.30) based on CDC (Girls, 2-20 Years) weight-for-age data using vitals from 2/18/2020.    General: This is an alert, active child in no distress.    EYES: PERRL, no conjunctival injection or discharge.   EARS:  Canals are patent.  NOSE: Nares are patent with no congestion  THROAT: Oropharynx has no lesions, moist mucus membranes. Pharynx without erythema, tonsils normal.  NECK: Supple, no lymphadenopathy, no masses.   HEART: Regular rate and rhythm without murmur. Peripheral pulses are 2+ and equal.   LUNGS: Clear bilaterally to auscultation, no wheezes or rhonchi. No retractions, nasal flaring, or distress noted.  ABDOMEN: Normal bowel sounds, soft and non-tender, no HSM or mass  MUSCULOSKELETAL: Extremities are without abnormalities.  SKIN: Warm, dry, without significant rash or birthmarks. 2cm linear laceration of anterior frontal scalp that is well approximated without erythema or drainage, with single staple in place.    ASSESSMENT and PLAN:   Laceration of scalp, healing well, here for staple removal  - Staple removed without complication, tolerated well   - Continue antibiotic ointment BID x 3 more days, monitor for signs of infection    Dysuria  - POC urinalysis  Lab Results   Component Value Date/Time    POCCOLOR yellow 02/18/2020 12:52 PM    POCAPPEAR cloudy 02/18/2020 12:52 PM    POCLEUKEST small 02/18/2020 12:52 PM    POCNITRITE positive 02/18/2020 12:52 PM    POCUROBILIGE 0.2 02/18/2020 12:52 PM    POCPROTEIN 100 02/18/2020 12:52 PM    POCURPH 6.5 02/18/2020 12:52 PM    POCBLOOD mod 02/18/2020 12:52 PM    POCSPGRV " cause diarrhea  Call your doctor if the diarrhea becomes severe, does not stop, or is bloody  Do not take any medicine to stop diarrhea until you have talked to your doctor  Diarrhea can occur 2 months or more after you stop taking this medicine  Tell any doctor or dentist who treats you that you are using this medicine  This medicine may affect certain medical test results  Call your doctor if your symptoms do not improve or if they get worse  Keep all medicine out of the reach of children  Never share your medicine with anyone  Possible Side Effects While Using This Medicine:   Call your doctor right away if you notice any of these side effects: Allergic reaction: Itching or hives, swelling in your face or hands, swelling or tingling in your mouth or throat, chest tightness, trouble breathing  Blistering, peeling, red skin rash  Severe diarrhea, especially if bloody or ongoing  Severe stomach pain, vomiting  If you notice these less serious side effects, talk with your doctor:   Mild diarrhea or nausea  If you notice other side effects that you think are caused by this medicine, tell your doctor  Call your doctor for medical advice about side effects  You may report side effects to FDA at 6-438-FDA-9952  © Copyright [facility] 2022 Information is for End User's use only and may not be sold, redistributed or otherwise used for commercial purposes  The above information is an  only  It is not intended as medical advice for individual conditions or treatments  Talk to your doctor, nurse or pharmacist before following any medical regimen to see if it is safe and effective for you  1.025 02/18/2020 12:52 PM    POCKETONES neg 02/18/2020 12:52 PM    POCBILIRUBIN neg 02/18/2020 12:52 PM    POCGLUCUA neg 02/18/2020 12:52 PM   - Urinalysis and urine culture to lab   - Begin cefdinir 7 mg/kg BID x 7 days  - Clotrimazole cream BID x 10 days

## 2023-06-27 NOTE — PROGRESS NOTES
Assessment/Plan:  If no improvement in couple of days then will follow with wound center  1  Infected blister of fifth toe of left foot, initial encounter  -     cephalexin (KEFLEX) 500 mg capsule; Take 1 capsule (500 mg total) by mouth every 12 (twelve) hours for 10 days  -     mupirocin (BACTROBAN) 2 % ointment; Apply topically 2 (two) times a day  -     Ambulatory Referral to Wound Care; Future    2  Type 2 diabetes mellitus with microalbuminuria, with long-term current use of insulin (HCC)  Assessment & Plan:  Complaint with regimen and last HbA1c at goal  Lab Results   Component Value Date    HGBA1C 6 0 (H) 04/22/2023         3  Benign essential hypertension  Assessment & Plan:  Stable with current regimen      4  Mixed hyperlipidemia  Assessment & Plan:  Complaint with statin and tolerating it well              Patient Instructions: Take medication with food  It is important that you take the entire course of antibiotics prescribed  May also take a probiotic of your choice to maintain healthy GI chanda  Can take some probiotic and yogurt with the medication  Return if symptoms worsen or fail to improve        Future Appointments   Date Time Provider \[facility] [place] [facility]\""   7/10/2023  6:00 PM Faye Boone OT [facility]   7/11/2023  6:00 PM Jackman, [place] TH[facility] [address] [facility]   7/24/2023  6:00 PM Kirit Ewing MD [facility]   7/25/2023  6:00 PM [place], [place] [facility] [address] [facility]   8/8/2023  6:00 PM [place]man, [place] [facility] [address] [facility]   8/22/2023  6:00 PM Jackman, [place] [facility] [address] [facility]   9/5/2023  6:00 PM Jackman, [place] [facility] [address] [facility]   9/19/2023  6:00 PM Jackman, [place] [facility]   9/27/2023  9:30 AM BE [facility]   10/3/2023  8:00 AM Randi Jorge MD [facility]   10/3/2023  9:00 AM Wendy Chamorro MD [facility]   11/6/2023  7:00 PM Darshana Cho DO [facility]           Subjective:      Patient ID: Perico Sra "Beronica Arndt is a 57 y o  male  Chief Complaint   Patient presents with   • Blister     On left foot between pinkie toe and 4th toe  Patient concerned it is infected   Onset: 3 weeks and worsening  Shanika Pearson CMA          Vitals:  /72   Pulse 60   Temp (!) 97 2 °F (36 2 °C)   Resp 18   Ht 5' 10 25\" (1 784 m)   Wt 105 kg (231 lb)   SpO2 98%   BMI 32 91 kg/m²     HPI  Patient stated that his left last toe was bothering him and then noticed there was a blister and it ruptured but area still did not completely resolved and patient is diabetic and concerned about it  Denies fever, chills and discharge from area  Not using any OTC for symptoms  Complaint with medications for chronic illnesses  The following portions of the patient's history were reviewed and updated as appropriate: allergies, current medications, past family history, past medical history, past social history, past surgical history and problem list       Review of Systems   HENT: Negative  Respiratory: Negative  Cardiovascular: Negative  Gastrointestinal: Negative  Skin:        As noted in HPI     Neurological: Negative  Psychiatric/Behavioral: Negative  Objective:    Social History     Tobacco Use   Smoking Status Former   • Packs/day:    • Types: Cigarettes   • Start date: 0   • Quit date:    • Years since quittin 5   Smokeless Tobacco Never   Tobacco Comments    Former smoker       Allergies:    Allergies   Allergen Reactions   • Latex Rash     At dentist, lips swollen    • Codeine Nausea Only     Reaction Date: 2005;          Current Outpatient Medications   Medication Sig Dispense Refill   • ALPRAZolam (XANAX) 0 25 mg tablet Take 1 tablet (0 25 mg total) by mouth daily at bedtime as needed for anxiety 14 tablet 0   • amLODIPine (NORVASC) 10 mg tablet Take 1 tablet (10 mg total) by mouth daily (Patient taking differently: Take 5 mg by mouth daily) 90 tablet 1   • aspirin (ECOTRIN " LOW STRENGTH) 81 mg EC tablet Take 81 mg by mouth daily     • atorvastatin (LIPITOR) 20 mg tablet TAKE ONE TABLET BY MOUTH EVERY DAY 90 tablet 1   • B-D ULTRAFINE III SHORT PEN 31G X 8 MM MISC USE AS DIRECTED 200 each 1   • Blood Glucose Monitoring Suppl (OneTouch Verio) w/Device KIT Test blood sugar twice a day 1 kit 0   • cephalexin (KEFLEX) 500 mg capsule Take 1 capsule (500 mg total) by mouth every 12 (twelve) hours for 10 days 20 capsule 0   • Continuous Blood Gluc Sensor (FreeStyle Ulysses 14 Day Sensor) MISC CHANGE SENSOR EVERY 14 DAYS 2 each 12   • Empagliflozin (Jardiance) 10 MG TABS tablet Take 1 tablet (10 mg total) by mouth every morning 30 tablet 3   • ergocalciferol (ERGOCALCIFEROL) 1 25 MG (82991 UT) capsule Take 1 capsule (50,000 Units total) by mouth once a week for 12 doses 12 capsule 0   • gabapentin (NEURONTIN) 100 mg capsule Take 1 capsule (100 mg total) by mouth 4 (four) times a day 360 capsule 0   • glipiZIDE (GLUCOTROL XL) 10 mg 24 hr tablet TAKE TWO TABLETS BY MOUTH EVERY DAY AT BEDTIME 180 tablet 1   • glucose blood (OneTouch Verio) test strip Test blood sugar twice a day 200 each 3   • hydroxychloroquine (PLAQUENIL) 200 mg tablet Take 1 tablet (200 mg total) by mouth 2 (two) times a day with meals 60 tablet 3   • Insulin Glargine Solostar (Basaglar KwikPen) 100 UNIT/ML SOPN Inject 0 1 mL (10 Units total) under the skin daily 3 mL 3   • metFORMIN (GLUCOPHAGE) 500 mg tablet TAKE TWO TABLETS BY MOUTH TWICE A DAY WITH MEALS (GENERIC FOR GLUCOPHAGE) 360 tablet 3   • mupirocin (BACTROBAN) 2 % ointment Apply topically 2 (two) times a day 22 g 0   • olmesartan (BENICAR) 20 mg tablet TAKE TWO TABLETS BY MOUTH EVERY  tablet 3   • OneTouch Delica Lancets 86P MISC Test blood sugar twice a day 200 each 3   • tamsulosin (FLOMAX) 0 4 mg 0 4 mg daily       No current facility-administered medications for this visit  Physical Exam  Constitutional:       Appearance: Normal appearance     HENT: Head: Normocephalic  Nose: Nose normal    Eyes:      Conjunctiva/sclera: Conjunctivae normal    Cardiovascular:      Rate and Rhythm: Normal rate and regular rhythm  Pulmonary:      Effort: Pulmonary effort is normal       Breath sounds: Normal breath sounds  Musculoskeletal:         General: No swelling or tenderness  Normal range of motion  Skin:     General: Skin is warm and dry  Comments: Area is erythematous and greenish in color at left fifth toe and no tenderness and swelling noted and FROM of toe and foot noted   Neurological:      Mental Status: He is alert and oriented to person, place, and time  Psychiatric:         Mood and Affect: Mood normal          Behavior: Behavior normal          Thought Content:  Thought content normal          Judgment: Judgment normal                        SERA Mansfield

## 2023-06-27 NOTE — ASSESSMENT & PLAN NOTE
Complaint with regimen and last HbA1c at goal  Lab Results   Component Value Date    HGBA1C 6 0 (H) 04/22/2023

## 2023-07-10 ENCOUNTER — TELEPHONE (OUTPATIENT)
Dept: FAMILY MEDICINE CLINIC | Facility: CLINIC | Age: 63
End: 2023-07-10

## 2023-07-10 NOTE — TELEPHONE ENCOUNTER
7/10/2023 4:50 PM called Remedios Vera regarding his message  Left message on his voicemail to call the office.    Cristhian Lobo, DO

## 2023-07-11 ENCOUNTER — SOCIAL WORK (OUTPATIENT)
Dept: BEHAVIORAL/MENTAL HEALTH CLINIC | Facility: CLINIC | Age: 63
End: 2023-07-11
Payer: COMMERCIAL

## 2023-07-11 DIAGNOSIS — F41.9 ANXIETY: Primary | ICD-10-CM

## 2023-07-11 PROCEDURE — 90791 PSYCH DIAGNOSTIC EVALUATION: CPT
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
## 2023-07-18 ENCOUNTER — OFFICE VISIT (OUTPATIENT)
Dept: WOUND CARE | Facility: HOSPITAL | Age: 63
End: 2023-07-18
Payer: COMMERCIAL

## 2023-07-18 VITALS
HEART RATE: 64 BPM | DIASTOLIC BLOOD PRESSURE: 88 MMHG | TEMPERATURE: 97.4 F | BODY MASS INDEX: 32.93 KG/M2 | WEIGHT: 230 LBS | SYSTOLIC BLOOD PRESSURE: 150 MMHG | HEIGHT: 70 IN

## 2023-07-18 DIAGNOSIS — S90.425A: Primary | ICD-10-CM

## 2023-07-18 DIAGNOSIS — E11.40 TYPE 2 DIABETES MELLITUS WITH DIABETIC NEUROPATHY, WITH LONG-TERM CURRENT USE OF INSULIN (HCC): ICD-10-CM

## 2023-07-18 DIAGNOSIS — I50.32 CHRONIC HEART FAILURE WITH PRESERVED EJECTION FRACTION (HCC): ICD-10-CM

## 2023-07-18 DIAGNOSIS — Z79.4 TYPE 2 DIABETES MELLITUS WITH DIABETIC NEUROPATHY, WITH LONG-TERM CURRENT USE OF INSULIN (HCC): ICD-10-CM

## 2023-07-18 PROCEDURE — 99213 OFFICE O/P EST LOW 20 MIN: CPT | Performed by: STUDENT IN AN ORGANIZED HEALTH CARE EDUCATION/TRAINING PROGRAM

## 2023-07-18 PROCEDURE — 99203 OFFICE O/P NEW LOW 30 MIN: CPT | Performed by: STUDENT IN AN ORGANIZED HEALTH CARE EDUCATION/TRAINING PROGRAM

## 2023-07-18 RX ORDER — LIDOCAINE HYDROCHLORIDE 40 MG/ML
5 SOLUTION TOPICAL ONCE
Status: COMPLETED | OUTPATIENT
Start: 2023-07-18 | End: 2023-07-18

## 2023-07-18 RX ADMIN — LIDOCAINE HYDROCHLORIDE 5 ML: 40 SOLUTION TOPICAL at 08:30

## 2023-07-18 NOTE — PROGRESS NOTES
Patient ID: Toña Green is a 57 y.o. male Date of Birth 1960     Chief Complaint  Chief Complaint   Patient presents with   • New Patient Visit     Left 5th toe       Allergies  Latex and Codeine    Assessment:     Diagnoses and all orders for this visit:    Blister of fifth toe, left, initial encounter  -     lidocaine (XYLOCAINE) 4 % topical solution 5 mL    Chronic heart failure with preserved ejection fraction (HCC)  -     lidocaine (XYLOCAINE) 4 % topical solution 5 mL  -     Wound cleansing and dressings; Future  -     Wound miscellaneous orders; Future    Type 2 diabetes mellitus with diabetic neuropathy, with long-term current use of insulin (HCC)  -     lidocaine (XYLOCAINE) 4 % topical solution 5 mL  -     Wound cleansing and dressings; Future  -     Wound miscellaneous orders; Future                Plan:  • It was a pleasure to see Toña Green for wound care consult today  • There is no open wound today. Continue to take good care of your feet. Please see podiatry for routine visits and fitting for diabetic shoes given history of diabetes. • Follow-up with wound management as needed in the future. • All plans of care discussed with patient at bedside who verbalized understanding with treatment plan. Wound 07/18/23 Toe (Comment  which one) Anterior; Left (Active)   Enter Threasa Petite score: Jolly Grade 1: Partial or full-thickness ulcer (superficial) 07/18/23 0825   Wound Image Images linked 07/18/23 0824   Wound Description Estevez;Dry; Intact 07/18/23 0825   Mariza-wound Assessment Clean;Dry; Intact 07/18/23 0825   Wound Length (cm) 0.4 cm 07/18/23 0825   Wound Width (cm) 0.5 cm 07/18/23 0825   Wound Depth (cm) 0 cm (noted intact scab) 07/18/23 0825   Wound Surface Area (cm^2) 0.2 cm^2 07/18/23 0825   Wound Volume (cm^3) 0 cm^3 07/18/23 0825   Calculated Wound Volume (cm^3) 0 cm^3 07/18/23 0825   Non-staged Wound Description Full thickness 07/18/23 0825   Dressing Status -- (none upon arrival) 07/18/23 0825       Wound 07/18/23 Toe (Comment  which one) Anterior; Left (Active)   Date First Assessed/Time First Assessed: 07/18/23 0823   Location: (c) Toe (Comment  which one)  Wound Location Orientation: Anterior; Left       [REMOVED] Wound 11/30/21 Knee Right (Removed)   Resolved Date: 07/18/23  Date First Assessed/Time First Assessed: 11/30/21 0838   Location: Knee  Wound Location Orientation: Right  Wound Description (Comments): TEDS applied to bilateral lower extremities  Incision's 1st Dressing: ADHESIVE SKIN CLSR. .. Subjective:      .    7/18/23: Bertis Koyanagi is a pleasant 20-year-old male with a past medical history of type 2 diabetes mellitus with most recent hemoglobin A1c of 6.0% rheumatoid arthritis, obesity, CKD, HFpEF G1DD here today for initial wound care consult. Patient was referred to wound care by a member of his primary care team, Jonathan Moore, [address] [place] [address]. He presented to the PCP office on 6/27/2023 with a wound of his fifth digit of his left toe. States at that time that it was a blister but ruptured and was not resolving for greater than 3 weeks. Patient was started on Keflex 500 mg twice daily x10 days, given mupirocin to put on topically, and given referral to wound care. The following portions of the patient's history were reviewed and updated as appropriate: allergies, current medications, past family history, past medical history, past social history, past surgical history and problem list.    Review of Systems   All other systems reviewed and are negative. Objective:       Wound 07/18/23 Toe (Comment  which one) Anterior; Left (Active)   Enter Luetta Bamberger score: Jolly Grade 1: Partial or full-thickness ulcer (superficial) 07/18/23 0825   Wound Image Images linked 07/18/23 0824   Wound Description Estevez;Dry; Intact 07/18/23 0825   Mariza-wound Assessment Clean;Dry; Intact 07/18/23 0825   Wound Length (cm) 0.4 cm 07/18/23 0825   Wound Width (cm) 0.5 cm 07/18/23 0825 Wound Depth (cm) 0 cm (noted intact scab) 07/18/23 0825   Wound Surface Area (cm^2) 0.2 cm^2 07/18/23 0825   Wound Volume (cm^3) 0 cm^3 07/18/23 0825   Calculated Wound Volume (cm^3) 0 cm^3 07/18/23 0825   Non-staged Wound Description Full thickness 07/18/23 0825   Dressing Status -- (none upon arrival) 07/18/23 0825       /88   Pulse 64   Temp (!) 97.4 °F (36.3 °C)   Ht 5' 10" (1.778 m)   Wt 104 kg (230 lb)   BMI 33.00 kg/m²     Physical Exam  Vitals reviewed. Constitutional:       Appearance: Normal appearance. HENT:      Head: Normocephalic and atraumatic. Eyes:      Extraocular Movements: Extraocular movements intact. Pulmonary:      Effort: Pulmonary effort is normal.   Musculoskeletal:      Cervical back: Neck supple. Skin:     Comments: Prior wound of medial fifth digit of the left foot is fully epithelialized today. No drainage. No open wound. Neurological:      Mental Status: He is alert. Psychiatric:         Mood and Affect: Mood normal.               Wound Instructions:  Orders Placed This Encounter   Procedures   • Wound cleansing and dressings     Left 5th toe      Dr. Duane Boettcher removed old skin/scab on the left 5th toe. No openings were noted. Okay to go to swimming/ beach. Standing Status:   Future     Standing Expiration Date:   7/18/2024   • Wound miscellaneous orders     Standing Status:   Future     Standing Expiration Date:   7/18/2024     Scheduling Instructions:      Please consider following up with a podiatrist to have annual yearly checks on your feet. Diabetic shoes would be helpful in protecting your feet and comfort. Diagnosis ICD-10-CM Associated Orders   1. Blister of fifth toe, left, initial encounter  S90.425A lidocaine (XYLOCAINE) 4 % topical solution 5 mL      2.  Chronic heart failure with preserved ejection fraction (HCC)  I50.32 lidocaine (XYLOCAINE) 4 % topical solution 5 mL     Wound cleansing and dressings     Wound miscellaneous orders 3. Type 2 diabetes mellitus with diabetic neuropathy, with long-term current use of insulin (HCC)  E11.40 lidocaine (XYLOCAINE) 4 % topical solution 5 mL    Z79.4 Wound cleansing and dressings     Wound miscellaneous orders           --  Shaheen Fajardo MD    "This note has been constructed using a voice recognition system. Therefore there may be syntax, spelling, and/or grammatical errors. Occasional wrong word or "sound alike" substitutions may have occurred due to the inherent limitations of voice recognition software. Read the chart carefully and recognize, using context, where substitutions have occurred.  Please call if you have any questions."

## 2023-07-18 NOTE — PATIENT INSTRUCTIONS
Orders Placed This Encounter   Procedures    Wound cleansing and dressings     Left 5th toe      Dr. Fidelia Howard removed old skin/scab on the left 5th toe. No openings were noted. Okay to go to swimming/ beach. Standing Status:   Future     Standing Expiration Date:   7/18/2024    Wound miscellaneous orders     Standing Status:   Future     Standing Expiration Date:   7/18/2024     Scheduling Instructions:      Please consider following up with a podiatrist to have annual yearly checks on your feet. Diabetic shoes would be helpful in protecting your feet and comfort.

## 2023-07-19 DIAGNOSIS — N18.2 TYPE 2 DIABETES MELLITUS WITH STAGE 2 CHRONIC KIDNEY DISEASE AND HYPERTENSION (HCC): ICD-10-CM

## 2023-07-19 DIAGNOSIS — E66.01 CLASS 2 SEVERE OBESITY DUE TO EXCESS CALORIES WITH SERIOUS COMORBIDITY AND BODY MASS INDEX (BMI) OF 35.0 TO 35.9 IN ADULT (HCC): ICD-10-CM

## 2023-07-19 DIAGNOSIS — N18.2 STAGE 2 CHRONIC KIDNEY DISEASE: ICD-10-CM

## 2023-07-19 DIAGNOSIS — Z79.4 TYPE 2 DIABETES MELLITUS WITH MICROALBUMINURIA, WITH LONG-TERM CURRENT USE OF INSULIN (HCC): ICD-10-CM

## 2023-07-19 DIAGNOSIS — E11.22 TYPE 2 DIABETES MELLITUS WITH STAGE 2 CHRONIC KIDNEY DISEASE AND HYPERTENSION (HCC): ICD-10-CM

## 2023-07-19 DIAGNOSIS — E55.9 VITAMIN D DEFICIENCY: ICD-10-CM

## 2023-07-19 DIAGNOSIS — E11.29 TYPE 2 DIABETES MELLITUS WITH MICROALBUMINURIA, WITH LONG-TERM CURRENT USE OF INSULIN (HCC): ICD-10-CM

## 2023-07-19 DIAGNOSIS — R80.9 TYPE 2 DIABETES MELLITUS WITH MICROALBUMINURIA, WITH LONG-TERM CURRENT USE OF INSULIN (HCC): ICD-10-CM

## 2023-07-19 DIAGNOSIS — I12.9 TYPE 2 DIABETES MELLITUS WITH STAGE 2 CHRONIC KIDNEY DISEASE AND HYPERTENSION (HCC): ICD-10-CM

## 2023-07-19 DIAGNOSIS — R80.1 PERSISTENT PROTEINURIA: ICD-10-CM

## 2023-07-19 LAB
ALBUMIN SERPL-MCNC: 4 G/DL (ref 3.9–4.9)
ALBUMIN/GLOB SERPL: 1.8 {RATIO} (ref 1.2–2.2)
ALP SERPL-CCNC: 57 IU/L (ref 44–121)
ALT SERPL-CCNC: 15 IU/L (ref 0–44)
AST SERPL-CCNC: 18 IU/L (ref 0–40)
BILIRUB SERPL-MCNC: 0.5 MG/DL (ref 0–1.2)
BUN SERPL-MCNC: 26 MG/DL (ref 8–27)
BUN/CREAT SERPL: 19 (ref 10–24)
CALCIUM SERPL-MCNC: 9.7 MG/DL (ref 8.6–10.2)
CHLORIDE SERPL-SCNC: 105 MMOL/L (ref 96–106)
CHOLEST SERPL-MCNC: 124 MG/DL (ref 100–199)
CO2 SERPL-SCNC: 22 MMOL/L (ref 20–29)
CREAT SERPL-MCNC: 1.39 MG/DL (ref 0.76–1.27)
EGFR: 57 ML/MIN/1.73
ERYTHROCYTE [DISTWIDTH] IN BLOOD BY AUTOMATED COUNT: 13.6 % (ref 11.6–15.4)
EST. AVERAGE GLUCOSE BLD GHB EST-MCNC: 126 MG/DL
GLOBULIN SER-MCNC: 2.2 G/DL (ref 1.5–4.5)
GLUCOSE SERPL-MCNC: 129 MG/DL (ref 70–99)
HBA1C MFR BLD: 6 % (ref 4.8–5.6)
HCT VFR BLD AUTO: 46.6 % (ref 37.5–51)
HDLC SERPL-MCNC: 46 MG/DL
HGB BLD-MCNC: 15.6 G/DL (ref 13–17.7)
LDLC SERPL CALC-MCNC: 63 MG/DL (ref 0–99)
LDLC/HDLC SERPL: 1.4 RATIO (ref 0–3.6)
MCH RBC QN AUTO: 28.4 PG (ref 26.6–33)
MCHC RBC AUTO-ENTMCNC: 33.5 G/DL (ref 31.5–35.7)
MCV RBC AUTO: 85 FL (ref 79–97)
MICRODELETION SYND BLD/T FISH: NORMAL
MICRODELETION SYND BLD/T FISH: NORMAL
PLATELET # BLD AUTO: 249 X10E3/UL (ref 150–450)
POTASSIUM SERPL-SCNC: 4.1 MMOL/L (ref 3.5–5.2)
PROT SERPL-MCNC: 6.2 G/DL (ref 6–8.5)
RBC # BLD AUTO: 5.5 X10E6/UL (ref 4.14–5.8)
SL AMB VLDL CHOLESTEROL CALC: 15 MG/DL (ref 5–40)
SODIUM SERPL-SCNC: 142 MMOL/L (ref 134–144)
TRIGL SERPL-MCNC: 75 MG/DL (ref 0–149)
WBC # BLD AUTO: 5.7 X10E3/UL (ref 3.4–10.8)

## 2023-07-24 PROBLEM — E11.618 ARTHRITIS ASSOCIATED WITH DIABETES (HCC): Status: ACTIVE | Noted: 2023-07-24

## 2023-07-25 ENCOUNTER — SOCIAL WORK (OUTPATIENT)
Dept: BEHAVIORAL/MENTAL HEALTH CLINIC | Facility: CLINIC | Age: 63
End: 2023-07-25
Payer: COMMERCIAL

## 2023-07-25 DIAGNOSIS — F43.23 ADJUSTMENT DISORDER WITH MIXED ANXIETY AND DEPRESSED MOOD: Primary | ICD-10-CM

## 2023-07-25 PROCEDURE — 90834 PSYTX W PT 45 MINUTES: CPT
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
## 2023-07-28 DIAGNOSIS — I10 BENIGN ESSENTIAL HYPERTENSION: ICD-10-CM

## 2023-07-28 RX ORDER — AMLODIPINE BESYLATE 10 MG/1
10 TABLET ORAL DAILY
Qty: 90 TABLET | Refills: 1 | Status: SHIPPED | OUTPATIENT
Start: 2023-07-28 | End: 2023-07-31

## 2023-07-29 DIAGNOSIS — I10 BENIGN ESSENTIAL HYPERTENSION: ICD-10-CM

## 2023-07-31 RX ORDER — AMLODIPINE BESYLATE 10 MG/1
10 TABLET ORAL DAILY
Qty: 90 TABLET | Refills: 1 | Status: SHIPPED | OUTPATIENT
Start: 2023-07-31

## 2023-08-08 ENCOUNTER — TELEPHONE (OUTPATIENT)
Dept: PSYCHIATRY | Facility: CLINIC | Age: 63
End: 2023-08-08

## 2023-08-08 NOTE — TELEPHONE ENCOUNTER
Patient is calling regarding cancelling an appointment. Date/Time: 8/8/2023 @ 6 pm    Reason: Patient just had surgery. Can't do virtual too many people at home. No other convenient appointments for patients to reschedule. Confirmed next appointment.     Patient was rescheduled: YES [] NO [x]  If yes, when was Patient reschedule for:     Patient requesting call back to reschedule: YES [] NO [x]

## 2023-08-11 DIAGNOSIS — Z79.4 TYPE 2 DIABETES MELLITUS WITH DIABETIC NEUROPATHY, WITH LONG-TERM CURRENT USE OF INSULIN (HCC): ICD-10-CM

## 2023-08-11 DIAGNOSIS — E11.40 TYPE 2 DIABETES MELLITUS WITH DIABETIC NEUROPATHY, WITH LONG-TERM CURRENT USE OF INSULIN (HCC): ICD-10-CM

## 2023-08-11 RX ORDER — FLASH GLUCOSE SENSOR
KIT MISCELLANEOUS
Qty: 2 EACH | Refills: 12 | Status: SHIPPED | OUTPATIENT
Start: 2023-08-11

## 2023-08-24 ENCOUNTER — SOCIAL WORK (OUTPATIENT)
Dept: BEHAVIORAL/MENTAL HEALTH CLINIC | Facility: CLINIC | Age: 63
End: 2023-08-24
Payer: COMMERCIAL

## 2023-08-24 DIAGNOSIS — F43.23 ADJUSTMENT DISORDER WITH MIXED ANXIETY AND DEPRESSED MOOD: Primary | ICD-10-CM

## 2023-08-24 PROCEDURE — 90834 PSYTX W PT 45 MINUTES: CPT

## 2023-08-24 NOTE — PSYCH
Behavioral Health Psychotherapy Progress Note    Psychotherapy Provided: Individual Psychotherapy     1. Adjustment disorder with mixed anxiety and depressed mood            Goals addressed in session: Goal 1     DATA: Felicitas Alejo spoke with writer about how he is feeling following his surgery, which was successful but he still has some pain. Aside from that, he and his spouse still are working through the difficulties in their relationship. Felicitas Alejo was tearful at times during the session, as he feels sad that his wife is upset about the infidelity on her part. Felicitas Alejo reports he is working on forgiving her but does not want to be resentful. Writer explained that the issue is still "fresh" and he is grieving over the loss of what he had, and - as he stated - feels insecure. He reports he is happy he can talk about these things and that he has someone to talk to, and looks forward to coming to sessions. Writer and Felicitas Alejo developed his treatment plan as well. Felicitas Alejo would like to learn to identify his emotions as well as learn how to forgive without feeling resentful. Writer provided worksheet on emotions and Felicitas Alejo was appreciative, and thinks this will be useful. During this session, this clinician used the following therapeutic modalities: Engagement Strategies, Client-centered Therapy and Supportive Psychotherapy    Substance Abuse was not addressed during this session. ASSESSMENT:  Camila Kaufman presents with a Euthymic/ normal mood. his affect is Normal range and intensity and Tearful, which is congruent, with his mood and the content of the session. The client has made progress on their goals. Camila Kaufman presents with a none risk of suicide, none risk of self-harm, and none risk of harm to others. For any risk assessment that surpasses a "low" rating, a safety plan must be developed.     A safety plan was indicated: yes  If yes, describe in detail       PLAN: Between sessions, Camila Kaufman will work towards identifying his emotions outside of therapy sessions. At the next session, the therapist will use Engagement Strategies and Client-centered Therapy to address topic of concern. .    Behavioral Health Treatment Plan and Discharge Planning: Lucita Gil is aware of and agrees to continue to work on their treatment plan. They have identified and are working toward their discharge goals.  yes    Visit start and stop times:    08/24/23  Start Time: 1100  Stop Time: 1144  Total Visit Time: 44 minutes

## 2023-08-24 NOTE — BH TREATMENT PLAN
Outpatient Behavioral Health Psychotherapy Treatment Plan    Ana Chaudhary  1960     Date of Initial Psychotherapy Assessment:  7/11/24  Date of Current Treatment Plan: 08/24/23  Treatment Plan Target Date: 2/24/24   Treatment Plan Expiration Date: 2/24/24    Diagnosis:   1. Adjustment disorder with mixed anxiety and depressed mood            Area(s) of Need:     Long Term Goal 1 (in the client's own words): "I want to work on how to forgive, but not be resentful (of that forgiveness)"    Stage of Change: Preparation    Target Date for completion: 2/24/24     Anticipated therapeutic modalities: Client centered therapy, Gestalt therapy     People identified to complete this goal: Ramirez      Objective 1: (identify the means of measuring success in meeting the objective): Lizetteantonietta Gustafson will work with therapist twice monthly and learn to identify, understand and process his emotions      Objective 2: (identify the means of measuring success in meeting the objective): Lizette Gustafson will verbalize expectations he has of his spouse in regards to trust between them        I am currently under the care of a [facility] psychiatric provider: no    My [facility] psychiatric provider is: none    I am currently taking psychiatric medications: No    I feel that I will be ready for discharge from mental health care when I reach the following (measurable goal/objective): Goal is complete      I have created my Crisis Plan and have been offered a copy of this [facility]: Diagnosis and Treatment Plan explained to [facility] acknowledges an understanding of their diagnosis. Ana Neena agrees to this treatment plan.     I have been offered a copy of this Treatment Plan. yes

## 2023-08-31 DIAGNOSIS — E11.49 DIABETES MELLITUS TYPE 2 WITH NEUROLOGICAL MANIFESTATIONS (HCC): ICD-10-CM

## 2023-08-31 RX ORDER — GLIPIZIDE 10 MG/1
TABLET, FILM COATED, EXTENDED RELEASE ORAL
Qty: 180 TABLET | Refills: 1 | Status: SHIPPED | OUTPATIENT
Start: 2023-08-31

## 2023-09-04 DIAGNOSIS — Z79.4 TYPE 2 DIABETES MELLITUS WITH DIABETIC NEUROPATHY, WITH LONG-TERM CURRENT USE OF INSULIN (HCC): ICD-10-CM

## 2023-09-04 DIAGNOSIS — E11.40 TYPE 2 DIABETES MELLITUS WITH DIABETIC NEUROPATHY, WITH LONG-TERM CURRENT USE OF INSULIN (HCC): ICD-10-CM

## 2023-09-05 ENCOUNTER — SOCIAL WORK (OUTPATIENT)
Dept: BEHAVIORAL/MENTAL HEALTH CLINIC | Facility: CLINIC | Age: 63
End: 2023-09-05
Payer: COMMERCIAL

## 2023-09-05 DIAGNOSIS — F43.23 ADJUSTMENT DISORDER WITH MIXED ANXIETY AND DEPRESSED MOOD: Primary | ICD-10-CM

## 2023-09-05 PROCEDURE — 90837 PSYTX W PT 60 MINUTES: CPT

## 2023-09-05 RX ORDER — INSULIN GLARGINE 100 [IU]/ML
INJECTION, SOLUTION SUBCUTANEOUS
Qty: 15 ML | Refills: 3 | Status: SHIPPED | OUTPATIENT
Start: 2023-09-05

## 2023-09-05 NOTE — PSYCH
Behavioral Health Psychotherapy Progress Note    Psychotherapy Provided: Individual Psychotherapy     1. Adjustment disorder with mixed anxiety and depressed mood            Goals addressed in session: Goal 1     DATA: Rachel Carey reports that things are continuing to improve between him and his wife. He talked about emotions relating to the affair, including sadness, anger towards both his spouse and the man she had an affair with, resentment towards the man and desire to contact his wife (via email message, which he decided not do send). Rachel Carey started reading [place] [facility] [employer] by Joe Narayanan and enjoys it so far. He is finding he can identify certain emotions he feels. We talked about his conversations she has with his wife, about how he feels and wants to forgive her but is resentful, and how she understands but still feels badly. Writer reiterated that this is something that will take time to process, similar to emotions with the process of grief. Rachel Carey understood, and agreed. Writer explained to Rachel Carey that his feelings are valid, not right or wrong, or good or bad. Rachel Carey is pleased that he is able to come to sessions and states he feels better after each session. During this session, this clinician used the following therapeutic modalities: Engagement Strategies, Client-centered Therapy and Supportive Psychotherapy    Substance Abuse was not addressed during this session. ASSESSMENT:  Jada Wilkinson presents with a Euthymic/ normal mood. his affect is Normal range and intensity, which is congruent, with his mood and the content of the session. The client has made progress on their goals. Jada Wilkinson presents with a none risk of suicide, none risk of self-harm, and none risk of harm to others. For any risk assessment that surpasses a "low" rating, a safety plan must be developed.     A safety plan was indicated: no  If yes, describe in detail       PLAN: Between sessions, Jada Wilkinson will focus on identifying emotions and where he places, or may displace, these emotions. At the next session, the therapist will use Engagement Strategies and Client-centered Therapy to address area of concern since last session. Behavioral Health Treatment Plan and Discharge Planning: Ana Shahpaula is aware of and agrees to continue to work on their treatment plan. They have identified and are working toward their discharge goals.  yes    Visit start and stop times:    09/06/23  Start Time: 0600  Stop Time: 9295  Total Visit Time: 53 minutes

## 2023-09-19 ENCOUNTER — SOCIAL WORK (OUTPATIENT)
Dept: BEHAVIORAL/MENTAL HEALTH CLINIC | Facility: CLINIC | Age: 63
End: 2023-09-19
Payer: COMMERCIAL

## 2023-09-19 DIAGNOSIS — E78.2 MIXED HYPERLIPIDEMIA: ICD-10-CM

## 2023-09-19 DIAGNOSIS — F43.23 ADJUSTMENT REACTION WITH ANXIETY AND DEPRESSION: Primary | ICD-10-CM

## 2023-09-19 PROCEDURE — 90834 PSYTX W PT 45 MINUTES: CPT

## 2023-09-19 RX ORDER — ATORVASTATIN CALCIUM 20 MG/1
TABLET, FILM COATED ORAL
Qty: 90 TABLET | Refills: 1 | Status: SHIPPED | OUTPATIENT
Start: 2023-09-19
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
## 2023-09-27 ENCOUNTER — HOSPITAL ENCOUNTER (OUTPATIENT)
Dept: NEUROLOGY | Facility: CLINIC | Age: 63
Discharge: HOME | End: 2023-09-27
Payer: COMMERCIAL

## 2023-09-27 ENCOUNTER — SOCIAL WORK (OUTPATIENT)
Dept: BEHAVIORAL/MENTAL HEALTH CLINIC | Facility: CLINIC | Age: 63
End: 2023-09-27
Payer: COMMERCIAL

## 2023-09-27 DIAGNOSIS — Z79.4 TYPE 2 DIABETES MELLITUS WITH DIABETIC NEUROPATHY, WITH LONG-TERM CURRENT USE OF INSULIN (HCC): ICD-10-CM

## 2023-09-27 DIAGNOSIS — F43.23 ACUTE ADJUSTMENT DISORDER WITH MIXED ANXIETY AND DEPRESSED MOOD: Primary | ICD-10-CM

## 2023-09-27 DIAGNOSIS — E11.40 TYPE 2 DIABETES MELLITUS WITH DIABETIC NEUROPATHY, WITH LONG-TERM CURRENT USE OF INSULIN (HCC): ICD-10-CM

## 2023-09-27 PROCEDURE — 95911 NRV CNDJ TEST 9-10 STUDIES: CPT | Performed by: PSYCHIATRY & NEUROLOGY

## 2023-09-27 PROCEDURE — 95886 MUSC TEST DONE W/N TEST COMP: CPT | Performed by: PSYCHIATRY & NEUROLOGY

## 2023-09-27 PROCEDURE — 90834 PSYTX W PT 45 MINUTES: CPT

## 2023-09-27 NOTE — PSYCH
Behavioral Health Psychotherapy Progress Note    Psychotherapy Provided: Individual Psychotherapy     1. Acute adjustment disorder with mixed anxiety and depressed mood            Goals addressed in session: Goal 1     DATA: Amanda Tamez reports that things are continuing to improve between him and his spouse. They both visited the urologist and learned about Ramirez's implant. Amanda Tamez reports this has been beneficial in regards to their sexual encounters and both of them are very pleased with the outcome. Amanda Tamez did reflect on how he has been feeling presently, compared to the past couple of months. Amanda Tamez notices his is feeling better physically and emotionally. Amanda Tamez and Thanh Barajas also developed crisis plan. Amanda Tamez reports that his brother and his wife are his biggest supporters, and what helps him in times of emotional distress. During this session, this clinician used the following therapeutic modalities: Engagement Strategies, Client-centered Therapy and Supportive Psychotherapy    Substance Abuse was not addressed during this session. ASSESSMENT:  Marium Moreno presents with a Euthymic/ normal mood. his affect is Normal range and intensity, which is congruent, with his mood and the content of the session. The client has made progress on their goals. Marium Moreno presents with a none risk of suicide, none risk of self-harm, and none risk of harm to others. For any risk assessment that surpasses a "low" rating, a safety plan must be developed. A safety plan was indicated: no  If yes, describe in detail       PLAN: Between sessions, Marium Moreno will continue to identify areas in his marriage that he struggles with and use what has helped to make improvements between him and his spouse.  At the next session, the therapist will use Engagement Strategies and Client-centered Therapy to address his relationship and progress he has made    [address] and Discharge Planning: Sabiha Dance Zarina Roth is aware of and agrees to continue to work on their treatment plan. They have identified and are working toward their discharge goals.  yes    Visit start and stop times:    09/28/23  Start Time: 1202  Stop Time: 1247  Total Visit Time: 45 minutes

## 2023-09-27 NOTE — BH CRISIS PLAN
Client Name: Camila Kaufman       Client YOB: 1960  : 1960    Treatment Team (include name and contact information):     Psychotherapist: Jose Floyd    Psychiatrist: none   Release of information completed: no      Healthcare Provider  Andi Del Toro DO   [address]  [phone]    Type of Plan   * Child plans (children 15 yo and younger) must be completed and signed by the child's legal guardian   * Plans for all individuals 15 yo and above must be signed by the client. Plan Type: adolescent/adult (15 and over) Initial      My Personal Strengths are (in the client's own words):  "I think I'm humble, strong, caring"    The stressors and triggers that may put me at risk are:  death of family member (immediate family), job loss and marital problems    Coping skills I can use to keep myself calm and safe: Increased contact with professional supports and Other (describe)get emotions out, deep breathing    Coping skills/supports I can use to maintain abstinence from substance use:   Not Applicable    The people that provide me with help and support: (Include name, contact, and how they can help)   Support person #1: Brother Katalina Duque    * Phone number: in cell phone    * How can they help me? He is a great listener, would try to put a solution to something I ask him to     Support person #2: Timmy Velazquez, wife    * Phone number: in cell phone    * How can they help me?  She is a good listener and is very caring and thoughtful      In the past, the following has helped me in times of crisis:    Calling a family member and Breathing exercises (or other mindfulness-based activities)      If it is an emergency and you need immediate help, call     If there is a possibility of danger to yourself or others, call the following crisis hotline resources:     Adult Crisis Numbers  Suicide Prevention Hotline - Dial   [facility]: [address]: [address]: [address]: [phone]  [facility]: [phone]  [facility]: [address]: [address]: [phone] (daytime). [phone] (after hours, weekends, holidays)     Child/Adolescent Crisis Numbers   [facility] \[facility]": [address] [facility] [facility]: [phone]   [facility]: [phone]   [facility]: [phone]    Please note: Some [facility] do not have a separate number for Child/Adolescent specific crisis. If your county is not listed under Child/Adolescent, please call the adult number for your county     National Talk to Text Line   [facility] - [phone]    In the event your feelings become unmanageable, and you cannot reach your support system, you will call 911 immediately or go to the nearest hospital emergency room.

## 2023-10-03 ENCOUNTER — OFFICE VISIT (OUTPATIENT)
Dept: NEPHROLOGY | Facility: CLINIC | Age: 63
End: 2023-10-03
Payer: COMMERCIAL

## 2023-10-03 VITALS
DIASTOLIC BLOOD PRESSURE: 78 MMHG | HEART RATE: 68 BPM | HEIGHT: 69 IN | BODY MASS INDEX: 35.1 KG/M2 | SYSTOLIC BLOOD PRESSURE: 136 MMHG | WEIGHT: 237 LBS | OXYGEN SATURATION: 98 %

## 2023-10-03 DIAGNOSIS — I50.32 CHRONIC HEART FAILURE WITH PRESERVED EJECTION FRACTION (HCC): ICD-10-CM

## 2023-10-03 DIAGNOSIS — N18.31 TYPE 2 DIABETES MELLITUS WITH STAGE 3A CHRONIC KIDNEY DISEASE AND HYPERTENSION (HCC): Primary | ICD-10-CM

## 2023-10-03 DIAGNOSIS — I12.9 TYPE 2 DIABETES MELLITUS WITH STAGE 3A CHRONIC KIDNEY DISEASE AND HYPERTENSION (HCC): Primary | ICD-10-CM

## 2023-10-03 DIAGNOSIS — E66.01 CLASS 2 SEVERE OBESITY DUE TO EXCESS CALORIES WITH SERIOUS COMORBIDITY AND BODY MASS INDEX (BMI) OF 35.0 TO 35.9 IN ADULT: ICD-10-CM

## 2023-10-03 DIAGNOSIS — E11.22 TYPE 2 DIABETES MELLITUS WITH STAGE 3A CHRONIC KIDNEY DISEASE AND HYPERTENSION (HCC): Primary | ICD-10-CM

## 2023-10-03 PROCEDURE — 99214 OFFICE O/P EST MOD 30 MIN: CPT | Performed by: INTERNAL MEDICINE

## 2023-10-03 RX ORDER — MELATONIN
1000 DAILY
COMMUNITY

## 2023-10-03 NOTE — PROGRESS NOTES
NEPHROLOGY OFFICE VISIT   Israel Croft 70 y.o. male MRN: [id number]  10/3/2023    Reason for Visit: Chronic kidney disease    History of Present Illness (HPI):    I had the pleasure of seeing Mil Kline today in the renal clinic for the continued management of chronic kidney disease. Since our last visit, there has been no ER visits or hospitilizations. He currently has no complaints at this time and is feeling well. Patient denies any chest pain, shortness of breath and swelling. The last blood work was done on July included a BMP, which we have reviewed together. At her last visit back in March I had started him on Jardiance 10 mg. He has been doing really well on this medication. His diabetic control has improved and his hemoglobin A1c is down to 6. From April to July he appeared to have a slight increase of his creatinine ranging anywhere from 1.3 to 1.5 mg/dL primarily staying in the 1.4 range and had been stable on that 3-month range. Very possible that the initiation of the SGLT inhibitor caused a slight increase of the creatinine which is expected from his usual baseline of around 1.2 mg/dL. We will repeat his blood work again in the next few weeks nonfasting. I will also check a Cystatin C for more accurate assessment of his GFR. Overall is doing quite well. ASSESSMENT and PLAN:    Elevated creatinine  --Increased from April to July  --Not meet the clinical criteria for acute kidney injury  -- Stable between 1.3 to 1.4 mg/dL slight increase from his prior baseline  -- Underlying progression versus the initiation of the SGLT2 inhibitor  -- Check a repeat nonfasting BMP and a Cystatin C level. -- Continue current medications. Chronic Kidney Disease Stage IIA with nephrotic range proteinuria (G3A3)  --Imaging: Renal ultrasound from February 2022. Right kidney 12.2 cm, left kidney 11.9 cm. No evidence of hydronephrosis and no masses. No cyst noted. --Urinalysis: From January 2023. 3+ protein. Trace glucose. No significant microscopic hematuria. No casts and no bacteria were seen. --Thang Foster a repeat microalbumin/creatinine ratio which has improved based on April where was 2.6 g/g from 3.6 g/g  --Baseline creatinine: Low 1's, may be had a new baseline creatinine that fluctuates in the low to mid 1's  --Etiology:  Presumed be secondary to diabetic kidney disease, hypertension and obesity related renal dysfunction  --Biopsy Proven:  No  --Status: Slight increase of his creatinine from the baseline but overall stable over the past 3 months. Check a repeat microalbumin/creatinine ratio. --Serologies: DEBORA was negative, Oppermann C3 and C4 are normal, rheumatoid factor was negative, otitis panel was nonreactive, antiglomerular basement membrane antibodies were negative, phospholipase A2 receptor antibodies were negative, ANCA titers were negative, kappa/lambda ratio was normal at 1.29  --Management:  Continue angiotensin receptor blocker for blood pressure control and proteinuria. Continue Jardiance for kidney protective benefits and proteinuria. Ace blood work as detailed above  --Reducing Cardiovascular Risk Factors:  Simvastatin+ Ezetimibe reduced atherosclerotic events in CKD (SHARP trial); Low dose aspirin safe (if no contraindications exist); smoking is an independent risk factor for Chronic Kidney Disease and progression, strongly recommend smoking cessation     Diabetes mellitus type 2  -hemoglobin A1c: 6 (A1C values may be falsely elevated or decreased in those with CKD, assay method should be certified by [facility] glycohemoglobin standardization Program). Target A1C between 7-8.5 (will need to be individualized for each patient), and would utilize A1C  in conjunction with continuous glucose monitoring (CGM).   It should also be noted that the A1c with more advanced kidney disease would be inaccurate due to decreased red blood cell life along with anemia.  -current Dora Dubon. Continue Jardiance  -proteinuria:  Yes  -retinopathy:  Not reported  -neuropathy:  Yes  -following with endocrinology Dr. Viry Stallworth  -please follow with an ophthalmologist and podiatrist every year  -continued self monitoring of blood glucose at home  -Management in CKD Recommendations:   • Metformin:  Avoid if creatinine clearance is less than 30 cc/min (concern for lactic acidosis)  • Sodium-Glucose Cotransporter-2 (SGLT2) Inhibitors:  May see an acute drop in the eGFR initially when starting the medication but then a stabilization of the renal function, with slower loss of renal function as compared to placebo.  Relative risk of end-stage renal disease, doubling of serum creatinine or death from renal causes were also found to be lower as compared to placebo. (CREDENCE).  DAPA-CKD study, showed that patients with chronic kidney disease regardless of the presence or absence of diabetes the risk of composite of a sustained decline in the estimated GFR of at least 50%, end-stage renal disease or death from renal or cardiovascular causes was significantly lower with Dapagliflozin than with placebo. EMPEROR-Reduced study also showed beneficial effects.   If no contraindications exist I would recommend this medication added to the diabetic regiment, if further optimal diabetic control is required. If eGFR < 60 cc/min (avoid ertugliflozin);  If eGFR < 45 cc/min (caution with or avoid dapagliflozin, emphagliflozin), if eGFR  < 30 cc/min (caution or avoid all including canagliflozin, more for efficacy)  • Sulfonylureas:  Preferred short-acting (glipizide, glimepiride, repaglinide), relatively safe in patients with non dialysis CKD  • Thiazolidinedones/Alpha-Gluosidase inhibitors/Dipeptidyl peptidase-4 inhibitors:  Generally not considered first-line agents in CKD (limited data in long-term safety and efficacy)  • Insulin:  Starting dose may need to be lower than what would ordinarily be used, as there is a decrease metabolism of insulin (no dose adjustment if the GFR > 50 mL/min, dose should be reduced to 75% of baseline if GFR 10-50 mL/min, and 50% baseline if GFR < 10 mL/min)     Hypertension  -- Stage II (American College of Cardiology/American Heart Association)  -- with underlying chronic kidney disease and obesity  -- Body mass index is 35.44 kg/m². -- Volume status: Euvolemic  -- Etiology:  Essential hypertension and obesity  -- Secondary Work-Up:  No  -- Target Goal: < 130/80 (ACC/AHA, CKD with proteinuria, CKD in diabetics) ; if tolerated can target SBP < 120 mmHg in high cardiovascular risk ( Age > 75, CKD, CVD, No Diabetics SPRINT)  -- Lifestyle Modifications: DASH Diet, Weight Loss for ideal body weight, 45 mins of cardiovascular exercise 3 times a week as tolerated, and if no contraindications exist, smoking cessation, limit alcohol use, avoid NSAIDS, monitor blood pressure at home  -- Status:  Blood pressure at target  -- Current antihypertensive regiment: Amlodipine 10 mg daily, olmesartan 40 mg daily  -- Changes: Continue current management but encouraged weight loss exercise and diet     Vitamin D deficiency  --Vitamin D 25-hydroxy level less than 15  --Ergocalciferol 50,000 units weekly     Proteinuria  -- presumed to be secondary to diabetic kidney disease and obesity  -- 24 hour urine protein or urine protein creatinine ratio: Improvement based on April's microalbumin/creatinine ratio check a repeat at this time  -- Current Blood Pressure: Close to target  -- current anti proteinuric medications: Olmesartan  -- Interventions: Continue Jardiance and olmesartan  -- General Recommendations:  • RAAS Blockade with high dose ARB or ACEI for target blood pressure < 130/80 as tolerated, with spironolactone as a good adjunct for anti proteinuric effect.  Management of hypervolemia for blood pressure control as needed.   • Avoid combination ACEI + ARB, due to high adverse effects (ONTARGET study)  • Direct Renin Inhibitor + ACEI or ARB has FDA black box warning for patients with diabetes and GFR < 60 cc/min due to risk for non fatal stroke, renal complications, hyperkalemia and hypotension (ALTITUDE study)  • Moderate dietary protein restriction 0.8 gm/kg  • Recommend statin therapy if no contraindications. • Recommend 1, 25 vitamin-D such as Paricalcitol if appropropriate (VITAL study)        Right knee osteoarthritis  --status post right knee surgery on 11/30/2021     Obesity  -- BMI  35  --Recommend decreasing portion sizes, encouraging healthy choices of fruits and vegetables, consuming healthier snacks and moderation in carbohydrate intake. Exercise recommendations; 3-5 times a week, the American Heart Association recommends 150 minutes a week moderate exercise  --Strongly recommend evaluation by nutritionist, declined  --Overweight and obesity have been associated with increased mortality and morbidity. Associated with a reduction in life expectancy, associated with increased all cause and cardiovascular mortality  --Metabolic risks, including increased risk of diabetes type 2, insulin resistance with hyperinsulinemia (weight loss of 5 to 7% associated with a decreased risk of type 2 diabetes among individuals with prediabetes and weight loss of 15% can lead to dramatic improvement of diabetes in nearly half of people). Dyslipidemia, hypertension and heart disease are all increased in patients with obesity. Along with increased risk of stroke increased risk of multiple cancer types. Can also be associated with increased renal dysfunction, strongest risk factor for new onset chronic kidney disease. There is also a degree of compensatory hyperfiltration to meet high in the metabolic demands of increased body weight. This can also result in increased increased risk for nephrolithiasis.             PATIENT INSTRUCTIONS:    Patient Instructions   1.)  Low 2 g sodium diet    2.)  Monitor weights at home    3.)  Avoid NSAIDs (ibuprofen, Motrin, Advil, Aleve, naproxen)    4.)  Monitor blood pressure at home, call if blood pressure greater than 150/90 persistently    5.) I will plan to discuss all results including blood work, and/or imaging at our next visit, unless there is an urgent indication, in which case I will call you earlier. If you have any questions or concerns about your results, please feel free to call our office. 6.) Check repeat blood work in next few weeks and in 6 months    7.) SGLT2 Inhibitors such as Jardiance , have been shown and studied to reduce the decline of kidney function and reduce cardiovascular outcomes. We recommend you continue this medication. Please stop this medication during any period of illness, during any perioperative period. Please maintain good foot care and avoid keto diet. Maintain adequate hydration. And watch out for hypoglycemia. Orders Placed This Encounter   Procedures   • Basic metabolic panel     This is a patient instruction: Patient fasting for 8 hours or longer recommended. Standing Status:   Future     Number of Occurrences:   1     Standing Expiration Date:   10/3/2024   • Cystatin C With eGFR     Standing Status:   Future     Number of Occurrences:   1     Standing Expiration Date:   10/3/2024   • Albumin / creatinine urine ratio     Standing Status:   Future     Number of Occurrences:   1     Standing Expiration Date:   10/3/2024   • Comprehensive metabolic panel     This is a patient instruction: Patient fasting for 8 hours or longer recommended.      Standing Status:   Future     Number of Occurrences:   1     Standing Expiration Date:   10/3/2024   • Cystatin C With eGFR     Standing Status:   Future     Number of Occurrences:   1     Standing Expiration Date:   10/3/2024   • Albumin / creatinine urine ratio     Standing Status:   Future     Number of Occurrences:   1     Standing Expiration Date: 10/3/2024       OBJECTIVE:  Current Weight: Weight - Scale: 108 kg (237 lb)  Vitals:    10/03/23 0754   BP: 136/78   BP Location: Left arm   Patient Position: Sitting   Cuff Size: Large   Pulse: 68   SpO2: 98%   Weight: 108 kg (237 lb)   Height: 5' 9" (1.753 m)    Body mass index is 35 kg/m². REVIEW OF SYSTEMS:    Review of Systems   Constitutional: Negative for activity change and fever. Respiratory: Negative for cough, chest tightness, shortness of breath and wheezing. Cardiovascular: Negative for chest pain and leg swelling. Gastrointestinal: Negative for abdominal pain, diarrhea, nausea and vomiting. Endocrine: Negative for polyuria. Genitourinary: Negative for difficulty urinating, dysuria, flank pain, frequency and urgency. Skin: Negative for rash. Neurological: Negative for dizziness, syncope, light-headedness and headaches. PHYSICAL EXAM:      Physical Exam  Vitals and nursing note reviewed. Constitutional:       General: He is not in acute distress. Appearance: He is well-developed. He is obese. HENT:      Head: Normocephalic and atraumatic. Eyes:      General: No scleral icterus. Conjunctiva/sclera: Conjunctivae normal.      Pupils: Pupils are equal, round, and reactive to light. Cardiovascular:      Rate and Rhythm: Normal rate and regular rhythm. Heart sounds: S1 normal and S2 normal. No murmur heard. No friction rub. No gallop. Pulmonary:      Effort: Pulmonary effort is normal. No respiratory distress. Breath sounds: Normal breath sounds. No wheezing or rales. Abdominal:      General: Bowel sounds are normal.      Palpations: Abdomen is soft. Tenderness: There is no abdominal tenderness. There is no rebound. Musculoskeletal:         General: Normal range of motion. Cervical back: Normal range of motion and neck supple. Right lower leg: Edema present. Skin:     Findings: No rash.    Neurological:      Mental Status: He is alert and oriented to person, place, and time.    Psychiatric:         Behavior: Behavior normal.         Medications:    Current Outpatient Medications:   •  amLODIPine (NORVASC) 10 mg tablet, TAKE ONE TABLET BY MOUTH EVERY DAY (Patient taking differently: Take 5 mg by mouth daily), Disp: 90 tablet, Rfl: 1  •  aspirin (ECOTRIN LOW STRENGTH) 81 mg EC tablet, Take 81 mg by mouth daily, Disp: , Rfl:   •  atorvastatin (LIPITOR) 20 mg tablet, TAKE ONE TABLET BY MOUTH EVERY DAY, Disp: 90 tablet, Rfl: 1  •  Basaglar KwikPen 100 units/mL SOPN, INJECT 10 UNITS UNDER THE SKIN DAILY, Disp: 15 mL, Rfl: 3  •  cholecalciferol (VITAMIN D3) 1,000 units tablet, Take 1,000 Units by mouth daily, Disp: , Rfl:   •  Empagliflozin (Jardiance) 10 MG TABS tablet, Take 1 tablet (10 mg total) by mouth every morning, Disp: 30 tablet, Rfl: 3  •  gabapentin (NEURONTIN) 100 mg capsule, Take 1 capsule (100 mg total) by mouth 4 (four) times a day (Patient taking differently: Take 200 mg by mouth 2 (two) times a day), Disp: 360 capsule, Rfl: 0  •  glipiZIDE (GLUCOTROL XL) 10 mg 24 hr tablet, TAKE TWO TABLETS BY MOUTH EVERY DAY AT BEDTIME, Disp: 180 tablet, Rfl: 1  •  hydroxychloroquine (PLAQUENIL) 200 mg tablet, Take 1 tablet (200 mg total) by mouth 2 (two) times a day with meals, Disp: 180 tablet, Rfl: 1  •  metFORMIN (GLUCOPHAGE) 500 mg tablet, TAKE TWO TABLETS BY MOUTH TWICE A DAY WITH MEALS (GENERIC FOR GLUCOPHAGE), Disp: 360 tablet, Rfl: 3  •  olmesartan (BENICAR) 20 mg tablet, TAKE TWO TABLETS BY MOUTH EVERY DAY, Disp: 180 tablet, Rfl: 3  •  tamsulosin (FLOMAX) 0.4 mg, 0.4 mg daily, Disp: , Rfl:   •  ALPRAZolam (XANAX) 0.25 mg tablet, Take 1 tablet (0.25 mg total) by mouth daily at bedtime as needed for anxiety (Patient not taking: Reported on 10/3/2023), Disp: 14 tablet, Rfl: 0  •  B-D ULTRAFINE III SHORT PEN 31G X 8 MM MISC, USE AS DIRECTED, Disp: 200 each, Rfl: 1  •  Blood Glucose Monitoring Suppl (OneTouch Verio) w/Device KIT, Test blood sugar twice a day, Disp: 1 kit, Rfl: 0  •  Continuous Blood Gluc Sensor (FreeStyle Ulysses 14 Day Sensor) MISC, Change sensor every 14 days, Disp: 2 each, Rfl: 12  •  ergocalciferol (ERGOCALCIFEROL) 1.25 MG (92764 UT) capsule, Take 1 capsule (50,000 Units total) by mouth once a week for 12 doses, Disp: 12 capsule, Rfl: 0  •  glucose blood (OneTouch Verio) test strip, Test blood sugar twice a day, Disp: 200 each, Rfl: 3  •  mupirocin (BACTROBAN) 2 % ointment, Apply topically 2 (two) times a day, Disp: 22 g, Rfl: 0  •  OneTouch Delica Lancets 94B MISC, Test blood sugar twice a day, Disp: 200 each, Rfl: 3    Laboratory Results:        Invalid input(s): "ALBUMIN"    Results for orders placed or performed in visit on 07/18/23   CBC and differential   Result Value Ref Range    White Blood Cell Count 5.6 3.4 - 10.8 x10E3/uL    Red Blood Cell Count 5.47 4.14 - 5.80 x10E6/uL    Hemoglobin 15.9 13.0 - 17.7 g/dL    HCT 47.2 37.5 - 51.0 %    MCV 86 79 - 97 fL    MCH 29.1 26.6 - 33.0 pg    MCHC 33.7 31.5 - 35.7 g/dL    RDW 13.7 11.6 - 15.4 %    Platelet Count 721 824 - 450 x10E3/uL    Neutrophils 71 Not Estab. %    Lymphocytes 16 Not Estab. %    Monocytes 8 Not Estab. %    Eosinophils 4 Not Estab. %    Basophils PCT 1 Not Estab. %    Neutrophils (Absolute) 4.0 1.4 - 7.0 x10E3/uL    Lymphocytes (Absolute) 0.9 0.7 - 3.1 x10E3/uL    Monocytes (Absolute) 0.5 0.1 - 0.9 x10E3/uL    Eosinophils (Absolute) 0.2 0.0 - 0.4 x10E3/uL    Basophils ABS 0.1 0.0 - 0.2 x10E3/uL    Immature Granulocytes 0 Not Estab. %    Immature Granulocytes (Absolute) 0.0 0.0 - 0.1 x10E3/uL   Comprehensive metabolic panel   Result Value Ref Range    Glucose, Random 132 (H) 70 - 99 mg/dL    BUN 26 8 - 27 mg/dL    Creatinine 1.46 (H) 0.76 - 1.27 mg/dL    eGFR 53 (L) >59 mL/min/1.73    SL AMB BUN/CREATININE RATIO 18 10 - 24    Sodium 142 134 - 144 mmol/L    Potassium 4.1 3.5 - 5.2 mmol/L    Chloride 105 96 - 106 mmol/L    CO2 21 20 - 29 mmol/L    CALCIUM 9.4 8.6 - 10.2 mg/dL    Protein, Total 6.2 6.0 - 8.5 g/dL    Albumin 4.1 3.9 - 4.9 g/dL    Globulin, Total 2.1 1.5 - 4.5 g/dL    Albumin/Globulin Ratio 2.0 1.2 - 2.2    TOTAL BILIRUBIN 0.6 0.0 - 1.2 mg/dL    Alk Phos Isoenzymes 59 44 - 121 IU/L    AST 20 0 - 40 IU/L    ALT 17 0 - 44 IU/L   Urinalysis with microscopic   Result Value Ref Range    Specific Gravity 1.019 1.005 - 1.030    Ph 5.5 5.0 - 7.5    Color UA Yellow Yellow    Urine Appearance Clear Clear    Leukocyte Esterase Negative Negative    Protein 3+ (A) Negative/Trace    Glucose, 24 HR Urine Trace (A) Negative    Ketone, Urine Negative Negative    Blood, Urine Negative Negative    Bilirubin, Urine Negative Negative    Urobilinogen Urine 0.2 0.2 - 1.0 mg/dL    Nitrites Urine Negative Negative    Microscopic Examination See below:    Microscopic Examination   Result Value Ref Range    SL AMB WBC, URINE 0-5 0 - 5 /hpf    RBC, Urine None seen 0 - 2 /hpf    Epithelial Cells (non renal) None seen 0 - 10 /hpf    Casts None seen None seen /lpf    Bacteria, Urine None seen None seen/Few   Rheumatoid Arthritis Profile   Result Value Ref Range    Rheumatoid Factor (RF) <10.0 <14.0 IU/mL    ANTI-CCP AB, IGG/IGA 6 0 - 19 units   Uric acid   Result Value Ref Range    Uric Acid 5.6 3.8 - 8.4 mg/dL   Sedimentation rate, automated   Result Value Ref Range    Sedimentation Rate 15 0 - 30 mm/hr   C-reactive protein   Result Value Ref Range    C-Reactive Protein, Quant <1 0 - 10 mg/L   Antinuclear Antibodies, IFA   Result Value Ref Range    Antinuclear Antibodies, IFA Negative

## 2023-10-03 NOTE — PATIENT INSTRUCTIONS
1. )  Low 2 g sodium diet    2.)  Monitor weights at home    3.)  Avoid NSAIDs (ibuprofen, Motrin, Advil, Aleve, naproxen)    4.)  Monitor blood pressure at home, call if blood pressure greater than 150/90 persistently    5.) I will plan to discuss all results including blood work, and/or imaging at our next visit, unless there is an urgent indication, in which case I will call you earlier. If you have any questions or concerns about your results, please feel free to call our office. 6.) Check repeat blood work in next few weeks and in 6 months    7.) SGLT2 Inhibitors such as Jardiance , have been shown and studied to reduce the decline of kidney function and reduce cardiovascular outcomes. We recommend you continue this medication. Please stop this medication during any period of illness, during any perioperative period. Please maintain good foot care and avoid keto diet. Maintain adequate hydration. And watch out for hypoglycemia.

## 2023-10-17 ENCOUNTER — OFFICE VISIT (OUTPATIENT)
Dept: OBGYN CLINIC | Facility: CLINIC | Age: 63
End: 2023-10-17
Payer: COMMERCIAL

## 2023-10-17 ENCOUNTER — SOCIAL WORK (OUTPATIENT)
Dept: BEHAVIORAL/MENTAL HEALTH CLINIC | Facility: CLINIC | Age: 63
End: 2023-10-17
Payer: COMMERCIAL

## 2023-10-17 VITALS
BODY MASS INDEX: 35.1 KG/M2 | HEIGHT: 69 IN | SYSTOLIC BLOOD PRESSURE: 155 MMHG | WEIGHT: 237 LBS | DIASTOLIC BLOOD PRESSURE: 89 MMHG | HEART RATE: 59 BPM

## 2023-10-17 DIAGNOSIS — G56.03 BILATERAL CARPAL TUNNEL SYNDROME: Primary | ICD-10-CM

## 2023-10-17 DIAGNOSIS — Z79.4 TYPE 2 DIABETES MELLITUS WITH DIABETIC NEUROPATHY, WITH LONG-TERM CURRENT USE OF INSULIN (HCC): ICD-10-CM

## 2023-10-17 DIAGNOSIS — E11.40 TYPE 2 DIABETES MELLITUS WITH DIABETIC NEUROPATHY, WITH LONG-TERM CURRENT USE OF INSULIN (HCC): ICD-10-CM

## 2023-10-17 DIAGNOSIS — F43.23 ADJUSTMENT DISORDER WITH MIXED ANXIETY AND DEPRESSED MOOD: Primary | ICD-10-CM

## 2023-10-17 PROCEDURE — 90834 PSYTX W PT 45 MINUTES: CPT

## 2023-10-17 PROCEDURE — 99204 OFFICE O/P NEW MOD 45 MIN: CPT | Performed by: STUDENT IN AN ORGANIZED HEALTH CARE EDUCATION/TRAINING PROGRAM

## 2023-10-17 RX ORDER — FLASH GLUCOSE SCANNING READER
EACH MISCELLANEOUS
COMMUNITY

## 2023-10-17 NOTE — H&P (VIEW-ONLY)
ASSESSMENT/PLAN:    Assessment:   Carpal Tunnel Syndrome  bilateral    Plan:   Open Carpal Tunnel Release left. We discussed that some of his symptoms may be coming from diabetic neuropathy. He has classic carpal tunnel symptoms including APB weakness and loss of two-point discrimination only in the median nerve distribution. He understands if he has a carpal tunnel release under local anesthesia it may provide him some relief however may alleviate all of his symptoms due to his diabetic neuropathy. He understands this and would like to move forward with a carpal tunnel release. Follow Up: After Surgery    To Do Next Visit:  Wound check    General Discussions:  Carpal Tunnel Syndrome: The anatomy and physiology of carpal tunnel syndrome was discussed with the patient today. Increase pressure localized under the transverse carpal ligament can cause pain, numbness, tingling, or dysesthesias within the median nerve distribution as well as feelings of fatigue, clumsiness, or awkwardness. These symptoms typically occur at night and worse in the morning upon waking. Eventually, untreated carpal tunnel syndrome can result in weakness and permanent loss of muscle within the thenar compartment of the hand. Treatment options were discussed with the patient. Conservative treatment includes nocturnal resting splints to keep the nerve in a neutral position, ergonomic changes within the work or home environment, activity modification, and tendon gliding exercises. Steroid injections within the carpal canal can help a majority of patients, however this is often self-limited in a majority of patients. Surgical intervention to divide the transverse carpal ligament typically results in a long-lasting relief of the patient's complaints, with the recurrence rate of less than 1%.      Operative Discussions:     Standard Consent: The risks and benefits of the procedure were explained to the patient, which include, but are not limited to: Bleeding, infection, recurrence, pain, scar, damage to tendons, damage to nerves, and damage to blood vessels, failure to give desired results and complications related to anesthesia. These risks, along with alternative conservative treatment options, and postoperative protocols were voiced back and understood by the patient. All questions were answered to the patient's satisfaction. The patient agrees to comply with a standard postoperative protocol, and is willing to proceed. Education was provided via written and auditory forms. There were no barriers to learning. Written handouts regarding wound care, incision and scar care, and general preoperative information was provided to the patient. Prior to surgery, the patient may be requested to stop all anti-inflammatory medications. Prophylactic aspirin, Plavix, and Coumadin may be allowed to be continued. Medications including vitamin E., ginkgo, and fish oil are requested to be stopped approximately one week prior to surgery. Hypertensive medications and beta blockers, if taken, should be continued. Diabetes: The risks of diabetes were discussed with the patient. These risks include increased risk of infection, delayed wound healing, increased swelling, increased stiffness, and increased scar formation. While these risks are generally increased in all diabetics, keeping one's blood sugar under strict control can help decrease problems after surgery. If blood sugar levels are too high, or hemoglobin A1c levels are too high, surgery may be delayed or canceled. _____________________________________________________  CHIEF COMPLAINT:  Chief Complaint   Patient presents with   • Left Hand - Numbness   • Right Hand - Numbness         SUBJECTIVE:  Erinn Wang is a 70 y.o. male who presents with Numbness to the bilateral hands. This started  2 year(s) ago as Insidious and has been getting progressively worse.   He has trouble with activities that require fine motor skills, such as buttoning a shirt and using a zipper. He states the left side is worse than the right. His pain is worse at night. He reports his symptoms are worse on the left than the right. The symptoms wake him up in the middle of the night and cause him to try and shake out his hands. Radiation: None  Previous Treatments: NSAIDs and activity modification with only partial relief  Associated symptoms: Pain  Moderate  Infrequent  Sharp  Handedness: right  Work status: employed as  for [employer], involves a lot of typing. PAST MEDICAL HISTORY:  Past Medical History:   Diagnosis Date   • Arthritis    • Carpal tunnel syndrome    • Chronic kidney disease     Stage 2 CKD   • Corneal abrasion     last assessed - [id number]   • Diabetes mellitus ([address])    • Gout    • Hiatal hernia    • Hyperlipidemia    • Hypertension    • Impaired fasting glucose     last assessed - [id number]   • Kidney stone     last stone 2009   • Lumbar spondylosis 10/31/2021   • Lumbar spondylosis    • Numbness of fingers of both hands    • PONV (postoperative nausea and vomiting)    • Psoriasis    • RA (rheumatoid arthritis) ([address])    • 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[address])    • Sinus infection     currently under tx PO ABT etc as of 04/17/19   • Viremia     Resolved - [id number]       PAST SURGICAL HISTORY:  Past Surgical History:   Procedure Laterality Date   • ANKLE SURGERY Right     tendon arthrotomy   • ARTHROTOMY ANKLE     • BACK SURGERY  2014    lumbar laminectomy   • COLONOSCOPY     • COLONOSCOPY N/A 4/19/2019    Procedure: COLONOSCOPY;  Surgeon: Jessy Yip MD;  Location: [address] [place] [address] [address] [facility];   Service: Gastroenterology   • FINGER SURGERY Bilateral     every finger contracture release over a 10 year period trigger finger   • INCISION TENDON SHEATH      of a finger   • KNEE SURGERY Right     x2 arthrotomy   • SC ARTHRP KNE CONDYLE&PLATU MEDIAL&LAT COMPARTMENTS Right 11/30/2021 Procedure: ARTHROPLASTY KNEE TOTAL W ROBOT;  Surgeon: Barron Mcdonald DO;  Location: [facility];  Service: Orthopedics   • SHOULDER SURGERY Left     x1 staples, tendon repair secondary to many spontaneous dislocations       FAMILY HISTORY:  Family History   Problem Relation Age of Onset   • Hypertension Mother    • Arthritis Mother    • Hyperlipidemia Mother    • Lung cancer Mother    • Stroke Maternal Grandfather    • Diabetes Cousin    • COPD Father    • Depression Family    • Diabetes Family    • No Known Problems Sister    • No Known Problems Brother    • No Known Problems Maternal Aunt    • No Known Problems Maternal Uncle    • No Known Problems Paternal Aunt    • No Known Problems Paternal Uncle    • No Known Problems Maternal Grandmother    • No Known Problems Paternal Grandmother    • No Known Problems Paternal Grandfather        SOCIAL HISTORY:  Social History     Tobacco Use   • Smoking status: Former     Packs/day: 1.00     Types: Cigarettes     Start date: 0     Quit date:      Years since quittin.8   • Smokeless tobacco: Never   • Tobacco comments:     Former smoker   Vaping Use   • Vaping Use: Never used   Substance Use Topics   • Alcohol use: Yes     Comment: rare, once a month    • Drug use: No       MEDICATIONS:    Current Outpatient Medications:   •  amLODIPine (NORVASC) 10 mg tablet, TAKE ONE TABLET BY MOUTH EVERY DAY (Patient taking differently: Take 5 mg by mouth daily), Disp: 90 tablet, Rfl: 1  •  aspirin (ECOTRIN LOW STRENGTH) 81 mg EC tablet, Take 81 mg by mouth daily, Disp: , Rfl:   •  atorvastatin (LIPITOR) 20 mg tablet, TAKE ONE TABLET BY MOUTH EVERY DAY, Disp: 90 tablet, Rfl: 1  •  B-D ULTRAFINE III SHORT PEN 31G X 8 MM MISC, USE AS DIRECTED, Disp: 200 each, Rfl: 1  •  Basaglar KwikPen 100 units/mL SOPN, INJECT 10 UNITS UNDER THE SKIN DAILY, Disp: 15 mL, Rfl: 3  •  cholecalciferol (VITAMIN D3) 1,000 units tablet, Take 1,000 Units by mouth daily, Disp: , Rfl:   •  Continuous Blood Gluc  (FreeStyle Ulysses 14 Day [place]) SHREE, Use, Disp: , Rfl:   •  Empagliflozin (Jardiance) 10 MG TABS tablet, Take 1 tablet (10 mg total) by mouth every morning, Disp: 30 tablet, Rfl: 3  •  gabapentin (NEURONTIN) 100 mg capsule, Take 1 capsule (100 mg total) by mouth 4 (four) times a day (Patient taking differently: Take 200 mg by mouth 2 (two) times a day), Disp: 360 capsule, Rfl: 0  •  glipiZIDE (GLUCOTROL XL) 10 mg 24 hr tablet, TAKE TWO TABLETS BY MOUTH EVERY DAY AT BEDTIME, Disp: 180 tablet, Rfl: 1  •  hydroxychloroquine (PLAQUENIL) 200 mg tablet, Take 1 tablet (200 mg total) by mouth 2 (two) times a day with meals, Disp: 180 tablet, Rfl: 1  •  metFORMIN (GLUCOPHAGE) 500 mg tablet, TAKE TWO TABLETS BY MOUTH TWICE A DAY WITH MEALS (GENERIC FOR GLUCOPHAGE), Disp: 360 tablet, Rfl: 3  •  olmesartan (BENICAR) 20 mg tablet, TAKE TWO TABLETS BY MOUTH EVERY DAY, Disp: 180 tablet, Rfl: 3  •  tamsulosin (FLOMAX) 0.4 mg, 0.4 mg daily, Disp: , Rfl:   •  ALPRAZolam (XANAX) 0.25 mg tablet, Take 1 tablet (0.25 mg total) by mouth daily at bedtime as needed for anxiety (Patient not taking: Reported on 10/3/2023), Disp: 14 tablet, Rfl: 0  •  Blood Glucose Monitoring Suppl (OneTouch Verio) w/Device KIT, Test blood sugar twice a day (Patient not taking: Reported on 10/17/2023), Disp: 1 kit, Rfl: 0  •  Continuous Blood Gluc Sensor (FreeStyle Ulysses 14 Day Sensor) MISC, Change sensor every 14 days, Disp: 2 each, Rfl: 12  •  ergocalciferol (ERGOCALCIFEROL) 1.25 MG (43979 UT) capsule, Take 1 capsule (50,000 Units total) by mouth once a week for 12 doses, Disp: 12 capsule, Rfl: 0  •  glucose blood (OneTouch Verio) test strip, Test blood sugar twice a day (Patient not taking: Reported on 10/17/2023), Disp: 200 each, Rfl: 3  •  mupirocin (BACTROBAN) 2 % ointment, Apply topically 2 (two) times a day (Patient not taking: Reported on 10/17/2023), Disp: 22 g, Rfl: 0  •  OneTouch Delica Lancets 63V MISC, Test blood sugar twice a day (Patient not taking: Reported on 10/17/2023), Disp: 200 each, Rfl: 3    ALLERGIES:  Allergies   Allergen Reactions   • Latex Rash     At dentist, lips swollen    • Codeine Nausea Only     Reaction Date: [id number];        REVIEW OF SYSTEMS:  Pertinent items are noted in HPI. A comprehensive review of systems was negative. LABS:  HgA1c:   Lab Results   Component Value Date    HGBA1C 6.0 (H) 07/18/2023     BMP:   Lab Results   Component Value Date    GLUCOSE 245 (H) 11/20/2017    CALCIUM 8.4 12/01/2021     11/20/2017    K 4.1 07/18/2023    CO2 21 07/18/2023     07/18/2023    BUN 26 07/18/2023    CREATININE 1.46 (H) 07/18/2023         _____________________________________________________  PHYSICAL EXAMINATION:  Vital signs: /89   Pulse 59   Ht 5' 9" (1.753 m)   Wt 108 kg (237 lb)   BMI 35.00 kg/m²   General: well developed and well nourished, alert, oriented times 3, and appears comfortable  Psychiatric: Normal  HEENT: Trachea Midline, No torticollis  Cardiovascular: No discernable arrhythmia  Pulmonary: No wheezing or stridor  Abdomen: No rebound or guarding  Extremities: No peripheral edema  Skin: No masses, erythema, lacerations, fluctation, ulcerations  Neurovascular: Decreased Sensation to  the Median Nerve, Motor Intact to the Median, Ulnar, Radial Nerve, and Pulses Intact    MUSCULOSKELETAL EXAMINATION:    Left Hand:\  3+ out of 5 APB strength, negative Durkan's, negative Tinel's, no thenar atrophy, loss of two-point discrimination in the median nerve distribution to 5 mm. Intact ulnar nerve distribution to the small and ring finger to 5 mm. Negative Tinel's at the elbow over the cubital tunnel. Negative Santosh sign    Right Hand:  3+ out of 5 APB strength, negative Durkan's, negative Tinel's, no thenar atrophy, loss of two-point discrimination in the median nerve distribution to 5 mm. Intact ulnar nerve distribution to the small and ring finger to 5 mm.   Negative Tinel's at the elbow over the cubital tunnel. Negative Santosh sign  _____________________________________________________  STUDIES REVIEWED:  Results were reviewed by Dr. Nevin Garrido: EMG: Abnormal study. These electrodiagnostic findings are most consistent with bilateral, severe, median mononeuropathies across the wrists (carpal tunnel syndrome); left worse than the right. In addition, there is evidence to support an underlying axonal neuropathy, likely secondary to this patient's longstanding history of diabetes. There is no electrodiagnostic evidence to support a cervical radiculopathy in either upper extremity.       PROCEDURES PERFORMED:  No Procedures performed today    Scribe Attestation    I,:  Gautam Ceja am acting as a scribe while in the presence of the attending physician.:       I,:  Wisam Gonzales MD personally performed the services described in this documentation    as scribed in my presence.:

## 2023-10-17 NOTE — H&P
ASSESSMENT/PLAN:    Assessment:   Carpal Tunnel Syndrome  bilateral    Plan:   Open Carpal Tunnel Release left. We discussed that some of his symptoms may be coming from diabetic neuropathy. He has classic carpal tunnel symptoms including APB weakness and loss of two-point discrimination only in the median nerve distribution. He understands if he has a carpal tunnel release under local anesthesia it may provide him some relief however may alleviate all of his symptoms due to his diabetic neuropathy. He understands this and would like to move forward with a carpal tunnel release. Follow Up: After Surgery    To Do Next Visit:  Wound check    General Discussions:  Carpal Tunnel Syndrome: The anatomy and physiology of carpal tunnel syndrome was discussed with the patient today. Increase pressure localized under the transverse carpal ligament can cause pain, numbness, tingling, or dysesthesias within the median nerve distribution as well as feelings of fatigue, clumsiness, or awkwardness. These symptoms typically occur at night and worse in the morning upon waking. Eventually, untreated carpal tunnel syndrome can result in weakness and permanent loss of muscle within the thenar compartment of the hand. Treatment options were discussed with the patient. Conservative treatment includes nocturnal resting splints to keep the nerve in a neutral position, ergonomic changes within the work or home environment, activity modification, and tendon gliding exercises. Steroid injections within the carpal canal can help a majority of patients, however this is often self-limited in a majority of patients. Surgical intervention to divide the transverse carpal ligament typically results in a long-lasting relief of the patient's complaints, with the recurrence rate of less than 1%.      Operative Discussions:     Standard Consent: The risks and benefits of the procedure were explained to the patient, which include, but are not limited to: Bleeding, infection, recurrence, pain, scar, damage to tendons, damage to nerves, and damage to blood vessels, failure to give desired results and complications related to anesthesia. These risks, along with alternative conservative treatment options, and postoperative protocols were voiced back and understood by the patient. All questions were answered to the patient's satisfaction. The patient agrees to comply with a standard postoperative protocol, and is willing to proceed. Education was provided via written and auditory forms. There were no barriers to learning. Written handouts regarding wound care, incision and scar care, and general preoperative information was provided to the patient. Prior to surgery, the patient may be requested to stop all anti-inflammatory medications. Prophylactic aspirin, Plavix, and Coumadin may be allowed to be continued. Medications including vitamin E., ginkgo, and fish oil are requested to be stopped approximately one week prior to surgery. Hypertensive medications and beta blockers, if taken, should be continued. Diabetes: The risks of diabetes were discussed with the patient. These risks include increased risk of infection, delayed wound healing, increased swelling, increased stiffness, and increased scar formation. While these risks are generally increased in all diabetics, keeping one's blood sugar under strict control can help decrease problems after surgery. If blood sugar levels are too high, or hemoglobin A1c levels are too high, surgery may be delayed or canceled. _____________________________________________________  CHIEF COMPLAINT:  Chief Complaint   Patient presents with   • Left Hand - Numbness   • Right Hand - Numbness         SUBJECTIVE:  Vicki Wood is a 70 y.o. male who presents with Numbness to the bilateral hands. This started  2 year(s) ago as Insidious and has been getting progressively worse.   He has trouble with activities that require fine motor skills, such as buttoning a shirt and using a zipper. He states the left side is worse than the right. His pain is worse at night. He reports his symptoms are worse on the left than the right. The symptoms wake him up in the middle of the night and cause him to try and shake out his hands. Radiation: None  Previous Treatments: NSAIDs and activity modification with only partial relief  Associated symptoms: Pain  Moderate  Infrequent  Sharp  Handedness: right  Work status: employed as  for [employer], involves a lot of typing. PAST MEDICAL HISTORY:  Past Medical History:   Diagnosis Date   • Arthritis    • Carpal tunnel syndrome    • Chronic kidney disease     Stage 2 CKD   • Corneal abrasion     last assessed - [id number]   • Diabetes mellitus ([address])    • Gout    • Hiatal hernia    • Hyperlipidemia    • Hypertension    • Impaired fasting glucose     last assessed - [id number]   • Kidney stone     last stone 2009   • Lumbar spondylosis 10/31/2021   • Lumbar spondylosis    • Numbness of fingers of both hands    • PONV (postoperative nausea and vomiting)    • Psoriasis    • RA (rheumatoid arthritis) ([address])    • 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[address])    • Sinus infection     currently under tx PO ABT etc as of 04/17/19   • Viremia     Resolved - [id number]       PAST SURGICAL HISTORY:  Past Surgical History:   Procedure Laterality Date   • ANKLE SURGERY Right     tendon arthrotomy   • ARTHROTOMY ANKLE     • BACK SURGERY  2014    lumbar laminectomy   • COLONOSCOPY     • COLONOSCOPY N/A 4/19/2019    Procedure: COLONOSCOPY;  Surgeon: Melissa Fofana MD;  Location: [address] [place] [address] GI LAB;   Service: Gastroenterology   • FINGER SURGERY Bilateral     every finger contracture release over a 10 year period trigger finger   • INCISION TENDON SHEATH      of a finger   • KNEE SURGERY Right     x2 arthrotomy   • TX ARTHRP KNE CONDYLE&PLATU MEDIAL&LAT COMPARTMENTS Right 11/30/2021 Procedure: ARTHROPLASTY KNEE TOTAL W ROBOT;  Surgeon: Patricia Larios DO;  Location: [facility];  Service: Orthopedics   • SHOULDER SURGERY Left     x1 staples, tendon repair secondary to many spontaneous dislocations       FAMILY HISTORY:  Family History   Problem Relation Age of Onset   • Hypertension Mother    • Arthritis Mother    • Hyperlipidemia Mother    • Lung cancer Mother    • Stroke Maternal Grandfather    • Diabetes Cousin    • COPD Father    • Depression Family    • Diabetes Family    • No Known Problems Sister    • No Known Problems Brother    • No Known Problems Maternal Aunt    • No Known Problems Maternal Uncle    • No Known Problems Paternal Aunt    • No Known Problems Paternal Uncle    • No Known Problems Maternal Grandmother    • No Known Problems Paternal Grandmother    • No Known Problems Paternal Grandfather        SOCIAL HISTORY:  Social History     Tobacco Use   • Smoking status: Former     Packs/day: 1.00     Types: Cigarettes     Start date: 0     Quit date:      Years since quittin.8   • Smokeless tobacco: Never   • Tobacco comments:     Former smoker   Vaping Use   • Vaping Use: Never used   Substance Use Topics   • Alcohol use: Yes     Comment: rare, once a month    • Drug use: No       MEDICATIONS:    Current Outpatient Medications:   •  amLODIPine (NORVASC) 10 mg tablet, TAKE ONE TABLET BY MOUTH EVERY DAY (Patient taking differently: Take 5 mg by mouth daily), Disp: 90 tablet, Rfl: 1  •  aspirin (ECOTRIN LOW STRENGTH) 81 mg EC tablet, Take 81 mg by mouth daily, Disp: , Rfl:   •  atorvastatin (LIPITOR) 20 mg tablet, TAKE ONE TABLET BY MOUTH EVERY DAY, Disp: 90 tablet, Rfl: 1  •  B-D ULTRAFINE III SHORT PEN 31G X 8 MM MISC, USE AS DIRECTED, Disp: 200 each, Rfl: 1  •  Basaglar KwikPen 100 units/mL SOPN, INJECT 10 UNITS UNDER THE SKIN DAILY, Disp: 15 mL, Rfl: 3  •  cholecalciferol (VITAMIN D3) 1,000 units tablet, Take 1,000 Units by mouth daily, Disp: , Rfl:   •  Continuous Blood Gluc  (FreeStyle Ulysses 14 Day [place]) SHREE, Use, Disp: , Rfl:   •  Empagliflozin (Jardiance) 10 MG TABS tablet, Take 1 tablet (10 mg total) by mouth every morning, Disp: 30 tablet, Rfl: 3  •  gabapentin (NEURONTIN) 100 mg capsule, Take 1 capsule (100 mg total) by mouth 4 (four) times a day (Patient taking differently: Take 200 mg by mouth 2 (two) times a day), Disp: 360 capsule, Rfl: 0  •  glipiZIDE (GLUCOTROL XL) 10 mg 24 hr tablet, TAKE TWO TABLETS BY MOUTH EVERY DAY AT BEDTIME, Disp: 180 tablet, Rfl: 1  •  hydroxychloroquine (PLAQUENIL) 200 mg tablet, Take 1 tablet (200 mg total) by mouth 2 (two) times a day with meals, Disp: 180 tablet, Rfl: 1  •  metFORMIN (GLUCOPHAGE) 500 mg tablet, TAKE TWO TABLETS BY MOUTH TWICE A DAY WITH MEALS (GENERIC FOR GLUCOPHAGE), Disp: 360 tablet, Rfl: 3  •  olmesartan (BENICAR) 20 mg tablet, TAKE TWO TABLETS BY MOUTH EVERY DAY, Disp: 180 tablet, Rfl: 3  •  tamsulosin (FLOMAX) 0.4 mg, 0.4 mg daily, Disp: , Rfl:   •  ALPRAZolam (XANAX) 0.25 mg tablet, Take 1 tablet (0.25 mg total) by mouth daily at bedtime as needed for anxiety (Patient not taking: Reported on 10/3/2023), Disp: 14 tablet, Rfl: 0  •  Blood Glucose Monitoring Suppl (OneTouch Verio) w/Device KIT, Test blood sugar twice a day (Patient not taking: Reported on 10/17/2023), Disp: 1 kit, Rfl: 0  •  Continuous Blood Gluc Sensor (FreeStyle Ulysses 14 Day Sensor) MISC, Change sensor every 14 days, Disp: 2 each, Rfl: 12  •  ergocalciferol (ERGOCALCIFEROL) 1.25 MG (67847 UT) capsule, Take 1 capsule (50,000 Units total) by mouth once a week for 12 doses, Disp: 12 capsule, Rfl: 0  •  glucose blood (OneTouch Verio) test strip, Test blood sugar twice a day (Patient not taking: Reported on 10/17/2023), Disp: 200 each, Rfl: 3  •  mupirocin (BACTROBAN) 2 % ointment, Apply topically 2 (two) times a day (Patient not taking: Reported on 10/17/2023), Disp: 22 g, Rfl: 0  •  OneTouch Delica Lancets 01M MISC, Test blood sugar twice a day (Patient not taking: Reported on 10/17/2023), Disp: 200 each, Rfl: 3    ALLERGIES:  Allergies   Allergen Reactions   • Latex Rash     At dentist, lips swollen    • Codeine Nausea Only     Reaction Date: [id number];        REVIEW OF SYSTEMS:  Pertinent items are noted in HPI. A comprehensive review of systems was negative. LABS:  HgA1c:   Lab Results   Component Value Date    HGBA1C 6.0 (H) 07/18/2023     BMP:   Lab Results   Component Value Date    GLUCOSE 245 (H) 11/20/2017    CALCIUM 8.4 12/01/2021     11/20/2017    K 4.1 07/18/2023    CO2 21 07/18/2023     07/18/2023    BUN 26 07/18/2023    CREATININE 1.46 (H) 07/18/2023         _____________________________________________________  PHYSICAL EXAMINATION:  Vital signs: /89   Pulse 59   Ht 5' 9" (1.753 m)   Wt 108 kg (237 lb)   BMI 35.00 kg/m²   General: well developed and well nourished, alert, oriented times 3, and appears comfortable  Psychiatric: Normal  HEENT: Trachea Midline, No torticollis  Cardiovascular: No discernable arrhythmia  Pulmonary: No wheezing or stridor  Abdomen: No rebound or guarding  Extremities: No peripheral edema  Skin: No masses, erythema, lacerations, fluctation, ulcerations  Neurovascular: Decreased Sensation to  the Median Nerve, Motor Intact to the Median, Ulnar, Radial Nerve, and Pulses Intact    MUSCULOSKELETAL EXAMINATION:    Left Hand:\  3+ out of 5 APB strength, negative Durkan's, negative Tinel's, no thenar atrophy, loss of two-point discrimination in the median nerve distribution to 5 mm. Intact ulnar nerve distribution to the small and ring finger to 5 mm. Negative Tinel's at the elbow over the cubital tunnel. Negative Santosh sign    Right Hand:  3+ out of 5 APB strength, negative Durkan's, negative Tinel's, no thenar atrophy, loss of two-point discrimination in the median nerve distribution to 5 mm. Intact ulnar nerve distribution to the small and ring finger to 5 mm.   Negative Tinel's at the elbow over the cubital tunnel. Negative Santosh sign  _____________________________________________________  STUDIES REVIEWED:  Results were reviewed by Dr. Mitzi Hinds: EMG: Abnormal study. These electrodiagnostic findings are most consistent with bilateral, severe, median mononeuropathies across the wrists (carpal tunnel syndrome); left worse than the right. In addition, there is evidence to support an underlying axonal neuropathy, likely secondary to this patient's longstanding history of diabetes. There is no electrodiagnostic evidence to support a cervical radiculopathy in either upper extremity.       PROCEDURES PERFORMED:  No Procedures performed today    Scribe Attestation    I,:  Lex Wills am acting as a scribe while in the presence of the attending physician.:       I,:  Tiana Partida MD personally performed the services described in this documentation    as scribed in my presence.:

## 2023-10-17 NOTE — PSYCH
Behavioral Health Psychotherapy Progress Note    Psychotherapy Provided: Individual Psychotherapy     1. Adjustment disorder with mixed anxiety and depressed mood            Goals addressed in session: Goal 1     DATA: Basil Victor reports things have been going well since our last encounter. Basil Victor talked about his relationship with his wife, feelings of resentment and when and if he will forgive her, how he notices patterns of rumination, need to focus on caring for self and spending time alone, coping with and working on patience with his wife healing on her childhood difficulties, family dynamics past and current, and question of codependency between them. Basil Victor reports that his wife feels the need to be around him a lot and always inform him where she is. He stated that he thinks there is some codependency developing between them. He reports that he can leave her be and go spend time with her friends, but she has difficulty leaving him at times. Basil Victor has reported and continues to explain that he tries not to bring up the past with his wife, as he is aware she feels ashamed for what she did. However, Basil Victor mentioned to writer that when he and wife celebrated their anniversary, he questioned what her thought process was, when - last year and the year prior - she was providing gifts to him and having sex with him during their anniversary, while at the same time, having an affair. Writer pointed out the contradiction between what Basil Victor reports, in that, he says he does not bring up the affair with his wife, yet he did so during their anniversary. Basil Victor appreciated this being brought to his attention and reflected on it. He reports he is not sure why. Writer explained that Ramirez's feelings are valid, and writer is not excusing or supporting his wife's actions but is rather curious about his contradictory actions.      During this session, this clinician used the following therapeutic modalities: Engagement Strategies, Client-centered Therapy, and Supportive Psychotherapy    Substance Abuse was not addressed during this session. If the client is diagnosed with a co-occurring substance use disorder, please indicate any changes in the frequency or amount of use:   . Stage of change for addressing substance use diagnoses: No substance use/Not applicable    ASSESSMENT:  Sadi Atkins presents with a Euthymic/ normal mood. his affect is Normal range and intensity, which is congruent, with his mood and the content of the session. The client has made progress on their goals. Sadi Atkins presents with a none risk of suicide, none risk of self-harm, and none risk of harm to others. For any risk assessment that surpasses a "low" rating, a safety plan must be developed. A safety plan was indicated: no  If yes, describe in detail       PLAN: Between sessions, Sadi Atkins will work towards and focus on, more time with self and self care. At the next session, the therapist will use Engagement Strategies and Client-centered Therapy to address issue Sofy Barragan reported about codependency between him and his wife. .    Behavioral Health Treatment Plan and Discharge Planning: Sadi Atkins is aware of and agrees to continue to work on their treatment plan. They have identified and are working toward their discharge goals.  yes    Visit start and stop times:    10/17/23  Start Time: 0502  Stop Time: 8462  Total Visit Time: 48 minutes

## 2023-10-17 NOTE — PROGRESS NOTES
ASSESSMENT/PLAN:    Assessment:   Carpal Tunnel Syndrome  bilateral    Plan:   Open Carpal Tunnel Release left. We discussed that some of his symptoms may be coming from diabetic neuropathy. He has classic carpal tunnel symptoms including APB weakness and loss of two-point discrimination only in the median nerve distribution. He understands if he has a carpal tunnel release under local anesthesia it may provide him some relief however may alleviate all of his symptoms due to his diabetic neuropathy. He understands this and would like to move forward with a carpal tunnel release. Follow Up: After Surgery    To Do Next Visit:  Wound check    General Discussions:  Carpal Tunnel Syndrome: The anatomy and physiology of carpal tunnel syndrome was discussed with the patient today. Increase pressure localized under the transverse carpal ligament can cause pain, numbness, tingling, or dysesthesias within the median nerve distribution as well as feelings of fatigue, clumsiness, or awkwardness. These symptoms typically occur at night and worse in the morning upon waking. Eventually, untreated carpal tunnel syndrome can result in weakness and permanent loss of muscle within the thenar compartment of the hand. Treatment options were discussed with the patient. Conservative treatment includes nocturnal resting splints to keep the nerve in a neutral position, ergonomic changes within the work or home environment, activity modification, and tendon gliding exercises. Steroid injections within the carpal canal can help a majority of patients, however this is often self-limited in a majority of patients. Surgical intervention to divide the transverse carpal ligament typically results in a long-lasting relief of the patient's complaints, with the recurrence rate of less than 1%.      Operative Discussions:     Standard Consent: The risks and benefits of the procedure were explained to the patient, which include, but are not limited to: Bleeding, infection, recurrence, pain, scar, damage to tendons, damage to nerves, and damage to blood vessels, failure to give desired results and complications related to anesthesia. These risks, along with alternative conservative treatment options, and postoperative protocols were voiced back and understood by the patient. All questions were answered to the patient's satisfaction. The patient agrees to comply with a standard postoperative protocol, and is willing to proceed. Education was provided via written and auditory forms. There were no barriers to learning. Written handouts regarding wound care, incision and scar care, and general preoperative information was provided to the patient. Prior to surgery, the patient may be requested to stop all anti-inflammatory medications. Prophylactic aspirin, Plavix, and Coumadin may be allowed to be continued. Medications including vitamin E., ginkgo, and fish oil are requested to be stopped approximately one week prior to surgery. Hypertensive medications and beta blockers, if taken, should be continued. Diabetes: The risks of diabetes were discussed with the patient. These risks include increased risk of infection, delayed wound healing, increased swelling, increased stiffness, and increased scar formation. While these risks are generally increased in all diabetics, keeping one's blood sugar under strict control can help decrease problems after surgery. If blood sugar levels are too high, or hemoglobin A1c levels are too high, surgery may be delayed or canceled. _____________________________________________________  CHIEF COMPLAINT:  Chief Complaint   Patient presents with   • Left Hand - Numbness   • Right Hand - Numbness         SUBJECTIVE:  Shira Lewis is a 70 y.o. male who presents with Numbness to the bilateral hands. This started  2 year(s) ago as Insidious and has been getting progressively worse.   He has trouble with activities that require fine motor skills, such as buttoning a shirt and using a zipper. He states the left side is worse than the right. His pain is worse at night. He reports his symptoms are worse on the left than the right. The symptoms wake him up in the middle of the night and cause him to try and shake out his hands. Radiation: None  Previous Treatments: NSAIDs and activity modification with only partial relief  Associated symptoms: Pain  Moderate  Infrequent  Sharp  Handedness: right  Work status: employed as  for [employer], involves a lot of typing. PAST MEDICAL HISTORY:  Past Medical History:   Diagnosis Date   • Arthritis    • Carpal tunnel syndrome    • Chronic kidney disease     Stage 2 CKD   • Corneal abrasion     last assessed - [id number]   • Diabetes mellitus ([address])    • Gout    • Hiatal hernia    • Hyperlipidemia    • Hypertension    • Impaired fasting glucose     last assessed - [id number]   • Kidney stone     last stone 2009   • Lumbar spondylosis 10/31/2021   • Lumbar spondylosis    • Numbness of fingers of both hands    • PONV (postoperative nausea and vomiting)    • Psoriasis    • RA (rheumatoid arthritis) ([address])    • 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[address])    • Sinus infection     currently under tx PO ABT etc as of 04/17/19   • Viremia     Resolved - [id number]       PAST SURGICAL HISTORY:  Past Surgical History:   Procedure Laterality Date   • ANKLE SURGERY Right     tendon arthrotomy   • ARTHROTOMY ANKLE     • BACK SURGERY  2014    lumbar laminectomy   • COLONOSCOPY     • COLONOSCOPY N/A 4/19/2019    Procedure: COLONOSCOPY;  Surgeon: Roxann Bergman MD;  Location: [address] [place] [address] GI LAB;   Service: Gastroenterology   • FINGER SURGERY Bilateral     every finger contracture release over a 10 year period trigger finger   • INCISION TENDON SHEATH      of a finger   • KNEE SURGERY Right     x2 arthrotomy   • ME ARTHRP KNE CONDYLE&PLATU MEDIAL&LAT COMPARTMENTS Right 11/30/2021 Procedure: ARTHROPLASTY KNEE TOTAL W ROBOT;  Surgeon: Becky Lozoya DO;  Location: [facility];  Service: Orthopedics   • SHOULDER SURGERY Left     x1 staples, tendon repair secondary to many spontaneous dislocations       FAMILY HISTORY:  Family History   Problem Relation Age of Onset   • Hypertension Mother    • Arthritis Mother    • Hyperlipidemia Mother    • Lung cancer Mother    • Stroke Maternal Grandfather    • Diabetes Cousin    • COPD Father    • Depression Family    • Diabetes Family    • No Known Problems Sister    • No Known Problems Brother    • No Known Problems Maternal Aunt    • No Known Problems Maternal Uncle    • No Known Problems Paternal Aunt    • No Known Problems Paternal Uncle    • No Known Problems Maternal Grandmother    • No Known Problems Paternal Grandmother    • No Known Problems Paternal Grandfather        SOCIAL HISTORY:  Social History     Tobacco Use   • Smoking status: Former     Packs/day: 1.00     Types: Cigarettes     Start date: 0     Quit date:      Years since quittin.8   • Smokeless tobacco: Never   • Tobacco comments:     Former smoker   Vaping Use   • Vaping Use: Never used   Substance Use Topics   • Alcohol use: Yes     Comment: rare, once a month    • Drug use: No       MEDICATIONS:    Current Outpatient Medications:   •  amLODIPine (NORVASC) 10 mg tablet, TAKE ONE TABLET BY MOUTH EVERY DAY (Patient taking differently: Take 5 mg by mouth daily), Disp: 90 tablet, Rfl: 1  •  aspirin (ECOTRIN LOW STRENGTH) 81 mg EC tablet, Take 81 mg by mouth daily, Disp: , Rfl:   •  atorvastatin (LIPITOR) 20 mg tablet, TAKE ONE TABLET BY MOUTH EVERY DAY, Disp: 90 tablet, Rfl: 1  •  B-D ULTRAFINE III SHORT PEN 31G X 8 MM MISC, USE AS DIRECTED, Disp: 200 each, Rfl: 1  •  Basaglar KwikPen 100 units/mL SOPN, INJECT 10 UNITS UNDER THE SKIN DAILY, Disp: 15 mL, Rfl: 3  •  cholecalciferol (VITAMIN D3) 1,000 units tablet, Take 1,000 Units by mouth daily, Disp: , Rfl:   •  Continuous Blood Gluc  (FreeStyle Ulysses 14 Day [place]) SHREE, Use, Disp: , Rfl:   •  Empagliflozin (Jardiance) 10 MG TABS tablet, Take 1 tablet (10 mg total) by mouth every morning, Disp: 30 tablet, Rfl: 3  •  gabapentin (NEURONTIN) 100 mg capsule, Take 1 capsule (100 mg total) by mouth 4 (four) times a day (Patient taking differently: Take 200 mg by mouth 2 (two) times a day), Disp: 360 capsule, Rfl: 0  •  glipiZIDE (GLUCOTROL XL) 10 mg 24 hr tablet, TAKE TWO TABLETS BY MOUTH EVERY DAY AT BEDTIME, Disp: 180 tablet, Rfl: 1  •  hydroxychloroquine (PLAQUENIL) 200 mg tablet, Take 1 tablet (200 mg total) by mouth 2 (two) times a day with meals, Disp: 180 tablet, Rfl: 1  •  metFORMIN (GLUCOPHAGE) 500 mg tablet, TAKE TWO TABLETS BY MOUTH TWICE A DAY WITH MEALS (GENERIC FOR GLUCOPHAGE), Disp: 360 tablet, Rfl: 3  •  olmesartan (BENICAR) 20 mg tablet, TAKE TWO TABLETS BY MOUTH EVERY DAY, Disp: 180 tablet, Rfl: 3  •  tamsulosin (FLOMAX) 0.4 mg, 0.4 mg daily, Disp: , Rfl:   •  ALPRAZolam (XANAX) 0.25 mg tablet, Take 1 tablet (0.25 mg total) by mouth daily at bedtime as needed for anxiety (Patient not taking: Reported on 10/3/2023), Disp: 14 tablet, Rfl: 0  •  Blood Glucose Monitoring Suppl (OneTouch Verio) w/Device KIT, Test blood sugar twice a day (Patient not taking: Reported on 10/17/2023), Disp: 1 kit, Rfl: 0  •  Continuous Blood Gluc Sensor (FreeStyle Ulysses 14 Day Sensor) MISC, Change sensor every 14 days, Disp: 2 each, Rfl: 12  •  ergocalciferol (ERGOCALCIFEROL) 1.25 MG (46738 UT) capsule, Take 1 capsule (50,000 Units total) by mouth once a week for 12 doses, Disp: 12 capsule, Rfl: 0  •  glucose blood (OneTouch Verio) test strip, Test blood sugar twice a day (Patient not taking: Reported on 10/17/2023), Disp: 200 each, Rfl: 3  •  mupirocin (BACTROBAN) 2 % ointment, Apply topically 2 (two) times a day (Patient not taking: Reported on 10/17/2023), Disp: 22 g, Rfl: 0  •  OneTouch Delica Lancets 51M MISC, Test blood sugar twice a day (Patient not taking: Reported on 10/17/2023), Disp: 200 each, Rfl: 3    ALLERGIES:  Allergies   Allergen Reactions   • Latex Rash     At dentist, lips swollen    • Codeine Nausea Only     Reaction Date: [id number];        REVIEW OF SYSTEMS:  Pertinent items are noted in HPI. A comprehensive review of systems was negative. LABS:  HgA1c:   Lab Results   Component Value Date    HGBA1C 6.0 (H) 07/18/2023     BMP:   Lab Results   Component Value Date    GLUCOSE 245 (H) 11/20/2017    CALCIUM 8.4 12/01/2021     11/20/2017    K 4.1 07/18/2023    CO2 21 07/18/2023     07/18/2023    BUN 26 07/18/2023    CREATININE 1.46 (H) 07/18/2023         _____________________________________________________  PHYSICAL EXAMINATION:  Vital signs: /89   Pulse 59   Ht 5' 9" (1.753 m)   Wt 108 kg (237 lb)   BMI 35.00 kg/m²   General: well developed and well nourished, alert, oriented times 3, and appears comfortable  Psychiatric: Normal  HEENT: Trachea Midline, No torticollis  Cardiovascular: No discernable arrhythmia  Pulmonary: No wheezing or stridor  Abdomen: No rebound or guarding  Extremities: No peripheral edema  Skin: No masses, erythema, lacerations, fluctation, ulcerations  Neurovascular: Decreased Sensation to  the Median Nerve, Motor Intact to the Median, Ulnar, Radial Nerve, and Pulses Intact    MUSCULOSKELETAL EXAMINATION:    Left Hand:\  3+ out of 5 APB strength, negative Durkan's, negative Tinel's, no thenar atrophy, loss of two-point discrimination in the median nerve distribution to 5 mm. Intact ulnar nerve distribution to the small and ring finger to 5 mm. Negative Tinel's at the elbow over the cubital tunnel. Negative Santosh sign    Right Hand:  3+ out of 5 APB strength, negative Durkan's, negative Tinel's, no thenar atrophy, loss of two-point discrimination in the median nerve distribution to 5 mm. Intact ulnar nerve distribution to the small and ring finger to 5 mm.   Negative Tinel's at the elbow over the cubital tunnel. Negative Santosh sign  _____________________________________________________  STUDIES REVIEWED:  Results were reviewed by Dr. Singh Cyril: EMG: Abnormal study. These electrodiagnostic findings are most consistent with bilateral, severe, median mononeuropathies across the wrists (carpal tunnel syndrome); left worse than the right. In addition, there is evidence to support an underlying axonal neuropathy, likely secondary to this patient's longstanding history of diabetes. There is no electrodiagnostic evidence to support a cervical radiculopathy in either upper extremity.       PROCEDURES PERFORMED:  No Procedures performed today    Scribe Attestation    I,:  Oliver Gallegos am acting as a scribe while in the presence of the attending physician.:       I,:  Zain Moran MD personally performed the services described in this documentation    as scribed in my presence.:

## 2023-10-26 ENCOUNTER — HOSPITAL ENCOUNTER (OUTPATIENT)
Facility: HOSPITAL | Age: 63
Setting detail: OUTPATIENT SURGERY
Discharge: HOME | End: 2023-10-26
Attending: STUDENT IN AN ORGANIZED HEALTH CARE EDUCATION/TRAINING PROGRAM | Admitting: STUDENT IN AN ORGANIZED HEALTH CARE EDUCATION/TRAINING PROGRAM
Payer: COMMERCIAL

## 2023-10-26 VITALS
BODY MASS INDEX: 34.57 KG/M2 | SYSTOLIC BLOOD PRESSURE: 179 MMHG | TEMPERATURE: 96 F | DIASTOLIC BLOOD PRESSURE: 84 MMHG | OXYGEN SATURATION: 98 % | WEIGHT: 234.13 LBS | RESPIRATION RATE: 18 BRPM | HEART RATE: 65 BPM

## 2023-10-26 DIAGNOSIS — G56.03 BILATERAL CARPAL TUNNEL SYNDROME: Primary | ICD-10-CM

## 2023-10-26 PROCEDURE — 64721 CARPAL TUNNEL SURGERY: CPT | Performed by: STUDENT IN AN ORGANIZED HEALTH CARE EDUCATION/TRAINING PROGRAM

## 2023-10-26 PROCEDURE — 64721 CARPAL TUNNEL SURGERY: CPT | Performed by: PHYSICIAN ASSISTANT

## 2023-10-26 RX ORDER — NAPROXEN SODIUM 220 MG
220 TABLET ORAL 2 TIMES DAILY WITH MEALS
Qty: 60 TABLET | Refills: 0 | Status: SHIPPED | OUTPATIENT
Start: 2023-10-26

## 2023-10-26 RX ORDER — ACETAMINOPHEN 325 MG/1
975 TABLET ORAL EVERY 8 HOURS
Status: CANCELLED | OUTPATIENT
Start: 2023-10-26

## 2023-10-26 RX ORDER — MAGNESIUM HYDROXIDE 1200 MG/15ML
LIQUID ORAL AS NEEDED
Status: DISCONTINUED | OUTPATIENT
Start: 2023-10-26 | End: 2023-10-26 | Stop reason: HOSPADM

## 2023-10-26 RX ORDER — SENNOSIDES 8.6 MG
650 CAPSULE ORAL EVERY 8 HOURS PRN
Qty: 30 TABLET | Refills: 0 | Status: SHIPPED | OUTPATIENT
Start: 2023-10-26

## 2023-10-26 RX ORDER — ONDANSETRON 2 MG/ML
4 INJECTION INTRAMUSCULAR; INTRAVENOUS EVERY 6 HOURS PRN
Status: CANCELLED | OUTPATIENT
Start: 2023-10-26

## 2023-10-26 NOTE — PERIOPERATIVE NURSING NOTE
Received patient from OR via stretcher now awake and alert. Left hand dressing D+I Fingers on left hand warm and mobile with good capillary refill. Denies c/o pain or discomfort.

## 2023-10-26 NOTE — DISCHARGE INSTR - AVS FIRST PAGE
Post Operative Instructions    You have had surgery on your arm today, please read and follow the information below:  Elevate your hand above your elbow during the next 24-48 hours to help with swelling. Place your hand and arm over your head with motion at your shoulder three times a day. Do not apply any cream/ointment/oil to your incisions including antibiotics. Do not soak your hands in standing water (dishwater, tubs, Jacuzzi's, pools, etc.) until given permission (typically 2-3 weeks after injury)    Call the office if you notice any:  Increased numbness or tingling of your hand or fingers that is not relieved with elevation. Increasing pain that is not controlled with medication. Difficulty chewing, breathing, swallowing. Chest pains or shortness of breath. Fever over 101.4 degrees. Bandage: Remove bandage after 2 days. Motion: Move fingers into a fist 5 times a day, DO NOT move any splinted fingers. Weight bearing status: Avoid heavy lifting (>5 pounds) with the extremity that was operated on until follow up appointment. Normal activities of daily living are OK. Ice: Ice for 10 minutes every hour as needed for swelling x 24 hours. Sling: No sling necessary. Medications:   Naproxen 220 mg two times a day   Tylenol Extended Release 650 mg every 8 hours    After surgery, we would like you to take naproxen 220 mg one tablet by mouth every 12 hours with food  AND Tylenol 650 mg one tablet by mouth every 8 hours  (at breakfast, lunch and dinner) for 3-5 days after your surgery. Please take these medication EVERYDAY after surgery for 3-5 days, and not just as needed. You can take these medications at the same time. Follow-up Appointment: 7-10 days. Please call the office if you have any questions or concerns regarding your post-operative care.

## 2023-10-26 NOTE — OP NOTE
OPERATIVE REPORT  PATIENT NAME: Cait Chris    :  1960  MRN: [id number]  Pt Location: [facility] OR ROOM 01    SURGERY DATE: 10/26/2023    Surgeon(s) and Role:     * Warner Coleman MD - Primary     * Stoney Rodrigues PA-C - Assisting    Preop Diagnosis:  Bilateral carpal tunnel syndrome [G56.03]    Post-Op Diagnosis Codes:     * Bilateral carpal tunnel syndrome [G56.03]    Procedure(s):  Left - RELEASE CARPAL TUNNEL    Specimen(s):  * No specimens in log *    Estimated Blood Loss:   Minimal    Drains:  * No LDAs found *    Anesthesia Type:   Local    Operative Indications:  Bilateral carpal tunnel syndrome [G56.03]      Operative Findings:  Left Carpal tunnel release     Complications:   None    Procedure and Technique:  Patient is a 70 y.o. male evaluated in clinic with symptoms and clinical exam consistent with Left carpal tunnel syndrome  We discussed treatment options with patient including conservative management and surgical management. Discussed nonoperative management with splinting, injections, and observation. We discussed risk of surgery including pain, bleeding, stiffness, damage to neurovascular structures, infection, need for therapy. Patient had tried night time splinting but symptoms progressed. Patient elected for surgical management. Informed consent was obtained. Prior to prep, local was given: 16cc, 50/50 mixture of 1% lidocaine and 25% marcaine with 1: 200,000 epinephrine was given for a carpal tunnel nerve block. The operative extremity was prepped and draped in a standard fashion. A timeout was performed prior to incision identifying the name and laterality of the procedure. After the patient, site, and procedure were once again identified, attention was turned towards the left carpal tunnel. An anterior approach to the carpal tunnel was undertaken. A  3cm incision was made in line with the fourth digit, proximal to Moore's line.   With care taken to identify and protect superficial neurovascular structures, the skin, subcutaneous fat, and palmar fascia were divided in line with the incision. Transverse carpal ligament was identified and incised using a 15 blade. A freer elevator was inserted superficial to the median nerve for protection but deep to the transverse carpal ligament along the ulnar aspect of the carpal tunnel. Under direct visualization, the transverse carpal ligament was divided from proximal to distal.  We confirmed complete release under direct visualization and released distally to the level of the palmar fat. Care was taken to protect the recurrent branch of the median nerve. We were able to identify the median nerve which was intact in its entirety as well as the flexor tendons within the canal.  We then divided the antebrachial fascia and a proximal and ulnar based direction for approximately 2 cm. Hemostasis achieved. We closed with skin with 4-0 nylon suture. A sterile dressing was applied. Postoperative Plan:  Patient may range digits as tolerated. Dressing taken down in the next 48-72 hours. Patient will wash hand normally. We will limit weightbearing to a couple of pounds for the first 2 weeks. We will see patient back in 7-10 days. I was present for the entire procedure.     Patient Disposition:  PACU         SIGNATURE: Zain Moran MD  DATE: October 26, 2023  TIME: 6:38 PM

## 2023-10-26 NOTE — PERIOPERATIVE NURSING NOTE
AVS summary reviewed with patient and family. Both verbalize understanding of all discharge instructions. Denies c/o pain or discomfort.

## 2023-10-26 NOTE — INTERVAL H&P NOTE
H&P reviewed. After examining the patient I find no changes in the patients condition since the H&P had been written.     Vitals:    10/26/23 1107   BP: 167/80   Pulse: 66   Resp: 18   Temp: (!) 97.2 °F (36.2 °C)   SpO2: 98%

## 2023-10-31 ENCOUNTER — SOCIAL WORK (OUTPATIENT)
Dept: BEHAVIORAL/MENTAL HEALTH CLINIC | Facility: CLINIC | Age: 63
End: 2023-10-31
Payer: COMMERCIAL

## 2023-10-31 DIAGNOSIS — F43.23 ADJUSTMENT DISORDER WITH MIXED ANXIETY AND DEPRESSED MOOD: Primary | ICD-10-CM

## 2023-10-31 PROCEDURE — 90834 PSYTX W PT 45 MINUTES: CPT
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
## 2023-11-07 ENCOUNTER — OFFICE VISIT (OUTPATIENT)
Dept: OBGYN CLINIC | Facility: CLINIC | Age: 63
End: 2023-11-07

## 2023-11-07 VITALS
DIASTOLIC BLOOD PRESSURE: 91 MMHG | HEART RATE: 61 BPM | HEIGHT: 69 IN | BODY MASS INDEX: 34.66 KG/M2 | SYSTOLIC BLOOD PRESSURE: 173 MMHG | WEIGHT: 234 LBS

## 2023-11-07 DIAGNOSIS — G56.01 CARPAL TUNNEL SYNDROME ON RIGHT: ICD-10-CM

## 2023-11-07 DIAGNOSIS — Z98.890 S/P CARPAL TUNNEL RELEASE: Primary | ICD-10-CM

## 2023-11-07 PROCEDURE — 99024 POSTOP FOLLOW-UP VISIT: CPT | Performed by: STUDENT IN AN ORGANIZED HEALTH CARE EDUCATION/TRAINING PROGRAM

## 2023-11-07 RX ORDER — ONDANSETRON 2 MG/ML
4 INJECTION INTRAMUSCULAR; INTRAVENOUS EVERY 6 HOURS PRN
OUTPATIENT
Start: 2023-11-07

## 2023-11-07 RX ORDER — ACETAMINOPHEN 325 MG/1
975 TABLET ORAL EVERY 8 HOURS
OUTPATIENT
Start: 2023-11-07

## 2023-11-07 RX ORDER — CHLORHEXIDINE GLUCONATE ORAL RINSE 1.2 MG/ML
15 SOLUTION DENTAL ONCE
OUTPATIENT
Start: 2023-11-07 | End: 2023-11-07

## 2023-11-07 NOTE — H&P
ASSESSMENT/PLAN:    Assessment:   POD #12 s/p left Carpal Tunnel Release     2. Right Carpal Tunnel Syndrome    Plan:   Continue OTC medications and ice as needed. We discussed continuing no pushing, pulling, or lifting for 6 weeks following surgery. Regarding his right CTS, we reviewed the risks, benefits, and alternatives to this procedure. The patient elected to proceed and consent forms were completed. We discussed the expected timeline for recovery including the timeline for return to work and sporting activities. The patient expressed good understanding and elected to proceed. Follow Up:  4 weeks for next post-op visit for left Carpal Tunnel Release    Plan to follow up 10-14 days following right Carpal Tunnel Release for suture removal    To Do Next Visit:  For right Carpal Tunnel Release, sutures out    General Discussions:     Carpal Tunnel Syndrome: The anatomy and physiology of carpal tunnel syndrome was discussed with the patient today. Increase pressure localized under the transverse carpal ligament can cause pain, numbness, tingling, or dysesthesias within the median nerve distribution as well as feelings of fatigue, clumsiness, or awkwardness. These symptoms typically occur at night and worse in the morning upon waking. Eventually, untreated carpal tunnel syndrome can result in weakness and permanent loss of muscle within the thenar compartment of the hand. Treatment options were discussed with the patient. Conservative treatment includes nocturnal resting splints to keep the nerve in a neutral position, ergonomic changes within the work or home environment, activity modification, and tendon gliding exercises. Steroid injections within the carpal canal can help a majority of patients, however this is often self-limited in a majority of patients.   Surgical intervention to divide the transverse carpal ligament typically results in a long-lasting relief of the patient's complaints, with the recurrence rate of less than 1%. For the left Carpal Tunnel Release, The patient can begin using unscented lotion to help with any desensitization. _____________________________________________________  CHIEF COMPLAINT:  Chief Complaint   Patient presents with   • Left Wrist - Post-op         SUBJECTIVE:  Xochitl Moore is a 70 y.o. male who presents POD #12 s/p left Carpal Tunnel Release. Today, he notes some incisional soreness and numbness/tingling in the median nerve, but this has largely improved since surgery. The patient states he has been lightly typing and completing yard work without any pain or difficulties. He also notes he is able to sleep throughout the night now that his pain has significantly improved. The patient is very pleased with his progress so far and would like to discuss a Carpal Tunnel Release on the right hand. He does note constant numbness/tingling in the median nerve distribution on this side. He is hoping to be scheduled for surgery around the [place]-New Years timeframe. Work status: no lifting, pushing, pulling for 6 weeks following surgery. The patient can type as tolerated.      PAST MEDICAL HISTORY:  Past Medical History:   Diagnosis Date   • Arthritis    • Carpal tunnel syndrome    • Chronic kidney disease     Stage 2 CKD   • Corneal abrasion     last assessed - [id number]   • Diabetes mellitus ([address])    • Gout    • Hiatal hernia    • Hyperlipidemia    • Hypertension    • Impaired fasting glucose     last assessed - [id number]   • Kidney stone     last stone 2009   • Lumbar spondylosis 10/31/2021   • Lumbar spondylosis    • Numbness of fingers of both hands    • PONV (postoperative nausea and vomiting)    • Psoriasis    • RA (rheumatoid arthritis) ([address])    • 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[address])    • Sinus infection     currently under tx PO ABT etc as of 04/17/19   • Viremia     Resolved - [id number]       PAST SURGICAL HISTORY:  Past Surgical History:   Procedure Laterality Date   • ANKLE SURGERY Right     tendon arthrotomy   • ARTHROTOMY ANKLE     • BACK SURGERY  2014    lumbar laminectomy   • CARPAL TUNNEL RELEASE Left    • COLONOSCOPY     • COLONOSCOPY N/A 2019    Procedure: COLONOSCOPY;  Surgeon: Indigo Campos MD;  Location: [address] [place] [address] [address] [facility];   Service: Gastroenterology   • FINGER SURGERY Bilateral     every finger contracture release over a 10 year period trigger finger   • INCISION TENDON SHEATH      of a finger   • KNEE SURGERY Right     x2 arthrotomy   • CT ARTHRP KNE CONDYLE&PLATU MEDIAL&LAT COMPARTMENTS Right 2021    Procedure: ARTHROPLASTY KNEE TOTAL W ROBOT;  Surgeon: Tasha Galloway DO;  Location: [facility];  Service: Orthopedics   • CT NEUROPLASTY &/TRANSPOS MEDIAN NRV CARPAL Farrel Riggs Left 10/26/2023    Procedure: RELEASE CARPAL TUNNEL;  Surgeon: Maikol Kelly MD;  Location: [facility];  Service: Orthopedics   • SHOULDER SURGERY Left     x1 staples, tendon repair secondary to many spontaneous dislocations       FAMILY HISTORY:  Family History   Problem Relation Age of Onset   • Hypertension Mother    • Arthritis Mother    • Hyperlipidemia Mother    • Lung cancer Mother    • Stroke Maternal Grandfather    • Diabetes Cousin    • COPD Father    • Depression Family    • Diabetes Family    • No Known Problems Sister    • No Known Problems Brother    • No Known Problems Maternal Aunt    • No Known Problems Maternal Uncle    • No Known Problems Paternal Aunt    • No Known Problems Paternal Uncle    • No Known Problems Maternal Grandmother    • No Known Problems Paternal Grandmother    • No Known Problems Paternal Grandfather        SOCIAL HISTORY:  Social History     Tobacco Use   • Smoking status: Former     Packs/day: 1.00     Types: Cigarettes     Start date: 0     Quit date: 2000     Years since quittin.8   • Smokeless tobacco: Never   • Tobacco comments:     Former smoker   Vaping Use   • Vaping Use: Never used   Substance Use Topics   • Alcohol use: Yes     Comment: rare, once a month    • Drug use: No       MEDICATIONS:    Current Outpatient Medications:   •  acetaminophen (TYLENOL) 650 mg CR tablet, Take 1 tablet (650 mg total) by mouth every 8 (eight) hours as needed for mild pain, Disp: 30 tablet, Rfl: 0  •  aspirin (ECOTRIN LOW STRENGTH) 81 mg EC tablet, Take 81 mg by mouth daily, Disp: , Rfl:   •  atorvastatin (LIPITOR) 20 mg tablet, TAKE ONE TABLET BY MOUTH EVERY DAY, Disp: 90 tablet, Rfl: 1  •  B-D ULTRAFINE III SHORT PEN 31G X 8 MM MISC, USE AS DIRECTED, Disp: 200 each, Rfl: 1  •  Basaglar KwikPen 100 units/mL SOPN, INJECT 10 UNITS UNDER THE SKIN DAILY, Disp: 15 mL, Rfl: 3  •  cholecalciferol (VITAMIN D3) 1,000 units tablet, Take 1,000 Units by mouth daily, Disp: , Rfl:   •  Continuous Blood Gluc  (FreeStyle Ulysses 14 Day [place]) SHREE, Use, Disp: , Rfl:   •  Continuous Blood Gluc Sensor (FreeStyle Ulysses 14 Day Sensor) MISC, Change sensor every 14 days, Disp: 2 each, Rfl: 12  •  Empagliflozin (Jardiance) 10 MG TABS tablet, Take 1 tablet (10 mg total) by mouth every morning, Disp: 30 tablet, Rfl: 3  •  gabapentin (NEURONTIN) 100 mg capsule, TAKE ONE CAPSULE BY MOUTH FOUR TIMES A DAY, Disp: 360 capsule, Rfl: 0  •  glipiZIDE (GLUCOTROL XL) 10 mg 24 hr tablet, TAKE TWO TABLETS BY MOUTH EVERY DAY AT BEDTIME, Disp: 180 tablet, Rfl: 1  •  hydroxychloroquine (PLAQUENIL) 200 mg tablet, Take 1 tablet (200 mg total) by mouth 2 (two) times a day with meals, Disp: 180 tablet, Rfl: 1  •  metFORMIN (GLUCOPHAGE) 500 mg tablet, TAKE TWO TABLETS BY MOUTH TWICE A DAY WITH MEALS (GENERIC FOR GLUCOPHAGE), Disp: 360 tablet, Rfl: 3  •  naproxen sodium (ALEVE) 220 MG tablet, Take 1 tablet (220 mg total) by mouth 2 (two) times a day with meals, Disp: 60 tablet, Rfl: 0  •  olmesartan (BENICAR) 20 mg tablet, TAKE TWO TABLETS BY MOUTH EVERY DAY, Disp: 180 tablet, Rfl: 3  •  tamsulosin (FLOMAX) 0.4 mg, 0.4 mg daily, Disp: , Rfl:   •  ALPRAZolam Alexia Bourne) 0.25 mg tablet, Take 1 tablet (0.25 mg total) by mouth daily at bedtime as needed for anxiety (Patient not taking: Reported on 10/3/2023), Disp: 14 tablet, Rfl: 0  •  amLODIPine (NORVASC) 10 mg tablet, TAKE ONE TABLET BY MOUTH EVERY DAY (Patient not taking: Reported on 11/7/2023), Disp: 90 tablet, Rfl: 1  •  Blood Glucose Monitoring Suppl (OneTouch Verio) w/Device KIT, Test blood sugar twice a day (Patient not taking: Reported on 10/17/2023), Disp: 1 kit, Rfl: 0  •  ergocalciferol (ERGOCALCIFEROL) 1.25 MG (17660 UT) capsule, Take 1 capsule (50,000 Units total) by mouth once a week for 12 doses, Disp: 12 capsule, Rfl: 0  •  glucose blood (OneTouch Verio) test strip, Test blood sugar twice a day (Patient not taking: Reported on 10/17/2023), Disp: 200 each, Rfl: 3  •  mupirocin (BACTROBAN) 2 % ointment, Apply topically 2 (two) times a day (Patient not taking: Reported on 10/17/2023), Disp: 22 g, Rfl: 0  •  OneTouch Delica Lancets 95W MISC, Test blood sugar twice a day (Patient not taking: Reported on 10/17/2023), Disp: 200 each, Rfl: 3    ALLERGIES:  Allergies   Allergen Reactions   • Latex Rash     At dentist, lips swollen    • Codeine Nausea Only     Reaction Date: [id number];        REVIEW OF SYSTEMS:  Pertinent items are noted in HPI. A comprehensive review of systems was negative.     LABS:  HgA1c:   Lab Results   Component Value Date    HGBA1C 6.0 (H) 07/18/2023     BMP:   Lab Results   Component Value Date    GLUCOSE 245 (H) 11/20/2017    CALCIUM 8.4 12/01/2021     11/20/2017    K 4.1 07/18/2023    CO2 21 07/18/2023     07/18/2023    BUN 26 07/18/2023    CREATININE 1.46 (H) 07/18/2023           _____________________________________________________  PHYSICAL EXAMINATION:  Vital signs: BP (!) 173/91   Pulse 61   Ht 5' 9" (1.753 m)   Wt 106 kg (234 lb)   BMI 34.56 kg/m²   General: well developed and well nourished, alert, oriented times 3, and appears comfortable  Psychiatric: Normal  HEENT: Trachea Midline, No torticollis  Cardiovascular: No discernable arrhythmia  Pulmonary: No wheezing or stridor  Abdomen: No rebound or guarding  Extremities: No peripheral edema  Skin: No masses, erythema, lacerations, fluctation, ulcerations. Positive for surgical incision  Neurovascular: Sensation Intact to the Median, Ulnar, Radial Nerve, Motor Intact to the Median, Ulnar, Radial Nerve, and Pulses Intact    MUSCULOSKELETAL EXAMINATION:  LEFT SIDE:  Carpal tunnel:  incision clean, dry, intact. Sutures removed today. Swelling noted to the thenar eminence and thumb. , 5/5 FPL, 4/5 APB, sensation intact and similar in the ulnar and median nerve distrubution     RIGHT SIDE:  Positive Tinel  5/5 APL  3/5 EPB  Mild thenar atrophy  Negative Derkin's Compression  No 2-point discrimination in the median nerve distribution. 2-point discrimination appreciated in the ulnar nerve distribution.     _____________________________________________________  STUDIES REVIEWED:  No Studies to review      PROCEDURES PERFORMED:  Procedures  No Procedures performed today

## 2023-11-07 NOTE — PROGRESS NOTES
ASSESSMENT/PLAN:    Assessment:   POD #12 s/p left Carpal Tunnel Release     2. Right Carpal Tunnel Syndrome    Plan:   Continue OTC medications and ice as needed. We discussed continuing no pushing, pulling, or lifting for 6 weeks following surgery. Regarding his right CTS, we reviewed the risks, benefits, and alternatives to this procedure. The patient elected to proceed and consent forms were completed. We discussed the expected timeline for recovery including the timeline for return to work and sporting activities. The patient expressed good understanding and elected to proceed. Follow Up:  4 weeks for next post-op visit for left Carpal Tunnel Release    Plan to follow up 10-14 days following right Carpal Tunnel Release for suture removal    To Do Next Visit:  For right Carpal Tunnel Release, sutures out    General Discussions:     Carpal Tunnel Syndrome: The anatomy and physiology of carpal tunnel syndrome was discussed with the patient today. Increase pressure localized under the transverse carpal ligament can cause pain, numbness, tingling, or dysesthesias within the median nerve distribution as well as feelings of fatigue, clumsiness, or awkwardness. These symptoms typically occur at night and worse in the morning upon waking. Eventually, untreated carpal tunnel syndrome can result in weakness and permanent loss of muscle within the thenar compartment of the hand. Treatment options were discussed with the patient. Conservative treatment includes nocturnal resting splints to keep the nerve in a neutral position, ergonomic changes within the work or home environment, activity modification, and tendon gliding exercises. Steroid injections within the carpal canal can help a majority of patients, however this is often self-limited in a majority of patients.   Surgical intervention to divide the transverse carpal ligament typically results in a long-lasting relief of the patient's complaints, with the recurrence rate of less than 1%. For the left Carpal Tunnel Release, The patient can begin using unscented lotion to help with any desensitization. _____________________________________________________  CHIEF COMPLAINT:  Chief Complaint   Patient presents with   • Left Wrist - Post-op         SUBJECTIVE:  Natividad Franks is a 70 y.o. male who presents POD #12 s/p left Carpal Tunnel Release. Today, he notes some incisional soreness and numbness/tingling in the median nerve, but this has largely improved since surgery. The patient states he has been lightly typing and completing yard work without any pain or difficulties. He also notes he is able to sleep throughout the night now that his pain has significantly improved. The patient is very pleased with his progress so far and would like to discuss a Carpal Tunnel Release on the right hand. He does note constant numbness/tingling in the median nerve distribution on this side. He is hoping to be scheduled for surgery around the [place]-New Years timeframe. Work status: no lifting, pushing, pulling for 6 weeks following surgery. The patient can type as tolerated.      PAST MEDICAL HISTORY:  Past Medical History:   Diagnosis Date   • Arthritis    • Carpal tunnel syndrome    • Chronic kidney disease     Stage 2 CKD   • Corneal abrasion     last assessed - [id number]   • Diabetes mellitus ([address])    • Gout    • Hiatal hernia    • Hyperlipidemia    • Hypertension    • Impaired fasting glucose     last assessed - [id number]   • Kidney stone     last stone 2009   • Lumbar spondylosis 10/31/2021   • Lumbar spondylosis    • Numbness of fingers of both hands    • PONV (postoperative nausea and vomiting)    • Psoriasis    • RA (rheumatoid arthritis) ([address])    • 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[address])    • Sinus infection     currently under tx PO ABT etc as of 04/17/19   • Viremia     Resolved - [id number]       PAST SURGICAL HISTORY:  Past Surgical History:   Procedure Laterality Date   • ANKLE SURGERY Right     tendon arthrotomy   • ARTHROTOMY ANKLE     • BACK SURGERY  2014    lumbar laminectomy   • CARPAL TUNNEL RELEASE Left    • COLONOSCOPY     • COLONOSCOPY N/A 2019    Procedure: COLONOSCOPY;  Surgeon: Mary Ramos MD;  Location: [address] [place] [address] [address] [facility] LAB;   Service: Gastroenterology   • FINGER SURGERY Bilateral     every finger contracture release over a 10 year period trigger finger   • INCISION TENDON SHEATH      of a finger   • KNEE SURGERY Right     x2 arthrotomy   • NC ARTHRP KNE CONDYLE&PLATU MEDIAL&LAT COMPARTMENTS Right 2021    Procedure: ARTHROPLASTY KNEE TOTAL W ROBOT;  Surgeon: Carri Ayon DO;  Location: [facility];  Service: Orthopedics   • [place] NEUROPLASTY &/TRANSPOS MEDIAN NRV CARPAL [place] Left 10/26/2023    Procedure: RELEASE CARPAL TUNNEL;  Surgeon: Demetria Lombard, MD;  Location: [facility];  Service: Orthopedics   • SHOULDER SURGERY Left     x1 staples, tendon repair secondary to many spontaneous dislocations       FAMILY HISTORY:  Family History   Problem Relation Age of Onset   • Hypertension Mother    • Arthritis Mother    • Hyperlipidemia Mother    • Lung cancer Mother    • Stroke Maternal Grandfather    • Diabetes Cousin    • COPD Father    • Depression Family    • Diabetes Family    • No Known Problems Sister    • No Known Problems Brother    • No Known Problems Maternal Aunt    • No Known Problems Maternal Uncle    • No Known Problems Paternal Aunt    • No Known Problems Paternal Uncle    • No Known Problems Maternal Grandmother    • No Known Problems Paternal Grandmother    • No Known Problems Paternal Grandfather        SOCIAL HISTORY:  Social History     Tobacco Use   • Smoking status: Former     Packs/day: 1.00     Types: Cigarettes     Start date: 0     Quit date:      Years since quittin.8   • Smokeless tobacco: Never   • Tobacco comments:     Former smoker   Vaping Use   • Vaping Use: Never used   Substance Use Topics   • Alcohol use: Yes     Comment: rare, once a month    • Drug use: No       MEDICATIONS:    Current Outpatient Medications:   •  acetaminophen (TYLENOL) 650 mg CR tablet, Take 1 tablet (650 mg total) by mouth every 8 (eight) hours as needed for mild pain, Disp: 30 tablet, Rfl: 0  •  aspirin (ECOTRIN LOW STRENGTH) 81 mg EC tablet, Take 81 mg by mouth daily, Disp: , Rfl:   •  atorvastatin (LIPITOR) 20 mg tablet, TAKE ONE TABLET BY MOUTH EVERY DAY, Disp: 90 tablet, Rfl: 1  •  B-D ULTRAFINE III SHORT PEN 31G X 8 MM MISC, USE AS DIRECTED, Disp: 200 each, Rfl: 1  •  Basaglar KwikPen 100 units/mL SOPN, INJECT 10 UNITS UNDER THE SKIN DAILY, Disp: 15 mL, Rfl: 3  •  cholecalciferol (VITAMIN D3) 1,000 units tablet, Take 1,000 Units by mouth daily, Disp: , Rfl:   •  Continuous Blood Gluc  (FreeStyle Ulysses 14 Day [place]) SHREE, Use, Disp: , Rfl:   •  Continuous Blood Gluc Sensor (FreeStyle Ulysses 14 Day Sensor) MISC, Change sensor every 14 days, Disp: 2 each, Rfl: 12  •  Empagliflozin (Jardiance) 10 MG TABS tablet, Take 1 tablet (10 mg total) by mouth every morning, Disp: 30 tablet, Rfl: 3  •  gabapentin (NEURONTIN) 100 mg capsule, TAKE ONE CAPSULE BY MOUTH FOUR TIMES A DAY, Disp: 360 capsule, Rfl: 0  •  glipiZIDE (GLUCOTROL XL) 10 mg 24 hr tablet, TAKE TWO TABLETS BY MOUTH EVERY DAY AT BEDTIME, Disp: 180 tablet, Rfl: 1  •  hydroxychloroquine (PLAQUENIL) 200 mg tablet, Take 1 tablet (200 mg total) by mouth 2 (two) times a day with meals, Disp: 180 tablet, Rfl: 1  •  metFORMIN (GLUCOPHAGE) 500 mg tablet, TAKE TWO TABLETS BY MOUTH TWICE A DAY WITH MEALS (GENERIC FOR GLUCOPHAGE), Disp: 360 tablet, Rfl: 3  •  naproxen sodium (ALEVE) 220 MG tablet, Take 1 tablet (220 mg total) by mouth 2 (two) times a day with meals, Disp: 60 tablet, Rfl: 0  •  olmesartan (BENICAR) 20 mg tablet, TAKE TWO TABLETS BY MOUTH EVERY DAY, Disp: 180 tablet, Rfl: 3  •  tamsulosin (FLOMAX) 0.4 mg, 0.4 mg daily, Disp: , Rfl:   •  ALPRAZolam Teddy Ceron) 0.25 mg tablet, Take 1 tablet (0.25 mg total) by mouth daily at bedtime as needed for anxiety (Patient not taking: Reported on 10/3/2023), Disp: 14 tablet, Rfl: 0  •  amLODIPine (NORVASC) 10 mg tablet, TAKE ONE TABLET BY MOUTH EVERY DAY (Patient not taking: Reported on 11/7/2023), Disp: 90 tablet, Rfl: 1  •  Blood Glucose Monitoring Suppl (OneTouch Verio) w/Device KIT, Test blood sugar twice a day (Patient not taking: Reported on 10/17/2023), Disp: 1 kit, Rfl: 0  •  ergocalciferol (ERGOCALCIFEROL) 1.25 MG (39185 UT) capsule, Take 1 capsule (50,000 Units total) by mouth once a week for 12 doses, Disp: 12 capsule, Rfl: 0  •  glucose blood (OneTouch Verio) test strip, Test blood sugar twice a day (Patient not taking: Reported on 10/17/2023), Disp: 200 each, Rfl: 3  •  mupirocin (BACTROBAN) 2 % ointment, Apply topically 2 (two) times a day (Patient not taking: Reported on 10/17/2023), Disp: 22 g, Rfl: 0  •  OneTouch Delica Lancets 55U MISC, Test blood sugar twice a day (Patient not taking: Reported on 10/17/2023), Disp: 200 each, Rfl: 3    ALLERGIES:  Allergies   Allergen Reactions   • Latex Rash     At dentist, lips swollen    • Codeine Nausea Only     Reaction Date: [id number];        REVIEW OF SYSTEMS:  Pertinent items are noted in HPI. A comprehensive review of systems was negative.     LABS:  HgA1c:   Lab Results   Component Value Date    HGBA1C 6.0 (H) 07/18/2023     BMP:   Lab Results   Component Value Date    GLUCOSE 245 (H) 11/20/2017    CALCIUM 8.4 12/01/2021     11/20/2017    K 4.1 07/18/2023    CO2 21 07/18/2023     07/18/2023    BUN 26 07/18/2023    CREATININE 1.46 (H) 07/18/2023           _____________________________________________________  PHYSICAL EXAMINATION:  Vital signs: BP (!) 173/91   Pulse 61   Ht 5' 9" (1.753 m)   Wt 106 kg (234 lb)   BMI 34.56 kg/m²   General: well developed and well nourished, alert, oriented times 3, and appears comfortable  Psychiatric: Normal  HEENT: Trachea Midline, No torticollis  Cardiovascular: No discernable arrhythmia  Pulmonary: No wheezing or stridor  Abdomen: No rebound or guarding  Extremities: No peripheral edema  Skin: No masses, erythema, lacerations, fluctation, ulcerations. Positive for surgical incision  Neurovascular: Sensation Intact to the Median, Ulnar, Radial Nerve, Motor Intact to the Median, Ulnar, Radial Nerve, and Pulses Intact    MUSCULOSKELETAL EXAMINATION:  LEFT SIDE:  Carpal tunnel:  incision clean, dry, intact. Sutures removed today. Swelling noted to the thenar eminence and thumb. , 5/5 FPL, 4/5 APB, sensation intact and similar in the ulnar and median nerve distrubution     RIGHT SIDE:  Positive Tinel  5/5 APL  3/5 EPB  Mild thenar atrophy  Negative Derkin's Compression  No 2-point discrimination in the median nerve distribution. 2-point discrimination appreciated in the ulnar nerve distribution.     _____________________________________________________  STUDIES REVIEWED:  No Studies to review      PROCEDURES PERFORMED:  Procedures  No Procedures performed today

## 2023-11-27 ENCOUNTER — TELEPHONE (OUTPATIENT)
Dept: PSYCHIATRY | Facility: CLINIC | Age: 63
End: 2023-11-27

## 2023-11-27 NOTE — TELEPHONE ENCOUNTER
Received call from patient to reschedule appointment with Brittanie SALAZAR on 11/28/2023 to 5 pm from 6 pm.
none

## 2023-11-28 ENCOUNTER — SOCIAL WORK (OUTPATIENT)
Dept: BEHAVIORAL/MENTAL HEALTH CLINIC | Facility: CLINIC | Age: 63
End: 2023-11-28
Payer: COMMERCIAL

## 2023-11-28 DIAGNOSIS — F43.23 ADJUSTMENT DISORDER WITH MIXED ANXIETY AND DEPRESSED MOOD: Primary | ICD-10-CM

## 2023-11-28 PROCEDURE — 90834 PSYTX W PT 45 MINUTES: CPT

## 2023-11-28 NOTE — PSYCH
Behavioral Health Psychotherapy Progress Note    Psychotherapy Provided: Individual Psychotherapy     1. Adjustment disorder with mixed anxiety and depressed mood            Goals addressed in session: Goal 1     DATA: Monique Dumont spoke about how things have been going in his marriage. He reported that he feels he was disrespected as his wife had an affair, and he wants respect and feels he deserves that. He has communicated this with his wife, and said she understands. He tells her he appreciates the respect. She tells him where she is, as he still has doubts and difficulty fully trusting her. However, he reported he wishes she can be more independent, spend time with friends more than him. Tuyet Dye his wife works for her brother and her brother does not treat her kindly, but "like garbage". This was initially one of the triggers that led her to drink. Monique Dumont reports that focusing on himself, is hard, as he worries about his wife. She is going to therapy and tells him it is good and she finds it helpful. Writer spoke with Monique Dumont about importance of focusing on self, and what is preventing this? Writer brought up 'detachment'. He said he is trying but it is hard, and wonders how he can work on this. WE talked about exercises and reading material on "letting go" Writer will provide resources to Monique Dumont via email. Monique Dumont added that he has felt the anger, the grief, over the affair, how it hurt him. He said he had a meltdown, he would cry in the car, he felt broken. At this time he said he feels '100 times better'. His wife was there for him when he was feeling broken and upset. We discussed grief and stages. Writer normalized process of grief, explained no order or rules, just can feel the emotions. They may come and go, and come back, and that is okay. We talked about emotional vulnerability, letting emotional guard down. Monique Dumont feels he has been vulnerable.  He reported that he felt a sense of responsibility for the affair, that his wife needed him and he was not there. He now makes an effort to be there, and lets her know. If she does not want to talk about things, he said he does not push her. He said it is hard to forgive both her and the man she had the affair with. Rehana Morgan and wife decided that at this time they do not want to pursue marriage counseling. He does not want to push her to do more therapy. Writer encouraged Rehana Morgan to keep focus on self moving forward, as he did in session today. During this session, this clinician used the following therapeutic modalities: Engagement Strategies, Client-centered Therapy, and Supportive Psychotherapy    Substance Abuse was not addressed during this session. If the client is diagnosed with a co-occurring substance use disorder, please indicate any changes in the frequency or amount of use:   . Stage of change for addressing substance use diagnoses: No substance use/Not applicable    ASSESSMENT:  JuanP ablo Park presents with a Euthymic/ normal mood. his affect is Normal range and intensity, which is congruent, with his mood and the content of the session. The client has made progress on their goals. Juan Pablo Park presents with a none risk of suicide, none risk of self-harm, and none risk of harm to others. For any risk assessment that surpasses a "low" rating, a safety plan must be developed. A safety plan was indicated: no  If yes, describe in detail       PLAN: Between sessions, Juan Pablo Park will   . At the next session, the therapist will use Engagement Strategies, Client-centered Therapy, and Supportive Psychotherapy to address focusing on self, letting go of things out of his control, detachment. .    Behavioral Health Treatment Plan and Discharge Planning: Juan Pablo Park is aware of and agrees to continue to work on their treatment plan. They have identified and are working toward their discharge goals.  yes    Visit start and stop times:    11/29/23  Start Time: 0530  Stop Time: 0615  Total Visit Time: 45 minutes

## 2023-11-29 PROBLEM — F43.23 ADJUSTMENT DISORDER WITH MIXED ANXIETY AND DEPRESSED MOOD: Status: ACTIVE | Noted: 2023-11-29

## 2023-12-04 ENCOUNTER — OFFICE VISIT (OUTPATIENT)
Dept: FAMILY MEDICINE CLINIC | Facility: CLINIC | Age: 63
End: 2023-12-04
Payer: COMMERCIAL

## 2023-12-04 VITALS
HEART RATE: 61 BPM | TEMPERATURE: 97.7 F | BODY MASS INDEX: 36.29 KG/M2 | WEIGHT: 245 LBS | RESPIRATION RATE: 16 BRPM | SYSTOLIC BLOOD PRESSURE: 160 MMHG | OXYGEN SATURATION: 97 % | HEIGHT: 69 IN | DIASTOLIC BLOOD PRESSURE: 70 MMHG

## 2023-12-04 DIAGNOSIS — Z12.5 SCREENING PSA (PROSTATE SPECIFIC ANTIGEN): ICD-10-CM

## 2023-12-04 DIAGNOSIS — Z23 NEED FOR VACCINATION: ICD-10-CM

## 2023-12-04 DIAGNOSIS — E11.29 TYPE 2 DIABETES MELLITUS WITH MICROALBUMINURIA, WITH LONG-TERM CURRENT USE OF INSULIN (HCC): Primary | ICD-10-CM

## 2023-12-04 DIAGNOSIS — Z23 NEED FOR TDAP VACCINATION: ICD-10-CM

## 2023-12-04 DIAGNOSIS — Z79.4 TYPE 2 DIABETES MELLITUS WITH MICROALBUMINURIA, WITH LONG-TERM CURRENT USE OF INSULIN (HCC): Primary | ICD-10-CM

## 2023-12-04 DIAGNOSIS — I10 BENIGN ESSENTIAL HYPERTENSION: ICD-10-CM

## 2023-12-04 DIAGNOSIS — R80.9 TYPE 2 DIABETES MELLITUS WITH MICROALBUMINURIA, WITH LONG-TERM CURRENT USE OF INSULIN (HCC): Primary | ICD-10-CM

## 2023-12-04 PROBLEM — M06.09 RHEUMATOID ARTHRITIS WITHOUT RHEUMATOID FACTOR, MULTIPLE SITES (HCC): Status: RESOLVED | Noted: 2021-10-31 | Resolved: 2023-12-04

## 2023-12-04 PROCEDURE — 99214 OFFICE O/P EST MOD 30 MIN: CPT | Performed by: FAMILY MEDICINE

## 2023-12-04 PROCEDURE — 90715 TDAP VACCINE 7 YRS/> IM: CPT

## 2023-12-04 PROCEDURE — 90677 PCV20 VACCINE IM: CPT

## 2023-12-04 PROCEDURE — 90471 IMMUNIZATION ADMIN: CPT

## 2023-12-04 PROCEDURE — 90472 IMMUNIZATION ADMIN EACH ADD: CPT

## 2023-12-04 RX ORDER — AMLODIPINE BESYLATE 10 MG/1
10 TABLET ORAL DAILY
Qty: 90 TABLET | Refills: 1
Start: 2023-12-04

## 2023-12-04 NOTE — ASSESSMENT & PLAN NOTE
Not controlled  Dose of amlodipine increased from 5 to 10 mg a day   Continue Olmesartan 40 mg a day

## 2023-12-04 NOTE — PROGRESS NOTES
Name: Kenzie Small      : 1960      MRN: [id number]  Encounter Provider: Theresa Valerio DO  Encounter Date: 2023   Encounter department: [facility]     1. Type 2 diabetes mellitus with microalbuminuria, with long-term current use of insulin [facility])  Assessment & Plan:    Lab Results   Component Value Date    HGBA1C 6.0 (H) 2023     Has eye appointment in January    Orders:  -     Hemoglobin A1C; Future  -     Hemoglobin A1C    2. Need for vaccination  -     Pneumococcal Conjugate Vaccine 20-valent (Pcv20)    3. Benign essential hypertension  Assessment & Plan:  Not controlled  Dose of amlodipine increased from 5 to 10 mg a day   Continue Olmesartan 40 mg a day     Orders:  -     amLODIPine (NORVASC) 10 mg tablet; Take 1 tablet (10 mg total) by mouth daily  -     CBC; Future  -     Comprehensive metabolic panel; Future  -     Lipid Panel with Direct LDL reflex; Future  -     CBC  -     Comprehensive metabolic panel  -     Lipid Panel with Direct LDL reflex    4. Need for Tdap vaccination  -     TDAP VACCINE GREATER THAN OR EQUAL TO 6YO IM    5. Screening PSA (prostate specific antigen)  -     PSA Total (Reflex To Free); Future  -     PSA Total (Reflex To Free)         Shingles vaccine recommended  He had his flu vaccines     Return in about 6 months (around 2024) for Next scheduled follow up. Subjective      Patient reports that he has been doing well. He is working with his therapist regularly. That things at home have been doing well. He is doing well with his sugars. He is continuing with his monitor. He is only had a couple of low blood sugars but otherwise has been really well controlled.       Review of Systems    Current Outpatient Medications on File Prior to Visit   Medication Sig   • aspirin (ECOTRIN LOW STRENGTH) 81 mg EC tablet Take 81 mg by mouth daily   • atorvastatin (LIPITOR) 20 mg tablet TAKE ONE TABLET BY MOUTH EVERY DAY   • B-D ULTRAFINE III SHORT PEN 31G X 8 MM MISC USE AS DIRECTED   • Basaglar KwikPen 100 units/mL SOPN INJECT 10 UNITS UNDER THE SKIN DAILY   • cholecalciferol (VITAMIN D3) 1,000 units tablet Take 1,000 Units by mouth daily   • Continuous Blood Gluc  (FreeStyle Ulysses 14 Day [place]) SHREE Use   • Continuous Blood Gluc Sensor (FreeStyle Ulysses 14 Day Sensor) MISC Change sensor every 14 days   • Empagliflozin (Jardiance) 10 MG TABS tablet Take 1 tablet (10 mg total) by mouth every morning   • gabapentin (NEURONTIN) 100 mg capsule TAKE ONE CAPSULE BY MOUTH FOUR TIMES A DAY   • glipiZIDE (GLUCOTROL XL) 10 mg 24 hr tablet TAKE TWO TABLETS BY MOUTH EVERY DAY AT BEDTIME   • hydroxychloroquine (PLAQUENIL) 200 mg tablet Take 1 tablet (200 mg total) by mouth 2 (two) times a day with meals   • metFORMIN (GLUCOPHAGE) 500 mg tablet TAKE TWO TABLETS BY MOUTH TWICE A DAY WITH MEALS (GENERIC FOR GLUCOPHAGE)   • olmesartan (BENICAR) 20 mg tablet TAKE TWO TABLETS BY MOUTH EVERY DAY   • tamsulosin (FLOMAX) 0.4 mg 0.4 mg daily   • [DISCONTINUED] amLODIPine (NORVASC) 10 mg tablet TAKE ONE TABLET BY MOUTH EVERY DAY (Patient taking differently: Take 5 mg by mouth daily)   • acetaminophen (TYLENOL) 650 mg CR tablet Take 1 tablet (650 mg total) by mouth every 8 (eight) hours as needed for mild pain (Patient not taking: Reported on 12/4/2023)   • ALPRAZolam (XANAX) 0.25 mg tablet Take 1 tablet (0.25 mg total) by mouth daily at bedtime as needed for anxiety (Patient not taking: Reported on 10/3/2023)   • Blood Glucose Monitoring Suppl (OneTouch Verio) w/Device KIT Test blood sugar twice a day (Patient not taking: Reported on 10/17/2023)   • ergocalciferol (ERGOCALCIFEROL) 1.25 MG (45263 UT) capsule Take 1 capsule (50,000 Units total) by mouth once a week for 12 doses (Patient not taking: Reported on 12/4/2023)   • glucose blood (OneTouch Verio) test strip Test blood sugar twice a day (Patient not taking: Reported on 10/17/2023)   • mupirocin (BACTROBAN) 2 % ointment Apply topically 2 (two) times a day (Patient not taking: Reported on 10/17/2023)   • naproxen sodium (ALEVE) 220 MG tablet Take 1 tablet (220 mg total) by mouth 2 (two) times a day with meals (Patient not taking: Reported on 12/4/2023)   • OneTouch Delica Lancets 72C MISC Test blood sugar twice a day (Patient not taking: Reported on 10/17/2023)       Objective     /70   Pulse 61   Temp 97.7 °F (36.5 °C)   Resp 16   Ht 5' 9" (1.753 m)   Wt 111 kg (245 lb)   SpO2 97%   BMI 36.18 kg/m²     Physical Exam  Vitals and nursing note reviewed. Constitutional:       Appearance: He is well-developed. HENT:      Head: Normocephalic and atraumatic. Right Ear: Tympanic membrane and external ear normal.      Left Ear: Tympanic membrane and external ear normal.   Cardiovascular:      Rate and Rhythm: Normal rate and regular rhythm. Heart sounds: Normal heart sounds. No murmur heard. Pulmonary:      Effort: Pulmonary effort is normal. No respiratory distress. Breath sounds: Normal breath sounds. No wheezing or rales. Musculoskeletal:      Right lower leg: No edema. Left lower leg: No edema.        Margarita Patricio DO

## 2023-12-07 ENCOUNTER — TELEPHONE (OUTPATIENT)
Dept: PSYCHIATRY | Facility: CLINIC | Age: 63
End: 2023-12-07

## 2023-12-07 NOTE — TELEPHONE ENCOUNTER
Patient is calling regarding cancelling an appointment. Date/Time: 12/12/2023 @ 6 pm    Reason: Isn't available whole day. Doesn't want to reschedule. Patient also moved his appointment on 12/26/2023 from 6 pm to 12:00 pm that day. Provider ok'd this appointment change for this day.     Patient was rescheduled: YES [] NO [x]  If yes, when was Patient reschedule for:     Patient requesting call back to reschedule: YES [] NO [x]

## 2023-12-18 ENCOUNTER — APPOINTMENT (OUTPATIENT)
Dept: RADIOLOGY | Facility: CLINIC | Age: 63
End: 2023-12-18
Payer: COMMERCIAL

## 2023-12-18 ENCOUNTER — OFFICE VISIT (OUTPATIENT)
Dept: OBGYN CLINIC | Facility: CLINIC | Age: 63
End: 2023-12-18
Payer: COMMERCIAL

## 2023-12-18 VITALS
WEIGHT: 245 LBS | SYSTOLIC BLOOD PRESSURE: 144 MMHG | BODY MASS INDEX: 36.29 KG/M2 | HEIGHT: 69 IN | HEART RATE: 71 BPM | DIASTOLIC BLOOD PRESSURE: 82 MMHG

## 2023-12-18 DIAGNOSIS — M25.531 PAIN IN RIGHT WRIST: Primary | ICD-10-CM

## 2023-12-18 DIAGNOSIS — M25.531 PAIN IN RIGHT WRIST: ICD-10-CM

## 2023-12-18 PROCEDURE — 73110 X-RAY EXAM OF WRIST: CPT

## 2023-12-18 PROCEDURE — 20600 DRAIN/INJ JOINT/BURSA W/O US: CPT | Performed by: STUDENT IN AN ORGANIZED HEALTH CARE EDUCATION/TRAINING PROGRAM

## 2023-12-18 PROCEDURE — 99214 OFFICE O/P EST MOD 30 MIN: CPT | Performed by: STUDENT IN AN ORGANIZED HEALTH CARE EDUCATION/TRAINING PROGRAM

## 2023-12-18 RX ORDER — LIDOCAINE HYDROCHLORIDE 10 MG/ML
1 INJECTION, SOLUTION INFILTRATION; PERINEURAL
Status: COMPLETED | OUTPATIENT
Start: 2023-12-18 | End: 2023-12-18

## 2023-12-18 RX ORDER — BETAMETHASONE SODIUM PHOSPHATE AND BETAMETHASONE ACETATE 3; 3 MG/ML; MG/ML
3 INJECTION, SUSPENSION INTRA-ARTICULAR; INTRALESIONAL; INTRAMUSCULAR; SOFT TISSUE
Status: COMPLETED | OUTPATIENT
Start: 2023-12-18 | End: 2023-12-18

## 2023-12-18 RX ADMIN — LIDOCAINE HYDROCHLORIDE 1 ML: 10 INJECTION, SOLUTION INFILTRATION; PERINEURAL at 16:15

## 2023-12-18 RX ADMIN — BETAMETHASONE SODIUM PHOSPHATE AND BETAMETHASONE ACETATE 3 MG: 3; 3 INJECTION, SUSPENSION INTRA-ARTICULAR; INTRALESIONAL; INTRAMUSCULAR; SOFT TISSUE at 16:15

## 2023-12-18 NOTE — PROGRESS NOTES
ASSESSMENT/PLAN:    Assessment:   - B/L CTS now s/p L CTR  - R deQuervain syndrome   - DM on insulin     Plan:   - Was preop for CTR on R side but now has new diagnosis of DeQuervain syndrome to R hand. Discussed conservative management which included, CSI, thumb spica splint and therapy. For now Ramirez wants to move forward with CSI and splint. If symptoms persist will consider therapy. Will hold off on CTR for now   -CSI provided to R 1st extensor compartment today     _____________________________________________________  CHIEF COMPLAINT:  Chief Complaint   Patient presents with   • Right Wrist - Pain         SUBJECTIVE:  Murtaza Cerrato is a 63 y.o. male who is s/p CTR (left) presenting for R wrist pain. Reports his wrist pain started a few weeks ago after doing heavy labor in his yard. Denies any paraesthesias or trauma.  CTS on left have mostly resoled s/p CTR and he can sleep through the night. He was scheduled to have a R CTR next week.     PAST MEDICAL HISTORY:  Past Medical History:   Diagnosis Date   • Arthritis    • Carpal tunnel syndrome    • Chronic kidney disease     Stage 2 CKD   • Corneal abrasion     last assessed - 18Nov2012   • Diabetes mellitus (HCC)    • Gout    • Hiatal hernia    • Hyperlipidemia    • Hypertension    • Impaired fasting glucose     last assessed - 05Jun2006   • Kidney stone     last stone 2009   • Lumbar spondylosis 10/31/2021   • Lumbar spondylosis    • Numbness of fingers of both hands    • PONV (postoperative nausea and vomiting)    • Psoriasis    • RA (rheumatoid arthritis) ([facility])    • 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[facility])    • Sinus infection     currently under tx PO ABT etc as of 04/17/19   • Viremia     Resolved - 22Jan2018       PAST SURGICAL HISTORY:  Past Surgical History:   Procedure Laterality Date   • ANKLE SURGERY Right     tendon arthrotomy   • ARTHROTOMY ANKLE     • BACK SURGERY  2014    lumbar laminectomy   • CARPAL TUNNEL RELEASE Left    •  COLONOSCOPY     • COLONOSCOPY N/A 2019    Procedure: COLONOSCOPY;  Surgeon: Toña Aly MD;  Location: [facility];  Service: Gastroenterology   • FINGER SURGERY Bilateral     every finger contracture release over a 10 year period trigger finger   • INCISION TENDON SHEATH      of a finger   • KNEE SURGERY Right     x2 arthrotomy   • AK ARTHRP KNE CONDYLE&PLATU MEDIAL&LAT COMPARTMENTS Right 2021    Procedure: ARTHROPLASTY KNEE TOTAL W ROBOT;  Surgeon: Marty Thomas DO;  Location: [facility];  Service: Orthopedics   • AK NEUROPLASTY &/TRANSPOS MEDIAN NRV CARPAL TUNNE Left 10/26/2023    Procedure: RELEASE CARPAL TUNNEL;  Surgeon: Aline Whatley MD;  Location: [facility];  Service: Orthopedics   • SHOULDER SURGERY Left     x1 staples, tendon repair secondary to many spontaneous dislocations       FAMILY HISTORY:  Family History   Problem Relation Age of Onset   • Hypertension Mother    • Arthritis Mother    • Hyperlipidemia Mother    • Lung cancer Mother    • Stroke Maternal Grandfather    • Diabetes Cousin    • COPD Father    • Depression Family    • Diabetes Family    • No Known Problems Sister    • No Known Problems Brother    • No Known Problems Maternal Aunt    • No Known Problems Maternal Uncle    • No Known Problems Paternal Aunt    • No Known Problems Paternal Uncle    • No Known Problems Maternal Grandmother    • No Known Problems Paternal Grandmother    • No Known Problems Paternal Grandfather        SOCIAL HISTORY:  Social History     Tobacco Use   • Smoking status: Former     Current packs/day: 0.00     Average packs/day: 1 pack/day for 28.0 years (28.0 ttl pk-yrs)     Types: Cigarettes     Start date:      Quit date: 2000     Years since quittin.9   • Smokeless tobacco: Never   • Tobacco comments:     Former smoker   Vaping Use   • Vaping status: Never Used   Substance Use Topics   • Alcohol use: Yes     Comment: rare, once a month    • Drug use: No        MEDICATIONS:    Current Outpatient Medications:   •  amLODIPine (NORVASC) 10 mg tablet, Take 1 tablet (10 mg total) by mouth daily, Disp: 90 tablet, Rfl: 1  •  aspirin (ECOTRIN LOW STRENGTH) 81 mg EC tablet, Take 81 mg by mouth daily, Disp: , Rfl:   •  atorvastatin (LIPITOR) 20 mg tablet, TAKE ONE TABLET BY MOUTH EVERY DAY, Disp: 90 tablet, Rfl: 1  •  B-D ULTRAFINE III SHORT PEN 31G X 8 MM MISC, USE AS DIRECTED, Disp: 200 each, Rfl: 1  •  Basaglar KwikPen 100 units/mL SOPN, INJECT 10 UNITS UNDER THE SKIN DAILY, Disp: 15 mL, Rfl: 3  •  cholecalciferol (VITAMIN D3) 1,000 units tablet, Take 1,000 Units by mouth daily, Disp: , Rfl:   •  Continuous Blood Gluc  (FreeStyle Ulysses 14 Day [place]) SHREE, Use, Disp: , Rfl:   •  Continuous Blood Gluc Sensor (FreeStyle Ulysses 14 Day Sensor) MISC, Change sensor every 14 days, Disp: 2 each, Rfl: 12  •  Empagliflozin (Jardiance) 10 MG TABS tablet, Take 1 tablet (10 mg total) by mouth every morning, Disp: 30 tablet, Rfl: 3  •  gabapentin (NEURONTIN) 100 mg capsule, TAKE ONE CAPSULE BY MOUTH FOUR TIMES A DAY, Disp: 360 capsule, Rfl: 0  •  glipiZIDE (GLUCOTROL XL) 10 mg 24 hr tablet, TAKE TWO TABLETS BY MOUTH EVERY DAY AT BEDTIME, Disp: 180 tablet, Rfl: 1  •  hydroxychloroquine (PLAQUENIL) 200 mg tablet, Take 1 tablet (200 mg total) by mouth 2 (two) times a day with meals, Disp: 180 tablet, Rfl: 1  •  metFORMIN (GLUCOPHAGE) 500 mg tablet, TAKE TWO TABLETS BY MOUTH TWICE A DAY WITH MEALS (GENERIC FOR GLUCOPHAGE), Disp: 360 tablet, Rfl: 3  •  olmesartan (BENICAR) 20 mg tablet, TAKE TWO TABLETS BY MOUTH EVERY DAY, Disp: 180 tablet, Rfl: 3  •  tamsulosin (FLOMAX) 0.4 mg, 0.4 mg daily, Disp: , Rfl:   •  acetaminophen (TYLENOL) 650 mg CR tablet, Take 1 tablet (650 mg total) by mouth every 8 (eight) hours as needed for mild pain (Patient not taking: Reported on 12/4/2023), Disp: 30 tablet, Rfl: 0  •  ALPRAZolam (XANAX) 0.25 mg tablet, Take 1 tablet (0.25 mg total) by mouth daily  "at bedtime as needed for anxiety (Patient not taking: Reported on 10/3/2023), Disp: 14 tablet, Rfl: 0  •  Blood Glucose Monitoring Suppl (OneTouch Verio) w/Device KIT, Test blood sugar twice a day (Patient not taking: Reported on 10/17/2023), Disp: 1 kit, Rfl: 0  •  ergocalciferol (ERGOCALCIFEROL) 1.25 MG (27428 UT) capsule, Take 1 capsule (50,000 Units total) by mouth once a week for 12 doses (Patient not taking: Reported on 12/4/2023), Disp: 12 capsule, Rfl: 0  •  glucose blood (OneTouch Verio) test strip, Test blood sugar twice a day (Patient not taking: Reported on 10/17/2023), Disp: 200 each, Rfl: 3  •  mupirocin (BACTROBAN) 2 % ointment, Apply topically 2 (two) times a day (Patient not taking: Reported on 10/17/2023), Disp: 22 g, Rfl: 0  •  naproxen sodium (ALEVE) 220 MG tablet, Take 1 tablet (220 mg total) by mouth 2 (two) times a day with meals (Patient not taking: Reported on 12/4/2023), Disp: 60 tablet, Rfl: 0  •  OneTouch Delica Lancets 33G MISC, Test blood sugar twice a day (Patient not taking: Reported on 10/17/2023), Disp: 200 each, Rfl: 3  No current facility-administered medications for this visit.    ALLERGIES:  Allergies   Allergen Reactions   • Latex Rash     At dentist, lips swollen    • Codeine Nausea Only     Reaction Date: [id number];        REVIEW OF SYSTEMS:  Pertinent items are noted in HPI.  A comprehensive review of systems was negative.    LABS:  HgA1c:   Lab Results   Component Value Date    HGBA1C 6.0 (H) 07/18/2023     BMP:   Lab Results   Component Value Date    GLUCOSE 245 (H) 11/20/2017    CALCIUM 8.4 12/01/2021     11/20/2017    K 4.1 07/18/2023    CO2 21 07/18/2023     07/18/2023    BUN 26 07/18/2023    CREATININE 1.46 (H) 07/18/2023         _____________________________________________________  PHYSICAL EXAMINATION:  Vital signs: /82   Pulse 71   Ht 5' 9\" (1.753 m)   Wt 111 kg (245 lb)   BMI 36.18 kg/m²   General: well developed and well nourished, alert, " oriented times 3, and appears comfortable  Psychiatric: Normal  HEENT: Trachea Midline, No torticollis  Cardiovascular: No discernable arrhythmia  Pulmonary: No wheezing or stridor  Abdomen: No rebound or guarding  Extremities: No peripheral edema  Skin: No masses, erythema, lacerations, fluctation, ulcerations  Neurovascular: Sensation Intact to the Median, Ulnar, Radial Nerve, Motor Intact to the Median, Ulnar, Radial Nerve, and Pulses Intact    MUSCULOSKELETAL EXAMINATION:  Right wrist   TTP over radial styloid  (+) [place]   No pain with palpation of CMC joint  No pain over SL ligament  No snuffbox tenderness   No scaphoid tubercle tenderness     _____________________________________________________  STUDIES REVIEWED:  Images were reviewed in PACS by Dr. Whatley and demonstrate: Calcified radial and ulnar artery, no acute bony abnormalities and minimal arthritis       PROCEDURES PERFORMED:  Small joint arthrocentesis  Universal Protocol:  Consent: Verbal consent obtained.  Consent given by: patient  Timeout called at: 12/18/2023 4:59 PM.  Patient understanding: patient states understanding of the procedure being performed  Patient consent: the patient's understanding of the procedure matches consent given  Patient identity confirmed: verbally with patient  Supporting Documentation  Indications: pain   Procedure Details  Needle size: 25 G  Ultrasound guidance: no  Medications administered: 3 mg betamethasone acetate-betamethasone sodium phosphate 6 (3-3) mg/mL; 1 mL lidocaine 1 %

## 2023-12-26 ENCOUNTER — TELEPHONE (OUTPATIENT)
Dept: PSYCHIATRY | Facility: CLINIC | Age: 63
End: 2023-12-26

## 2023-12-26 NOTE — TELEPHONE ENCOUNTER
Writer called clt as he did not show up for appt, lm reminding him he had switched his usual 6pm to 12pm today.

## 2023-12-27 ENCOUNTER — OFFICE VISIT (OUTPATIENT)
Dept: URGENT CARE | Facility: CLINIC | Age: 63
End: 2023-12-27
Payer: COMMERCIAL

## 2023-12-27 ENCOUNTER — TELEPHONE (OUTPATIENT)
Age: 63
End: 2023-12-27

## 2023-12-27 VITALS
TEMPERATURE: 97.8 F | OXYGEN SATURATION: 99 % | WEIGHT: 245 LBS | RESPIRATION RATE: 16 BRPM | BODY MASS INDEX: 36.18 KG/M2 | HEART RATE: 60 BPM

## 2023-12-27 DIAGNOSIS — R09.81 NASAL CONGESTION: Primary | ICD-10-CM

## 2023-12-27 PROCEDURE — 87636 SARSCOV2 & INF A&B AMP PRB: CPT | Performed by: PHYSICIAN ASSISTANT

## 2023-12-27 PROCEDURE — 99203 OFFICE O/P NEW LOW 30 MIN: CPT | Performed by: PHYSICIAN ASSISTANT

## 2023-12-27 RX ORDER — AMOXICILLIN AND CLAVULANATE POTASSIUM 875; 125 MG/1; MG/1
1 TABLET, FILM COATED ORAL 2 TIMES DAILY WITH MEALS
Qty: 14 TABLET | Refills: 0 | Status: SHIPPED | OUTPATIENT
Start: 2023-12-27 | End: 2024-01-03

## 2023-12-28 LAB
FLUAV RNA RESP QL NAA+PROBE: NEGATIVE
FLUBV RNA RESP QL NAA+PROBE: NEGATIVE
SARS-COV-2 RNA RESP QL NAA+PROBE: NEGATIVE

## 2023-12-28 NOTE — PROGRESS NOTES
[facility]        NAME: Murtaza Cerrato is a 63 y.o. male  : 1960    MRN: [id number]  DATE: 2023  TIME: 7:37 PM    Assessment and Plan   Nasal congestion [R09.81]  1. Nasal congestion  Covid/Flu-Office Collect    amoxicillin-clavulanate (AUGMENTIN) 875-125 mg per tablet        Advised patient to try diabetic cough syrup OTC.  Discussed importance of staying hydrated.  Advised patient that I do not think he needs antibiotics yet, and that this is a viral infection.  However he is concerned because he is having company come over in a few days and does not want to get sicker before then.  I advised that I will call antibiotics into the pharmacy but he is only to take them if significant worsening over the next 3 days.  He verbalizes understanding and is in agreement with plan.  If he begins taking them, he should finish the entire course. Discussed strict return to care precautions as well as red flag symptoms which should prompt immediate ED referral. Pt verbalized understanding and is in agreement with plan.  Please follow up with your primary care provider within the next week. Please remember that your visit today was with an urgent care provider and should not replace follow up with your primary care provider for chronic medical issues or annual physicals.       Patient Instructions       Follow up with PCP in 3-5 days.  Proceed to  ER if symptoms worsen.    Chief Complaint     Chief Complaint   Patient presents with    Cold Like Symptoms     Pt presents with head congestion, chest congestion, PND, sore throat;started 4 days ago; negative home Covid test         History of Present Illness       Murtaza Cerrato is a(n) 63 y.o. male presenting with URI symptoms x 3-4 days  Past medical history: DM2, RBBB, CKD, RA, HTN, HLD  Congestion: yes  Sore throat: yes  Cough: yes  Sputum production: no  Fever: no  Body aches: no  Loss of smell/taste: no  GI symptoms: no  Known sick  contacts: no  OTC meds tried: none  Tested negative at home for covid yesterday.          Review of Systems   Review of Systems   Constitutional:         Negative except as noted in HPI   HENT:  Positive for sinus pressure.    Respiratory:  Negative for shortness of breath and wheezing.    Cardiovascular:  Negative for chest pain.         Current Medications       Current Outpatient Medications:     amLODIPine (NORVASC) 10 mg tablet, Take 1 tablet (10 mg total) by mouth daily, Disp: 90 tablet, Rfl: 1    amoxicillin-clavulanate (AUGMENTIN) 875-125 mg per tablet, Take 1 tablet by mouth 2 (two) times a day with meals for 7 days, Disp: 14 tablet, Rfl: 0    aspirin (ECOTRIN LOW STRENGTH) 81 mg EC tablet, Take 81 mg by mouth daily, Disp: , Rfl:     atorvastatin (LIPITOR) 20 mg tablet, TAKE ONE TABLET BY MOUTH EVERY DAY, Disp: 90 tablet, Rfl: 1    B-D ULTRAFINE III SHORT PEN 31G X 8 MM MISC, USE AS DIRECTED, Disp: 200 each, Rfl: 1    Basaglar KwikPen 100 units/mL SOPN, INJECT 10 UNITS UNDER THE SKIN DAILY, Disp: 15 mL, Rfl: 3    Blood Glucose Monitoring Suppl (OneTouch Verio) w/Device KIT, Test blood sugar twice a day, Disp: 1 kit, Rfl: 0    cholecalciferol (VITAMIN D3) 1,000 units tablet, Take 1,000 Units by mouth daily, Disp: , Rfl:     Continuous Blood Gluc  (FreeStyle Ulysses 14 Day [place]) SHREE, Use, Disp: , Rfl:     Continuous Blood Gluc Sensor (FreeStyle Ulysses 14 Day Sensor) MISC, Change sensor every 14 days, Disp: 2 each, Rfl: 12    Empagliflozin (Jardiance) 10 MG TABS tablet, Take 1 tablet (10 mg total) by mouth every morning, Disp: 30 tablet, Rfl: 3    gabapentin (NEURONTIN) 100 mg capsule, TAKE ONE CAPSULE BY MOUTH FOUR TIMES A DAY, Disp: 360 capsule, Rfl: 0    glipiZIDE (GLUCOTROL XL) 10 mg 24 hr tablet, TAKE TWO TABLETS BY MOUTH EVERY DAY AT BEDTIME, Disp: 180 tablet, Rfl: 1    hydroxychloroquine (PLAQUENIL) 200 mg tablet, Take 1 tablet (200 mg total) by mouth 2 (two) times a day with meals, Disp: 180  tablet, Rfl: 1    metFORMIN (GLUCOPHAGE) 500 mg tablet, TAKE TWO TABLETS BY MOUTH TWICE A DAY WITH MEALS (GENERIC FOR GLUCOPHAGE), Disp: 360 tablet, Rfl: 3    olmesartan (BENICAR) 20 mg tablet, TAKE TWO TABLETS BY MOUTH EVERY DAY, Disp: 180 tablet, Rfl: 3    tamsulosin (FLOMAX) 0.4 mg, 0.4 mg daily, Disp: , Rfl:     acetaminophen (TYLENOL) 650 mg CR tablet, Take 1 tablet (650 mg total) by mouth every 8 (eight) hours as needed for mild pain (Patient not taking: Reported on 12/4/2023), Disp: 30 tablet, Rfl: 0    ALPRAZolam (XANAX) 0.25 mg tablet, Take 1 tablet (0.25 mg total) by mouth daily at bedtime as needed for anxiety (Patient not taking: Reported on 10/3/2023), Disp: 14 tablet, Rfl: 0    ergocalciferol (ERGOCALCIFEROL) 1.25 MG (17904 UT) capsule, Take 1 capsule (50,000 Units total) by mouth once a week for 12 doses (Patient not taking: Reported on 12/4/2023), Disp: 12 capsule, Rfl: 0    glucose blood (OneTouch Verio) test strip, Test blood sugar twice a day (Patient not taking: Reported on 10/17/2023), Disp: 200 each, Rfl: 3    mupirocin (BACTROBAN) 2 % ointment, Apply topically 2 (two) times a day (Patient not taking: Reported on 10/17/2023), Disp: 22 g, Rfl: 0    naproxen sodium (ALEVE) 220 MG tablet, Take 1 tablet (220 mg total) by mouth 2 (two) times a day with meals (Patient not taking: Reported on 12/4/2023), Disp: 60 tablet, Rfl: 0    OneTouch Delica Lancets 33G MISC, Test blood sugar twice a day (Patient not taking: Reported on 10/17/2023), Disp: 200 each, Rfl: 3    Current Allergies     Allergies as of 12/27/2023 - Reviewed 12/27/2023   Allergen Reaction Noted    Latex Rash 02/07/2014    Codeine Nausea Only 10/03/2005            The following portions of the patient's history were reviewed and updated as appropriate: allergies, current medications, past family history, past medical history, past social history, past surgical history and problem list.     Past Medical History:   Diagnosis Date     Arthritis     Carpal tunnel syndrome     Chronic kidney disease     Stage 2 CKD    Corneal abrasion     last assessed - 18Nov2012    Diabetes mellitus (HCC)     Gout     Hiatal hernia     Hyperlipidemia     Hypertension     Impaired fasting glucose     last assessed - 05Jun2006    Kidney stone     last stone 2009    Lumbar spondylosis 10/31/2021    Lumbar spondylosis     Numbness of fingers of both hands     PONV (postoperative nausea and vomiting)     Psoriasis     RA (rheumatoid arthritis) (HCC)     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HCC)     Sinus infection     currently under tx PO ABT etc as of 04/17/19    Viremia     Resolved - 22Jan2018       Past Surgical History:   Procedure Laterality Date    ANKLE SURGERY Right     tendon arthrotomy    ARTHROTOMY ANKLE      BACK SURGERY  2014    lumbar laminectomy    CARPAL TUNNEL RELEASE Left     COLONOSCOPY      COLONOSCOPY N/A 04/19/2019    Procedure: COLONOSCOPY;  Surgeon: Toña Aly MD;  Location: [facility] GI LAB;  Service: Gastroenterology    FINGER SURGERY Bilateral     every finger contracture release over a 10 year period trigger finger    INCISION TENDON SHEATH      of a finger    KNEE SURGERY Right     x2 arthrotomy    ME ARTHRP KNE CONDYLE&PLATU MEDIAL&LAT COMPARTMENTS Right 11/30/2021    Procedure: ARTHROPLASTY KNEE TOTAL W ROBOT;  Surgeon: Marty Thomas DO;  Location: [facility];  Service: Orthopedics    ME NEUROPLASTY &/TRANSPOS MEDIAN NRV CARPAL TUNNE Left 10/26/2023    Procedure: RELEASE CARPAL TUNNEL;  Surgeon: Aline Whatley MD;  Location: [facility];  Service: Orthopedics    SHOULDER SURGERY Left     x1 staples, tendon repair secondary to many spontaneous dislocations       Family History   Problem Relation Age of Onset    Hypertension Mother     Arthritis Mother     Hyperlipidemia Mother     Lung cancer Mother     Stroke Maternal Grandfather     Diabetes Cousin     COPD Father     Depression Family     Diabetes Family     No Known  Problems Sister     No Known Problems Brother     No Known Problems Maternal Aunt     No Known Problems Maternal Uncle     No Known Problems Paternal Aunt     No Known Problems Paternal Uncle     No Known Problems Maternal Grandmother     No Known Problems Paternal Grandmother     No Known Problems Paternal Grandfather          Medications have been verified.        Objective   Pulse 60   Temp 97.8 °F (36.6 °C)   Resp 16   Wt 111 kg (245 lb)   SpO2 99%   BMI 36.18 kg/m²        Physical Exam     Physical Exam  Vitals and nursing note reviewed.   Constitutional:       General: He is not in acute distress.     Appearance: Normal appearance. He is not toxic-appearing.   HENT:      Head: Normocephalic and atraumatic.      Right Ear: Tympanic membrane, ear canal and external ear normal.      Left Ear: Tympanic membrane, ear canal and external ear normal.      Nose: No congestion.      Right Sinus: Maxillary sinus tenderness present. No frontal sinus tenderness.      Left Sinus: Maxillary sinus tenderness present. No frontal sinus tenderness.      Mouth/Throat:      Mouth: Mucous membranes are moist.      Pharynx: Oropharynx is clear. No oropharyngeal exudate or posterior oropharyngeal erythema.   Eyes:      Conjunctiva/sclera: Conjunctivae normal.      Pupils: Pupils are equal, round, and reactive to light.   Cardiovascular:      Rate and Rhythm: Normal rate and regular rhythm.      Heart sounds: Normal heart sounds.   Pulmonary:      Effort: Pulmonary effort is normal. No respiratory distress.      Breath sounds: Normal breath sounds. No wheezing, rhonchi or rales.   Musculoskeletal:      Cervical back: Normal range of motion and neck supple.   Skin:     General: Skin is warm and dry.      Capillary Refill: Capillary refill takes less than 2 seconds.   Neurological:      Mental Status: He is alert and oriented to person, place, and time.   Psychiatric:         Behavior: Behavior normal.

## 2024-01-09 ENCOUNTER — TELEMEDICINE (OUTPATIENT)
Dept: BEHAVIORAL/MENTAL HEALTH CLINIC | Facility: CLINIC | Age: 64
End: 2024-01-09
Payer: COMMERCIAL

## 2024-01-09 DIAGNOSIS — F43.23 ADJUSTMENT DISORDER WITH MIXED ANXIETY AND DEPRESSED MOOD: Primary | ICD-10-CM

## 2024-01-09 PROCEDURE — 90834 PSYTX W PT 45 MINUTES: CPT

## 2024-01-09 NOTE — PSYCH
Behavioral Health Psychotherapy Progress Note    Psychotherapy Provided: Individual Psychotherapy     1. Adjustment disorder with mixed anxiety and depressed mood            Goals addressed in session: Goal 1     DATA: Ramirez reported that he was very sick but is feeling better. He had a nice holiday with family, said all things are going well with family, children and wife. Explained that anniversary of when he found out wife was cheating, is coming up (March of this year).  He is trying to be mindful of his emotions, actions and how to cope with reminders of the indicdent. Ramirez reproted that things have progressed with him and and his wife's relationship and he feels better, less angry, more trusting of her. Athough, he idnetified that he still has resentment. Writer validated clt's apprehension and anger, provided suportive listening and feedback. He added that it is difficult to fully work on himself and tend to his own needs, as he gew up caring for his siblings and still takes care of his children, to an extent. He stated that right now he doesn't think he will improve in this area, as he is so used to caring for others, but has this as a goal.  Clt spoke about his own family and past, as writer brought this up since clt said he wanted to address this. Clt explained he would like to further delve into his past family dynamics when there is more time.     During this session, this clinician used the following therapeutic modalities: Engagement Strategies, Client-centered Therapy, and Supportive Psychotherapy    Substance Abuse was not addressed during this session. If the client is diagnosed with a co-occurring substance use disorder, please indicate any changes in the frequency or amount of use:   . Stage of change for addressing substance use diagnoses: No substance use/Not applicable    ASSESSMENT:  Ramirez Cerrato presents with a Euthymic/ normal mood.     his affect is Normal range and intensity, which is  "congruent, with his mood and the content of the session. The client has made progress on their goals.       Ramirez Cerrato presents with a none risk of suicide, none risk of self-harm, and none risk of harm to others.    For any risk assessment that surpasses a \"low\" rating, a safety plan must be developed.    A safety plan was indicated: no  If yes, describe in detail       PLAN: Between sessions, Ramirez Cerrato will work on focusing more on self. At the next session, the therapist will use Engagement Strategies, Client-centered Therapy, and Supportive Psychotherapy to address focusing on self and how his past and present family dynamics impacts him emotionally, behaviorally.    Behavioral Health Treatment Plan and Discharge Planning: Ramirez Cerrato is aware of and agrees to continue to work on their treatment plan. They have identified and are working toward their discharge goals. yes    Visit start and stop times:    01/09/24  Start Time: 0547  Stop Time: 0630  Total Visit Time: 43 minutes    Virtual Regular Visit    Verification of patient location:    Patient is located at Home in the following state in which I hold an active license [place]      Assessment/Plan:    Problem List Items Addressed This Visit       Adjustment disorder with mixed anxiety and depressed mood - Primary       Goals addressed in session: Goal 1          Reason for visit is   Chief Complaint   Patient presents with    Virtual Regular Visit          Encounter provider MARIE Dhillon    Provider located at [facility] [address]  [facility] [id number] [phone]      Recent Visits  No visits were found meeting these conditions.  Showing recent visits within past 7 days and meeting all other requirements  Today's Visits  Date Type Provider Dept   01/09/24 Telemedicine MARIE Dhillon  [facility]   Showing today's visits and meeting all other requirements  Future Appointments  No visits were found meeting these conditions.  Showing future appointments within next 150 days and meeting all other requirements       The patient was identified by name and date of birth. Murtaza Cerrato was informed that this is a telemedicine visit and that the visit is being conducted throughthe [employer] platform. He agrees to proceed..  My office door was closed. No one else was in the room.  He acknowledged consent and understanding of privacy and security of the video platform. The patient has agreed to participate and understands they can discontinue the visit at any time.    Patient is aware this is a billable service.     Subjective  Murtaza Cerrato is a 63 y.o. male    .      HPI     Past Medical History:   Diagnosis Date    Arthritis     Carpal tunnel syndrome     Chronic kidney disease     Stage 2 CKD    Corneal abrasion     last assessed - 18Nov2012    Diabetes mellitus (HCC)     Gout     Hiatal hernia     Hyperlipidemia     Hypertension     Impaired fasting glucose     last assessed - 05Jun2006    Kidney stone     last stone 2009    Lumbar spondylosis 10/31/2021    Lumbar spondylosis     Numbness of fingers of both hands     PONV (postoperative nausea and vomiting)     Psoriasis     RA (rheumatoid arthritis) (HCC)     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HCC)     Sinus infection     currently under tx PO ABT etc as of 04/17/19    Viremia     Resolved - 22Jan2018       Past Surgical History:   Procedure Laterality Date    ANKLE SURGERY Right     tendon arthrotomy    ARTHROTOMY ANKLE      BACK SURGERY  2014    lumbar laminectomy    CARPAL TUNNEL RELEASE Left     COLONOSCOPY      COLONOSCOPY N/A 04/19/2019    Procedure: COLONOSCOPY;  Surgeon: Toña Aly MD;  Location: [facility] GI [facility];  Service: Gastroenterology    FINGER SURGERY Bilateral     every finger contracture release over a 10  year period trigger finger    INCISION TENDON SHEATH      of a finger    KNEE SURGERY Right     x2 arthrotomy    RI ARTHRP KNE CONDYLE&PLATU MEDIAL&LAT COMPARTMENTS Right 11/30/2021    Procedure: ARTHROPLASTY KNEE TOTAL W ROBOT;  Surgeon: Marty Thomas DO;  Location: [facility];  Service: Orthopedics    RI NEUROPLASTY &/TRANSPOS MEDIAN NRV CARPAL TUNNE Left 10/26/2023    Procedure: RELEASE CARPAL TUNNEL;  Surgeon: Aline Whatley MD;  Location: [facility];  Service: Orthopedics    SHOULDER SURGERY Left     x1 staples, tendon repair secondary to many spontaneous dislocations       Current Outpatient Medications   Medication Sig Dispense Refill    acetaminophen (TYLENOL) 650 mg CR tablet Take 1 tablet (650 mg total) by mouth every 8 (eight) hours as needed for mild pain (Patient not taking: Reported on 12/4/2023) 30 tablet 0    ALPRAZolam (XANAX) 0.25 mg tablet Take 1 tablet (0.25 mg total) by mouth daily at bedtime as needed for anxiety (Patient not taking: Reported on 10/3/2023) 14 tablet 0    amLODIPine (NORVASC) 10 mg tablet Take 1 tablet (10 mg total) by mouth daily 90 tablet 1    aspirin (ECOTRIN LOW STRENGTH) 81 mg EC tablet Take 81 mg by mouth daily      atorvastatin (LIPITOR) 20 mg tablet TAKE ONE TABLET BY MOUTH EVERY DAY 90 tablet 1    B-D ULTRAFINE III SHORT PEN 31G X 8 MM MISC USE AS DIRECTED 200 each 1    Basaglar KwikPen 100 units/mL SOPN INJECT 10 UNITS UNDER THE SKIN DAILY 15 mL 3    Blood Glucose Monitoring Suppl (OneTouch Verio) w/Device KIT Test blood sugar twice a day 1 kit 0    cholecalciferol (VITAMIN D3) 1,000 units tablet Take 1,000 Units by mouth daily      Continuous Blood Gluc  (FreeStyle Ulysses 14 Day [place]) SHREE Use      Continuous Blood Gluc Sensor (FreeStyle Ulysses 14 Day Sensor) MISC Change sensor every 14 days 2 each 12    Empagliflozin (Jardiance) 10 MG TABS tablet Take 1 tablet (10 mg total) by mouth every morning 30 tablet 3    ergocalciferol (ERGOCALCIFEROL) 1.25 MG  (08771 UT) capsule Take 1 capsule (50,000 Units total) by mouth once a week for 12 doses (Patient not taking: Reported on 12/4/2023) 12 capsule 0    gabapentin (NEURONTIN) 100 mg capsule TAKE ONE CAPSULE BY MOUTH FOUR TIMES A  capsule 0    glipiZIDE (GLUCOTROL XL) 10 mg 24 hr tablet TAKE TWO TABLETS BY MOUTH EVERY DAY AT BEDTIME 180 tablet 1    glucose blood (OneTouch Verio) test strip Test blood sugar twice a day (Patient not taking: Reported on 10/17/2023) 200 each 3    hydroxychloroquine (PLAQUENIL) 200 mg tablet Take 1 tablet (200 mg total) by mouth 2 (two) times a day with meals 180 tablet 1    metFORMIN (GLUCOPHAGE) 500 mg tablet TAKE TWO TABLETS BY MOUTH TWICE A DAY WITH MEALS (GENERIC FOR GLUCOPHAGE) 360 tablet 3    mupirocin (BACTROBAN) 2 % ointment Apply topically 2 (two) times a day (Patient not taking: Reported on 10/17/2023) 22 g 0    naproxen sodium (ALEVE) 220 MG tablet Take 1 tablet (220 mg total) by mouth 2 (two) times a day with meals (Patient not taking: Reported on 12/4/2023) 60 tablet 0    olmesartan (BENICAR) 20 mg tablet TAKE TWO TABLETS BY MOUTH EVERY  tablet 3    OneTouch Delica Lancets 33G MISC Test blood sugar twice a day (Patient not taking: Reported on 10/17/2023) 200 each 3    tamsulosin (FLOMAX) 0.4 mg 0.4 mg daily       No current facility-administered medications for this visit.        Allergies   Allergen Reactions    Latex Rash     At dentist, lips swollen     Codeine Nausea Only     Reaction Date: 03Oct2005;        Review of Systems    Video Exam    There were no vitals filed for this visit.    Physical Exam     Visit Time    Visit Start Time: 0547  Visit Stop Time: 0630  Total Visit Duration:  43 minutes

## 2024-01-23 ENCOUNTER — SOCIAL WORK (OUTPATIENT)
Dept: BEHAVIORAL/MENTAL HEALTH CLINIC | Facility: CLINIC | Age: 64
End: 2024-01-23
Payer: COMMERCIAL

## 2024-01-23 DIAGNOSIS — F43.23 ADJUSTMENT DISORDER WITH MIXED ANXIETY AND DEPRESSED MOOD: Primary | ICD-10-CM

## 2024-01-23 PROCEDURE — 90834 PSYTX W PT 45 MINUTES: CPT

## 2024-01-24 DIAGNOSIS — E11.49 DIABETES MELLITUS TYPE 2 WITH NEUROLOGICAL MANIFESTATIONS (HCC): ICD-10-CM

## 2024-01-24 RX ORDER — PEN NEEDLE, DIABETIC 31 GX5/16"
NEEDLE, DISPOSABLE MISCELLANEOUS
Qty: 200 EACH | Refills: 0 | Status: SHIPPED | OUTPATIENT
Start: 2024-01-24

## 2024-01-24 NOTE — PSYCH
"Behavioral Health Psychotherapy Progress Note    Psychotherapy Provided: Individual Psychotherapy     1. Adjustment disorder with mixed anxiety and depressed mood            Goals addressed in session: Goal 1     DATA: Ramirez mentioned that things between him and spouse continue to go well, however, he is still waiting for her to open up about what else happened during the affair she had. We discussed how long he has been waiting, and the possibility that she may never open up fully, and what his fear is. Clt said he would be okay to learn that other things went on in the past but his concern is that his wife is still in love with this man, and also, if she is still invovled with him. He stated he trusts her but not with this man. He is confident that her therapy is going well.   Writer redirected clt to bring focus to himself, as we brought this up a few weeks prior. We agreed that since we started workign together, clt's focus has been on the afair. Writer pointed out that, althoguh this is not something clt will or should \"get over\" soon, it may be beneficial to focus more in himself, as he admitted he does nto do this at all. He considers himself more of a caretaker, of his immediate family. He has always cared for the wellbeing of others, including when he was younger. He cared for his siblings, as he is the oldest and his father was an alcoholic.   We talked about pursuing what clt wants to address. Clt said he does not know. Writer suggested clt try doing something out of his comfort zone, whatever this may be, and see what happens. This may help him locate what he is uncomfortable with - emotionally, mentally - and then can address during session next time.     During this session, this clinician used the following therapeutic modalities: Engagement Strategies, Client-centered Therapy, and Supportive Psychotherapy    Substance Abuse was not addressed during this session. If the client is diagnosed with a " "co-occurring substance use disorder, please indicate any changes in the frequency or amount of use:   . Stage of change for addressing substance use diagnoses: No substance use/Not applicable    ASSESSMENT:  Ramirez Cerrato presents with a Euthymic/ normal mood.     his affect is Normal range and intensity, which is congruent, with his mood and the content of the session. The client has made progress on their goals.       Ramirez Cerrato presents with a none risk of suicide, none risk of self-harm, and none risk of harm to others.    For any risk assessment that surpasses a \"low\" rating, a safety plan must be developed.    A safety plan was indicated: no  If yes, describe in detail       PLAN: Between sessions, Ramirez Cerrato will spend time alone, away from spouse, to focus inward and recognize any disccomfort. At the next session, the therapist will use Engagement Strategies, Client-centered Therapy, and Supportive Psychotherapy to address focusing on self.    Behavioral Health Treatment Plan and Discharge Planning: Ramirez Cerrato is aware of and agrees to continue to work on their treatment plan. They have identified and are working toward their discharge goals. yes    Visit start and stop times:    01/24/24  Start Time: 0550  Stop Time: 0635  Total Visit Time: 45 minutes  "

## 2024-01-29 ENCOUNTER — TELEPHONE (OUTPATIENT)
Dept: FAMILY MEDICINE CLINIC | Facility: CLINIC | Age: 64
End: 2024-01-29

## 2024-01-29 DIAGNOSIS — I10 BENIGN ESSENTIAL HYPERTENSION: Primary | ICD-10-CM

## 2024-01-29 RX ORDER — OLMESARTAN MEDOXOMIL 40 MG/1
20 TABLET ORAL DAILY
Qty: 45 TABLET | Refills: 0 | Status: SHIPPED | OUTPATIENT
Start: 2024-01-29

## 2024-01-29 NOTE — TELEPHONE ENCOUNTER
Left voice mail to return call  Please notify of med change per Dr Verdin's note  Thank you  Natalia Reynaga MA

## 2024-01-29 NOTE — TELEPHONE ENCOUNTER
[facility] pharmacy called the refill line stating the the Olmesartan 20mg is currently backordered and unable to be provided to patient. Please review on how to proceed.

## 2024-01-29 NOTE — TELEPHONE ENCOUNTER
Please call the patient let him know that I had to change his olmesartan.  The 20 mg dose that he is normally on is on backorder.  I switched it to 40 mg and he will need to take a half a tablet daily until the regular dose is available.  Thank you,  Dr. Verdin

## 2024-02-06 ENCOUNTER — SOCIAL WORK (OUTPATIENT)
Dept: BEHAVIORAL/MENTAL HEALTH CLINIC | Facility: CLINIC | Age: 64
End: 2024-02-06

## 2024-02-06 DIAGNOSIS — F43.23 ADJUSTMENT DISORDER WITH MIXED ANXIETY AND DEPRESSED MOOD: Primary | ICD-10-CM

## 2024-02-07 NOTE — PSYCH
"Behavioral Health Psychotherapy Progress Note    Psychotherapy Provided: Individual Psychotherapy     1. Adjustment disorder with mixed anxiety and depressed mood            Goals addressed in session: Goal 1     DATA: Ramirez and writer explored ways in which he started focusing more in himself rather than his spouse. Ramirez said he has been reading and finds this helpful and enjoys it. Aside from this, he expressed concern in regards to his grandson, Joon, being raised by his daughter Kevin and her ex spouse Sha. (Kevin is his dtr from previous marriage with ex wife Valentina, who also had an affair and  Ramirez, back in 1990. Ramirez expressed that he was hurt about this this, as Valentina told him she \"used\" him; although, with this relationship he had support and people knew about the incident and divorce. With his current wife Valentina, no one knows about the affair and what they are going through.  Writer mentioned that this is something that has not been brought up for discussion yet, as the focus - understandably - has been on his difficulties with his current spouse. )    Ramirez rexplained that Kevin and Sha decided not to baptize Joon and his concern is that Joon \"will not go to heaven\", and feels Joon should at least be exposed to Oriental orthodox, not just Catholicism but all religions, rather than having his parents decide for him that he not follow a Oriental orthodox. Clkun further explained that Sha is not the easiest person to get a long with, and also, his concerns about his daughter Kevin. Clt said Kevin became pregnant before marriage,  is not getting the help he thins she needs as he does not think she is happy, has made and continues to make bad decisions. Clt feels badly that her dtr is not happy. We discussed topic of Oriental orthodox, allowing persons to choose what they wish even if clt is not in agreement, and boundaries. Clt said he is aware he has no control over how any of his kids live their lives or raise their kids.     Clt " "added that he struggles with his son Ramirez's lifestyle, explaining that Ramirez had difficulty in school and didn't finish college, is a hard worker but smokes marijuana regularly, \"self sabotages\", but is \"a really good person\". We again talked about boundaries and allowing persons to choose their path. Writer acknowledged clt's feelings of sadness and frustrations.   During this session, this clinician used the following therapeutic modalities: Engagement Strategies, Bereavement Therapy, Client-centered Therapy, and Supportive Psychotherapy    Substance Abuse was not addressed during this session. If the client is diagnosed with a co-occurring substance use disorder, please indicate any changes in the frequency or amount of use:   . Stage of change for addressing substance use diagnoses: No substance use/Not applicable    ASSESSMENT:  Ramirez Cerrato presents with a Euthymic/ normal mood.     his affect is Normal range and intensity, which is congruent, with his mood and the content of the session. The client has made progress on their goals.       Ramirez Cerrato presents with a none risk of suicide, none risk of self-harm, and none risk of harm to others.    For any risk assessment that surpasses a \"low\" rating, a safety plan must be developed.    A safety plan was indicated: no  If yes, describe in detail       PLAN: Between sessions, Ramirez Cerrato will continue focusing on self care and himself. At the next session, the therapist will use Engagement Strategies, Client-centered Therapy, and Supportive Psychotherapy to address new goals, more discussion regarding concerns with kids Kevin and Ramirez Newman. .    Behavioral Health Treatment Plan and Discharge Planning: Ramirez Cerrato is aware of and agrees to continue to work on their treatment plan. They have identified and are working toward their discharge goals. yes    Visit start and stop times:    02/07/24  Start Time: 0555  Stop Time: 0642  Total Visit " Time: 47 minutes

## 2024-02-09 ENCOUNTER — TELEPHONE (OUTPATIENT)
Age: 64
End: 2024-02-09

## 2024-02-09 DIAGNOSIS — I12.9 TYPE 2 DIABETES MELLITUS WITH STAGE 3A CHRONIC KIDNEY DISEASE AND HYPERTENSION (HCC): Primary | ICD-10-CM

## 2024-02-09 DIAGNOSIS — E11.22 TYPE 2 DIABETES MELLITUS WITH STAGE 3A CHRONIC KIDNEY DISEASE AND HYPERTENSION (HCC): Primary | ICD-10-CM

## 2024-02-09 DIAGNOSIS — N18.31 TYPE 2 DIABETES MELLITUS WITH STAGE 3A CHRONIC KIDNEY DISEASE AND HYPERTENSION (HCC): Primary | ICD-10-CM

## 2024-02-09 NOTE — TELEPHONE ENCOUNTER
They will generally list an alternative that is covered, was he given that information?  If he is running low through the weekend, it would be better to check and see which would be covered instead of sending a new prescription for one that may potentially not be covered. SERA Espinal

## 2024-02-09 NOTE — TELEPHONE ENCOUNTER
Patient called the RX Refill Line. Message is being forwarded to the office.     Patient is requesting as of the new year his Basaglar is no longer covered by insurance. He is requesting an alternative to be called in to his Pharmacy ([id number] [place]) He only has about 3-4 days left of his current insulin supply. Please advise and let pt know ( [phone] ) when new medication sent in. Thank you.    Please contact patient at [phone]

## 2024-02-12 RX ORDER — INSULIN GLARGINE 100 [IU]/ML
10 INJECTION, SOLUTION SUBCUTANEOUS
Qty: 3 ML | Refills: 0 | Status: SHIPPED | OUTPATIENT
Start: 2024-02-12

## 2024-02-12 NOTE — TELEPHONE ENCOUNTER
Pt's pharmacy called - insurance rejection states use Lantus please advise if this is an ok alternative. Thank you.

## 2024-02-13 PROBLEM — N18.2 STAGE 2 CHRONIC KIDNEY DISEASE: Status: RESOLVED | Noted: 2021-10-31 | Resolved: 2024-02-13

## 2024-02-13 PROBLEM — I13.0 HYPERTENSIVE HEART AND KIDNEY DISEASE WITH HF AND WITH CKD STAGE I-IV (HCC): Status: ACTIVE | Noted: 2024-02-13

## 2024-02-20 ENCOUNTER — SOCIAL WORK (OUTPATIENT)
Dept: BEHAVIORAL/MENTAL HEALTH CLINIC | Facility: CLINIC | Age: 64
End: 2024-02-20
Payer: COMMERCIAL

## 2024-02-20 DIAGNOSIS — F43.23 ADJUSTMENT DISORDER WITH MIXED ANXIETY AND DEPRESSED MOOD: Primary | ICD-10-CM

## 2024-02-20 PROCEDURE — 90834 PSYTX W PT 45 MINUTES: CPT

## 2024-02-21 NOTE — PSYCH
"Behavioral Health Psychotherapy Progress Note    Psychotherapy Provided: Individual Psychotherapy     1. Adjustment disorder with mixed anxiety and depressed mood            Goals addressed in session: Goal 1     DATA: Ramirez spoke about his son Ramirez Newman, and the difficulties he's had with Ramirez, regarding Ramirez's \"laziness\" and smoking pot, which has led him to having a low paying job and this is disappointing to clt. Clt has set boundaries over the years in efforts to stop enabling his son. Ramirez spoke about his previous marriage and how it impacted his children, and also discussed his current marriage. His wife Valentina and he continue to do better, as they are working through the affair Valentina had last year. Writer pointed out how difficult it must have been , being that clt has been involed with two wives that betrayed him. He spoke about the different he felt between his ex and his current wife. He cut off his ex but maintains an amicable relationship.     During this session, this clinician used the following therapeutic modalities: Engagement Strategies, Client-centered Therapy, and Supportive Psychotherapy    Substance Abuse was not addressed during this session. If the client is diagnosed with a co-occurring substance use disorder, please indicate any changes in the frequency or amount of use:   . Stage of change for addressing substance use diagnoses: No substance use/Not applicable    ASSESSMENT:  Ramirez Cerrato presents with a Euthymic/ normal mood.     his affect is Normal range and intensity, which is congruent, with his mood and the content of the session. The client has made progress on their goals.       Ramirez Cerrato presents with a none risk of suicide, none risk of self-harm, and none risk of harm to others.    For any risk assessment that surpasses a \"low\" rating, a safety plan must be developed.    A safety plan was indicated: no  If yes, describe in detail       PLAN: Between sessions, Ramirez CLARK" Saqib will speak with his wife regarding how he feels about their intimacy. At the next session, the therapist will use Engagement Strategies, Client-centered Therapy, and Supportive Psychotherapy to address relationship with wife, feelings towards his son.    Behavioral Health Treatment Plan and Discharge Planning: Ramirez CLARK Saqib is aware of and agrees to continue to work on their treatment plan. They have identified and are working toward their discharge goals. yes    Visit start and stop times:    02/21/24  Start Time: 0551  Stop Time: 0636  Total Visit Time: 45 minutes

## 2024-02-26 DIAGNOSIS — E11.49 DIABETES MELLITUS TYPE 2 WITH NEUROLOGICAL MANIFESTATIONS (HCC): ICD-10-CM

## 2024-02-26 RX ORDER — GLIPIZIDE 10 MG/1
TABLET, FILM COATED, EXTENDED RELEASE ORAL
Qty: 60 TABLET | Refills: 0 | Status: SHIPPED | OUTPATIENT
Start: 2024-02-26

## 2024-02-26 NOTE — TELEPHONE ENCOUNTER
Patient needs updated blood work and has previously placed orders. Please contact patient to go for labs.

## 2024-03-05 ENCOUNTER — SOCIAL WORK (OUTPATIENT)
Dept: BEHAVIORAL/MENTAL HEALTH CLINIC | Facility: CLINIC | Age: 64
End: 2024-03-05
Payer: COMMERCIAL

## 2024-03-05 DIAGNOSIS — F43.23 ADJUSTMENT DISORDER WITH MIXED ANXIETY AND DEPRESSED MOOD: Primary | ICD-10-CM

## 2024-03-05 PROCEDURE — 90834 PSYTX W PT 45 MINUTES: CPT

## 2024-03-05 NOTE — BH TREATMENT PLAN
"Outpatient Behavioral Health Psychotherapy Treatment Plan    Ramirez Cerrato  1960     Date of Initial Psychotherapy Assessment:  7/11/23  Date of Current Treatment Plan: 03/05/24  Treatment Plan Target Date: 10/5/24  Treatment Plan Expiration Date: 10/5/24    Diagnosis:   1. Adjustment disorder with mixed anxiety and depressed mood            Area(s) of Need: Unresolved anxiety from past work stress    Long Term Goal 1 (in the client's own words): \"I would like to be able to talk about this and not feel as anxious\"    Stage of Change: Preparation    Target Date for completion: 10/5/24     Anticipated therapeutic modalities: Gestalt, CBT, client centered therapy     People identified to complete this goal: Ramirez therapist      Objective 1: (identify the means of measuring success in meeting the objective): Ramirez will verbalize 3 ways past work stress negatively impacts him presently      Objective 2: (identify the means of measuring success in meeting the objective): Ramirez will identify and apply 1 relaxing exercise each day         I am currently under the care of a [facility] psychiatric provider: no    My [facility] psychiatric provider is: NA    I am currently taking psychiatric medications: No    I feel that I will be ready for discharge from mental health care when I reach the following (measurable goal/objective): Level of anxiety has decreased    For children and adults who have a legal guardian:   Has there been any change to custody orders and/or guardianship status? NA. If yes, attach updated documentation.    I have created my Crisis Plan and have been offered a copy of this plan    Behavioral Health Treatment Plan [facility]: Diagnosis and Treatment Plan explained to Ramirez Cerrato acknowledges an understanding of their diagnosis. Ramirez Cerrato agrees to this treatment plan.    I have been offered a copy of this Treatment Plan. yes        "

## 2024-03-05 NOTE — PSYCH
"Behavioral Health Psychotherapy Progress Note    Psychotherapy Provided: Individual Psychotherapy     1. Adjustment disorder with mixed anxiety and depressed mood            Goals addressed in session: Goal 1     DATA: Ramirez developed new treatment plan goal, specifically, addressing an issue he had with his previous employer in 2006, which went on for many years, before he changed jobs. 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 It still bothers him. Clt talked about the importance of morals and his ary, and how the corruption changed his beliefs (in a positive way). Clt added that things continue to go well with his wife; however, he did tell her it is almost the year anniversary since her affair. His wife had asked him what was bothering him this week and he was open with her as to why. No concerns about this overall.   During this session, this clinician used the following therapeutic modalities: Engagement Strategies, Client-centered Therapy, and Supportive Psychotherapy    Substance Abuse was not addressed during this session. If the client is diagnosed with a co-occurring substance use disorder, please indicate any changes in the frequency or amount of use:   . Stage of change for addressing substance use diagnoses: No substance use/Not applicable    ASSESSMENT:  Ramirez Cerrato presents with a Euthymic/ normal and Anxious mood.     his affect is Normal range and intensity and Constricted, which is congruent, with his mood and the content of the session. The client has made progress on their goals.       Ramirez Cerrato presents with a none risk of suicide, none risk of self-harm, and none risk of harm to others.    For any risk assessment that surpasses a \"low\" rating, a safety plan must be developed.    A safety plan was indicated: no  If yes, describe in detail       PLAN: Between sessions, Ramirez CLARK" Saqib will reflect upon how his past job impacts him presently. At the next session, the therapist will use Engagement Strategies, Client-centered Therapy, Cognitive Behavioral Therapy, and Supportive Psychotherapy to address anxiety.    Behavioral Health Treatment Plan and Discharge Planning: Ramirez EDUARDO Cerrato is aware of and agrees to continue to work on their treatment plan. They have identified and are working toward their discharge goals. yes    Visit start and stop times:    03/05/24  Start Time: 0600  Stop Time: 0649  Total Visit Time: 49 minutes

## 2024-03-14 DIAGNOSIS — E78.2 MIXED HYPERLIPIDEMIA: ICD-10-CM

## 2024-03-14 RX ORDER — ATORVASTATIN CALCIUM 20 MG/1
TABLET, FILM COATED ORAL
Qty: 90 TABLET | Refills: 1 | Status: SHIPPED | OUTPATIENT
Start: 2024-03-14

## 2024-03-18 ENCOUNTER — TELEPHONE (OUTPATIENT)
Dept: PSYCHIATRY | Facility: CLINIC | Age: 64
End: 2024-03-18

## 2024-03-18 NOTE — TELEPHONE ENCOUNTER
Patient is calling regarding cancelling an appointment.    Date/Time: 3/19/2024 @ 6 pm    Reason: Pt has a flight out for work at 2 am.  Doesn't want to reschedule for earlier appointment or when he get back.      Patient was rescheduled: YES [] NO [x]  If yes, when was Patient reschedule for:     Patient requesting call back to reschedule: YES [] NO [x]

## 2024-03-28 ENCOUNTER — TELEPHONE (OUTPATIENT)
Dept: NEPHROLOGY | Facility: CLINIC | Age: 64
End: 2024-03-28

## 2024-04-01 DIAGNOSIS — E11.49 DIABETES MELLITUS TYPE 2 WITH NEUROLOGICAL MANIFESTATIONS (HCC): ICD-10-CM

## 2024-04-01 RX ORDER — GLIPIZIDE 10 MG/1
TABLET, FILM COATED, EXTENDED RELEASE ORAL
Qty: 60 TABLET | Refills: 5 | Status: SHIPPED | OUTPATIENT
Start: 2024-04-01

## 2024-04-02 ENCOUNTER — SOCIAL WORK (OUTPATIENT)
Dept: BEHAVIORAL/MENTAL HEALTH CLINIC | Facility: CLINIC | Age: 64
End: 2024-04-02
Payer: COMMERCIAL

## 2024-04-02 DIAGNOSIS — F43.23 ADJUSTMENT DISORDER WITH MIXED ANXIETY AND DEPRESSED MOOD: Primary | ICD-10-CM

## 2024-04-02 PROCEDURE — 90834 PSYTX W PT 45 MINUTES: CPT

## 2024-04-03 NOTE — PSYCH
Behavioral Health Psychotherapy Progress Note    Psychotherapy Provided: Individual Psychotherapy     1. Adjustment disorder with mixed anxiety and depressed mood            Goals addressed in session: Goal 1     DATA: Ramirez followed up more about last session's discussion regarding work. He then spoke about relationship with daughter and concerns about her, as she is living alone with her child and not working. Clt explained how he wast his children to be, wants them to be successful. We spoke about his wants versus what his children want, writer pointed out that what he wants may not alight with what they want. Clt believes his dtr is not really happy. Clt explained he has supported her and his son Ramirez as best he could but is aware that he needs to set boundaries and works towards doing so. Clt had no concerns at this time regarding his relationship with his wife, says things are great and he trusts her; however, clt did mention he sometimes does not trust her - for example, when she got together with old friends and was with a male friend. Writer pointed out contradictory statements. Clt will reflect on this.   During this session, this clinician used the following therapeutic modalities: Engagement Strategies, Client-centered Therapy, Family Therapy, and Supportive Psychotherapy    Substance Abuse was not addressed during this session. If the client is diagnosed with a co-occurring substance use disorder, please indicate any changes in the frequency or amount of use:   . Stage of change for addressing substance use diagnoses: No substance use/Not applicable    ASSESSMENT:  Ramirez Cerrato presents with a Euthymic/ normal and Anxious mood.     his affect is Normal range and intensity and Constricted, which is congruent, with his mood and the content of the session. The client has made progress on their goals.       Ramirez Cerrato presents with a none risk of suicide, none risk of self-harm, and none risk of  "harm to others.    For any risk assessment that surpasses a \"low\" rating, a safety plan must be developed.    A safety plan was indicated: no  If yes, describe in detail       PLAN: Between sessions, Ramirez Cerrato will reflect upon his anger and level of trust in his wife. At the next session, the therapist will use Engagement Strategies, Client-centered Therapy, and Supportive Psychotherapy to address emotions, relationship dynamics with family.    Behavioral Health Treatment Plan and Discharge Planning: Ramirez Cerrato is aware of and agrees to continue to work on their treatment plan. They have identified and are working toward their discharge goals. yes    Visit start and stop times:    04/03/24  Start Time: 0555  Stop Time: 0640  Total Visit Time: 45 minutes  "

## 2024-04-08 DIAGNOSIS — E11.22 TYPE 2 DIABETES MELLITUS WITH STAGE 3A CHRONIC KIDNEY DISEASE AND HYPERTENSION (HCC): ICD-10-CM

## 2024-04-08 DIAGNOSIS — N18.31 TYPE 2 DIABETES MELLITUS WITH STAGE 3A CHRONIC KIDNEY DISEASE AND HYPERTENSION (HCC): ICD-10-CM

## 2024-04-08 DIAGNOSIS — I12.9 TYPE 2 DIABETES MELLITUS WITH STAGE 3A CHRONIC KIDNEY DISEASE AND HYPERTENSION (HCC): ICD-10-CM

## 2024-04-08 RX ORDER — INSULIN GLARGINE 100 [IU]/ML
10 INJECTION, SOLUTION SUBCUTANEOUS
Qty: 15 ML | Refills: 0 | Status: SHIPPED | OUTPATIENT
Start: 2024-04-08

## 2024-04-12 ENCOUNTER — TELEPHONE (OUTPATIENT)
Dept: RHEUMATOLOGY | Facility: CLINIC | Age: 64
End: 2024-04-12

## 2024-04-12 NOTE — TELEPHONE ENCOUNTER
Called and left message for patient to call back and reschedule 5/8 appt. Provider will not be in office.     Can bring him in next week any of the openings or Tuesday 4/23 there is an opening.

## 2024-04-16 ENCOUNTER — TELEPHONE (OUTPATIENT)
Dept: PSYCHIATRY | Facility: CLINIC | Age: 64
End: 2024-04-16

## 2024-04-19 DIAGNOSIS — I10 BENIGN ESSENTIAL HYPERTENSION: ICD-10-CM

## 2024-04-19 RX ORDER — AMLODIPINE BESYLATE 10 MG/1
10 TABLET ORAL DAILY
Qty: 90 TABLET | Refills: 1 | Status: SHIPPED | OUTPATIENT
Start: 2024-04-19

## 2024-04-26 DIAGNOSIS — I10 BENIGN ESSENTIAL HYPERTENSION: ICD-10-CM

## 2024-04-27 RX ORDER — OLMESARTAN MEDOXOMIL 20 MG/1
TABLET ORAL
Qty: 180 TABLET | Refills: 1 | Status: SHIPPED | OUTPATIENT
Start: 2024-04-27

## 2024-04-27 NOTE — TELEPHONE ENCOUNTER
Apt made  But Loulou Gill will need a blood work order    Please call when ready for    Arturo Wang    Thank you Medication: metformin (GLUCOPHAGE) 1000 MG tablet   Medication refill denied due to duplicate request.

## 2024-04-30 ENCOUNTER — SOCIAL WORK (OUTPATIENT)
Dept: BEHAVIORAL/MENTAL HEALTH CLINIC | Facility: CLINIC | Age: 64
End: 2024-04-30

## 2024-04-30 DIAGNOSIS — F43.23 ADJUSTMENT DISORDER WITH MIXED ANXIETY AND DEPRESSED MOOD: Primary | ICD-10-CM

## 2024-04-30 NOTE — PSYCH
"Behavioral Health Psychotherapy Progress Note    Psychotherapy Provided: Individual Psychotherapy     1. Adjustment disorder with mixed anxiety and depressed mood            Goals addressed in session: Goal 1     DATA: marriage much better, able to go out be more active, relieves stress due to work. Feels doing much better, asked what I though, encourgaed him to call as needed agreed he is doing well . Reviewed updated tx plan from March, added that he feels improving here but is confident he will likely need to reach out at some time, as there are things that my arise. He is open to couples therpy, wife is not.   During this session, this clinician used the following therapeutic modalities: Engagement Strategies, Client-centered Therapy, and Supportive Psychotherapy    Substance Abuse was not addressed during this session. If the client is diagnosed with a co-occurring substance use disorder, please indicate any changes in the frequency or amount of use:   . Stage of change for addressing substance use diagnoses: No substance use/Not applicable    ASSESSMENT:  Ramirez Cerrato presents with a Euthymic/ normal mood.     his affect is Normal range and intensity, which is congruent, with his mood and the content of the session. The client has made progress on their goals.       Ramirez Cerrato presents with a none risk of suicide, none risk of self-harm, and none risk of harm to others.    For any risk assessment that surpasses a \"low\" rating, a safety plan must be developed.    A safety plan was indicated: no  If yes, describe in detail       PLAN: Between sessions, Ramirez Cerrato will contact writer for future appts. At the next session, the therapist will use Engagement Strategies and Client-centered Therapy to address new concerns.    Behavioral Health Treatment Plan and Discharge Planning: Ramirez Cerrato is aware of and agrees to continue to work on their treatment plan. They have identified and are " working toward their discharge goals. yes    Visit start and stop times:    05/01/24  Start Time: 0600  Stop Time: 0645  Total Visit Time: 45 minutes

## 2024-05-10 ENCOUNTER — OFFICE VISIT (OUTPATIENT)
Dept: FAMILY MEDICINE CLINIC | Facility: CLINIC | Age: 64
End: 2024-05-10
Payer: COMMERCIAL

## 2024-05-10 VITALS
DIASTOLIC BLOOD PRESSURE: 72 MMHG | SYSTOLIC BLOOD PRESSURE: 140 MMHG | BODY MASS INDEX: 37.33 KG/M2 | HEART RATE: 72 BPM | TEMPERATURE: 95 F | HEIGHT: 69 IN | WEIGHT: 252 LBS | RESPIRATION RATE: 16 BRPM

## 2024-05-10 DIAGNOSIS — N18.31 TYPE 2 DIABETES MELLITUS WITH STAGE 3A CHRONIC KIDNEY DISEASE AND HYPERTENSION (HCC): ICD-10-CM

## 2024-05-10 DIAGNOSIS — I10 BENIGN ESSENTIAL HYPERTENSION: ICD-10-CM

## 2024-05-10 DIAGNOSIS — I12.9 TYPE 2 DIABETES MELLITUS WITH STAGE 3A CHRONIC KIDNEY DISEASE AND HYPERTENSION (HCC): ICD-10-CM

## 2024-05-10 DIAGNOSIS — E11.22 TYPE 2 DIABETES MELLITUS WITH STAGE 3A CHRONIC KIDNEY DISEASE AND HYPERTENSION (HCC): ICD-10-CM

## 2024-05-10 DIAGNOSIS — J01.90 ACUTE SINUSITIS, RECURRENCE NOT SPECIFIED, UNSPECIFIED LOCATION: Primary | ICD-10-CM

## 2024-05-10 PROCEDURE — 99214 OFFICE O/P EST MOD 30 MIN: CPT | Performed by: NURSE PRACTITIONER

## 2024-05-10 RX ORDER — AZITHROMYCIN 250 MG/1
TABLET, FILM COATED ORAL
Qty: 6 TABLET | Refills: 0 | Status: SHIPPED | OUTPATIENT
Start: 2024-05-10 | End: 2024-05-15

## 2024-05-10 NOTE — PATIENT INSTRUCTIONS
Azithromycin (By mouth)   Azithromycin ([id number])  Treats infections. This medicine is a macrolide antibiotic.   Brand Name(s): Zithromax, Zithromax Tri-Tree, Zithromax Z-Tree, Zmax   There may be other brand names for this medicine.  When This Medicine Should Not Be Used:   This medicine is not right for everyone. Do not use it if you had an allergic reaction to azithromycin, erythromycin, or similar medicines, or if you have a history of liver problems caused by azithromycin.  How to Use This Medicine:   Capsule, Liquid, Packet, Powder, Tablet  Your doctor will tell you how much medicine to use. Do not use more than directed.  Take all of the medicine in your prescription to clear up your infection, even if you feel better after the first few doses.  Multiple dose (Zithromax® oral liquid or tablets):   You may take this medicine with or without food.  Shake the bottle well before you measure the medicine. Measure the oral liquid medicine with a marked measuring spoon, oral syringe, or medicine cup.  Single dose (Zmax® extended-release oral liquid or powder):   Liquid:   Take this medicine on an empty stomach at least 1 hour before you eat, or 2 hours after you eat.  Call your doctor right away if you vomit within 1 hour after you take the medicine.  You must take the liquid within 12 hours after the pharmacist gives it to you.  Shake the bottle well before you measure the medicine. Measure your dose with a marked measuring spoon, cup, or syringe.  Powder:   Open 1 packet and pour all of the medicine into a glass with about 2 ounces (¼ cup) of water. Mix well and drink the medicine right away. Pour another 2 ounces of water into the same glass, and drink the remaining medicine.  Read and follow the patient instructions that come with this medicine. Talk to your doctor or pharmacist if you have any questions.  Missed dose: If you are taking multiple doses, take the dose as soon as you remember. If it is  almost time for your next dose, wait until then to take a regular dose. Do not use extra medicine to make up for a missed dose.  Store the medicine in a closed container at room temperature, away from heat, moisture, and direct light.  Extended-release oral liquid: Do not refrigerate or freeze.  Oral liquid for 1 dose only: Store at room temperature. Do not store in the refrigerator or allow the medicine to freeze.  Oral liquid for multiple doses: Store at room temperature or in the refrigerator. Use it within 10 days of filling the prescription.  Drugs and Foods to Avoid:   Ask your doctor or pharmacist before using any other medicine, including over-the-counter medicines, vitamins, and herbal products.  Some medicines can affect how azithromycin works. Tell your doctor if you are also using any of the following:  Colchicine, cyclosporine, digoxin, nelfinavir, phenytoin  Blood thinner (including warfarin)  Ergot medicine  Medicine for a heart rhythm problem (including amiodarone, dofetilide, procainamide, quinidine, sotalol)  Zithromax® for multiple doses: Do not take an antacid that contains magnesium or aluminum at the same time you take Zithromax®. An antacid will affect how the medicine works. Antacids will not affect Zmax® for single dose.  Warnings While Using This Medicine:   Tell your doctor if you are pregnant or breastfeeding, or if you have kidney disease, liver disease, heart disease, heart rhythm problems, heart failure, or myasthenia gravis. Tell your doctor if anyone in your family has heart rhythm problems.  This medicine may cause the following problems:   Serious skin reactions, including Mccullough-Horacio syndrome, acute generalized exanthematous pustulosis, toxic epidermal necrolysis, and drug reaction with eosinophilia and systemic symptoms (DRESS)  Liver problems  Infantile hypertrophic pyloric stenosis  Heart rhythm problems, including QT prolongation  Increased risk of serious heart or blood  vessel problems  This medicine can cause diarrhea. Call your doctor if the diarrhea becomes severe, does not stop, or is bloody. Do not take any medicine to stop diarrhea until you have talked to your doctor. Diarrhea can occur 2 months or more after you stop taking this medicine. It may occur 2 months or more after you stop using this medicine.  Call your doctor if your symptoms do not improve or if they get worse.  Your doctor will do lab tests at regular visits to check on the effects of this medicine. Keep all appointments.  Keep all medicine out of the reach of children. Never share your medicine with anyone.  Possible Side Effects While Using This Medicine:   Call your doctor right away if you notice any of these side effects:  Allergic reaction: Itching or hives, swelling in your face or hands, swelling or tingling in your mouth or throat, chest tightness, trouble breathing  Blistering, peeling, red skin rash  Dark urine, pale stools, nausea, vomiting, loss of appetite, stomach pain, yellow skin or eyes  Fainting, dizziness, lightheadedness  Fast, pounding, or uneven heartbeat, blurred vision, chest pain, trouble breathing, tiredness or weakness  Feeling irritable or vomits after feeding (in babies)  Severe diarrhea that may contain blood, stomach cramps, fever  If you notice these less serious side effects, talk with your doctor:   Mild diarrhea, nausea, vomiting, stomach pain  If you notice other side effects that you think are caused by this medicine, tell your doctor.   Call your doctor for medical advice about side effects. You may report side effects to FDA at 2-263-FDA-5761  © Copyright [employer] 2023 Information is for End User's use only and may not be sold, redistributed or otherwise used for commercial purposes.  The above information is an  only. It is not intended as medical advice for individual conditions or treatments. Talk to your doctor, nurse or pharmacist before following any  medical regimen to see if it is safe and effective for you.

## 2024-05-10 NOTE — PROGRESS NOTES
"Assessment/Plan:    1. Acute sinusitis, recurrence not specified, unspecified location  -     azithromycin (ZITHROMAX) 250 mg tablet; Take 500mg on day 1, 250mg on days 2-5    2. Type 2 diabetes mellitus with stage 3a chronic kidney disease and hypertension (HCC)  Assessment & Plan:  Complaint with current regimen   Lab Results   Component Value Date    HGBA1C 6.0 (H) 07/18/2023       3. Benign essential hypertension  Assessment & Plan:  Stable and advised not to use sudafed              Patient Instructions:  Take medication with food.  It is important that you take the entire course of antibiotics prescribed.  May also take a probiotic of your choice to maintain healthy GI chanda.    Can take some probiotic and yogurt with the medication.  Supportive care discussed and advised.  Advised to RTO for any worsening and no improvement.   Follow up for no improvement and worsening of conditions.  Patient advised and educated when to see immediate medical care.    Return if symptoms worsen or fail to improve.      Future Appointments   Date Time Provider Department Center   5/20/2024  5:45 PM Aline Whatley MD [facility]TH[facility]   6/6/2024  8:15 AM Mi Verdin DO [facility]   7/18/2024  7:20 AM Lauren Garcia MD Rheum [facility]   8/12/2024  8:00 AM Aman Ann MD [facility]           Subjective:      Patient ID: Murtaza Cerrato is a 63 y.o. male.    Chief Complaint   Patient presents with   • Cough   • Sinus Problem     Started one week ago [id number]         Vitals:  /72   Pulse 72   Temp (!) 95 °F (35 °C)   Resp 16   Ht 5' 9\" (1.753 m)   Wt 114 kg (252 lb)   BMI 37.21 kg/m²     Patient stated that started with congestion couple of days and progressed to sinus pressure and cough. Denies fever, chills and sob.   Complaint with medications for chronic illnesses and tolerating it well    Cough  Pertinent negatives include no chills, ear pain, fever, headaches, postnasal drip, " rhinorrhea, sore throat, shortness of breath or wheezing.   Sinus Problem  Associated symptoms include congestion, coughing and sinus pressure. Pertinent negatives include no chills, diaphoresis, ear pain, headaches, shortness of breath, sneezing or sore throat.               PHQ-2/9 Depression Screening    Little interest or pleasure in doing things: 0 - not at all  Feeling down, depressed, or hopeless: 0 - not at all  Trouble falling or staying asleep, or sleeping too much: 0 - not at all  Feeling tired or having little energy: 0 - not at all  Poor appetite or overeatin - not at all  Feeling bad about yourself - or that you are a failure or have let yourself or your family down: 0 - not at all  Trouble concentrating on things, such as reading the newspaper or watching television: 0 - not at all  Moving or speaking so slowly that other people could have noticed. Or the opposite - being so fidgety or restless that you have been moving around a lot more than usual: 0 - not at all  Thoughts that you would be better off dead, or of hurting yourself in some way: 0 - not at all  PHQ-9 Score: 0  PHQ-9 Interpretation: No or Minimal depression             The following portions of the patient's history were reviewed and updated as appropriate: allergies, current medications, past family history, past medical history, past social history, past surgical history and problem list.      Review of Systems   Constitutional:  Negative for chills, diaphoresis, fatigue, fever and unexpected weight change.   HENT:  Positive for congestion and sinus pressure. Negative for dental problem, drooling, ear discharge, ear pain, facial swelling, hearing loss, mouth sores, nosebleeds, postnasal drip, rhinorrhea, sinus pain, sneezing, sore throat, tinnitus, trouble swallowing and voice change.    Respiratory:  Positive for cough. Negative for chest tightness, shortness of breath and wheezing.    Cardiovascular: Negative.     Gastrointestinal:  Negative for abdominal pain, constipation, diarrhea, nausea and vomiting.   Musculoskeletal: Negative.    Skin: Negative.    Neurological:  Negative for dizziness, light-headedness and headaches.         Objective:    Social History     Tobacco Use   Smoking Status Former   • Current packs/day: 0.00   • Average packs/day: 1 pack/day for 28.0 years (28.0 ttl pk-yrs)   • Types: Cigarettes   • Start date:    • Quit date:    • Years since quittin.3   • Passive exposure: Past   Smokeless Tobacco Never   Tobacco Comments    Former smoker       Allergies:   Allergies   Allergen Reactions   • Latex Rash     At dentist, lips swollen    • Codeine Nausea Only     Reaction Date: 2005;          Current Outpatient Medications   Medication Sig Dispense Refill   • acetaminophen (TYLENOL) 650 mg CR tablet Take 1 tablet (650 mg total) by mouth every 8 (eight) hours as needed for mild pain 30 tablet 0   • ALPRAZolam (XANAX) 0.25 mg tablet Take 1 tablet (0.25 mg total) by mouth daily at bedtime as needed for anxiety 14 tablet 0   • amLODIPine (NORVASC) 10 mg tablet TAKE ONE TABLET BY MOUTH EVERY DAY 90 tablet 1   • aspirin (ECOTRIN LOW STRENGTH) 81 mg EC tablet Take 81 mg by mouth daily     • atorvastatin (LIPITOR) 20 mg tablet TAKE ONE TABLET BY MOUTH EVERY DAY 90 tablet 1   • azithromycin (ZITHROMAX) 250 mg tablet Take 500mg on day 1, 250mg on days 2-5 6 tablet 0   • Blood Glucose Monitoring Suppl (OneTouch Verio) w/Device KIT Test blood sugar twice a day 1 kit 0   • cholecalciferol (VITAMIN D3) 1,000 units tablet Take 1,000 Units by mouth daily     • Continuous Blood Gluc  (FreeStyle Ulysses 14 Day [place]) SHREE Use     • Continuous Blood Gluc Sensor (FreeStyle Ulysses 14 Day Sensor) MISC Change sensor every 14 days 2 each 12   • Empagliflozin (Jardiance) 10 MG TABS tablet Take 1 tablet (10 mg total) by mouth every morning 30 tablet 3   • gabapentin (NEURONTIN) 100 mg capsule TAKE ONE  CAPSULE BY MOUTH FOUR TIMES A  capsule 0   • glipiZIDE (GLUCOTROL XL) 10 mg 24 hr tablet TAKE TWO TABLETS BY MOUTH EVERY DAY AT BEDTIME 60 tablet 5   • glucose blood (OneTouch Verio) test strip Test blood sugar twice a day 200 each 3   • hydroxychloroquine (PLAQUENIL) 200 mg tablet TAKE ONE TABLET BY MOUTH TWICE A DAY WITH MEALS (GENERIC FOR PLAQUENIL) 180 tablet 1   • Insulin Glargine Solostar (Lantus SoloStar) 100 UNIT/ML SOPN INJECT 10 UNITS UNDER THE SKIN DAILY AT BEDTIME 15 mL 0   • Insulin Pen Needle (B-D ULTRAFINE III SHORT PEN) 31G X 8 MM MISC USE AS DIRECTED 200 each 0   • metFORMIN (GLUCOPHAGE) 500 mg tablet TAKE TWO TABLETS BY MOUTH TWICE A DAY WITH MEALS (GENERIC FOR GLUCOPHAGE) 360 tablet 3   • olmesartan (BENICAR) 20 mg tablet TAKE TWO TABLETS BY MOUTH EVERY  tablet 1   • OneTouch Delica Lancets 33G MISC Test blood sugar twice a day 200 each 3   • tamsulosin (FLOMAX) 0.4 mg 0.4 mg daily     • ergocalciferol (ERGOCALCIFEROL) 1.25 MG (46176 UT) capsule Take 1 capsule (50,000 Units total) by mouth once a week for 12 doses (Patient not taking: Reported on 5/10/2024) 12 capsule 0   • mupirocin (BACTROBAN) 2 % ointment Apply topically 2 (two) times a day (Patient not taking: Reported on 10/17/2023) 22 g 0     No current facility-administered medications for this visit.          Physical Exam  Vitals reviewed.   Constitutional:       Appearance: Normal appearance. He is well-developed.   HENT:      Head: Normocephalic.      Right Ear: Tympanic membrane, ear canal and external ear normal.      Left Ear: Tympanic membrane, ear canal and external ear normal.      Nose: Mucosal edema present.      Right Sinus: Maxillary sinus tenderness present. No frontal sinus tenderness.      Left Sinus: Maxillary sinus tenderness present. No frontal sinus tenderness.      Mouth/Throat:      Mouth: No oral lesions.      Pharynx: No oropharyngeal exudate or posterior oropharyngeal erythema.   Cardiovascular:       Rate and Rhythm: Normal rate and regular rhythm.      Heart sounds: Normal heart sounds.   Pulmonary:      Effort: Pulmonary effort is normal.      Breath sounds: Normal breath sounds.   Musculoskeletal:         General: Normal range of motion.      Cervical back: Neck supple.   Lymphadenopathy:      Cervical:      Right cervical: No superficial or posterior cervical adenopathy.     Left cervical: No superficial or posterior cervical adenopathy.   Skin:     General: Skin is warm and dry.   Neurological:      Mental Status: He is alert and oriented to person, place, and time.   Psychiatric:         Behavior: Behavior normal.         Thought Content: Thought content normal.         Judgment: Judgment normal.                     SERA Walters

## 2024-05-10 NOTE — ASSESSMENT & PLAN NOTE
Complaint with current regimen   Lab Results   Component Value Date    HGBA1C 6.0 (H) 07/18/2023

## 2024-05-20 ENCOUNTER — OFFICE VISIT (OUTPATIENT)
Dept: OBGYN CLINIC | Facility: CLINIC | Age: 64
End: 2024-05-20
Payer: COMMERCIAL

## 2024-05-20 VITALS
DIASTOLIC BLOOD PRESSURE: 78 MMHG | HEIGHT: 69 IN | SYSTOLIC BLOOD PRESSURE: 122 MMHG | BODY MASS INDEX: 35.1 KG/M2 | HEART RATE: 70 BPM | WEIGHT: 237 LBS

## 2024-05-20 DIAGNOSIS — M65.4 DE QUERVAIN'S TENOSYNOVITIS, RIGHT: ICD-10-CM

## 2024-05-20 DIAGNOSIS — Z98.890 S/P CARPAL TUNNEL RELEASE: Primary | ICD-10-CM

## 2024-05-20 DIAGNOSIS — G56.01 CARPAL TUNNEL SYNDROME ON RIGHT: ICD-10-CM

## 2024-05-20 PROCEDURE — 99213 OFFICE O/P EST LOW 20 MIN: CPT | Performed by: STUDENT IN AN ORGANIZED HEALTH CARE EDUCATION/TRAINING PROGRAM

## 2024-05-20 PROCEDURE — 20550 NJX 1 TENDON SHEATH/LIGAMENT: CPT | Performed by: STUDENT IN AN ORGANIZED HEALTH CARE EDUCATION/TRAINING PROGRAM

## 2024-05-20 RX ADMIN — BETAMETHASONE SODIUM PHOSPHATE AND BETAMETHASONE ACETATE 3 MG: 3; 3 INJECTION, SUSPENSION INTRA-ARTICULAR; INTRALESIONAL; INTRAMUSCULAR; SOFT TISSUE at 17:45

## 2024-05-20 RX ADMIN — LIDOCAINE HYDROCHLORIDE 1 ML: 10 INJECTION, SOLUTION INFILTRATION; PERINEURAL at 17:45

## 2024-05-20 NOTE — PROGRESS NOTES
ASSESSMENT/PLAN:    Assessment:   Right DeQuervain's syndrome  S/P left carpal tunnel release   Right carpal tunnel     Plan:   Repeat De Quervain's corticosteroid injection offered, accepted, and administered in clinic today. Procedure tolerated well, post injection protocol advised.   Discussed carpal tunnel release and De Quervain's   Patient will see how much symptomatic relief he gets from today's injection    Wants to have his Right CTR performed in the fall     Follow Up:  With Dr Salinas when ready to discuss surgery    To Do Next Visit:  Repeat evaluation  _____________________________________________________  CHIEF COMPLAINT:  Chief Complaint   Patient presents with   • Right Wrist - Pain         SUBJECTIVE:  Murtaza Cerrato is a 63 y.o. male who presents for follow up evaluation of right wrist pain and numbness and tingling. Patient has De Quervain's and carpal tunnel he was treated with a CSI for his De Quervain's at his last visit with benefit. His activity related thumb pain has returned. Patient notes numbness and tingling to the thumb, long and index fingers with nocturnal symptoms.       PAST MEDICAL HISTORY:  Past Medical History:   Diagnosis Date   • Arthritis    • Carpal tunnel syndrome    • Chronic kidney disease     Stage 2 CKD   • Corneal abrasion     last assessed - 18Nov2012   • Diabetes mellitus (HCC)    • Gout    • Hiatal hernia    • Hyperlipidemia    • Hypertension    • Impaired fasting glucose     last assessed - 05Jun2006   • Kidney stone     last stone 2009   • Lumbar spondylosis 10/31/2021   • Lumbar spondylosis    • Numbness of fingers of both hands    • PONV (postoperative nausea and vomiting)    • Psoriasis    • RA (rheumatoid arthritis) ([facility])    • 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[facility])    • Sinus infection     currently under tx PO ABT etc as of 04/17/19   • Stage 2 chronic kidney disease    • Viremia     Resolved - 22Jan2018       PAST SURGICAL HISTORY:  Past  Surgical History:   Procedure Laterality Date   • ANKLE SURGERY Right     tendon arthrotomy   • ARTHROTOMY ANKLE     • BACK SURGERY  2014    lumbar laminectomy   • CARPAL TUNNEL RELEASE Left    • COLONOSCOPY     • COLONOSCOPY N/A 2019    Procedure: COLONOSCOPY;  Surgeon: Toña Aly MD;  Location: [facility] GI [facility];  Service: Gastroenterology   • FINGER SURGERY Bilateral     every finger contracture release over a 10 year period trigger finger   • INCISION TENDON SHEATH      of a finger   • KNEE SURGERY Right     x2 arthrotomy   • KS ARTHRP KNE CONDYLE&PLATU MEDIAL&LAT COMPARTMENTS Right 2021    Procedure: ARTHROPLASTY KNEE TOTAL W ROBOT;  Surgeon: Marty Thomas DO;  Location: [facility];  Service: Orthopedics   • KS NEUROPLASTY &/TRANSPOS MEDIAN NRV CARPAL TUNNE Left 10/26/2023    Procedure: RELEASE CARPAL TUNNEL;  Surgeon: Aline Whatley MD;  Location: [facility];  Service: Orthopedics   • SHOULDER SURGERY Left     x1 staples, tendon repair secondary to many spontaneous dislocations       FAMILY HISTORY:  Family History   Problem Relation Age of Onset   • Hypertension Mother    • Arthritis Mother    • Hyperlipidemia Mother    • Lung cancer Mother    • Stroke Maternal Grandfather    • Diabetes Cousin    • COPD Father    • Depression Family    • Diabetes Family    • No Known Problems Sister    • No Known Problems Brother    • No Known Problems Maternal Aunt    • No Known Problems Maternal Uncle    • No Known Problems Paternal Aunt    • No Known Problems Paternal Uncle    • No Known Problems Maternal Grandmother    • No Known Problems Paternal Grandmother    • No Known Problems Paternal Grandfather        SOCIAL HISTORY:  Social History     Tobacco Use   • Smoking status: Former     Current packs/day: 0.00     Average packs/day: 1 pack/day for 28.0 years (28.0 ttl pk-yrs)     Types: Cigarettes     Start date:      Quit date: 2000     Years since quittin.4     Passive exposure: Past   •  Smokeless tobacco: Never   • Tobacco comments:     Former smoker   Vaping Use   • Vaping status: Never Used   Substance Use Topics   • Alcohol use: Yes     Comment: rare, once a month    • Drug use: No       MEDICATIONS:    Current Outpatient Medications:   •  acetaminophen (TYLENOL) 650 mg CR tablet, Take 1 tablet (650 mg total) by mouth every 8 (eight) hours as needed for mild pain, Disp: 30 tablet, Rfl: 0  •  ALPRAZolam (XANAX) 0.25 mg tablet, Take 1 tablet (0.25 mg total) by mouth daily at bedtime as needed for anxiety, Disp: 14 tablet, Rfl: 0  •  amLODIPine (NORVASC) 10 mg tablet, TAKE ONE TABLET BY MOUTH EVERY DAY, Disp: 90 tablet, Rfl: 1  •  aspirin (ECOTRIN LOW STRENGTH) 81 mg EC tablet, Take 81 mg by mouth daily, Disp: , Rfl:   •  atorvastatin (LIPITOR) 20 mg tablet, TAKE ONE TABLET BY MOUTH EVERY DAY, Disp: 90 tablet, Rfl: 1  •  Blood Glucose Monitoring Suppl (OneTouch Verio) w/Device KIT, Test blood sugar twice a day, Disp: 1 kit, Rfl: 0  •  cholecalciferol (VITAMIN D3) 1,000 units tablet, Take 1,000 Units by mouth daily, Disp: , Rfl:   •  Continuous Blood Gluc  (FreeStyle Ulysses 14 Day [place]) SHREE, Use, Disp: , Rfl:   •  Continuous Blood Gluc Sensor (FreeStyle Ulysses 14 Day Sensor) MISC, Change sensor every 14 days, Disp: 2 each, Rfl: 12  •  Empagliflozin (Jardiance) 10 MG TABS tablet, Take 1 tablet (10 mg total) by mouth every morning, Disp: 30 tablet, Rfl: 3  •  gabapentin (NEURONTIN) 100 mg capsule, TAKE ONE CAPSULE BY MOUTH FOUR TIMES A DAY, Disp: 120 capsule, Rfl: 2  •  glipiZIDE (GLUCOTROL XL) 10 mg 24 hr tablet, TAKE TWO TABLETS BY MOUTH EVERY DAY AT BEDTIME, Disp: 60 tablet, Rfl: 5  •  glucose blood (OneTouch Verio) test strip, Test blood sugar twice a day, Disp: 200 each, Rfl: 3  •  hydroxychloroquine (PLAQUENIL) 200 mg tablet, TAKE ONE TABLET BY MOUTH TWICE A DAY WITH MEALS (GENERIC FOR PLAQUENIL), Disp: 180 tablet, Rfl: 1  •  Insulin Glargine Solostar (Lantus SoloStar) 100 UNIT/ML SOPN,  "INJECT 10 UNITS UNDER THE SKIN DAILY AT BEDTIME, Disp: 15 mL, Rfl: 0  •  Insulin Pen Needle (B-D ULTRAFINE III SHORT PEN) 31G X 8 MM MISC, USE AS DIRECTED, Disp: 200 each, Rfl: 0  •  metFORMIN (GLUCOPHAGE) 500 mg tablet, TAKE TWO TABLETS BY MOUTH TWICE A DAY WITH MEALS (GENERIC FOR GLUCOPHAGE), Disp: 360 tablet, Rfl: 3  •  olmesartan (BENICAR) 20 mg tablet, TAKE TWO TABLETS BY MOUTH EVERY DAY, Disp: 180 tablet, Rfl: 1  •  OneTouch Delica Lancets 33G MISC, Test blood sugar twice a day, Disp: 200 each, Rfl: 3  •  tamsulosin (FLOMAX) 0.4 mg, 0.4 mg daily, Disp: , Rfl:   •  ergocalciferol (ERGOCALCIFEROL) 1.25 MG (37160 UT) capsule, Take 1 capsule (50,000 Units total) by mouth once a week for 12 doses (Patient not taking: Reported on 5/10/2024), Disp: 12 capsule, Rfl: 0  •  mupirocin (BACTROBAN) 2 % ointment, Apply topically 2 (two) times a day (Patient not taking: Reported on 10/17/2023), Disp: 22 g, Rfl: 0  No current facility-administered medications for this visit.    ALLERGIES:  Allergies   Allergen Reactions   • Latex Rash     At dentist, lips swollen    • Codeine Nausea Only     Reaction Date: 03Oct2005;        REVIEW OF SYSTEMS:  Pertinent items are noted in HPI.  A comprehensive review of systems was negative.    LABS:  HgA1c:   Lab Results   Component Value Date    HGBA1C 6.0 (H) 07/18/2023     BMP:   Lab Results   Component Value Date    GLUCOSE 245 (H) 11/20/2017    CALCIUM 8.4 12/01/2021     11/20/2017    K 4.1 07/18/2023    CO2 21 07/18/2023     07/18/2023    BUN 26 07/18/2023    CREATININE 1.46 (H) 07/18/2023         _____________________________________________________  PHYSICAL EXAMINATION:  Vital signs: /78   Pulse 70   Ht 5' 9\" (1.753 m)   Wt 108 kg (237 lb)   BMI 35.00 kg/m²   General: well developed and well nourished, alert, oriented times 3, and appears comfortable  Psychiatric: Normal  HEENT: Trachea Midline, No torticollis  Cardiovascular: No discernable " arrhythmia  Pulmonary: No wheezing or stridor  Abdomen: No rebound or guarding  Extremities: No peripheral edema  Skin: No masses, erythema, lacerations, fluctation, ulcerations  Neurovascular: Sensation Intact to the Median, Ulnar, Radial Nerve, Motor Intact to the Median, Ulnar, Radial Nerve, and Pulses Intact    MUSCULOSKELETAL EXAMINATION:  Right wrist   3/5 APB  No thenar atrophy   Loss of 2 point discrimination to median nerve distribution   (+) Finkelstien   TTP radial styloid   No pain with palpation of CMC joint  No pain over SL ligament  No snuffbox tenderness   No scaphoid tubercle tenderness   Demonstrates normal wrist, elbow, and shoulder motion  Forearm compartments are soft and supple  2+ distal radial pulse with brisk capillary refill to the fingers  Radial, median, and ulnar motor and sensory distribution intact  Sensations light to touch intact distally    _____________________________________________________  STUDIES REVIEWED:  No Studies to review      PROCEDURES PERFORMED:  Hand/upper extremity injection: R extensor compartment 1  Universal Protocol:  Consent: Verbal consent obtained.  Risks and benefits: risks, benefits and alternatives were discussed  Consent given by: patient  Timeout called at: 5/20/2024 5:56 PM.  Patient understanding: patient states understanding of the procedure being performed  Patient identity confirmed: verbally with patient  Supporting Documentation  Indications: diagnostic, tendon swelling and pain   Procedure Details  Condition:de Quervain's tenosynovitis Site: R extensor compartment 1   Preparation: Patient was prepped and draped in the usual sterile fashion  Needle size: 25 G  Ultrasound guidance: no  Medications administered: 1 mL lidocaine 1 %; 3 mg betamethasone acetate-betamethasone sodium phosphate 6 (3-3) mg/mL  Patient tolerance: patient tolerated the procedure well with no immediate complications  Dressing:  Sterile dressing applied              Scribe  Attestation    I,:  Summer Wu am acting as a scribe while in the presence of the attending physician.:       I,:  Aline Whatley MD personally performed the services described in this documentation    as scribed in my presence.:

## 2024-05-21 RX ORDER — LIDOCAINE HYDROCHLORIDE 10 MG/ML
1 INJECTION, SOLUTION INFILTRATION; PERINEURAL
Status: COMPLETED | OUTPATIENT
Start: 2024-05-20 | End: 2024-05-20

## 2024-05-21 RX ORDER — BETAMETHASONE SODIUM PHOSPHATE AND BETAMETHASONE ACETATE 3; 3 MG/ML; MG/ML
3 INJECTION, SUSPENSION INTRA-ARTICULAR; INTRALESIONAL; INTRAMUSCULAR; SOFT TISSUE
Status: COMPLETED | OUTPATIENT
Start: 2024-05-20 | End: 2024-05-20

## 2024-06-03 DIAGNOSIS — N18.31 TYPE 2 DIABETES MELLITUS WITH STAGE 3A CHRONIC KIDNEY DISEASE AND HYPERTENSION (HCC): ICD-10-CM

## 2024-06-03 DIAGNOSIS — E11.22 TYPE 2 DIABETES MELLITUS WITH STAGE 3A CHRONIC KIDNEY DISEASE AND HYPERTENSION (HCC): ICD-10-CM

## 2024-06-03 DIAGNOSIS — I12.9 TYPE 2 DIABETES MELLITUS WITH STAGE 3A CHRONIC KIDNEY DISEASE AND HYPERTENSION (HCC): ICD-10-CM

## 2024-06-03 RX ORDER — INSULIN GLARGINE 100 [IU]/ML
10 INJECTION, SOLUTION SUBCUTANEOUS
Qty: 15 ML | Refills: 1 | Status: SHIPPED | OUTPATIENT
Start: 2024-06-03

## 2024-06-10 ENCOUNTER — TELEPHONE (OUTPATIENT)
Age: 64
End: 2024-06-10

## 2024-06-10 DIAGNOSIS — I12.9 TYPE 2 DIABETES MELLITUS WITH STAGE 3A CHRONIC KIDNEY DISEASE AND HYPERTENSION (HCC): ICD-10-CM

## 2024-06-10 DIAGNOSIS — N18.31 TYPE 2 DIABETES MELLITUS WITH STAGE 3A CHRONIC KIDNEY DISEASE AND HYPERTENSION (HCC): ICD-10-CM

## 2024-06-10 DIAGNOSIS — E11.22 TYPE 2 DIABETES MELLITUS WITH STAGE 3A CHRONIC KIDNEY DISEASE AND HYPERTENSION (HCC): ICD-10-CM

## 2024-06-10 RX ORDER — INSULIN GLARGINE 100 [IU]/ML
25 INJECTION, SOLUTION SUBCUTANEOUS
Qty: 30 ML | Refills: 1 | Status: SHIPPED | OUTPATIENT
Start: 2024-06-10

## 2024-06-10 NOTE — TELEPHONE ENCOUNTER
Received call from pharmacy for order on 6/3 for Insulin Glargine Solostar (Lantus SoloStar) 100 UNIT/ML SOPN with dosing instructions for 10 units at bedtime. Patient reports to pharmacy that he takes 25 units. Please clarify dosing instructions and send new order to pharmacy if appropriate.

## 2024-06-12 DIAGNOSIS — E11.22 TYPE 2 DIABETES MELLITUS WITH STAGE 2 CHRONIC KIDNEY DISEASE AND HYPERTENSION  (HCC): ICD-10-CM

## 2024-06-12 DIAGNOSIS — Z79.4 TYPE 2 DIABETES MELLITUS WITH MICROALBUMINURIA, WITH LONG-TERM CURRENT USE OF INSULIN (HCC): ICD-10-CM

## 2024-06-12 DIAGNOSIS — E11.29 TYPE 2 DIABETES MELLITUS WITH MICROALBUMINURIA, WITH LONG-TERM CURRENT USE OF INSULIN (HCC): ICD-10-CM

## 2024-06-12 DIAGNOSIS — N18.2 STAGE 2 CHRONIC KIDNEY DISEASE: ICD-10-CM

## 2024-06-12 DIAGNOSIS — R80.9 TYPE 2 DIABETES MELLITUS WITH MICROALBUMINURIA, WITH LONG-TERM CURRENT USE OF INSULIN (HCC): ICD-10-CM

## 2024-06-12 DIAGNOSIS — E55.9 VITAMIN D DEFICIENCY: ICD-10-CM

## 2024-06-12 DIAGNOSIS — R80.1 PERSISTENT PROTEINURIA: ICD-10-CM

## 2024-06-12 DIAGNOSIS — E66.01 CLASS 2 SEVERE OBESITY DUE TO EXCESS CALORIES WITH SERIOUS COMORBIDITY AND BODY MASS INDEX (BMI) OF 35.0 TO 35.9 IN ADULT (HCC): ICD-10-CM

## 2024-06-12 DIAGNOSIS — N18.2 TYPE 2 DIABETES MELLITUS WITH STAGE 2 CHRONIC KIDNEY DISEASE AND HYPERTENSION  (HCC): ICD-10-CM

## 2024-06-12 DIAGNOSIS — I12.9 TYPE 2 DIABETES MELLITUS WITH STAGE 2 CHRONIC KIDNEY DISEASE AND HYPERTENSION  (HCC): ICD-10-CM

## 2024-06-24 ENCOUNTER — TELEPHONE (OUTPATIENT)
Age: 64
End: 2024-06-24

## 2024-06-24 DIAGNOSIS — I12.9 TYPE 2 DIABETES MELLITUS WITH STAGE 3A CHRONIC KIDNEY DISEASE AND HYPERTENSION (HCC): Primary | ICD-10-CM

## 2024-06-24 DIAGNOSIS — N18.31 TYPE 2 DIABETES MELLITUS WITH STAGE 3A CHRONIC KIDNEY DISEASE AND HYPERTENSION (HCC): Primary | ICD-10-CM

## 2024-06-24 DIAGNOSIS — E11.22 TYPE 2 DIABETES MELLITUS WITH STAGE 3A CHRONIC KIDNEY DISEASE AND HYPERTENSION (HCC): Primary | ICD-10-CM

## 2024-06-24 RX ORDER — AMOXICILLIN 500 MG/1
TABLET, FILM COATED ORAL
Qty: 4 TABLET | Refills: 0 | Status: SHIPPED | OUTPATIENT
Start: 2024-06-24 | End: 2024-07-24

## 2024-06-24 NOTE — TELEPHONE ENCOUNTER
Patient called the RX Refill Line. Message is being forwarded to the office.     Patient is requesting - Pt called refill line and stated he is diabetic and prior to a dental procedure he needs to take antibiotics. Pt is requesting Dr. Verdin to place an order and send it to [facility]. Please review. Thank you.    Please contact patient at - [phone]

## 2024-06-27 ENCOUNTER — TELEPHONE (OUTPATIENT)
Age: 64
End: 2024-06-27

## 2024-06-27 NOTE — TELEPHONE ENCOUNTER
Kassandra with [facility] called to get current Hemoglobin A 1 C. Advised last one completed July of 2023.

## 2024-07-06 DIAGNOSIS — Z79.4 DIABETES MELLITUS DUE TO UNDERLYING CONDITION WITH HYPERGLYCEMIA, WITH LONG-TERM CURRENT USE OF INSULIN (HCC): ICD-10-CM

## 2024-07-06 DIAGNOSIS — E08.65 DIABETES MELLITUS DUE TO UNDERLYING CONDITION WITH HYPERGLYCEMIA, WITH LONG-TERM CURRENT USE OF INSULIN (HCC): ICD-10-CM

## 2024-07-10 ENCOUNTER — RA CDI HCC (OUTPATIENT)
Dept: OTHER | Facility: HOSPITAL | Age: 64
End: 2024-07-10

## 2024-07-10 NOTE — PROGRESS NOTES
HCC coding opportunities          Chart Reviewed number of suggestions sent to Provider: 5   I13.0  E11.40  E11.618  M06.09  E11.319    Patients Insurance        Commercial Insurance: [employer] Commercial [employer]

## 2024-07-11 ENCOUNTER — TELEPHONE (OUTPATIENT)
Age: 64
End: 2024-07-11

## 2024-07-14 LAB
ALBUMIN SERPL-MCNC: 4 G/DL (ref 3.9–4.9)
ALBUMIN SERPL-MCNC: 4.1 G/DL (ref 3.9–4.9)
ALP SERPL-CCNC: 70 IU/L (ref 44–121)
ALP SERPL-CCNC: 71 IU/L (ref 44–121)
ALT SERPL-CCNC: 13 IU/L (ref 0–44)
ALT SERPL-CCNC: 14 IU/L (ref 0–44)
APPEARANCE UR: CLEAR
AST SERPL-CCNC: 15 IU/L (ref 0–40)
AST SERPL-CCNC: 15 IU/L (ref 0–40)
BACTERIA URNS QL MICRO: NORMAL
BASOPHILS # BLD AUTO: 0.1 X10E3/UL (ref 0–0.2)
BASOPHILS NFR BLD AUTO: 1 %
BILIRUB SERPL-MCNC: 0.3 MG/DL (ref 0–1.2)
BILIRUB SERPL-MCNC: 0.4 MG/DL (ref 0–1.2)
BILIRUB UR QL STRIP: NEGATIVE
BUN SERPL-MCNC: 28 MG/DL (ref 8–27)
BUN SERPL-MCNC: 28 MG/DL (ref 8–27)
BUN/CREAT SERPL: 19 (ref 10–24)
BUN/CREAT SERPL: 19 (ref 10–24)
CALCIUM SERPL-MCNC: 9.4 MG/DL (ref 8.6–10.2)
CALCIUM SERPL-MCNC: 9.5 MG/DL (ref 8.6–10.2)
CASTS URNS QL MICRO: NORMAL /LPF
CHLORIDE SERPL-SCNC: 106 MMOL/L (ref 96–106)
CHLORIDE SERPL-SCNC: 106 MMOL/L (ref 96–106)
CHOLEST SERPL-MCNC: 110 MG/DL (ref 100–199)
CO2 SERPL-SCNC: 20 MMOL/L (ref 20–29)
CO2 SERPL-SCNC: 21 MMOL/L (ref 20–29)
COLOR UR: YELLOW
CREAT SERPL-MCNC: 1.44 MG/DL (ref 0.76–1.27)
CREAT SERPL-MCNC: 1.51 MG/DL (ref 0.76–1.27)
CRP SERPL-MCNC: 1 MG/L (ref 0–10)
EGFR: 51 ML/MIN/1.73
EGFR: 54 ML/MIN/1.73
EOSINOPHIL # BLD AUTO: 0.3 X10E3/UL (ref 0–0.4)
EOSINOPHIL NFR BLD AUTO: 6 %
EPI CELLS #/AREA URNS HPF: NORMAL /HPF (ref 0–10)
ERYTHROCYTE [DISTWIDTH] IN BLOOD BY AUTOMATED COUNT: 14.2 % (ref 11.6–15.4)
ERYTHROCYTE [DISTWIDTH] IN BLOOD BY AUTOMATED COUNT: 14.3 % (ref 11.6–15.4)
ERYTHROCYTE [SEDIMENTATION RATE] IN BLOOD BY WESTERGREN METHOD: 10 MM/HR (ref 0–30)
EST. AVERAGE GLUCOSE BLD GHB EST-MCNC: 134 MG/DL
GLOBULIN SER-MCNC: 2.3 G/DL (ref 1.5–4.5)
GLOBULIN SER-MCNC: 2.4 G/DL (ref 1.5–4.5)
GLUCOSE SERPL-MCNC: 147 MG/DL (ref 70–99)
GLUCOSE SERPL-MCNC: 148 MG/DL (ref 70–99)
GLUCOSE UR QL: ABNORMAL
HBA1C MFR BLD: 6.3 % (ref 4.8–5.6)
HCT VFR BLD AUTO: 41.9 % (ref 37.5–51)
HCT VFR BLD AUTO: 42.9 % (ref 37.5–51)
HDLC SERPL-MCNC: 44 MG/DL
HGB BLD-MCNC: 14 G/DL (ref 13–17.7)
HGB BLD-MCNC: 14 G/DL (ref 13–17.7)
HGB UR QL STRIP: NEGATIVE
IMM GRANULOCYTES # BLD: 0 X10E3/UL (ref 0–0.1)
IMM GRANULOCYTES NFR BLD: 0 %
KETONES UR QL STRIP: NEGATIVE
LDLC SERPL CALC-MCNC: 53 MG/DL (ref 0–99)
LDLC/HDLC SERPL: 1.2 RATIO (ref 0–3.6)
LEUKOCYTE ESTERASE UR QL STRIP: NEGATIVE
LYMPHOCYTES # BLD AUTO: 0.9 X10E3/UL (ref 0.7–3.1)
LYMPHOCYTES NFR BLD AUTO: 19 %
MCH RBC QN AUTO: 28.9 PG (ref 26.6–33)
MCH RBC QN AUTO: 29.1 PG (ref 26.6–33)
MCHC RBC AUTO-ENTMCNC: 32.6 G/DL (ref 31.5–35.7)
MCHC RBC AUTO-ENTMCNC: 33.4 G/DL (ref 31.5–35.7)
MCV RBC AUTO: 87 FL (ref 79–97)
MCV RBC AUTO: 89 FL (ref 79–97)
MICRO URNS: ABNORMAL
MICRODELETION SYND BLD/T FISH: NORMAL
MICRODELETION SYND BLD/T FISH: NORMAL
MONOCYTES # BLD AUTO: 0.4 X10E3/UL (ref 0.1–0.9)
MONOCYTES NFR BLD AUTO: 9 %
NEUTROPHILS # BLD AUTO: 3.2 X10E3/UL (ref 1.4–7)
NEUTROPHILS NFR BLD AUTO: 65 %
NITRITE UR QL STRIP: NEGATIVE
PH UR STRIP: 5.5 [PH] (ref 5–7.5)
PLATELET # BLD AUTO: 214 X10E3/UL (ref 150–450)
PLATELET # BLD AUTO: 225 X10E3/UL (ref 150–450)
POTASSIUM SERPL-SCNC: 4.3 MMOL/L (ref 3.5–5.2)
POTASSIUM SERPL-SCNC: 4.4 MMOL/L (ref 3.5–5.2)
PROT SERPL-MCNC: 6.4 G/DL (ref 6–8.5)
PROT SERPL-MCNC: 6.4 G/DL (ref 6–8.5)
PROT UR QL STRIP: ABNORMAL
PSA SERPL-MCNC: 0.6 NG/ML (ref 0–4)
RBC # BLD AUTO: 4.81 X10E6/UL (ref 4.14–5.8)
RBC # BLD AUTO: 4.84 X10E6/UL (ref 4.14–5.8)
RBC #/AREA URNS HPF: NORMAL /HPF (ref 0–2)
SL AMB REFLEX CRITERIA: NORMAL
SL AMB VLDL CHOLESTEROL CALC: 13 MG/DL (ref 5–40)
SODIUM SERPL-SCNC: 141 MMOL/L (ref 134–144)
SODIUM SERPL-SCNC: 142 MMOL/L (ref 134–144)
SP GR UR: 1.02 (ref 1–1.03)
TRIGL SERPL-MCNC: 57 MG/DL (ref 0–149)
UROBILINOGEN UR STRIP-ACNC: 0.2 MG/DL (ref 0.2–1)
WBC # BLD AUTO: 4.9 X10E3/UL (ref 3.4–10.8)
WBC # BLD AUTO: 5.2 X10E3/UL (ref 3.4–10.8)
WBC #/AREA URNS HPF: NORMAL /HPF (ref 0–5)

## 2024-07-15 ENCOUNTER — OFFICE VISIT (OUTPATIENT)
Dept: FAMILY MEDICINE CLINIC | Facility: CLINIC | Age: 64
End: 2024-07-15
Payer: COMMERCIAL

## 2024-07-15 VITALS
RESPIRATION RATE: 17 BRPM | HEART RATE: 66 BPM | TEMPERATURE: 97.5 F | WEIGHT: 246.4 LBS | HEIGHT: 69 IN | SYSTOLIC BLOOD PRESSURE: 142 MMHG | BODY MASS INDEX: 36.5 KG/M2 | DIASTOLIC BLOOD PRESSURE: 72 MMHG

## 2024-07-15 DIAGNOSIS — I10 BENIGN ESSENTIAL HYPERTENSION: ICD-10-CM

## 2024-07-15 DIAGNOSIS — E11.22 TYPE 2 DIABETES MELLITUS WITH STAGE 3A CHRONIC KIDNEY DISEASE AND HYPERTENSION (HCC): Primary | ICD-10-CM

## 2024-07-15 DIAGNOSIS — M06.09 SERONEGATIVE ARTHROPATHY OF MULTIPLE SITES (HCC): ICD-10-CM

## 2024-07-15 DIAGNOSIS — N18.31 TYPE 2 DIABETES MELLITUS WITH STAGE 3A CHRONIC KIDNEY DISEASE AND HYPERTENSION (HCC): Primary | ICD-10-CM

## 2024-07-15 DIAGNOSIS — Z23 NEED FOR SHINGLES VACCINE: ICD-10-CM

## 2024-07-15 DIAGNOSIS — I12.9 TYPE 2 DIABETES MELLITUS WITH STAGE 3A CHRONIC KIDNEY DISEASE AND HYPERTENSION (HCC): Primary | ICD-10-CM

## 2024-07-15 DIAGNOSIS — K76.0 FATTY LIVER: ICD-10-CM

## 2024-07-15 DIAGNOSIS — E78.2 MIXED HYPERLIPIDEMIA: ICD-10-CM

## 2024-07-15 DIAGNOSIS — I50.32 CHRONIC HEART FAILURE WITH PRESERVED EJECTION FRACTION (HCC): ICD-10-CM

## 2024-07-15 PROCEDURE — 90750 HZV VACC RECOMBINANT IM: CPT

## 2024-07-15 PROCEDURE — 90471 IMMUNIZATION ADMIN: CPT

## 2024-07-15 PROCEDURE — 99214 OFFICE O/P EST MOD 30 MIN: CPT | Performed by: FAMILY MEDICINE

## 2024-07-15 RX ORDER — BLOOD-GLUCOSE SENSOR
1 EACH MISCELLANEOUS
Qty: 6 EACH | Refills: 3 | Status: SHIPPED | OUTPATIENT
Start: 2024-07-15

## 2024-07-15 NOTE — ASSESSMENT & PLAN NOTE
Lab Results   Component Value Date    HGBA1C 6.3 (H) 07/13/2024   Will try ozempic  Risks and benefits of medication discussed.    Once he starts the ozempic will cut down Lantus to to 13 units a day and cut back if needed

## 2024-07-15 NOTE — ASSESSMENT & PLAN NOTE
Wt Readings from Last 3 Encounters:   07/15/24 112 kg (246 lb 6.4 oz)   05/20/24 108 kg (237 lb)   05/10/24 114 kg (252 lb)       Stable

## 2024-07-15 NOTE — PROGRESS NOTES
Ambulatory Visit  Name: Murtaza Cerrato      : 1960      MRN: [id number]  Encounter Provider: Mi Verdin DO  Encounter Date: 7/15/2024   Encounter department: [facility]    Assessment & Plan   1. Type 2 diabetes mellitus with stage 3a chronic kidney disease and hypertension (HCC)  Assessment & Plan:    Lab Results   Component Value Date    HGBA1C 6.3 (H) 2024   Will try ozempic  Risks and benefits of medication discussed.    Once he starts the ozempic will cut down Lantus to to 13 units a day and cut back if needed   Orders:  -     Continuous Glucose Sensor (FreeStyle Ulysses 3 Sensor) MISC; Use 1 each every 14 (fourteen) days  -     semaglutide, 0.25 or 0.5 mg/dose, (Ozempic, 0.25 or 0.5 MG/DOSE,) 2 mg/3 mL injection pen; 0.25 mg under the skin every 7 days for 4 doses (28 days), THEN 0.5 mg under the skin every 7 days  -     Comprehensive metabolic panel; Future; Expected date: 2024  -     Hemoglobin A1C; Future; Expected date: 2024  -     Lipid Panel with Direct LDL reflex; Future; Expected date: 2024  -     Comprehensive metabolic panel  -     Hemoglobin A1C  -     Lipid Panel with Direct LDL reflex  2. 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HCC)  Assessment & Plan:  Stable  Managed by rheumatology  3. Chronic heart failure with preserved ejection fraction (HCC)  Assessment & Plan:  Wt Readings from Last 3 Encounters:   07/15/24 112 kg (246 lb 6.4 oz)   24 108 kg (237 lb)   05/10/24 114 kg (252 lb)       Stable       4. Benign essential hypertension  Assessment & Plan:  Blood pressure is well-controlled  Continue amlodipine 10 mg daily and olmesartan 20 mg daily  Orders:  -     CBC; Future; Expected date: 2024  -     Comprehensive metabolic panel; Future; Expected date: 2024  -     Lipid Panel with Direct LDL reflex; Future; Expected date: 2024  -     CBC  -     Comprehensive metabolic panel  -     Lipid Panel with Direct LDL  "reflex  5. Fatty liver  -     CBC; Future; Expected date: 09/28/2024  -     Comprehensive metabolic panel; Future; Expected date: 09/28/2024  -     CBC  -     Comprehensive metabolic panel  6. Mixed hyperlipidemia  -     Comprehensive metabolic panel; Future; Expected date: 09/28/2024  -     Lipid Panel with Direct LDL reflex; Future; Expected date: 09/28/2024  -     Comprehensive metabolic panel  -     Lipid Panel with Direct LDL reflex  7. Need for shingles vaccine  -     Zoster Vaccine Recombinant IM   Return in about 3 months (around 10/15/2024) for Diabetic follow up and 2nd Shingrix .  History of Present Illness     He reports that he has been feeling well.  He did fall off a little bit of his good routine but is back on track now.  His readings have been good.  He is interested in getting on medication we will have him stop the insulin.  He is worried about the side effects of insulin and weight gain        Review of Systems    Objective     /72   Pulse 66   Temp 97.5 °F (36.4 °C)   Resp 17   Ht 5' 9\" (1.753 m)   Wt 112 kg (246 lb 6.4 oz)   BMI 36.39 kg/m²     Physical Exam  Vitals and nursing note reviewed.   Constitutional:       General: He is not in acute distress.     Appearance: He is well-developed.   HENT:      Right Ear: Tympanic membrane normal.      Left Ear: Tympanic membrane normal.   Cardiovascular:      Rate and Rhythm: Normal rate and regular rhythm.      Heart sounds: No murmur heard.  Pulmonary:      Effort: Pulmonary effort is normal. No respiratory distress.      Breath sounds: Normal breath sounds.   Neurological:      Mental Status: He is alert.   Psychiatric:         Mood and Affect: Mood normal.       Administrative Statements           "

## 2024-07-16 LAB
ALBUMIN SERPL-MCNC: 4 G/DL (ref 3.9–4.9)
ALBUMIN/CREAT UR: 2067 MG/G CREAT (ref 0–29)
ALP SERPL-CCNC: 68 IU/L (ref 44–121)
ALT SERPL-CCNC: 15 IU/L (ref 0–44)
AST SERPL-CCNC: 19 IU/L (ref 0–40)
BILIRUB SERPL-MCNC: 0.4 MG/DL (ref 0–1.2)
BUN SERPL-MCNC: 28 MG/DL (ref 8–27)
BUN/CREAT SERPL: 19 (ref 10–24)
CALCIUM SERPL-MCNC: 9.7 MG/DL (ref 8.6–10.2)
CHLORIDE SERPL-SCNC: 106 MMOL/L (ref 96–106)
CO2 SERPL-SCNC: 21 MMOL/L (ref 20–29)
CREAT SERPL-MCNC: 1.49 MG/DL (ref 0.76–1.27)
CREAT UR-MCNC: 59.6 MG/DL
CYSTATIN C SERPL-MCNC: 1.66 MG/L (ref 0.72–1.16)
EGFR: 52 ML/MIN/1.73
GFR/BSA.PRED SERPLBLD CYS-BASED-ARV: 39 ML/MIN/1.73
GLOBULIN SER-MCNC: 2.3 G/DL (ref 1.5–4.5)
GLUCOSE SERPL-MCNC: 146 MG/DL (ref 70–99)
MICROALBUMIN UR-MCNC: 1231.7 UG/ML
POTASSIUM SERPL-SCNC: 4.4 MMOL/L (ref 3.5–5.2)
PROT SERPL-MCNC: 6.3 G/DL (ref 6–8.5)
SODIUM SERPL-SCNC: 142 MMOL/L (ref 134–144)

## 2024-07-18 ENCOUNTER — OFFICE VISIT (OUTPATIENT)
Age: 64
End: 2024-07-18
Payer: COMMERCIAL

## 2024-07-18 VITALS
TEMPERATURE: 97.8 F | OXYGEN SATURATION: 98 % | SYSTOLIC BLOOD PRESSURE: 134 MMHG | HEIGHT: 70 IN | WEIGHT: 250.6 LBS | HEART RATE: 63 BPM | DIASTOLIC BLOOD PRESSURE: 70 MMHG | BODY MASS INDEX: 35.88 KG/M2

## 2024-07-18 DIAGNOSIS — R80.1 PERSISTENT PROTEINURIA: ICD-10-CM

## 2024-07-18 DIAGNOSIS — M1A.0710 CHRONIC GOUT OF RIGHT ANKLE, UNSPECIFIED CAUSE: ICD-10-CM

## 2024-07-18 DIAGNOSIS — E11.618 ARTHRITIS ASSOCIATED WITH DIABETES (HCC): ICD-10-CM

## 2024-07-18 DIAGNOSIS — I50.32 CHRONIC HEART FAILURE WITH PRESERVED EJECTION FRACTION (HCC): ICD-10-CM

## 2024-07-18 DIAGNOSIS — L40.0 PLAQUE PSORIASIS: ICD-10-CM

## 2024-07-18 DIAGNOSIS — Z79.4 TYPE 2 DIABETES MELLITUS WITH DIABETIC NEUROPATHY, WITH LONG-TERM CURRENT USE OF INSULIN (HCC): ICD-10-CM

## 2024-07-18 DIAGNOSIS — M51.26 HERNIATED NUCLEUS PULPOSUS, L3-4: ICD-10-CM

## 2024-07-18 DIAGNOSIS — M47.812 CERVICAL SPONDYLOSIS WITHOUT MYELOPATHY: ICD-10-CM

## 2024-07-18 DIAGNOSIS — M15.0 PRIMARY GENERALIZED (OSTEO)ARTHRITIS: ICD-10-CM

## 2024-07-18 DIAGNOSIS — E11.40 TYPE 2 DIABETES MELLITUS WITH DIABETIC NEUROPATHY, WITH LONG-TERM CURRENT USE OF INSULIN (HCC): ICD-10-CM

## 2024-07-18 DIAGNOSIS — M47.816 LUMBAR SPONDYLOSIS: ICD-10-CM

## 2024-07-18 DIAGNOSIS — Z79.899 LONG-TERM USE OF PLAQUENIL: ICD-10-CM

## 2024-07-18 DIAGNOSIS — M06.09 SERONEGATIVE ARTHROPATHY OF MULTIPLE SITES (HCC): Primary | ICD-10-CM

## 2024-07-18 PROCEDURE — 99214 OFFICE O/P EST MOD 30 MIN: CPT | Performed by: INTERNAL MEDICINE

## 2024-07-18 NOTE — PROGRESS NOTES
Assessment/Plan:    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HCC)  Seronegative inflammatory arthritis.  Rule out seronegative rheumatoid arthritis versus psoriatic component.  Rule out erosive osteoarthritis.  Rule out arthritis related to diabetes.  Rheumatoid factor, CCP, and DEBORA all negative.  Sed rate and CRP normal.  Inflammation improved on Plaquenil. 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  He is status post left carpal tunnel release 10/26/2023.  He had more recent right carpal tunnel injection with benefit.  Symptoms do return after the steroid wears off.  He is considering right carpal tunnel release in the fall and will discuss it with orthopedic surgery.  Encourage patient to go for eye follow up while on Plaquenil as well as routine follow-up every 6 to 12 months.  Monitor disease activity and medication toxicity with lab work as ordered.  Follow-up 6 months or sooner if needed.    Arthritis associated with diabetes ([facility])    Lab Results   Component Value Date    HGBA1C 6.3 (H) 07/13/2024   Seronegative arthropathy with prior soft tissue prominence.  Rule out component of arthritis associated with diabetes.  Musculoskeletal ultrasound from October 2024 significant for no evidence of inflammation.  Continue Plaquenil.  Monitor labs for medication toxicity.  Patient needs routine eye follow-up twice yearly while on Plaquenil to rule out toxicity.  Continue to monitor.    Type 2 diabetes mellitus with diabetic neuropathy, with long-term current use of insulin ([facility])    Lab Results   Component Value Date    HGBA1C 6.3 (H) 07/13/2024   Diabetes stable with hemoglobin A1c in target range.  He is on Jardiance and due to start Ozempic.  He does have proteinuria and is followed by nephrology.  Follow-up primary care.  Continue to monitor.    Chronic heart failure with preserved ejection fraction ([facility])  Wt  Readings from Last 3 Encounters:   07/18/24 114 kg (250 lb 9.6 oz)   07/15/24 112 kg (246 lb 6.4 oz)   05/20/24 108 kg (237 lb)     History diastolic dysfunction.  Encourage patient to follow-up with cardiologist to monitor and repeat echocardiogram.          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  Lumbar spondylosis.  He does have prescription for updated hand films.  Musculoskeletal ultrasound from October 2022 significant for no evidence of inflammatory arthritis.  Continue Plaquenil twice daily.  Continue follow-up with eye doctor twice yearly to monitor for Plaquenil toxicity.  Encourage home exercise program as tolerated.  Continue to monitor.    Plaque psoriasis  Patient does have plaque psoriasis overall improved but still has lesions in his scalp and extremities.  Encourage follow-up with dermatologist.  Joint disease stable on Plaquenil.  Rule out psoriatic component.  Monitor disease activity and medication toxicity with lab work as ordered.  Continue to monitor.    Lumbar spondylosis  Low back pain currently stable with no radicular symptoms.  No evidence for inflammatory back pain by history, however, patient has not gone for updated imaging lumbar spine.  He does have history of lumbar decompression in the past.  Continue to monitor back symptoms.  If they worsen, will refer for imaging and consider physical therapy.  Continue to monitor.    Herniated nucleus pulposus, L3-4  History lumbar laminectomy no current radicular symptoms.  Continue to monitor.    Cervical spondylosis without myelopathy  Cervical spine films from November 2021 unremarkable.  EMG studies from September 2023 with no evidence of cervical radiculopathy.  No current radicular symptoms.  Continue to monitor.    Gout  History of gout quiescent.  Uric acid dated 7/18/2023 in target range at 5.6 on no urate lowering agent.  Continue to monitor clinically and with  lab work as ordered.    Proteinuria  3+ dipstick protein on urinalysis.  Stage IIIa chronic kidney disease with creatinine 1.44 and estimated GFR 54.  Follow-up nephrology.  Continue to monitor.       Problem List Items Addressed This Visit       Gout     History of gout quiescent.  Uric acid dated 7/18/2023 in target range at 5.6 on no urate lowering agent.  Continue to monitor clinically and with lab work as ordered.         Herniated nucleus pulposus, L3-4     History lumbar laminectomy no current radicular symptoms.  Continue to monitor.         Proteinuria     3+ dipstick protein on urinalysis.  Stage IIIa chronic kidney disease with creatinine 1.44 and estimated GFR 54.  Follow-up nephrology.  Continue to monitor.         Lumbar spondylosis     Low back pain currently stable with no radicular symptoms.  No evidence for inflammatory back pain by history, however, patient has not gone for updated imaging lumbar spine.  He does have history of lumbar decompression in the past.  Continue to monitor back symptoms.  If they worsen, will refer for imaging and consider physical therapy.  Continue to monitor.         Cervical spondylosis without myelopathy     Cervical spine films from November 2021 unremarkable.  EMG studies from September 2023 with no evidence of cervical radiculopathy.  No current radicular symptoms.  Continue to monitor.         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  Lumbar spondylosis.  He does have prescription for updated hand films.  Musculoskeletal ultrasound from October 2022 significant for no evidence of inflammatory arthritis.  Continue Plaquenil twice daily.  Continue follow-up with eye doctor twice yearly to monitor for Plaquenil toxicity.  Encourage home exercise program as tolerated.  Continue to monitor.         Plaque psoriasis     Patient does have plaque psoriasis overall improved but still has lesions in  his scalp and extremities.  Encourage follow-up with dermatologist.  Joint disease stable on Plaquenil.  Rule out psoriatic component.  Monitor disease activity and medication toxicity with lab work as ordered.  Continue to monitor.         Type 2 diabetes mellitus with diabetic neuropathy, with long-term current use of insulin ([facility])       Lab Results   Component Value Date    HGBA1C 6.3 (H) 07/13/2024   Diabetes stable with hemoglobin A1c in target range.  He is on Jardiance and due to start Ozempic.  He does have proteinuria and is followed by nephrology.  Follow-up primary care.  Continue to monitor.         Relevant Orders    Ambulatory Referral to Ophthalmology    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[facility]) - Primary     Seronegative inflammatory arthritis.  Rule out seronegative rheumatoid arthritis versus psoriatic component.  Rule out erosive osteoarthritis.  Rule out arthritis related to diabetes.  Rheumatoid factor, CCP, and DEBORA all negative.  Sed rate and CRP normal.  Inflammation improved on Plaquenil. 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  He is status post left carpal tunnel release 10/26/2023.  He had more recent right carpal tunnel injection with benefit.  Symptoms do return after the steroid wears off.  He is considering right carpal tunnel release in the fall and will discuss it with orthopedic surgery.  Encourage patient to go for eye follow up while on Plaquenil as well as routine follow-up every 6 to 12 months.  Monitor disease activity and medication toxicity with lab work as ordered.  Follow-up 6 months or sooner if needed.         Relevant Orders    C-reactive protein    Sedimentation rate, automated    CBC and differential    Comprehensive metabolic panel    Urinalysis with microscopic    Chronic heart failure with preserved ejection fraction ([facility])     Wt Readings from Last 3  Encounters:   07/18/24 114 kg (250 lb 9.6 oz)   07/15/24 112 kg (246 lb 6.4 oz)   05/20/24 108 kg (237 lb)     History diastolic dysfunction.  Encourage patient to follow-up with cardiologist to monitor and repeat echocardiogram.               Arthritis associated with diabetes (HCC)       Lab Results   Component Value Date    HGBA1C 6.3 (H) 07/13/2024   Seronegative arthropathy with prior soft tissue prominence.  Rule out component of arthritis associated with diabetes.  Musculoskeletal ultrasound from October 2024 significant for no evidence of inflammation.  Continue Plaquenil.  Monitor labs for medication toxicity.  Patient needs routine eye follow-up twice yearly while on Plaquenil to rule out toxicity.  Continue to monitor.          Other Visit Diagnoses       Long-term use of Plaquenil        Relevant Orders    Ambulatory Referral to Ophthalmology    MISCELLANEOUS LAB TEST                Reviewed records, labs, and imaging with the patient in detail.  Counseled patient.  Discussion regarding my findings and recommendations.  Office visit with documentation 35 min.    Subjective:      Patient ID: Murtaza Cerrato is a 63 y.o. male.    Patient with primary generalized osteoarthritis, with probable cervical spondylosis and documented lumbar spondylosis, with soft tissue prominence bilateral hands, and history mild scalp psoriasis.  Questionable adhesive capsulitis bilateral shoulders.  History flexor tenosynovitis with multiple trigger releases secondary to diabetes.  Carpal tunnel syndrome versus neuropathy secondary to diabetes.  Advanced DJD right knee.  Venous insufficiency mild.  Type 2 diabetes with elevated hemoglobin A1c.  History gout currently inactive.  Other medical problems include hypertension, hyperlipidemia, nephrolithiasis, lumbar spondylosis status post lumbar laminectomy.      Scalp psoriasis overall better with topical shampoo OTC.  He has had Psoriasis versus eczema with scattered  lesions on his arms and legs as well.  He had evaluation with dermatology.  He has history of severe right knee osteoarthritis and is S/P TKR 11/30/2021. He has no significant pain in his operative knee. He continues to work on a diabetic diet and is using a Inf[employer] glucose monitor.  The nephrologist started patient on Jardiance for renal function and proteinuria and he was referred for additional serologies in view of increased proteinuria.  He has been able to lower his insulin.  He is due to start Ozempic.  Patient tried sulfasalazine, but discontinued it in view of ongoing metallic taste in his mouth, with resolution of nausea and vomiting related to the medicine. He developed increased and hand pain and swelling off of the sulfasalazine more prominent in the left index finger PIP and DIP joints and left thumb.  He has been on Plaquenil since March 2023 and notes that overall his improved.  He has not yet had his baseline ophthalmology evaluation however.  He notes numbness and tingling in his hands.  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  No cervical radiculopathy noted. He was unable to go to occupational therapy due to time constraints.  He is status post left carpal tunnel release 10/26/2023 with benefit.  He is having symptoms in his right hand.  He did have prior injection into the carpal tunnel.  Right wrist x-ray dated 12/28/2023.  Most recent musculoskeletal ultrasound dated 10/4/2022 significant for no inflammatory changes noted.  Chronic low back pain improved. He notes dry mouth. He has lower extremity edema particularly ankles.  He does have compression hose which helps, but he is not wearing them daily.  He has history of periodontitis which was treated with deep scaling.  He sees the dentist every 3 months for cleanings.  Patient developed a blister on his left  fifth toe and was treated by wound care and released.  He has erectile dysfunction secondary to arterial insufficiency and he had penile implant on August 3.    Lab work dated 7/13/2024 significant for CRP 1.  White blood cell count 4.9.  Random glucose 147.  BUN 28.  Creatinine 1.44 with estimated GFR 54.  Urinalysis 3+ dipstick protein and glucose.  Sed rate 10.  Hemoglobin A1c 6.3.  Review of lab work dated 7/18/2023 significant for DEBORA, rheumatoid factor, CCP all negative.  CBC unremarkable.  Glucose 132.  Creatinine 1.46 with estimated GFR 53.  Calcium 9.4.  CRP less than 1.  Sed rate 15.  Uric acid 5.6.  Urinalysis 3+ protein with trace glucose.  Review of lab work dated 1/24/2023 significant for hemoglobin A1c 7.0.  Glucose 109.  CBC unremarkable.  Creatinine 1.25 with estimated GFR 64.  Vitamin D 16.5 treated with 12 weeks of vitamin D2.  Protein to creatinine ratio markedly elevated at 5723.  Review of lab work dated 07/01/2022 reveals total cholesterol 139.  Triglyceride 89.  HDL 47.  LDL 75.  CBC unremarkable.  Glucose 101.  Estimated GFR 79.  Hemoglobin A1c 4.9.  Micro albumin to creatinine ratio elevated at 2143.  Review of lab work dated 02/19/2022 reveals CRP 4 with normal 0-10.  Urinalysis 4+ dipstick protein.  CBC unremarkable.  Creatinine 1.07.  Estimated GFR 73.  Sed rate 42 elevated.  Hemoglobin A1c 5.3.  Review of lab work dated 11/15/2021 reveals G6PD negative.      Review of musculoskeletal ultrasound dated 10/4/2022 with no evidence for inflammatory arthropathy.  X-ray bilateral hands dated 11/15/2021 significant for nonspecific arthropathy in the D IP and PIP joints 2nd to 5th fingers, with cartilage narrowing and minimal periarticular demineralization.  No erosions noted.  Neck x-ray unremarkable.        Allergies  Allergies   Allergen Reactions    Latex Rash     At dentist, lips swollen     Codeine Nausea Only     Reaction Date: 03Oct2005;        Home Medications    Current Outpatient  Medications:     acetaminophen (TYLENOL) 650 mg CR tablet, Take 1 tablet (650 mg total) by mouth every 8 (eight) hours as needed for mild pain, Disp: 30 tablet, Rfl: 0    amLODIPine (NORVASC) 10 mg tablet, TAKE ONE TABLET BY MOUTH EVERY DAY, Disp: 90 tablet, Rfl: 1    aspirin (ECOTRIN LOW STRENGTH) 81 mg EC tablet, Take 81 mg by mouth daily, Disp: , Rfl:     atorvastatin (LIPITOR) 20 mg tablet, TAKE ONE TABLET BY MOUTH EVERY DAY, Disp: 90 tablet, Rfl: 1    Blood Glucose Monitoring Suppl (OneTouch Verio) w/Device KIT, Test blood sugar twice a day, Disp: 1 kit, Rfl: 0    cholecalciferol (VITAMIN D3) 1,000 units tablet, Take 1,000 Units by mouth daily, Disp: , Rfl:     Continuous Blood Gluc  (FreeStyle Ulysses 14 Day [place]) SHREE, Use, Disp: , Rfl:     Continuous Glucose Sensor (FreeStyle Ulysses 3 Sensor) MISC, Use 1 each every 14 (fourteen) days, Disp: 6 each, Rfl: 3    Empagliflozin (Jardiance) 10 MG TABS tablet, Take 1 tablet (10 mg total) by mouth every morning, Disp: 30 tablet, Rfl: 2    glipiZIDE (GLUCOTROL XL) 10 mg 24 hr tablet, TAKE TWO TABLETS BY MOUTH EVERY DAY AT BEDTIME, Disp: 60 tablet, Rfl: 5    glucose blood (OneTouch Verio) test strip, Test blood sugar twice a day, Disp: 200 each, Rfl: 3    hydroxychloroquine (PLAQUENIL) 200 mg tablet, TAKE ONE TABLET BY MOUTH TWICE A DAY WITH MEALS (GENERIC FOR PLAQUENIL), Disp: 180 tablet, Rfl: 1    Insulin Glargine Solostar (Lantus SoloStar) 100 UNIT/ML SOPN, Inject 0.25 mL (25 Units total) as directed daily at bedtime (Patient taking differently: Inject 15 Units as directed daily at bedtime), Disp: 30 mL, Rfl: 1    metFORMIN (GLUCOPHAGE) 500 mg tablet, TAKE TWO TABLETS BY MOUTH TWICE A DAY WITH MEALS (GENERIC GLUCOPHAGE), Disp: 360 tablet, Rfl: 1    mupirocin (BACTROBAN) 2 % ointment, Apply topically 2 (two) times a day, Disp: 22 g, Rfl: 0    olmesartan (BENICAR) 20 mg tablet, TAKE TWO TABLETS BY MOUTH EVERY DAY, Disp: 180 tablet, Rfl: 1    OneTouch Delica  Lancets 33G MISC, Test blood sugar twice a day, Disp: 200 each, Rfl: 3    semaglutide, 0.25 or 0.5 mg/dose, (Ozempic, 0.25 or 0.5 MG/DOSE,) 2 mg/3 mL injection pen, 0.25 mg under the skin every 7 days for 4 doses (28 days), THEN 0.5 mg under the skin every 7 days, Disp: 9 mL, Rfl: 1    tamsulosin (FLOMAX) 0.4 mg, 0.4 mg daily, Disp: , Rfl:     B-D ULTRAFINE III SHORT PEN 31G X 8 MM MISC, USE AS DIRECTED, Disp: 200 each, Rfl: 0    gabapentin (NEURONTIN) 100 mg capsule, TAKE ONE CAPSULE BY MOUTH FOUR TIMES A DAY, Disp: 120 capsule, Rfl: 2    Past Medical History  Past Medical History:   Diagnosis Date    Arthritis     Carpal tunnel syndrome     Chronic kidney disease     Stage 2 CKD    Corneal abrasion     last assessed - 18Nov2012    Diabetes mellitus (HCC)     Gout     Hiatal hernia     Hyperlipidemia     Hypertension     Impaired fasting glucose     last assessed - 05Jun2006    Kidney stone     last stone 2009    Lumbar spondylosis 10/31/2021    Lumbar spondylosis     Numbness of fingers of both hands     PONV (postoperative nausea and vomiting)     Psoriasis     RA (rheumatoid arthritis) (HCC)     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HCC)     Sinus infection     currently under tx PO ABT etc as of 04/17/19    Stage 2 chronic kidney disease     Viremia     Resolved - 22Jan2018       Past Surgical History   Past Surgical History:   Procedure Laterality Date    ANKLE SURGERY Right     tendon arthrotomy    ARTHROTOMY ANKLE      BACK SURGERY  2014    lumbar laminectomy    CARPAL TUNNEL RELEASE Left     COLONOSCOPY      COLONOSCOPY N/A 04/19/2019    Procedure: COLONOSCOPY;  Surgeon: Toña Aly MD;  Location: [facility] GI [facility];  Service: Gastroenterology    FINGER SURGERY Bilateral     every finger contracture release over a 10 year period trigger finger    INCISION TENDON SHEATH      of a finger    KNEE SURGERY Right     x2 arthrotomy    AK ARTHRP KNE CONDYLE&PLATU MEDIAL&LAT COMPARTMENTS Right 11/30/2021     Procedure: ARTHROPLASTY KNEE TOTAL W ROBOT;  Surgeon: Marty Thomas DO;  Location: [facility];  Service: Orthopedics    DE NEUROPLASTY &/TRANSPOS MEDIAN NRV CARPAL TUNNE Left 10/26/2023    Procedure: RELEASE CARPAL TUNNEL;  Surgeon: Aline Whatley MD;  Location: [facility];  Service: Orthopedics    SHOULDER SURGERY Left     x1 staples, tendon repair secondary to many spontaneous dislocations       Family History   Family History   Problem Relation Age of Onset    Hypertension Mother     Arthritis Mother     Hyperlipidemia Mother     Lung cancer Mother     Stroke Maternal Grandfather     Diabetes Cousin     COPD Father     Depression Family     Diabetes Family     No Known Problems Sister     No Known Problems Brother     No Known Problems Maternal Aunt     No Known Problems Maternal Uncle     No Known Problems Paternal Aunt     No Known Problems Paternal Uncle     No Known Problems Maternal Grandmother     No Known Problems Paternal Grandmother     No Known Problems Paternal Grandfather        The following portions of the patient's history were reviewed and updated as appropriate: allergies, current medications, past family history, past medical history, past social history, past surgical history, and problem list.    Review of Systems   Constitutional:  Negative for chills and fever.   HENT:  Negative for ear pain and sore throat.         Dry mouth.   Eyes:  Negative for pain and visual disturbance.   Respiratory:  Negative for cough and shortness of breath.    Cardiovascular:  Positive for leg swelling. Negative for chest pain and palpitations.        Edema ankles/feet.   Gastrointestinal:  Negative for abdominal pain and vomiting.   Genitourinary:  Negative for dysuria and hematuria.        Less nocturia.  Erectile dysfunction.   Musculoskeletal:  Positive for arthralgias, back pain and joint swelling.        Morning stiffness minutes duration.  Bilateral hand pain and numbness persist with overall  "improvement in joint swelling bilateral hands on Plaquenil.  Back pain improved.  Psoriasis scalp, extensor surfaces left knee, few patches extremities, and questionable patch extensor surface elbows better with scalp shampoo.  No dry eyes.  No Raynaud's.  History gout right ankle.  Last episode years ago.  History adhesive capsulitis shoulders improved.  Severe DJD right knee, S/P TKR 11/30/2021 stable.    Skin:  Positive for rash. Negative for color change.        Scalp psoriasis for which he uses shampoo.  Psoriasis face and nasolabial folds improved.   Neurological:  Positive for numbness. Negative for seizures and syncope.   All other systems reviewed and are negative.        Objective:      /70 (BP Location: Right arm, Patient Position: Sitting, Cuff Size: Adult)   Pulse 63   Temp 97.8 °F (36.6 °C) (Temporal)   Ht 5' 9.5\" (1.765 m)   Wt 114 kg (250 lb 9.6 oz)   SpO2 98%   BMI 36.48 kg/m²          Physical Exam  Vitals reviewed.   Constitutional:       Appearance: Normal appearance.   HENT:      Head: Normocephalic.      Nose:      Comments: Nose and throat unremarkable.  Cardiovascular:      Rate and Rhythm: Regular rhythm.   Pulmonary:      Breath sounds: Normal breath sounds.   Abdominal:      Palpations: Abdomen is soft.   Musculoskeletal:      Comments: Neck decreased lateral flexion.  Shoulders very slightly limited external rotation.  Bilateral shoulder crepitus.  Elbows full range of motion.  Wrists full range of motion with positive Tinel's bilaterally.  Hands minimal soft tissue prominence 2nd and 3rd MCPs right, scattered PIP joints, especially left index and middle finger PIP joints, and left thumb IP joint.  Very slight prominence DIP joints.  Straight leg raising negative bilaterally.  Schober's negative.  No SI joint tenderness appreciated.  Hips full range of motion.  Knees right slightly decreased flexion and extension with slight cool effusion, patellofemoral crepitus, and surgical " scar.  Left knee essentially full range of motion with patellofemoral crepitus.  Ankles slight soft tissue prominence lateral malleoli with edema right>left ankle.  Feet rearfoot hyperpronation with midfoot cavus deformities, high insteps, hallux valgus, and hammertoe deformities.  No synovitis noted.   Skin:     Comments: Plaque psoriasis scalp, with few patches lower extremities, extensor surface left knee, questionable extensor surfaces elbows. No onycholysis.  Varicosities lower extremities with trace to 1+ edema ankles and feet.  No stasis changes.               This note was written in part using the assistance of the [employer] [id number] microphone system. Those portions using this system have been dictated and not read.

## 2024-07-18 NOTE — PATIENT INSTRUCTIONS
Continue Plaquenil as before.  You must see the eye doctor for Plaquenil screening, otherwise we can continue the Plaquenil.  I am going to measure your Plaquenil level with your next lab work.  We will give you a kit for that.  The lab will draw it for you.  I would suggest follow-up once a year with a cardiologist so they can repeat your echocardiogram and monitor the mild diastolic dysfunction.  Follow-up primary care as scheduled.  Get lab work 2 weeks before your next office visit.

## 2024-07-21 DIAGNOSIS — E11.49 DIABETES MELLITUS TYPE 2 WITH NEUROLOGICAL MANIFESTATIONS (HCC): ICD-10-CM

## 2024-07-21 RX ORDER — PEN NEEDLE, DIABETIC 31 GX5/16"
NEEDLE, DISPOSABLE MISCELLANEOUS
Qty: 200 EACH | Refills: 0 | Status: SHIPPED | OUTPATIENT
Start: 2024-07-21

## 2024-08-05 NOTE — ASSESSMENT & PLAN NOTE
Lab Results   Component Value Date    HGBA1C 6.3 (H) 07/13/2024   Diabetes stable with hemoglobin A1c in target range.  He is on Jardiance and due to start Ozempic.  He does have proteinuria and is followed by nephrology.  Follow-up primary care.  Continue to monitor.  
  Lab Results   Component Value Date    HGBA1C 6.3 (H) 07/13/2024   Seronegative arthropathy with prior soft tissue prominence.  Rule out component of arthritis associated with diabetes.  Musculoskeletal ultrasound from October 2024 significant for no evidence of inflammation.  Continue Plaquenil.  Monitor labs for medication toxicity.  Patient needs routine eye follow-up twice yearly while on Plaquenil to rule out toxicity.  Continue to monitor.  
3+ dipstick protein on urinalysis.  Stage IIIa chronic kidney disease with creatinine 1.44 and estimated GFR 54.  Follow-up nephrology.  Continue to monitor.  
Cervical spine films from November 2021 unremarkable.  EMG studies from September 2023 with no evidence of cervical radiculopathy.  No current radicular symptoms.  Continue to monitor.  
History lumbar laminectomy no current radicular symptoms.  Continue to monitor.  
History of gout quiescent.  Uric acid dated 7/18/2023 in target range at 5.6 on no urate lowering agent.  Continue to monitor clinically and with lab work as ordered.  
Low back pain currently stable with no radicular symptoms.  No evidence for inflammatory back pain by history, however, patient has not gone for updated imaging lumbar spine.  He does have history of lumbar decompression in the past.  Continue to monitor back symptoms.  If they worsen, will refer for imaging and consider physical therapy.  Continue to monitor.  
Patient does have plaque psoriasis overall improved but still has lesions in his scalp and extremities.  Encourage follow-up with dermatologist.  Joint disease stable on Plaquenil.  Rule out psoriatic component.  Monitor disease activity and medication toxicity with lab work as ordered.  Continue to monitor.  
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  Lumbar spondylosis.  He does have prescription for updated hand films.  Musculoskeletal ultrasound from October 2022 significant for no evidence of inflammatory arthritis.  Continue Plaquenil twice daily.  Continue follow-up with eye doctor twice yearly to monitor for Plaquenil toxicity.  Encourage home exercise program as tolerated.  Continue to monitor.  
Seronegative inflammatory arthritis.  Rule out seronegative rheumatoid arthritis versus psoriatic component.  Rule out erosive osteoarthritis.  Rule out arthritis related to diabetes.  Rheumatoid factor, CCP, and DEBORA all negative.  Sed rate and CRP normal.  Inflammation improved on Plaquenil. 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  He is status post left carpal tunnel release 10/26/2023.  He had more recent right carpal tunnel injection with benefit.  Symptoms do return after the steroid wears off.  He is considering right carpal tunnel release in the fall and will discuss it with orthopedic surgery.  Encourage patient to go for eye follow up while on Plaquenil as well as routine follow-up every 6 to 12 months.  Monitor disease activity and medication toxicity with lab work as ordered.  Follow-up 6 months or sooner if needed.  
Wt Readings from Last 3 Encounters:   07/18/24 114 kg (250 lb 9.6 oz)   07/15/24 112 kg (246 lb 6.4 oz)   05/20/24 108 kg (237 lb)     History diastolic dysfunction.  Encourage patient to follow-up with cardiologist to monitor and repeat echocardiogram.        
right

## 2024-08-12 ENCOUNTER — OFFICE VISIT (OUTPATIENT)
Dept: NEPHROLOGY | Facility: CLINIC | Age: 64
End: 2024-08-12
Payer: COMMERCIAL

## 2024-08-12 VITALS
OXYGEN SATURATION: 99 % | WEIGHT: 244 LBS | HEART RATE: 74 BPM | DIASTOLIC BLOOD PRESSURE: 80 MMHG | BODY MASS INDEX: 34.93 KG/M2 | HEIGHT: 70 IN | SYSTOLIC BLOOD PRESSURE: 124 MMHG

## 2024-08-12 DIAGNOSIS — E11.22 TYPE 2 DIABETES MELLITUS WITH STAGE 3A CHRONIC KIDNEY DISEASE AND HYPERTENSION (HCC): Primary | ICD-10-CM

## 2024-08-12 DIAGNOSIS — R80.1 PERSISTENT PROTEINURIA: ICD-10-CM

## 2024-08-12 DIAGNOSIS — E66.01 CLASS 2 SEVERE OBESITY DUE TO EXCESS CALORIES WITH SERIOUS COMORBIDITY AND BODY MASS INDEX (BMI) OF 35.0 TO 35.9 IN ADULT (HCC): ICD-10-CM

## 2024-08-12 DIAGNOSIS — N18.31 TYPE 2 DIABETES MELLITUS WITH STAGE 3A CHRONIC KIDNEY DISEASE AND HYPERTENSION (HCC): Primary | ICD-10-CM

## 2024-08-12 DIAGNOSIS — I12.9 TYPE 2 DIABETES MELLITUS WITH STAGE 3A CHRONIC KIDNEY DISEASE AND HYPERTENSION (HCC): Primary | ICD-10-CM

## 2024-08-12 PROCEDURE — 99213 OFFICE O/P EST LOW 20 MIN: CPT | Performed by: INTERNAL MEDICINE

## 2024-08-12 NOTE — PROGRESS NOTES
NEPHROLOGY OFFICE VISIT   Murtaza Cerrato 63 y.o. male MRN: [id number]  8/12/2024    Reason for Visit: Chronic kidney disease stage III    History of Present Illness (HPI):    I had the pleasure of seeing Ramirez today in the renal clinic for the continued management of chronic kidney disease stage III, hypertension. Since our last visit, there has been no ER visits or hospitilizations. He currently has no complaints at this time and is feeling well. Patient denies any chest pain, shortness of breath and swelling. The last blood work was done on July 13 which included a CMP, microalbumin/creatinine ratio, CBC, which we have reviewed together.    His weight unfortunately increased to 244 pounds from 237 pounds from our last visit.  He has now been initiated on Ozempic by his primary care physician approximately 1 month ago.    His diabetes remains under excellent management as his most recent hemoglobin A1c 6.3.  He remains on Jardiance reports he is tolerating it well.    His blood pressure is under excellent control he remains on Benicar.    His proteinuria is improving his most recent microalbumin/creatinine ratio was down to 2 g/g from 2.6 g/g.    As renal function remained stable in the mid ones with the most recent creatinine 1.5 mg/dL and a GFR greater than 50 mL/min.  All his electrolytes are normal.    Still continues to have arthritic pain and carpal tunnel issues.  I asked him to ask his primary care physician for recommendations on a hand surgeon.    ASSESSMENT and PLAN:    Chronic Kidney Disease Stage IIA with nephrotic range proteinuria (G3A3)  --Imaging: Renal ultrasound from February 2022.  Right kidney 12.2 cm, left kidney 11.9 cm.  No evidence of hydronephrosis and no masses.  No cyst noted.  --Urinalysis: Recent urine analysis from July reviewed.  Positive for glucose and protein.  It should be noted that the patient is on Jardiance  --Proteinuria: Microalbumin/creatinine ratio improving to 2  g/g.  --Baseline creatinine: mid 1's  --Etiology:  Presumed be secondary to diabetic kidney disease, hypertension and obesity related renal dysfunction  --Biopsy Proven:  No  --Status: Proteinuria is improving, creatinine is stable and at baseline.  Needs weight loss.  Excellent blood pressure control continue RAAS blockade.  --Serologies: DEBORA was negative, complement C3 and C4 are normal, rheumatoid factor was negative, otitis panel was nonreactive, antiglomerular basement membrane antibodies were negative, phospholipase A2 receptor antibodies were negative, ANCA titers were negative, kappa/lambda ratio was normal at 1.29  --Management: Continue SGLT2 inhibitor with Jardiance and continue RAAS blockade with Benicar.  Continued excellent diabetic control and blood pressure control.  Needs weight loss and agree with the addition of Ozempic to assist in this.  --Reducing Cardiovascular Risk Factors:  Simvastatin+ Ezetimibe reduced atherosclerotic events in CKD (SHARP trial); Low dose aspirin safe (if no contraindications exist); smoking is an independent risk factor for Chronic Kidney Disease and progression, strongly recommend smoking cessation     Diabetes mellitus type 2  -hemoglobin A1c: 6.3 (A1C values may be falsely elevated or decreased in those with CKD, assay method should be certified by National glycohemoglobin standardization Program). Target A1C between 7-8.5 (will need to be individualized for each patient), and would utilize A1C  in conjunction with continuous glucose monitoring (CGM).  It should also be noted that the A1c with more advanced kidney disease would be inaccurate due to decreased red blood cell life along with anemia.  -current medications: Metformin, Jardiance, glargine, glipizide and now on Ozempic  -proteinuria:  Yes, and this is improving  -retinopathy:  Not reported  -neuropathy:  Yes  -following with endocrinology Dr. Dillon  -please follow with an ophthalmologist and podiatrist every  year  -continued self monitoring of blood glucose at home  -Management in CKD Recommendations:   Metformin:  Avoid if creatinine clearance is less than 30 cc/min (concern for lactic acidosis)  Sodium-Glucose Cotransporter-2 (SGLT2) Inhibitors:  May see an acute drop in the eGFR initially when starting the medication but then a stabilization of the renal function, with slower loss of renal function as compared to placebo.  Relative risk of end-stage renal disease, doubling of serum creatinine or death from renal causes were also found to be lower as compared to placebo. ([facility]EDENCE).  DAPA-CKD study, showed that patients with chronic kidney disease regardless of the presence or absence of diabetes the risk of composite of a sustained decline in the estimated GFR of at least 50%, end-stage renal disease or death from renal or cardiovascular causes was significantly lower with Dapagliflozin than with placebo. EMPEROR-Reduced study also showed beneficial effects.   If no contraindications exist I would recommend this medication added to the diabetic regiment, if further optimal diabetic control is required. If eGFR < 60 cc/min (avoid ertugliflozin); If eGFR < 45 cc/min (caution with or avoid dapagliflozin, emphagliflozin), if eGFR  < 30 cc/min (caution or avoid all including canagliflozin, more for efficacy)  Sulfonylureas:  Preferred short-acting (glipizide, glimepiride, repaglinide), relatively safe in patients with non dialysis CKD  Thiazolidinedones/Alpha-Gluosidase inhibitors/Dipeptidyl peptidase-4 inhibitors:  Generally not considered first-line agents in CKD (limited data in long-term safety and efficacy)  Insulin:  Starting dose may need to be lower than what would ordinarily be used, as there is a decrease metabolism of insulin (no dose adjustment if the GFR > 50 mL/min, dose should be reduced to 75% of baseline if GFR 10-50 mL/min, and 50% baseline if GFR < 10 mL/min)     Hypertension  -- Stage II (American  College of Cardiology/American Heart Association)  -- with underlying chronic kidney disease and obesity  -- Body mass index is 35.5 kg/m².  -- Volume status: Euvolemic  -- Etiology:  Essential hypertension and obesity  -- Secondary Work-Up:  No  -- Target Goal: < 130/80 (ACC/AHA, CKD with proteinuria, CKD in diabetics) ; if tolerated can target SBP < 120 mmHg in high cardiovascular risk ( Age > 75, CKD, CVD, No Diabetics SPRINT)  -- Lifestyle Modifications: DASH Diet, Weight Loss for ideal body weight, 45 mins of cardiovascular exercise 3 times a week as tolerated, and if no contraindications exist, smoking cessation, limit alcohol use, avoid NSAIDS, monitor blood pressure at home  -- Status: Blood pressure at target  -- Current antihypertensive regiment: Amlodipine 10 mg daily, olmesartan 40 mg daily  -- Changes: Continue current management but strongly recommend weight loss exercise and diet he is still obese with a BMI of 35     Vitamin D deficiency  -- Completed a course of ergocalciferol now currently on colecalciferol 1000 units daily     Proteinuria  -- presumed to be secondary to diabetic kidney disease and obesity  -- 24 hour urine protein or urine protein creatinine ratio: Continue  improvement of the proteinuria.  -- Current Blood Pressure: At target  -- current anti proteinuric medications: Olmesartan  -- Interventions: Continue Jardiance and olmesartan  -- General Recommendations:  RAAS Blockade with high dose ARB or ACEI for target blood pressure < 130/80 as tolerated, with spironolactone as a good adjunct for anti proteinuric effect.  Management of hypervolemia for blood pressure control as needed.  Avoid combination ACEI + ARB, due to high adverse effects (ONTARGET study)  Direct Renin Inhibitor + ACEI or ARB has FDA black box warning for patients with diabetes and GFR < 60 cc/min due to risk for non fatal stroke, renal complications, hyperkalemia and hypotension (ALTITUDE study)  Moderate dietary  protein restriction 0.8 gm/kg  Recommend statin therapy if no contraindications.   Recommend 1, 25 vitamin-D such as Paricalcitol if appropropriate (VITAL study)        Right knee osteoarthritis  --status post right knee surgery on 11/30/2021     Obesity  -- BMI  35.5, this is worsening now started on Ozempic  --Recommend decreasing portion sizes, encouraging healthy choices of fruits and vegetables, consuming healthier snacks and moderation in carbohydrate intake. Exercise recommendations; 3-5 times a week, the American Heart Association recommends 150 minutes a week moderate exercise  --Strongly recommend evaluation by nutritionist, declined  --Overweight and obesity have been associated with increased mortality and morbidity.  Associated with a reduction in life expectancy, associated with increased all cause and cardiovascular mortality  --Metabolic risks, including increased risk of diabetes type 2, insulin resistance with hyperinsulinemia (weight loss of 5 to 7% associated with a decreased risk of type 2 diabetes among individuals with prediabetes and weight loss of 15% can lead to dramatic improvement of diabetes in nearly half of people).  Dyslipidemia, hypertension and heart disease are all increased in patients with obesity.  Along with increased risk of stroke increased risk of multiple cancer types.  Can also be associated with increased renal dysfunction, strongest risk factor for new onset chronic kidney disease.  There is also a degree of compensatory hyperfiltration to meet high in the metabolic demands of increased body weight.  This can also result in increased increased risk for nephrolithiasis.      PATIENT INSTRUCTIONS:    Patient Instructions   1.)  Low 2 g sodium diet    2.)  Monitor weights at home    3.)  Avoid NSAIDs (ibuprofen, Motrin, Advil, Aleve, naproxen)    4.)  Monitor blood pressure at home, call if blood pressure greater than 150/90 persistently    5.) I will plan to discuss all  "results including blood work, and/or imaging at our next visit, unless there is an urgent indication, in which case I will call you earlier. If you have any questions or concerns about your results, please feel free to call our office.          Orders Placed This Encounter   Procedures    Albumin / creatinine urine ratio     Standing Status:   Future     Number of Occurrences:   1     Standing Expiration Date:   8/12/2025    Comprehensive metabolic panel     This is a patient instruction: Patient fasting for 8 hours or longer recommended.     Standing Status:   Future     Number of Occurrences:   1     Standing Expiration Date:   8/12/2025    Hemoglobin A1C     Standing Status:   Future     Number of Occurrences:   1     Standing Expiration Date:   8/12/2025    CBC and Platelet     Standing Status:   Future     Number of Occurrences:   1     Standing Expiration Date:   8/12/2025    PTH, intact     Standing Status:   Future     Number of Occurrences:   1     Standing Expiration Date:   8/12/2025       OBJECTIVE:  Current Weight: Weight - Scale: 111 kg (244 lb)  Vitals:    08/12/24 0800   BP: 124/80   BP Location: Left arm   Patient Position: Sitting   Cuff Size: Large   Pulse: 74   SpO2: 99%   Weight: 111 kg (244 lb)   Height: 5' 9.5\" (1.765 m)    Body mass index is 35.52 kg/m².      REVIEW OF SYSTEMS:    Review of Systems   Constitutional:  Negative for activity change and fever.   Respiratory:  Negative for cough, chest tightness, shortness of breath and wheezing.    Cardiovascular:  Negative for chest pain and leg swelling.   Gastrointestinal:  Negative for abdominal pain, diarrhea, nausea and vomiting.   Endocrine: Negative for polyuria.   Genitourinary:  Negative for difficulty urinating, dysuria, flank pain, frequency and urgency.   Skin:  Negative for rash.   Neurological:  Negative for dizziness, syncope, light-headedness and headaches.       PHYSICAL EXAM:      Physical Exam  Vitals and nursing note " reviewed.   Constitutional:       General: He is not in acute distress.     Appearance: He is well-developed.   HENT:      Head: Normocephalic and atraumatic.   Eyes:      General: No scleral icterus.     Conjunctiva/sclera: Conjunctivae normal.      Pupils: Pupils are equal, round, and reactive to light.   Cardiovascular:      Rate and Rhythm: Normal rate and regular rhythm.      Heart sounds: S1 normal and S2 normal. No murmur heard.     No friction rub. No gallop.   Pulmonary:      Effort: Pulmonary effort is normal. No respiratory distress.      Breath sounds: Normal breath sounds. No wheezing or rales.   Abdominal:      General: Bowel sounds are normal.      Palpations: Abdomen is soft.      Tenderness: There is no abdominal tenderness. There is no rebound.   Musculoskeletal:         General: Normal range of motion.      Cervical back: Normal range of motion and neck supple.   Skin:     Findings: No rash.   Neurological:      Mental Status: He is alert and oriented to person, place, and time.   Psychiatric:         Behavior: Behavior normal.         Medications:    Current Outpatient Medications:     amLODIPine (NORVASC) 10 mg tablet, TAKE ONE TABLET BY MOUTH EVERY DAY, Disp: 90 tablet, Rfl: 1    aspirin (ECOTRIN LOW STRENGTH) 81 mg EC tablet, Take 81 mg by mouth daily, Disp: , Rfl:     atorvastatin (LIPITOR) 20 mg tablet, TAKE ONE TABLET BY MOUTH EVERY DAY, Disp: 90 tablet, Rfl: 1    cholecalciferol (VITAMIN D3) 1,000 units tablet, Take 1,000 Units by mouth daily, Disp: , Rfl:     Empagliflozin (Jardiance) 10 MG TABS tablet, Take 1 tablet (10 mg total) by mouth every morning, Disp: 30 tablet, Rfl: 2    gabapentin (NEURONTIN) 100 mg capsule, TAKE ONE CAPSULE BY MOUTH FOUR TIMES A DAY, Disp: 120 capsule, Rfl: 2    glipiZIDE (GLUCOTROL XL) 10 mg 24 hr tablet, TAKE TWO TABLETS BY MOUTH EVERY DAY AT BEDTIME (Patient taking differently: 10 mg 2 (two) times a day), Disp: 60 tablet, Rfl: 5    hydroxychloroquine  "(PLAQUENIL) 200 mg tablet, TAKE ONE TABLET BY MOUTH TWICE A DAY WITH MEALS (GENERIC FOR PLAQUENIL), Disp: 180 tablet, Rfl: 1    Insulin Glargine Solostar (Lantus SoloStar) 100 UNIT/ML SOPN, Inject 0.25 mL (25 Units total) as directed daily at bedtime (Patient taking differently: Inject 10 Units as directed daily at bedtime), Disp: 30 mL, Rfl: 1    metFORMIN (GLUCOPHAGE) 500 mg tablet, TAKE TWO TABLETS BY MOUTH TWICE A DAY WITH MEALS (GENERIC GLUCOPHAGE), Disp: 360 tablet, Rfl: 1    olmesartan (BENICAR) 20 mg tablet, TAKE TWO TABLETS BY MOUTH EVERY DAY, Disp: 180 tablet, Rfl: 1    semaglutide, 0.25 or 0.5 mg/dose, (Ozempic, 0.25 or 0.5 MG/DOSE,) 2 mg/3 mL injection pen, 0.25 mg under the skin every 7 days for 4 doses (28 days), THEN 0.5 mg under the skin every 7 days, Disp: 9 mL, Rfl: 1    tamsulosin (FLOMAX) 0.4 mg, 0.4 mg daily, Disp: , Rfl:     acetaminophen (TYLENOL) 650 mg CR tablet, Take 1 tablet (650 mg total) by mouth every 8 (eight) hours as needed for mild pain, Disp: 30 tablet, Rfl: 0    B-D ULTRAFINE III SHORT PEN 31G X 8 MM MISC, USE AS DIRECTED, Disp: 200 each, Rfl: 0    Blood Glucose Monitoring Suppl (OneTouch Verio) w/Device KIT, Test blood sugar twice a day, Disp: 1 kit, Rfl: 0    Continuous Blood Gluc  (FreeStyle Ulysses 14 Day [place]) SHREE, Use, Disp: , Rfl:     Continuous Glucose Sensor (FreeStyle Ulysses 3 Sensor) MISC, Use 1 each every 14 (fourteen) days, Disp: 6 each, Rfl: 3    glucose blood (OneTouch Verio) test strip, Test blood sugar twice a day, Disp: 200 each, Rfl: 3    mupirocin (BACTROBAN) 2 % ointment, Apply topically 2 (two) times a day, Disp: 22 g, Rfl: 0    OneTouch Delica Lancets 33G MISC, Test blood sugar twice a day, Disp: 200 each, Rfl: 3    Laboratory Results:        Invalid input(s): \"ALBUMIN\"    Results for orders placed or performed in visit on 07/13/24   [facility] [place] Default   Result Value Ref Range    White Blood Cell Count 4.9 3.4 - 10.8 x10E3/uL    Red Blood Cell " Count 4.81 4.14 - 5.80 x10E6/uL    Hemoglobin 14.0 13.0 - 17.7 g/dL    HCT 41.9 37.5 - 51.0 %    MCV 87 79 - 97 fL    MCH 29.1 26.6 - 33.0 pg    MCHC 33.4 31.5 - 35.7 g/dL    RDW 14.3 11.6 - 15.4 %    Platelet Count 214 150 - 450 x10E3/uL    Neutrophils 65 Not Estab. %    Lymphocytes 19 Not Estab. %    Monocytes 9 Not Estab. %    Eosinophils 6 Not Estab. %    Basophils PCT 1 Not Estab. %    Neutrophils (Absolute) 3.2 1.4 - 7.0 x10E3/uL    Lymphocytes (Absolute) 0.9 0.7 - 3.1 x10E3/uL    Monocytes (Absolute) 0.4 0.1 - 0.9 x10E3/uL    Eosinophils (Absolute) 0.3 0.0 - 0.4 x10E3/uL    Basophils ABS 0.1 0.0 - 0.2 x10E3/uL    Immature Granulocytes 0 Not Estab. %    Immature Granulocytes (Absolute) 0.0 0.0 - 0.1 x10E3/uL   Comprehensive metabolic panel   Result Value Ref Range    Glucose, Random 147 (H) 70 - 99 mg/dL    BUN 28 (H) 8 - 27 mg/dL    Creatinine 1.44 (H) 0.76 - 1.27 mg/dL    eGFR 54 (L) >59 mL/min/1.73    SL AMB BUN/CREATININE RATIO 19 10 - 24    Sodium 142 134 - 144 mmol/L    Potassium 4.3 3.5 - 5.2 mmol/L    Chloride 106 96 - 106 mmol/L    CO2 21 20 - 29 mmol/L    CALCIUM 9.5 8.6 - 10.2 mg/dL    Protein, Total 6.4 6.0 - 8.5 g/dL    Albumin 4.0 3.9 - 4.9 g/dL    Globulin, Total 2.4 1.5 - 4.5 g/dL    TOTAL BILIRUBIN 0.4 0.0 - 1.2 mg/dL    Alk Phos Isoenzymes 70 44 - 121 IU/L    AST 15 0 - 40 IU/L    ALT 14 0 - 44 IU/L   Urinalysis with microscopic   Result Value Ref Range    Specific Gravity 1.022 1.005 - 1.030    Ph 5.5 5.0 - 7.5    Color UA Yellow Yellow    Urine Appearance Clear Clear    Leukocyte Esterase Negative Negative    Protein 3+ (A) Negative/Trace    Glucose, 24 HR Urine 3+ (A) Negative    Ketone, Urine Negative Negative    Blood, Urine Negative Negative    Bilirubin, Urine Negative Negative    Urobilinogen Urine 0.2 0.2 - 1.0 mg/dL    Nitrites Urine Negative Negative    Microscopic Examination See below:    Microscopic Examination   Result Value Ref Range    SL AMB WBC, URINE None seen 0 - 5 /hpf     RBC, Urine None seen 0 - 2 /hpf    Epithelial Cells (non renal) None seen 0 - 10 /hpf    Casts None seen None seen /lpf    Bacteria, Urine None seen None seen/Few   Sedimentation rate, automated   Result Value Ref Range    Sedimentation Rate 10 0 - 30 mm/hr   C-reactive protein   Result Value Ref Range    C-Reactive Protein, Quant 1 0 - 10 mg/L

## 2024-08-12 NOTE — PATIENT INSTRUCTIONS
1.)  Low 2 g sodium diet    2.)  Monitor weights at home    3.)  Avoid NSAIDs (ibuprofen, Motrin, Advil, Aleve, naproxen)    4.)  Monitor blood pressure at home, call if blood pressure greater than 150/90 persistently    5.) I will plan to discuss all results including blood work, and/or imaging at our next visit, unless there is an urgent indication, in which case I will call you earlier. If you have any questions or concerns about your results, please feel free to call our office.

## 2024-09-10 DIAGNOSIS — E78.2 MIXED HYPERLIPIDEMIA: ICD-10-CM

## 2024-09-10 RX ORDER — ATORVASTATIN CALCIUM 20 MG/1
TABLET, FILM COATED ORAL
Qty: 90 TABLET | Refills: 1 | Status: SHIPPED | OUTPATIENT
Start: 2024-09-10

## 2024-09-28 DIAGNOSIS — E11.49 DIABETES MELLITUS TYPE 2 WITH NEUROLOGICAL MANIFESTATIONS (HCC): ICD-10-CM

## 2024-09-28 RX ORDER — GLIPIZIDE 10 MG/1
TABLET, FILM COATED, EXTENDED RELEASE ORAL
Qty: 60 TABLET | Refills: 5 | Status: SHIPPED | OUTPATIENT
Start: 2024-09-28

## 2024-10-10 DIAGNOSIS — R80.1 PERSISTENT PROTEINURIA: ICD-10-CM

## 2024-10-10 DIAGNOSIS — Z79.4 TYPE 2 DIABETES MELLITUS WITH MICROALBUMINURIA, WITH LONG-TERM CURRENT USE OF INSULIN (HCC): ICD-10-CM

## 2024-10-10 DIAGNOSIS — I12.9 TYPE 2 DIABETES MELLITUS WITH STAGE 2 CHRONIC KIDNEY DISEASE AND HYPERTENSION  (HCC): ICD-10-CM

## 2024-10-10 DIAGNOSIS — N18.2 STAGE 2 CHRONIC KIDNEY DISEASE: ICD-10-CM

## 2024-10-10 DIAGNOSIS — E66.01 CLASS 2 SEVERE OBESITY DUE TO EXCESS CALORIES WITH SERIOUS COMORBIDITY AND BODY MASS INDEX (BMI) OF 35.0 TO 35.9 IN ADULT (HCC): ICD-10-CM

## 2024-10-10 DIAGNOSIS — E55.9 VITAMIN D DEFICIENCY: ICD-10-CM

## 2024-10-10 DIAGNOSIS — E11.29 TYPE 2 DIABETES MELLITUS WITH MICROALBUMINURIA, WITH LONG-TERM CURRENT USE OF INSULIN (HCC): ICD-10-CM

## 2024-10-10 DIAGNOSIS — N18.2 TYPE 2 DIABETES MELLITUS WITH STAGE 2 CHRONIC KIDNEY DISEASE AND HYPERTENSION  (HCC): ICD-10-CM

## 2024-10-10 DIAGNOSIS — E11.22 TYPE 2 DIABETES MELLITUS WITH STAGE 2 CHRONIC KIDNEY DISEASE AND HYPERTENSION  (HCC): ICD-10-CM

## 2024-10-10 DIAGNOSIS — R80.9 TYPE 2 DIABETES MELLITUS WITH MICROALBUMINURIA, WITH LONG-TERM CURRENT USE OF INSULIN (HCC): ICD-10-CM

## 2024-10-10 DIAGNOSIS — E66.812 CLASS 2 SEVERE OBESITY DUE TO EXCESS CALORIES WITH SERIOUS COMORBIDITY AND BODY MASS INDEX (BMI) OF 35.0 TO 35.9 IN ADULT (HCC): ICD-10-CM

## 2024-10-11 ENCOUNTER — TELEPHONE (OUTPATIENT)
Dept: NEPHROLOGY | Facility: CLINIC | Age: 64
End: 2024-10-11

## 2024-10-12 DIAGNOSIS — I10 BENIGN ESSENTIAL HYPERTENSION: ICD-10-CM

## 2024-10-12 RX ORDER — AMLODIPINE BESYLATE 10 MG/1
10 TABLET ORAL DAILY
Qty: 90 TABLET | Refills: 1 | Status: SHIPPED | OUTPATIENT
Start: 2024-10-12

## 2024-10-20 NOTE — TELEPHONE ENCOUNTER
Patient will be going to [facility] for lab work that was ordered by PCP [facility] print labs.  Patient will  this afternoon    87M pmhx PAD, dementia, pw loose stools x 1.5 months - pts stool recently appeared dark, per pts daughter, he did have peptobismol yday but states stool was dark prior to that. Pt not on AC. Pt denies abd pain, pt denies any symptoms but oriented x 2. Pt has chronic LE pain and chronic neck/ back pain. No falls. Pt not ambulatory per son Barrett Miner due to 'total blockage of both legs' - states he was seen previously and told it was nonoperable, and has been living with it for years    - d/w Son Barrett Miner at [phone] results - does not want further vascular eval here today, wants to take pt home, pts distal pulses intact and extremities warm, no signs of acute limb ischemia - reviewed alt chart [facility] MRN [id number] and had same vascular abnormalities seen at that time, will dc, return precautions discussed 87M pmhx PAD, dementia, HTN, CAD with stents, BPH with indwelling surparpubic dumont, pw loose stools x 1.5 months - pts stool recently appeared dark over last few days.  Per pts daughter, he did have peptobismol yday but states stool was dark prior to that. Pts son reports abnormal smeel from stool.  Pt not on AC. Pt denies abd pain, pt denies any symptoms but oriented x 2. Pt complains of chronic loose stools. Pt has chronic LE pain and chronic neck/ back pain. No falls. 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 He states has been known blockage for many years and was told that no intervention recommended. pts distal DP/PT pulses intact b/l and dopplerable and extremities warm,  pt  reassessed and with no pain except discomfort from stretcher. no signs of acute limb ischemia - reviewed alt chart [facility] MRN [id number] and had same vascular abnormalities seen in 2019. will dc, return precautions discussed

## 2024-10-25 DIAGNOSIS — I10 BENIGN ESSENTIAL HYPERTENSION: ICD-10-CM

## 2024-10-25 RX ORDER — OLMESARTAN MEDOXOMIL 20 MG/1
TABLET ORAL
Qty: 180 TABLET | Refills: 1 | Status: SHIPPED | OUTPATIENT
Start: 2024-10-25

## 2024-11-01 ENCOUNTER — DOCUMENTATION (OUTPATIENT)
Dept: PSYCHIATRY | Facility: CLINIC | Age: 64
End: 2024-11-01

## 2024-11-01 DIAGNOSIS — F43.23 ADJUSTMENT DISORDER WITH MIXED ANXIETY AND DEPRESSED MOOD: Primary | ICD-10-CM

## 2024-11-01 NOTE — PROGRESS NOTES
Psychotherapy Discharge Summary    Preferred Name: Ramirez Cerrato  YOB: 1960    Admission date to psychotherapy: 7/11/23    Referred by: self    Presenting Problem: marital problems, stress, relationship problems with family    Course of treatment included : individual therapy     Progress/Outcome of Treatment Goals (brief summary of course of treatment) Ramirez was very engaged and forthcoming during sessions. Open to feedback, identified difficulties with marriage as well as own stressors such as work and feelings of anger. Ramirez completed goals of care.    Treatment Complications (if any):   none    Treatment Progress: excellent    Current [facility] Psychiatric Provider: none    Discharge Medications include: no psychiatric medications    Discharge Date: 11/1/24    Discharge Diagnosis:   1. Adjustment disorder with mixed anxiety and depressed mood            Criteria for Discharge: completed treatment goals and objectives and is no longer in need of services    Aftercare recommendations include (include specific referral names and phone numbers, if appropriate): Ct will contact psychotherapist if needed for treatment    Prognosis: excellent

## 2025-01-09 DIAGNOSIS — E11.49 DIABETES MELLITUS TYPE 2 WITH NEUROLOGICAL MANIFESTATIONS (HCC): ICD-10-CM

## 2025-01-10 RX ORDER — PEN NEEDLE, DIABETIC 31 GX5/16"
NEEDLE, DISPOSABLE MISCELLANEOUS
Qty: 90 EACH | Refills: 1 | Status: SHIPPED | OUTPATIENT
Start: 2025-01-10

## 2025-01-11 DIAGNOSIS — Z79.4 DIABETES MELLITUS DUE TO UNDERLYING CONDITION WITH HYPERGLYCEMIA, WITH LONG-TERM CURRENT USE OF INSULIN (HCC): ICD-10-CM

## 2025-01-11 DIAGNOSIS — E08.65 DIABETES MELLITUS DUE TO UNDERLYING CONDITION WITH HYPERGLYCEMIA, WITH LONG-TERM CURRENT USE OF INSULIN (HCC): ICD-10-CM

## 2025-02-10 ENCOUNTER — APPOINTMENT (OUTPATIENT)
Dept: RADIOLOGY | Facility: CLINIC | Age: 65
End: 2025-02-10
Payer: COMMERCIAL

## 2025-02-10 ENCOUNTER — OFFICE VISIT (OUTPATIENT)
Dept: OBGYN CLINIC | Facility: CLINIC | Age: 65
End: 2025-02-10
Payer: COMMERCIAL

## 2025-02-10 VITALS — BODY MASS INDEX: 36.1 KG/M2 | WEIGHT: 248 LBS

## 2025-02-10 DIAGNOSIS — M25.522 PAIN IN LEFT ELBOW: ICD-10-CM

## 2025-02-10 DIAGNOSIS — M25.522 PAIN IN LEFT ELBOW: Primary | ICD-10-CM

## 2025-02-10 DIAGNOSIS — M70.22 OLECRANON BURSITIS OF LEFT ELBOW: ICD-10-CM

## 2025-02-10 DIAGNOSIS — G56.21 CUBITAL TUNNEL SYNDROME ON RIGHT: ICD-10-CM

## 2025-02-10 DIAGNOSIS — G56.01 CARPAL TUNNEL SYNDROME ON RIGHT: ICD-10-CM

## 2025-02-10 PROCEDURE — 99214 OFFICE O/P EST MOD 30 MIN: CPT | Performed by: STUDENT IN AN ORGANIZED HEALTH CARE EDUCATION/TRAINING PROGRAM

## 2025-02-10 PROCEDURE — 73080 X-RAY EXAM OF ELBOW: CPT

## 2025-02-10 NOTE — PROGRESS NOTES
ORTHOPAEDIC HAND, WRIST, AND ELBOW OFFICE  VISIT     ASSESSMENT/PLAN:    Murtaza Cerrato is a 64 y.o. RHD male who presents with left olecranon bursitis and right carpal tunnel and cubital tunnel syndrome     - X-ray left elbow was obtained and reviewed in the office today   - Discussed with patient conservative treatments for olecranon bursitis which includes anti-inflammatories, compression wrap over the elbow and avoid leaning on the elbow.   - Ace wrap was applied over the left elbow in the office today. Advised patient he may use a neoprene sleeve over the elbow for compression.   -Recommended patient to take over the counter Aleve for a week to help with the pain and swelling   - Will be Right US carpal tunnel and US cubital tunnel           The patient verbalized understanding of exam findings and treatment plan. We engaged in the shared decision-making process and treatment options were discussed at length with the patient. Surgical and conservative management discussed today along with risks and benefits.    Follow Up:  After US Carpal tunnel and Cubital tunnel       General Discussions       Carpal Tunnel Syndrome: The anatomy and physiology of carpal tunnel syndrome was discussed with the patient today.  Increase pressure localized under the transverse carpal ligament can cause pain, numbness, tingling, or dysesthesias within the median nerve distribution as well as feelings of fatigue, clumsiness, or awkwardness.  These symptoms typically occur at night and worse in the morning upon waking.  Eventually, untreated carpal tunnel syndrome can result in weakness and permanent loss of muscle within the thenar compartment of the hand.  Treatment options were discussed with the patient.  Conservative treatment includes nocturnal resting splints to keep the nerve in a neutral position, ergonomic changes within the work or home environment, activity modification, and tendon gliding exercises.  Steroid  injections within the carpal canal can help a majority of patients, however this is often self-limited in a majority of patients.  Surgical intervention to divide the transverse carpal ligament typically results in a long-lasting relief of the patient's complaints, with the recurrence rate of less than 1%.   Cubital Tunnel Syndrome: The anatomy and physiology of cubital tunnel syndrome were discussed with the patient today in the office.  Typically, increased elbow flexion activities decrease blood flow within the intraneural spaces, resulting in a feeling of numbness, tingling, weakness, or clumsiness within the hand and fingers.  Occasionally, anatomic structures such as medial elbow osteophytes, the medial head of the triceps, were subluxing ulnar nerve may result in increased pressure or aggravation at the cubital tunnel.  Typical signs and symptoms usually include numbness and tingling within the ring and small finger, weakness with , and weakness with pinch.  Conservative treatment and includes nocturnal bracing to keep the elbow in a semi-extended position, activity modification, therapy, and avoiding excessive elbow flexion activities.  A majority of patients typically respond to conservative treatment over a period of approximately 3-6 months.  EMG/NCV testing of the ulnar nerve at the elbow is not as reliable as carpal tunnel syndrome.  Surgical intervention in the form of in situ release of the ulnar nerve at the elbow or ulnar nerve transposition may be required in up to 20% of patients.         ____________________________________________________________________________________________________________________________________________      CHIEF COMPLAINT:  Left elbow and right hand     SUBJECTIVE:  Murtaza Cerrato is a 64 y.o. male who presents with Pain  Mild  Constant  Sharp and Aching to the left elbow.  This started 2 months  ago as Due to a personal injury.  Patient states in December he  was leave blowing backwards, tripped over a rock and fell directly onto the left elbow. He notes a week or two later he had a another fall directly onto the left elbow while snow blowing. He notes tenderness and swelling over the posterior left elbow.  He notes his pain with flexion and extension of the elbow and limited motion. He notes at night he has to sleep with his left arm up for comfort. He notes numbness and tingling around the posterior elbow. He is taking Tylenol for pain relief.   Previous Treatments: None   Handedness: right  Work status: Administrative, Sales       Patient states he was treated in the past with Dr. Whatley for right carpal and cubital tunnel. Patient states the numbness in the first three fingers are getting worse and notes numbness in the right and small finger intermittently.  Patient does has history of left carpal tunnel release by Dr. Whatley.     I have personally reviewed all the relevant PMH, PSH, SH, FH, Medications and allergies      PAST MEDICAL HISTORY:  Past Medical History:   Diagnosis Date    Arthritis     Carpal tunnel syndrome     Chronic kidney disease     Stage 2 CKD    Corneal abrasion     last assessed - 18Nov2012    Diabetes mellitus (HCC)     Gout     Hiatal hernia     Hyperlipidemia     Hypertension     Impaired fasting glucose     last assessed - 05Jun2006    Kidney stone     last stone 2009    Lumbar spondylosis 10/31/2021    Lumbar spondylosis     Numbness of fingers of both hands     PONV (postoperative nausea and vomiting)     Psoriasis     RA (rheumatoid arthritis) (HCC)     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HCC)     Sinus infection     currently under tx PO ABT etc as of 04/17/19    Stage 2 chronic kidney disease 10/31/2021    Viremia     Resolved - 22Jan2018       PAST SURGICAL HISTORY:  Past Surgical History:   Procedure Laterality Date    ANKLE SURGERY Right     tendon arthrotomy    ARTHROTOMY ANKLE      BACK SURGERY  2014    lumbar laminectomy     CARPAL TUNNEL RELEASE Left     COLONOSCOPY      COLONOSCOPY N/A 2019    Procedure: COLONOSCOPY;  Surgeon: Toña Aly MD;  Location: [facility] GI [facility];  Service: Gastroenterology    FINGER SURGERY Bilateral     every finger contracture release over a 10 year period trigger finger    INCISION TENDON SHEATH      of a finger    KNEE SURGERY Right     x2 arthrotomy    NM ARTHRP KNE CONDYLE&PLATU MEDIAL&LAT COMPARTMENTS Right 2021    Procedure: ARTHROPLASTY KNEE TOTAL W ROBOT;  Surgeon: Marty Thomas DO;  Location: [facility];  Service: Orthopedics    NM NEUROPLASTY &/TRANSPOS MEDIAN NRV CARPAL TUNNE Left 10/26/2023    Procedure: RELEASE CARPAL TUNNEL;  Surgeon: Aline Whatley MD;  Location: [facility];  Service: Orthopedics    SHOULDER SURGERY Left     x1 staples, tendon repair secondary to many spontaneous dislocations       FAMILY HISTORY:  Family History   Problem Relation Age of Onset    Hypertension Mother     Arthritis Mother     Hyperlipidemia Mother     Lung cancer Mother     Stroke Maternal Grandfather     Diabetes Cousin     COPD Father     Depression Family     Diabetes Family     No Known Problems Sister     No Known Problems Brother     No Known Problems Maternal Aunt     No Known Problems Maternal Uncle     No Known Problems Paternal Aunt     No Known Problems Paternal Uncle     No Known Problems Maternal Grandmother     No Known Problems Paternal Grandmother     No Known Problems Paternal Grandfather        SOCIAL HISTORY:  Social History     Tobacco Use    Smoking status: Former     Current packs/day: 0.00     Average packs/day: 1 pack/day for 28.0 years (28.0 ttl pk-yrs)     Types: Cigarettes     Start date:      Quit date:      Years since quittin.1     Passive exposure: Past    Smokeless tobacco: Never    Tobacco comments:     Former smoker   Vaping Use    Vaping status: Never Used   Substance Use Topics    Alcohol use: Yes     Comment: rare, once a month     Drug use:  No       MEDICATIONS:    Current Outpatient Medications:     acetaminophen (TYLENOL) 650 mg CR tablet, Take 1 tablet (650 mg total) by mouth every 8 (eight) hours as needed for mild pain, Disp: 30 tablet, Rfl: 0    amLODIPine (NORVASC) 10 mg tablet, TAKE ONE TABLET BY MOUTH EVERY DAY, Disp: 90 tablet, Rfl: 1    aspirin (ECOTRIN LOW STRENGTH) 81 mg EC tablet, Take 81 mg by mouth daily, Disp: , Rfl:     atorvastatin (LIPITOR) 20 mg tablet, TAKE ONE TABLET BY MOUTH EVERY DAY, Disp: 90 tablet, Rfl: 1    Blood Glucose Monitoring Suppl (OneTouch Verio) w/Device KIT, Test blood sugar twice a day, Disp: 1 kit, Rfl: 0    cholecalciferol (VITAMIN D3) 1,000 units tablet, Take 1,000 Units by mouth daily, Disp: , Rfl:     Continuous Blood Gluc  (FreeStyle Ulysses 14 Day [place]) SHREE, Use, Disp: , Rfl:     Continuous Glucose Sensor (FreeStyle Ulysses 3 Sensor) MISC, Use 1 each every 14 (fourteen) days, Disp: 6 each, Rfl: 3    Empagliflozin (Jardiance) 10 MG TABS tablet, Take 1 tablet (10 mg total) by mouth every morning, Disp: 30 tablet, Rfl: 5    gabapentin (NEURONTIN) 100 mg capsule, TAKE ONE CAPSULE BY MOUTH FOUR TIMES A DAY, Disp: 120 capsule, Rfl: 2    glipiZIDE (GLUCOTROL XL) 10 mg 24 hr tablet, TAKE TWO TABLETS BY MOUTH EVERY DAY AT BEDTIME, Disp: 60 tablet, Rfl: 5    glucose blood (OneTouch Verio) test strip, Test blood sugar twice a day, Disp: 200 each, Rfl: 3    hydroxychloroquine (PLAQUENIL) 200 mg tablet, TAKE ONE TABLET BY MOUTH TWICE A DAY WITH MEALS (GENERIC FOR PLAQUENIL), Disp: 180 tablet, Rfl: 0    Insulin Glargine Solostar (Lantus SoloStar) 100 UNIT/ML SOPN, Inject 0.25 mL (25 Units total) as directed daily at bedtime (Patient taking differently: Inject 10 Units as directed daily at bedtime), Disp: 30 mL, Rfl: 1    Insulin Pen Needle (B-D ULTRAFINE III SHORT PEN) 31G X 8 MM MISC, USE AS DIRECTED, Disp: 90 each, Rfl: 1    metFORMIN (GLUCOPHAGE) 500 mg tablet, TAKE TWO TABLETS BY MOUTH TWICE A DAY WITH MEALS  (GENERIC GLUCOPHAGE), Disp: 360 tablet, Rfl: 1    mupirocin (BACTROBAN) 2 % ointment, Apply topically 2 (two) times a day, Disp: 22 g, Rfl: 0    olmesartan (BENICAR) 20 mg tablet, TAKE TWO TABLETS BY MOUTH EVERY DAY, Disp: 180 tablet, Rfl: 1    OneTouch Delica Lancets 33G MISC, Test blood sugar twice a day, Disp: 200 each, Rfl: 3    semaglutide, 0.25 or 0.5 mg/dose, (Ozempic, 0.25 or 0.5 MG/DOSE,) 2 mg/3 mL injection pen, 0.25 mg under the skin every 7 days for 4 doses (28 days), THEN 0.5 mg under the skin every 7 days, Disp: 9 mL, Rfl: 1    tamsulosin (FLOMAX) 0.4 mg, 0.4 mg daily, Disp: , Rfl:     ALLERGIES:  Allergies   Allergen Reactions    Latex Rash     At dentist, lips swollen     Codeine Nausea Only     Reaction Date: 03Oct2005;            REVIEW OF SYSTEMS:  Pertinent items are noted in HPI.  A comprehensive review of systems was negative.    VITALS:  There were no vitals filed for this visit.    LABS:  HgA1c:   Lab Results   Component Value Date    HGBA1C 6.3 (H) 07/13/2024     BMP:   Lab Results   Component Value Date    GLUCOSE 245 (H) 11/20/2017    CALCIUM 8.4 12/01/2021     11/20/2017    K 4.3 07/13/2024    CO2 21 07/13/2024     07/13/2024    BUN 28 (H) 07/13/2024    CREATININE 1.44 (H) 07/13/2024       _____________________________________________________  PHYSICAL EXAMINATION:  General: well developed and well nourished, alert, oriented times 3, and appears comfortable  Psychiatric: Normal  HEENT: Normocephalic, Atraumatic Trachea Midline, No torticollis  Pulmonary: No audible wheezing or respiratory distress   Abdomen/GI: Non tender, non distended   Cardiovascular: Regular Rate and Rhythm. No pitting edema, 2+ radial pulse   Skin: No masses, erythema, lacerations, fluctation, ulcerations  Neurovascular: Sensation Intact to the Median, Ulnar, Radial Nerve, Motor Intact to the Median, Ulnar, Radial Nerve, and Pulses Intact  Musculoskeletal: Normal, except as noted in detailed exam and in  HPI.        FOCUSED MUSCULOSKELETAL EXAMINATION  Left Upper Extremity  Inspection: skin intact, no notable deformity   Palpation: TTP olecranon bursa   Neurologic: 5/5 elbow flexion, 5/5 elbow extension, 5/5 wrist extension, 5/5 wrist flexion, 5/5 finger flexion, 5/5 finger extension, 5/5 FPL, 5/5 EPL, 5/5 APB, 5/5 intrinsics, sensation intact to median, radial, and ulnar nerve distributions  Vascular: Palpable radial pulse, brisk cap refill <2sec, hand warm and well perfused  MSK:   LEFT SIDE:  Elbow:  Tenderness olecranon bursa  and full range of motion       Right  Upper Extremity  Inspection: skin intact, no notable deformity   Palpation: no TTP   Neurologic: 5/5 elbow flexion, 5/5 elbow extension, 5/5 wrist extension, 5/5 wrist flexion, 5/5 finger flexion, 5/5 finger extension, 5/5 FPL, 5/5 EPL, 5/5 APB, 5/5 intrinsics, sensation intact to median, radial, and ulnar nerve distributions  Vascular: Palpable radial pulse, brisk cap refill <2sec, hand warm and well perfused  MSK:   RIGHT SIDE:  Carpal tunnel:  positive carpal compression  and Cubital Tunnel:  Positive Elbow Flexion/Compression Test  ___________________________________________________  STUDIES REVIEWED:  Xrays of the X-ray left elbow  were obtained on 2/10/5  were independently reviewed which demonstrates small olecranon spur, no acute fractures or dislocations.     LABS REVIEWED:    HgA1c:   Lab Results   Component Value Date    HGBA1C 6.3 (H) 07/13/2024     BMP:   Lab Results   Component Value Date    GLUCOSE 245 (H) 11/20/2017    CALCIUM 8.4 12/01/2021     11/20/2017    K 4.3 07/13/2024    CO2 21 07/13/2024     07/13/2024    BUN 28 (H) 07/13/2024    CREATININE 1.44 (H) 07/13/2024               PROCEDURES PERFORMED:  Procedures  No Procedures performed today    _____________________________________________________      Scribe Attestation      I,:  Harris Dillon MA am acting as a scribe while in the presence of the attending  physician.:       I,:  Perico Cheatham MD personally performed the services described in this documentation    as scribed in my presence.:               I agree with the history, physical examination, assessment and plan of care as documented above.    Perico Cheatham M.D.  Attending, Orthopaedic Surgery  Hand, Wrist, and Elbow Surgery  [facility]

## 2025-02-17 LAB
BASOPHILS # BLD AUTO: 0.1 X10E3/UL (ref 0–0.2)
BASOPHILS NFR BLD AUTO: 1 %
EOSINOPHIL # BLD AUTO: 0.3 X10E3/UL (ref 0–0.4)
EOSINOPHIL NFR BLD AUTO: 5 %
ERYTHROCYTE [DISTWIDTH] IN BLOOD BY AUTOMATED COUNT: 13.6 % (ref 11.6–15.4)
HCT VFR BLD AUTO: 44.6 % (ref 37.5–51)
HGB BLD-MCNC: 14.6 G/DL (ref 13–17.7)
IMM GRANULOCYTES # BLD: 0 X10E3/UL (ref 0–0.1)
IMM GRANULOCYTES NFR BLD: 0 %
LYMPHOCYTES # BLD AUTO: 0.8 X10E3/UL (ref 0.7–3.1)
LYMPHOCYTES NFR BLD AUTO: 15 %
MCH RBC QN AUTO: 28.2 PG (ref 26.6–33)
MCHC RBC AUTO-ENTMCNC: 32.7 G/DL (ref 31.5–35.7)
MCV RBC AUTO: 86 FL (ref 79–97)
MONOCYTES # BLD AUTO: 0.4 X10E3/UL (ref 0.1–0.9)
MONOCYTES NFR BLD AUTO: 8 %
NEUTROPHILS # BLD AUTO: 3.7 X10E3/UL (ref 1.4–7)
NEUTROPHILS NFR BLD AUTO: 71 %
PLATELET # BLD AUTO: 231 X10E3/UL (ref 150–450)
RBC # BLD AUTO: 5.17 X10E6/UL (ref 4.14–5.8)
WBC # BLD AUTO: 5.2 X10E3/UL (ref 3.4–10.8)

## 2025-02-18 LAB
ALBUMIN SERPL-MCNC: 4.1 G/DL (ref 3.9–4.9)
ALBUMIN SERPL-MCNC: 4.1 G/DL (ref 3.9–4.9)
ALP SERPL-CCNC: 63 IU/L (ref 44–121)
ALP SERPL-CCNC: 65 IU/L (ref 44–121)
ALT SERPL-CCNC: 13 IU/L (ref 0–44)
ALT SERPL-CCNC: 15 IU/L (ref 0–44)
AST SERPL-CCNC: 17 IU/L (ref 0–40)
AST SERPL-CCNC: 20 IU/L (ref 0–40)
BILIRUB SERPL-MCNC: 0.4 MG/DL (ref 0–1.2)
BILIRUB SERPL-MCNC: 0.5 MG/DL (ref 0–1.2)
BUN SERPL-MCNC: 28 MG/DL (ref 8–27)
BUN SERPL-MCNC: 28 MG/DL (ref 8–27)
BUN/CREAT SERPL: 16 (ref 10–24)
BUN/CREAT SERPL: 18 (ref 10–24)
CALCIUM SERPL-MCNC: 9.5 MG/DL (ref 8.6–10.2)
CALCIUM SERPL-MCNC: 9.7 MG/DL (ref 8.6–10.2)
CHLORIDE SERPL-SCNC: 105 MMOL/L (ref 96–106)
CHLORIDE SERPL-SCNC: 107 MMOL/L (ref 96–106)
CHOLEST SERPL-MCNC: 128 MG/DL (ref 100–199)
CO2 SERPL-SCNC: 22 MMOL/L (ref 20–29)
CO2 SERPL-SCNC: 25 MMOL/L (ref 20–29)
CREAT SERPL-MCNC: 1.59 MG/DL (ref 0.76–1.27)
CREAT SERPL-MCNC: 1.73 MG/DL (ref 0.76–1.27)
CRP SERPL-MCNC: 1 MG/L (ref 0–10)
EGFR: 43 ML/MIN/1.73
EGFR: 48 ML/MIN/1.73
ERYTHROCYTE [DISTWIDTH] IN BLOOD BY AUTOMATED COUNT: 13.5 % (ref 11.6–15.4)
ERYTHROCYTE [SEDIMENTATION RATE] IN BLOOD BY WESTERGREN METHOD: 10 MM/HR (ref 0–30)
EST. AVERAGE GLUCOSE BLD GHB EST-MCNC: 134 MG/DL
GLOBULIN SER-MCNC: 2.1 G/DL (ref 1.5–4.5)
GLOBULIN SER-MCNC: 2.3 G/DL (ref 1.5–4.5)
GLUCOSE SERPL-MCNC: 114 MG/DL (ref 70–99)
GLUCOSE SERPL-MCNC: 119 MG/DL (ref 70–99)
HBA1C MFR BLD: 6.3 % (ref 4.8–5.6)
HCT VFR BLD AUTO: 43.2 % (ref 37.5–51)
HDLC SERPL-MCNC: 46 MG/DL
HGB BLD-MCNC: 14.7 G/DL (ref 13–17.7)
LDLC SERPL CALC-MCNC: 67 MG/DL (ref 0–99)
LDLC/HDLC SERPL: 1.5 RATIO (ref 0–3.6)
MCH RBC QN AUTO: 29.5 PG (ref 26.6–33)
MCHC RBC AUTO-ENTMCNC: 34 G/DL (ref 31.5–35.7)
MCV RBC AUTO: 87 FL (ref 79–97)
MICRODELETION SYND BLD/T FISH: NORMAL
MICRODELETION SYND BLD/T FISH: NORMAL
PLATELET # BLD AUTO: 234 X10E3/UL (ref 150–450)
POTASSIUM SERPL-SCNC: 4 MMOL/L (ref 3.5–5.2)
POTASSIUM SERPL-SCNC: 4.2 MMOL/L (ref 3.5–5.2)
PROT SERPL-MCNC: 6.2 G/DL (ref 6–8.5)
PROT SERPL-MCNC: 6.4 G/DL (ref 6–8.5)
RBC # BLD AUTO: 4.99 X10E6/UL (ref 4.14–5.8)
SL AMB VLDL CHOLESTEROL CALC: 15 MG/DL (ref 5–40)
SODIUM SERPL-SCNC: 142 MMOL/L (ref 134–144)
SODIUM SERPL-SCNC: 145 MMOL/L (ref 134–144)
TRIGL SERPL-MCNC: 76 MG/DL (ref 0–149)
WBC # BLD AUTO: 5.2 X10E3/UL (ref 3.4–10.8)

## 2025-02-19 ENCOUNTER — RESULTS FOLLOW-UP (OUTPATIENT)
Age: 65
End: 2025-02-19

## 2025-02-21 ENCOUNTER — OFFICE VISIT (OUTPATIENT)
Dept: FAMILY MEDICINE CLINIC | Facility: CLINIC | Age: 65
End: 2025-02-21
Payer: COMMERCIAL

## 2025-02-21 VITALS
SYSTOLIC BLOOD PRESSURE: 140 MMHG | HEART RATE: 68 BPM | HEIGHT: 70 IN | DIASTOLIC BLOOD PRESSURE: 76 MMHG | WEIGHT: 248.4 LBS | BODY MASS INDEX: 35.56 KG/M2 | TEMPERATURE: 98.1 F

## 2025-02-21 DIAGNOSIS — M06.09 SERONEGATIVE ARTHROPATHY OF MULTIPLE SITES (HCC): ICD-10-CM

## 2025-02-21 DIAGNOSIS — Z23 NEED FOR SHINGLES VACCINE: ICD-10-CM

## 2025-02-21 DIAGNOSIS — I50.32 CHRONIC HEART FAILURE WITH PRESERVED EJECTION FRACTION (HCC): ICD-10-CM

## 2025-02-21 DIAGNOSIS — I10 BENIGN ESSENTIAL HYPERTENSION: ICD-10-CM

## 2025-02-21 DIAGNOSIS — E11.22 TYPE 2 DIABETES MELLITUS WITH STAGE 3A CHRONIC KIDNEY DISEASE AND HYPERTENSION (HCC): Primary | ICD-10-CM

## 2025-02-21 DIAGNOSIS — Z12.5 SCREENING PSA (PROSTATE SPECIFIC ANTIGEN): ICD-10-CM

## 2025-02-21 DIAGNOSIS — N18.31 TYPE 2 DIABETES MELLITUS WITH STAGE 3A CHRONIC KIDNEY DISEASE AND HYPERTENSION (HCC): Primary | ICD-10-CM

## 2025-02-21 DIAGNOSIS — I12.9 TYPE 2 DIABETES MELLITUS WITH STAGE 3A CHRONIC KIDNEY DISEASE AND HYPERTENSION (HCC): Primary | ICD-10-CM

## 2025-02-21 PROCEDURE — 90750 HZV VACC RECOMBINANT IM: CPT

## 2025-02-21 PROCEDURE — 90471 IMMUNIZATION ADMIN: CPT

## 2025-02-21 PROCEDURE — 99214 OFFICE O/P EST MOD 30 MIN: CPT | Performed by: FAMILY MEDICINE

## 2025-02-21 RX ORDER — KETOCONAZOLE 20 MG/ML
SHAMPOO, SUSPENSION TOPICAL
COMMUNITY
Start: 2025-02-07

## 2025-02-21 RX ORDER — INSULIN GLARGINE 100 [IU]/ML
20 INJECTION, SOLUTION SUBCUTANEOUS
Qty: 30 ML | Refills: 1 | Status: SHIPPED | OUTPATIENT
Start: 2025-02-21

## 2025-02-21 NOTE — ASSESSMENT & PLAN NOTE
Wt Readings from Last 3 Encounters:   02/21/25 113 kg (248 lb 6.4 oz)   02/10/25 112 kg (248 lb)   08/12/24 111 kg (244 lb)     Stable  No symptoms   Continue  Jardiance 10 mg a day

## 2025-02-21 NOTE — TELEPHONE ENCOUNTER
Pt returned call went over results. Aware that Kidney function trending downward and to follow up with nephrology. No concerns.

## 2025-02-21 NOTE — PROGRESS NOTES
"Name: Murtaza Cerrato      : 1960      MRN: [id number]  Encounter Provider: Mi Verdin DO  Encounter Date: 2025   Encounter department: [facility]  :  Assessment & Plan  Type 2 diabetes mellitus with stage 3a chronic kidney disease and hypertension (HCC)  Well controlled   Lab Results   Component Value Date    HGBA1C 6.3 (H) 2025     Orders:  •  Insulin Glargine Solostar (Lantus SoloStar) 100 UNIT/ML SOPN; Inject 0.2 mL (20 Units total) as directed daily at bedtime  •  Hemoglobin A1C; Future  •  Albumin / creatinine urine ratio; Future    Chronic heart failure with preserved ejection fraction (HCC)  Wt Readings from Last 3 Encounters:   25 113 kg (248 lb 6.4 oz)   02/10/25 112 kg (248 lb)   24 111 kg (244 lb)     Stable  No symptoms   Continue  Jardiance 10 mg a day              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HCC)  Managed by rheumatology       Benign essential hypertension  Stable  Continue amlodipine 10 mg daily and olmesartan 20 mg daily  Orders:  •  CBC; Future  •  Comprehensive metabolic panel; Future  •  Lipid Panel with Direct LDL reflex; Future    Need for shingles vaccine    Orders:  •  Zoster Vaccine Recombinant IM    Screening PSA (prostate specific antigen)    Orders:  •  PSA Total (Reflex To Free); Future    Return in about 6 months (around 2025) for Next scheduled follow up.       History of Present Illness   Patient reports that he is feeling much better off the Ozempic.  It was really giving him a lot of nausea and upset stomach.  He has been continue to watch his sugars through diet.  He has been needing 20 units of insulin.  He is going to try to get down to needing only 10      Review of Systems   Cardiovascular:  Positive for leg swelling (Chronic secondary to his varicose veins, controlled with support socks). Negative for chest pain.       Objective   /76   Pulse 68   Temp 98.1 °F (36.7 °C)   Ht 5' 9.5\" (1.765 m)   " Wt 113 kg (248 lb 6.4 oz)   BMI 36.16 kg/m²      Physical Exam  Cardiovascular:      Pulses: no weak pulses.           Dorsalis pedis pulses are 2+ on the right side and 2+ on the left side.        Posterior tibial pulses are 2+ on the right side and 2+ on the left side.   Musculoskeletal:      Right lower leg: Edema present.      Left lower leg: Edema present.   Feet:      Right foot:      Skin integrity: No ulcer, skin breakdown, erythema, warmth, callus or dry skin.      Left foot:      Skin integrity: No ulcer, skin breakdown, erythema, warmth, callus or dry skin.         Patient's shoes and socks removed.    Right Foot/Ankle   Right Foot Inspection  Skin Exam: skin normal. Skin not intact, no dry skin, no warmth, no callus, no erythema, no maceration, no abnormal color, no pre-ulcer, no ulcer and no callus.     Toe Exam: ROM and strength within normal limits.     Sensory   Monofilament testing: intact    Vascular  Capillary refills: < 3 seconds  The right DP pulse is 2+. The right PT pulse is 2+.     Left Foot/Ankle  Left Foot Inspection  Skin Exam: skin normal. Skin not intact, no dry skin, no warmth, no erythema, no maceration, normal color, no pre-ulcer, no ulcer and no callus.     Toe Exam: ROM and strength within normal limits.     Sensory   Monofilament testing: intact    Vascular  Capillary refills: < 3 seconds  The left DP pulse is 2+. The left PT pulse is 2+.     Assign Risk Category  No deformity present  No loss of protective sensation  No weak pulses  Risk: 0

## 2025-02-21 NOTE — ASSESSMENT & PLAN NOTE
Well controlled   Lab Results   Component Value Date    HGBA1C 6.3 (H) 02/17/2025     Orders:  •  Insulin Glargine Solostar (Lantus SoloStar) 100 UNIT/ML SOPN; Inject 0.2 mL (20 Units total) as directed daily at bedtime  •  Hemoglobin A1C; Future  •  Albumin / creatinine urine ratio; Future

## 2025-02-21 NOTE — ASSESSMENT & PLAN NOTE
Stable  Continue amlodipine 10 mg daily and olmesartan 20 mg daily  Orders:  •  CBC; Future  •  Comprehensive metabolic panel; Future  •  Lipid Panel with Direct LDL reflex; Future

## 2025-02-23 ENCOUNTER — RESULTS FOLLOW-UP (OUTPATIENT)
Age: 65
End: 2025-02-23

## 2025-02-23 LAB — OH-CHLOROQUINE BLD-MCNC: 1705 NG/ML

## 2025-02-24 ENCOUNTER — HOSPITAL ENCOUNTER (OUTPATIENT)
Dept: RADIOLOGY | Facility: HOSPITAL | Age: 65
Discharge: HOME | End: 2025-02-24
Attending: STUDENT IN AN ORGANIZED HEALTH CARE EDUCATION/TRAINING PROGRAM
Payer: COMMERCIAL

## 2025-02-24 ENCOUNTER — TELEPHONE (OUTPATIENT)
Dept: ADMINISTRATIVE | Facility: OTHER | Age: 65
End: 2025-02-24

## 2025-02-24 DIAGNOSIS — G56.01 CARPAL TUNNEL SYNDROME ON RIGHT: ICD-10-CM

## 2025-02-24 DIAGNOSIS — G56.21 CUBITAL TUNNEL SYNDROME ON RIGHT: ICD-10-CM

## 2025-02-24 PROCEDURE — 76882 US LMTD JT/FCL EVL NVASC XTR: CPT

## 2025-02-24 NOTE — TELEPHONE ENCOUNTER
Upon review of the In Basket request and the patient's chart, initial outreach has been made via fax to facility. Please see Contacts section for details.     Thank you  Mariah Ann MA

## 2025-02-24 NOTE — LETTER
Diabetic Eye Exam Form    Date Requested: 25  Patient: Murtaza Cerrato  Patient : 1960   Referring Provider: Mi Verdin DO      DIABETIC Eye Exam Date _______________________________      Type of Exam MUST be documented for Diabetic Eye Exams. Please CHECK ONE.     Retinal Exam       Dilated Retinal Exam       OCT       Optomap-Iris Exam      Fundus Photography       Left Eye - Please check Retinopathy or No Retinopathy        Exam did show retinopathy    Exam did not show retinopathy       Right Eye - Please check Retinopathy or No Retinopathy       Exam did show retinopathy    Exam did not show retinopathy       Comments __Within  or most recent  ________________________________________________________    Practice Providing Exam ______________________________________________    Exam Performed By (print name) _______________________________________      Provider Signature ___________________________________________________      These reports are needed for  compliance.  Please fax this completed form and a copy of the Diabetic Eye Exam report to our office located at [address] [place] [address] as soon as possible via Fax [phone] attention Mariah: Phone [phone]  We thank you for your assistance in treating our mutual patient.

## 2025-02-24 NOTE — LETTER
Diabetic Eye Exam Form    Date Requested: 25  Patient: Murtaza Cerrato  Patient : 1960   Referring Provider: Mi Verdin DO      DIABETIC Eye Exam Date _______________________________      Type of Exam MUST be documented for Diabetic Eye Exams. Please CHECK ONE.     Retinal Exam       Dilated Retinal Exam       OCT       Optomap-Iris Exam      Fundus Photography       Left Eye - Please check Retinopathy or No Retinopathy        Exam did show retinopathy    Exam did not show retinopathy       Right Eye - Please check Retinopathy or No Retinopathy       Exam did show retinopathy    Exam did not show retinopathy       Comments __Within  or most recent ________________________________________________________    Practice Providing Exam ______________________________________________    Exam Performed By (print name) _______________________________________      Provider Signature ___________________________________________________      These reports are needed for  compliance.  Please fax this completed form and a copy of the Diabetic Eye Exam report to our office located at [address] [place] [address] as soon as possible via Fax [phone] attention Mariah: Phone [phone]  We thank you for your assistance in treating our mutual patient.

## 2025-02-24 NOTE — TELEPHONE ENCOUNTER
----- Message from Mi Verdin DO sent at 2/21/2025  4:28 PM EST -----  02/21/25 4:28 PM    Hello, our patient Murtaza Cerrato has had Diabetic Eye Exam completed/performed. Please assist in updating the patient chart by making an External outreach to [facility] facility located in [place]. The date of service is 2025.    Thank you,  Mi Verdin DO  [facility]

## 2025-03-04 NOTE — TELEPHONE ENCOUNTER
As a follow-up, a second attempt has been made for outreach via fax to facility. Please see Contacts section for details.    Thank you  Mariah Ann MA

## 2025-03-10 ENCOUNTER — OFFICE VISIT (OUTPATIENT)
Dept: OBGYN CLINIC | Facility: CLINIC | Age: 65
End: 2025-03-10
Payer: COMMERCIAL

## 2025-03-10 VITALS — BODY MASS INDEX: 35.81 KG/M2 | WEIGHT: 246 LBS

## 2025-03-10 DIAGNOSIS — G56.01 CARPAL TUNNEL SYNDROME OF RIGHT WRIST: Primary | ICD-10-CM

## 2025-03-10 DIAGNOSIS — G56.21 CUBITAL TUNNEL SYNDROME ON RIGHT: ICD-10-CM

## 2025-03-10 PROCEDURE — 99213 OFFICE O/P EST LOW 20 MIN: CPT | Performed by: STUDENT IN AN ORGANIZED HEALTH CARE EDUCATION/TRAINING PROGRAM

## 2025-03-10 RX ORDER — CEFAZOLIN SODIUM 2 G/50ML
2000 SOLUTION INTRAVENOUS ONCE
OUTPATIENT
Start: 2025-03-10 | End: 2025-03-10

## 2025-03-10 RX ORDER — CHLORHEXIDINE GLUCONATE ORAL RINSE 1.2 MG/ML
15 SOLUTION DENTAL ONCE
OUTPATIENT
Start: 2025-03-10 | End: 2025-03-10

## 2025-03-10 NOTE — PROGRESS NOTES
ORTHOPAEDIC HAND, WRIST, AND ELBOW OFFICE  VISIT     Name: Murtaza Cerrato      : 1960      MRN: [id number]  Encounter Provider: Perico Cheatham MD  Encounter Date: 3/10/2025   Encounter department: [facility]  :  Assessment & Plan  Carpal tunnel syndrome of right wrist    Orders:    Case request operating room: RIGHT CARPAL TUNNEL RELEASE, RIGHT CUBITAL TUNNEL RELEASE WITH POSSIBLE ANTERIOR TRANSPOSITION; Standing    Cubital tunnel syndrome on right    Orders:    Case request operating room: RIGHT CARPAL TUNNEL RELEASE, RIGHT CUBITAL TUNNEL RELEASE WITH POSSIBLE ANTERIOR TRANSPOSITION; Standing             ASSESSMENT/PLAN:    Murtaza Cerrato is a 64 y.o. RHD male who presents with right cubital and carpal tunnel    US of wrist and elbow reviewed, rule in both cubital and carpal tunnel.  Diagnosis, treatment options and associated risks were discussed with the patient including no treatment, nonsurgical treatment and potential for surgical intervention.  The patient was given the opportunity to ask questions regarding each.  Based on severity of patients disease and failure of conservative treatments I would recommend surgical intervention in the form of a right carpal and cubital tunnel release. Patient was amenable to this.   Risks and benefits of surgery were discussed at length. Consent signed in clinic. He will meet with my surgery scheduler today.           Follow Up:  Post operatively       Operative Discussions:    Cubital Tunnel Release:   The anatomy and physiology of cubital tunnel syndrome were discussed with the patient today in the office.  Typically, increased elbow flexion activities decrease blood flow within the intraneural spaces, resulting in a feeling of numbness, tingling, weakness, or clumsiness within the hand and fingers.  Occasionally, anatomic structures such as medial elbow osteophytes, the medial head of the triceps, were  subluxing ulnar nerve may result in increased pressure or aggravation at the cubital tunnel.  Typical signs and symptoms usually include numbness and tingling within the ring and small finger, weakness with , and weakness with pinch.  Conservative treatment and includes nocturnal bracing to keep the elbow in a semi-extended position, activity modification, therapy, and avoiding excessive elbow flexion activities.  A majority of patients typically respond to conservative treatment over a period of approximately 3-6 months.  EMG/NCV testing of the ulnar nerve at the elbow is not as reliable as carpal tunnel syndrome.  Surgical intervention in the form of in situ release of the ulnar nerve at the elbow or ulnar nerve transposition may be required in up to 20% of patients. The patient has elected to undergo an cubital tunnel release.  The possibility of converting to an open procedure, or a subcutaneous or submuscular ulnar nerve transposition depending on the nerve stability was discussed with the patient.  Typically, in the postoperative period, light activities are allowed immediately, driving is allowed when narcotic medications have stopped, and the incision may get wet after 5 days.  Heavy activities will be allowed after follow up appointment in 1-2 weeks.  Anti-inflammatory medications should be held for approximately 5 days postoperative.  While the pain within the ring and small finger of the hands generally improves rapidly, the numbness and tingling, as well as the strength, will slowly improve over a period of weeks to months.  Total recovery can take up to 18 months from the time of surgery.  Numbness and tingling near the incision, or near the medial aspect of the forearm was discussed with the patient.  The patient has an understanding of the above mentioned discussion.The risks and benefits of the procedure were explained to the patient, which include, but are not limited to: Bleeding, infection,  recurrence, pain, scar, damage to tendons, damage to nerves, and damage to blood vessels, failure to give desired results and complications related to anesthesia.  These risks, along with alternative conservative treatment options, and postoperative protocols were voiced back and understood by the patient.  All questions were answered to the patient's satisfaction.  The patient agrees to comply with a standard postoperative protocol, and is willing to proceed.  Education was provided via written and auditory forms.  There were no barriers to learning. Written handouts regarding wound care, incision and scar care, and general preoperative information was provided to the patient.  Prior to surgery, the patient may be requested to stop all anti-inflammatory medications.  Prophylactic aspirin, Plavix, and Coumadin may be allowed to be continued.  Medications including vitamin E., ginkgo, and fish oil are requested to be stopped approximately one week prior to surgery.  Hypertensive medications and beta blockers, if taken, should be continued.  Endoscopic Carpal Tunnel Release:   The anatomy and physiology of carpal tunnel syndrome was discussed with the patient today.  Increase pressure localized under the transverse carpal ligament can cause pain, numbness, tingling, or dysesthesias within the median nerve distribution as well as feelings of fatigue, clumsiness, or awkwardness.  These symptoms typically occur at night and worse in the morning upon waking.  Eventually, untreated carpal tunnel syndrome can result in weakness and permanent loss of muscle within the thenar compartment of the hand.  Treatment options were discussed with the patient.  Conservative treatment includes nocturnal resting splints to keep the nerve in a neutral position, ergonomic changes within the work or home environment, activity modification, and tendon gliding exercises.  Steroid injections within the carpal canal can help a majority of  patients, however this is often self-limited in a majority of patients.  Surgical intervention to divide the transverse carpal ligament typically results in a long-lasting relief of the patient's complaints, with the recurrence rate of less than 1%. The patient has elected to undergo an endoscopic carpal tunnel release.  The 2 incision technique was discussed with the patient, which results in approximately a two-week less recovery time, and less wound complications.  In the postoperative period, light activities are allowed immediately, driving is allowed when narcotic medication has stopped, and the bandages may be removed and incision may get wet after 2 days.  Heavy activities (lifting more than approximately 10 pounds) will be allowed after follow up appointment in 1-2 weeks.  While the pain and discomfort in the hands generally improves rapidly, the numbness and tingling as well as the strength will slowly improve over weeks to months depending on the severity of the carpal tunnel syndrome.  Pillar pain was discussed with the patient, which is typically a common but self-limiting condition.  The risks of bleeding and infection from the surgery are less than 1%.  Risk of recurrence is approximately 0.5%.  The risks of nerve injury or nerve damage or damage to the blood vessels is approximately 1 in 1200. The patient has an understanding of the above mentioned discussion.The risks and benefits of the procedure were explained to the patient, which include, but are not limited to: Bleeding, infection, recurrence, pain, scar, damage to tendons, damage to nerves, and damage to blood vessels, failure to give desired results and complications related to anesthesia.  These risks, along with alternative conservative treatment options, and postoperative protocols were voiced back and understood by the patient.  All questions were answered to the patient's satisfaction.  The patient agrees to comply with a standard  postoperative protocol, and is willing to proceed.  Education was provided via written and auditory forms.  There were no barriers to learning. Written handouts regarding wound care, incision and scar care, and general preoperative information was provided to the patient.  Prior to surgery, the patient may be requested to stop all anti-inflammatory medications.  Prophylactic aspirin, Plavix, and Coumadin may be allowed to be continued.  Medications including vitamin E., ginkgo, and fish oil are requested to be stopped approximately one week prior to surgery.  Hypertensive medications and beta blockers, if taken, should be continued.    ____________________________________________________________________________________________________________________________________________      CHIEF COMPLAINT:  Left hand pain and numbness    SUBJECTIVE:  Murtaza Cerrato is a 64 y.o. male who presents for follow up evaluation of right hand numbness and US review. US of carpal and cubital tunnel obtained 2/24/25, which demonstrate both carpal and cubital tunnel. History of left carpal tunnel release 10/26/23 with Dr Whatley.         I have personally reviewed all the relevant PMH, PSH, SH, FH, Medications and allergies      PAST MEDICAL HISTORY:  Past Medical History:   Diagnosis Date    Arthritis     Carpal tunnel syndrome     Chronic kidney disease     Stage 2 CKD    Corneal abrasion     last assessed - 18Nov2012    Diabetes mellitus (HCC)     Gout     Hiatal hernia     Hyperlipidemia     Hypertension     Impaired fasting glucose     last assessed - 05Jun2006    Kidney stone     last stone 2009    Lumbar spondylosis 10/31/2021    Lumbar spondylosis     Numbness of fingers of both hands     PONV (postoperative nausea and vomiting)     Psoriasis     RA (rheumatoid arthritis) (HCC)     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HCC)     Sinus infection     currently under tx PO ABT etc as of 04/17/19    Stage 2 chronic kidney  disease 10/31/2021    Viremia     Resolved - [id number]       PAST SURGICAL HISTORY:  Past Surgical History:   Procedure Laterality Date    ANKLE SURGERY Right     tendon arthrotomy    ARTHROTOMY ANKLE      BACK SURGERY  2014    lumbar laminectomy    CARPAL TUNNEL RELEASE Left     COLONOSCOPY      COLONOSCOPY N/A 04/19/2019    Procedure: COLONOSCOPY;  Surgeon: Toña Aly MD;  Location: [facility];  Service: Gastroenterology    FINGER SURGERY Bilateral     every finger contracture release over a 10 year period trigger finger    INCISION TENDON SHEATH      of a finger    KNEE SURGERY Right     x2 arthrotomy    AZ ARTHRP KNE CONDYLE&PLATU MEDIAL&LAT COMPARTMENTS Right 11/30/2021    Procedure: ARTHROPLASTY KNEE TOTAL W ROBOT;  Surgeon: Marty Thomas DO;  Location: [facility];  Service: Orthopedics    AZ NEUROPLASTY &/TRANSPOS MEDIAN NRV CARPAL TUNNE Left 10/26/2023    Procedure: RELEASE CARPAL TUNNEL;  Surgeon: Aline Whatley MD;  Location: [facility];  Service: Orthopedics    SHOULDER SURGERY Left     x1 staples, tendon repair secondary to many spontaneous dislocations       FAMILY HISTORY:  Family History   Problem Relation Age of Onset    Hypertension Mother     Arthritis Mother     Hyperlipidemia Mother     Lung cancer Mother     Stroke Maternal Grandfather     Diabetes Cousin     COPD Father     Depression Family     Diabetes Family     No Known Problems Sister     No Known Problems Brother     No Known Problems Maternal Aunt     No Known Problems Maternal Uncle     No Known Problems Paternal Aunt     No Known Problems Paternal Uncle     No Known Problems Maternal Grandmother     No Known Problems Paternal Grandmother     No Known Problems Paternal Grandfather        SOCIAL HISTORY:  Social History     Tobacco Use    Smoking status: Former     Current packs/day: 0.00     Average packs/day: 1 pack/day for 28.0 years (28.0 ttl pk-yrs)     Types: Cigarettes     Start date: 1972     Quit date: 2000      Years since quittin.2     Passive exposure: Past    Smokeless tobacco: Never    Tobacco comments:     Former smoker   Vaping Use    Vaping status: Never Used   Substance Use Topics    Alcohol use: Yes     Comment: rare, once a month     Drug use: No       MEDICATIONS:    Current Outpatient Medications:     acetaminophen (TYLENOL) 650 mg CR tablet, Take 1 tablet (650 mg total) by mouth every 8 (eight) hours as needed for mild pain, Disp: 30 tablet, Rfl: 0    amLODIPine (NORVASC) 10 mg tablet, TAKE ONE TABLET BY MOUTH EVERY DAY, Disp: 90 tablet, Rfl: 1    aspirin (ECOTRIN LOW STRENGTH) 81 mg EC tablet, Take 81 mg by mouth daily, Disp: , Rfl:     atorvastatin (LIPITOR) 20 mg tablet, TAKE ONE TABLET BY MOUTH EVERY DAY, Disp: 90 tablet, Rfl: 1    Blood Glucose Monitoring Suppl (OneTouch Verio) w/Device KIT, Test blood sugar twice a day, Disp: 1 kit, Rfl: 0    cholecalciferol (VITAMIN D3) 1,000 units tablet, Take 1,000 Units by mouth daily, Disp: , Rfl:     Continuous Blood Gluc  (FreeStyle Ulysses 14 Day [place]) SHREE, Use, Disp: , Rfl:     Continuous Glucose Sensor (FreeStyle Ulysses 3 Sensor) MISC, Use 1 each every 14 (fourteen) days, Disp: 6 each, Rfl: 3    Empagliflozin (Jardiance) 10 MG TABS tablet, Take 1 tablet (10 mg total) by mouth every morning, Disp: 30 tablet, Rfl: 5    gabapentin (NEURONTIN) 100 mg capsule, TAKE ONE CAPSULE BY MOUTH FOUR TIMES A DAY, Disp: 120 capsule, Rfl: 2    glipiZIDE (GLUCOTROL XL) 10 mg 24 hr tablet, TAKE TWO TABLETS BY MOUTH EVERY DAY AT BEDTIME, Disp: 60 tablet, Rfl: 5    glucose blood (OneTouch Verio) test strip, Test blood sugar twice a day, Disp: 200 each, Rfl: 3    hydroxychloroquine (PLAQUENIL) 200 mg tablet, TAKE ONE TABLET BY MOUTH TWICE A DAY WITH MEALS (GENERIC FOR PLAQUENIL), Disp: 180 tablet, Rfl: 0    Insulin Glargine Solostar (Lantus SoloStar) 100 UNIT/ML SOPN, Inject 0.2 mL (20 Units total) as directed daily at bedtime, Disp: 30 mL, Rfl: 1    Insulin Pen Needle  (B-D ULTRAFINE III SHORT PEN) 31G X 8 MM MISC, USE AS DIRECTED, Disp: 90 each, Rfl: 1    ketoconazole (NIZORAL) 2 % shampoo, Apply topically, Disp: , Rfl:     metFORMIN (GLUCOPHAGE) 500 mg tablet, TAKE TWO TABLETS BY MOUTH TWICE A DAY WITH MEALS (GENERIC GLUCOPHAGE), Disp: 360 tablet, Rfl: 1    olmesartan (BENICAR) 20 mg tablet, TAKE TWO TABLETS BY MOUTH EVERY DAY, Disp: 180 tablet, Rfl: 1    OneTouch Delica Lancets 33G MISC, Test blood sugar twice a day, Disp: 200 each, Rfl: 3    tamsulosin (FLOMAX) 0.4 mg, 0.4 mg daily, Disp: , Rfl:     mupirocin (BACTROBAN) 2 % ointment, Apply topically 2 (two) times a day, Disp: 22 g, Rfl: 0    ALLERGIES:  Allergies   Allergen Reactions    Latex Rash     At dentist, lips swollen     Codeine Nausea Only     Reaction Date: 03Oct2005;          REVIEW OF SYSTEMS:  Pertinent items are noted in HPI.  A comprehensive review of systems was negative.    VITALS:  There were no vitals filed for this visit.    LABS:  HgA1c:   Lab Results   Component Value Date    HGBA1C 6.3 (H) 02/17/2025     BMP:   Lab Results   Component Value Date    GLUCOSE 245 (H) 11/20/2017    CALCIUM 8.4 12/01/2021     11/20/2017    K 4.2 02/17/2025    CO2 22 02/17/2025     (H) 02/17/2025    BUN 28 (H) 02/17/2025    CREATININE 1.59 (H) 02/17/2025       _____________________________________________________  PHYSICAL EXAMINATION:  General: well developed and well nourished, alert, oriented times 3, and appears comfortable  Psychiatric: Normal  HEENT: Normocephalic, Atraumatic Trachea Midline, No torticollis  Pulmonary: No audible wheezing or respiratory distress   Abdomen/GI: Non tender, non distended   Cardiovascular: Regular Rate and Rhythm. No pitting edema, 2+ radial pulse   Skin: No masses, erythema, lacerations, fluctation, ulcerations  Neurovascular: Sensation Intact to the Median, Ulnar, Radial Nerve, Motor Intact to the Median, Ulnar, Radial Nerve, and Pulses Intact  Musculoskeletal: Normal, except  as noted in detailed exam and in HPI.        FOCUSED MUSCULOSKELETAL EXAMINATION:     Right  Upper Extremity  Inspection: skin intact, no notable deformity   Palpation: no TTP   Neurologic: 5/5 elbow flexion, 5/5 elbow extension, 5/5 wrist extension, 5/5 wrist flexion, 5/5 finger flexion, 5/5 finger extension, 5/5 FPL, 5/5 EPL, 5/5 APB, 5/5 intrinsics, sensation intact to median, radial, and ulnar nerve distributions  Vascular: Palpable radial pulse, brisk cap refill <2sec, hand warm and well perfused  MSK:   RIGHT SIDE:  Carpal tunnel:  positive carpal compression  and Cubital Tunnel:  Positive Elbow Flexion/Compression Test  ___________________________________________________  ___________________________________________________  STUDIES REVIEWED:    I have personally reviewed and interpreted  US of right wrist and elbow obtained on 2/24/25 which demonstrates cubital and carpal tunnel.      LABS REVIEWED:    HgA1c:   Lab Results   Component Value Date    HGBA1C 6.3 (H) 02/17/2025     BMP:   Lab Results   Component Value Date    GLUCOSE 245 (H) 11/20/2017    CALCIUM 8.4 12/01/2021     11/20/2017    K 4.2 02/17/2025    CO2 22 02/17/2025     (H) 02/17/2025    BUN 28 (H) 02/17/2025    CREATININE 1.59 (H) 02/17/2025               PROCEDURES PERFORMED:  Procedures  No Procedures performed today    _____________________________________________________            I agree with the history, physical examination, assessment and plan of care as documented above.    Perico Cheatham M.D.  Attending, Orthopaedic Surgery  Hand, Wrist, and Elbow Surgery  [facility]

## 2025-03-18 ENCOUNTER — PATIENT MESSAGE (OUTPATIENT)
Dept: FAMILY MEDICINE CLINIC | Facility: CLINIC | Age: 65
End: 2025-03-18

## 2025-03-18 DIAGNOSIS — Z29.89 SBE (SUBACUTE BACTERIAL ENDOCARDITIS) PROPHYLAXIS CANDIDATE: Primary | ICD-10-CM

## 2025-03-18 RX ORDER — AMOXICILLIN 500 MG/1
CAPSULE ORAL
Qty: 4 CAPSULE | Refills: 0 | Status: SHIPPED | OUTPATIENT
Start: 2025-03-18 | End: 2025-03-25

## 2025-03-28 DIAGNOSIS — E11.49 DIABETES MELLITUS TYPE 2 WITH NEUROLOGICAL MANIFESTATIONS (HCC): ICD-10-CM

## 2025-03-28 RX ORDER — GLIPIZIDE 10 MG/1
20 TABLET, FILM COATED, EXTENDED RELEASE ORAL
Qty: 60 TABLET | Refills: 5 | Status: SHIPPED | OUTPATIENT
Start: 2025-03-28

## 2025-04-03 ENCOUNTER — OFFICE VISIT (OUTPATIENT)
Age: 65
End: 2025-04-03
Payer: COMMERCIAL

## 2025-04-03 VITALS
SYSTOLIC BLOOD PRESSURE: 140 MMHG | HEIGHT: 70 IN | BODY MASS INDEX: 36.13 KG/M2 | WEIGHT: 252.4 LBS | HEART RATE: 59 BPM | OXYGEN SATURATION: 98 % | DIASTOLIC BLOOD PRESSURE: 90 MMHG

## 2025-04-03 DIAGNOSIS — M47.812 CERVICAL SPONDYLOSIS WITHOUT MYELOPATHY: ICD-10-CM

## 2025-04-03 DIAGNOSIS — I50.32 CHRONIC HEART FAILURE WITH PRESERVED EJECTION FRACTION (HCC): ICD-10-CM

## 2025-04-03 DIAGNOSIS — M47.816 LUMBAR SPONDYLOSIS: ICD-10-CM

## 2025-04-03 DIAGNOSIS — N18.31 STAGE 3A CHRONIC KIDNEY DISEASE (HCC): Primary | ICD-10-CM

## 2025-04-03 DIAGNOSIS — E66.812 CLASS 2 SEVERE OBESITY DUE TO EXCESS CALORIES WITH SERIOUS COMORBIDITY AND BODY MASS INDEX (BMI) OF 35.0 TO 35.9 IN ADULT (HCC): ICD-10-CM

## 2025-04-03 DIAGNOSIS — E66.01 CLASS 2 SEVERE OBESITY DUE TO EXCESS CALORIES WITH SERIOUS COMORBIDITY AND BODY MASS INDEX (BMI) OF 35.0 TO 35.9 IN ADULT (HCC): ICD-10-CM

## 2025-04-03 DIAGNOSIS — M1A.0710 CHRONIC GOUT OF RIGHT ANKLE, UNSPECIFIED CAUSE: ICD-10-CM

## 2025-04-03 DIAGNOSIS — Z79.899 LONG-TERM USE OF PLAQUENIL: ICD-10-CM

## 2025-04-03 DIAGNOSIS — M15.0 PRIMARY GENERALIZED (OSTEO)ARTHRITIS: ICD-10-CM

## 2025-04-03 DIAGNOSIS — M06.09 SERONEGATIVE ARTHROPATHY OF MULTIPLE SITES (HCC): ICD-10-CM

## 2025-04-03 DIAGNOSIS — L40.0 PLAQUE PSORIASIS: ICD-10-CM

## 2025-04-03 PROCEDURE — 99214 OFFICE O/P EST MOD 30 MIN: CPT | Performed by: INTERNAL MEDICINE

## 2025-04-03 RX ORDER — HYDROXYCHLOROQUINE SULFATE 200 MG/1
TABLET, FILM COATED ORAL
Qty: 120 TABLET | Refills: 1 | Status: SHIPPED | OUTPATIENT
Start: 2025-04-03 | End: 2025-10-03

## 2025-04-03 RX ORDER — HYDROXYCHLOROQUINE SULFATE 200 MG/1
200 TABLET, FILM COATED ORAL 2 TIMES DAILY WITH MEALS
Qty: 180 TABLET | Refills: 0 | Status: SHIPPED | OUTPATIENT
Start: 2025-04-03 | End: 2025-04-03

## 2025-04-03 NOTE — PATIENT INSTRUCTIONS
Start the physical therapy for your back.  I believe that we will be of benefit for you.  Get the x-ray of your back so that we can have a baseline.  If the back pain and hip pain come back, call the office and we will likely get an MRI, particularly if the physical therapy does not work.  Get the 1 blood test that is labeled for within the next week to recheck your kidney function.  Get the other labs that are ordered for before your next office visit completed in 6 months.  We will recheck your Plaquenil level in 3 months.

## 2025-04-03 NOTE — PROGRESS NOTES
Assessment/Plan:    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HCC)  Seronegative inflammatory arthritis.  Rule out seronegative rheumatoid arthritis versus psoriatic component.  Rule out erosive osteoarthritis.  Rule out arthritis related to diabetes.  Rheumatoid factor, CCP, and DEBORA all negative.  Sed rate and CRP normal.  Inflammation improved on Plaquenil. 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  He is status post left carpal tunnel release 10/26/2023.  Persistent right carpal tunnel symptoms despite injection.  He is scheduled for right carpal tunnel and cubital tunnel release later this month. Encourage patient to go for eye follow up while on Plaquenil as well as routine follow-up for diabetes every 6 to 12 months.  He recently lowered his Plaquenil dose to 400 mg every Monday, Wednesday, and Friday and 200 mg every Tuesday, Thursday, Saturday, and Sunday in view of elevated Plaquenil level of 1705 on 2/17/2025.  Will continue reduced dose of Plaquenil with plans to repeat Plaquenil level in 3 months to make sure he is therapeutic.  Monitor disease activity and medication toxicity with lab work as ordered.  Follow-up 6 months or sooner if needed.    Plaque psoriasis  Patient does have plaque psoriasis overall improved with shampoo but still has a few lesions in his scalp and extremities.  Encourage follow-up with dermatologist.  Joint disease stable on Plaquenil.  Rule out psoriatic component.  Monitor disease activity and medication toxicity with lab work as ordered.  Continue to monitor.    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  Lumbar spondylosis with increasing back pain with some radiation into the left hip.  Patient was referred for x-rays lumbar spine as well as physical therapy.  Patient was told to call the office if his  back and hip pain recur frequency, and I will likely order an MRI for further evaluation.  Consider pain management if symptoms warrant.  Review of musculoskeletal ultrasound from October 2022 significant for no evidence of inflammatory arthritis.  Continue Plaquenil in reduced dosage in view of elevated Plaquenil level on lab work from February 2025.  Continue follow-up with eye doctor twice yearly to monitor for Plaquenil toxicity.  Encourage home exercise program as tolerated.  Continue to monitor.    Cervical spondylosis without myelopathy  Cervical spine films from November 2021 unremarkable.  EMG studies from September 2023 with no evidence of cervical radiculopathy.  No current neck pain or radicular symptoms.  Continue to monitor.    Gout  History of gout quiescent.  Uric acid dated 7/18/2023 in target range at 5.6 on no urate lowering agent.  Continue to monitor clinically and with lab work as ordered.    Lumbar spondylosis  Low back pain with recent increase in symptoms with radiation into the left hip treated by chiropractor.  No evidence for inflammatory back pain by history, however, patient has not gone for updated imaging lumbar spine.  He does have history of lumbar decompression in the past.  I did give him a new prescription for updated lumbar spine films which I asked him to complete.  Continue to monitor back symptoms.  He was referred for physical therapy.  If symptoms would worsen, will likely refer for MRI.  Continue to monitor.    Class 2 severe obesity with serious comorbidity and body mass index (BMI) of 35.0 to 35.9 in adult  (HCC)  Patient has gained back the weight he lost.  He was intolerant to Ozempic.  Encourage weight reduction in view of increased risk for comorbidities.  I have discussed the anti-inflammatory diet with him particularly in view of the potential to decrease insulin resistance and promote weight reduction.  Follow-up primary care.  Continue to monitor.    Stage 3a  chronic kidney disease ([facility])  Lab Results   Component Value Date    EGFR 48 (L) 02/17/2025    EGFR 43 (L) 02/17/2025    EGFR 54 (L) 07/13/2024    CREATININE 1.59 (H) 02/17/2025    CREATININE 1.73 (H) 02/17/2025    CREATININE 1.44 (H) 07/13/2024   Worsening creatinine and estimated GFR currently chronic kidney disease stage IIIa.  Patient was referred for updated BMP in the next week to monitor chronic kidney disease.  Suspect it may be related to his type 2 diabetes.  He does follow with nephrology.  Avoid nephrotoxic agents like nonsteroidals.  Continue to monitor.    Chronic heart failure with preserved ejection fraction ([facility])  Wt Readings from Last 3 Encounters:   04/03/25 114 kg (252 lb 6.4 oz)   03/10/25 112 kg (246 lb)   02/21/25 113 kg (248 lb 6.4 oz)     History diastolic dysfunction.  Follow-up cardiology.  Continue to monitor.                 Problem List Items Addressed This Visit     Gout    History of gout quiescent.  Uric acid dated 7/18/2023 in target range at 5.6 on no urate lowering agent.  Continue to monitor clinically and with lab work as ordered.         Relevant Orders    Uric acid    Class 2 severe obesity with serious comorbidity and body mass index (BMI) of 35.0 to 35.9 in adult ([facility])    Patient has gained back the weight he lost.  He was intolerant to Ozempic.  Encourage weight reduction in view of increased risk for comorbidities.  I have discussed the anti-inflammatory diet with him particularly in view of the potential to decrease insulin resistance and promote weight reduction.  Follow-up primary care.  Continue to monitor.         Lumbar spondylosis    Low back pain with recent increase in symptoms with radiation into the left hip treated by chiropractor.  No evidence for inflammatory back pain by history, however, patient has not gone for updated imaging lumbar spine.  He does have history of lumbar decompression in the past.  I did give him a new prescription for updated lumbar spine  films which I asked him to complete.  Continue to monitor back symptoms.  He was referred for physical therapy.  If symptoms would worsen, will likely refer for MRI.  Continue to monitor.         Relevant Orders    XR spine lumbar minimum 4 views non injury    Ambulatory Referral to Physical Therapy    Cervical spondylosis without myelopathy    Cervical spine films from November 2021 unremarkable.  EMG studies from September 2023 with no evidence of cervical radiculopathy.  No current neck pain or radicular symptoms.  Continue to monitor.         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  Lumbar spondylosis with increasing back pain with some radiation into the left hip.  Patient was referred for x-rays lumbar spine as well as physical therapy.  Patient was told to call the office if his back and hip pain recur frequency, and I will likely order an MRI for further evaluation.  Consider pain management if symptoms warrant.  Review of musculoskeletal ultrasound from October 2022 significant for no evidence of inflammatory arthritis.  Continue Plaquenil in reduced dosage in view of elevated Plaquenil level on lab work from February 2025.  Continue follow-up with eye doctor twice yearly to monitor for Plaquenil toxicity.  Encourage home exercise program as tolerated.  Continue to monitor.         Plaque psoriasis    Patient does have plaque psoriasis overall improved with shampoo but still has a few lesions in his scalp and extremities.  Encourage follow-up with dermatologist.  Joint disease stable on Plaquenil.  Rule out psoriatic component.  Monitor disease activity and medication toxicity with lab work as ordered.  Continue to monitor.         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HCC)    Seronegative inflammatory arthritis.  Rule out seronegative rheumatoid arthritis versus psoriatic component.  Rule out erosive osteoarthritis.  Rule out  arthritis related to diabetes.  Rheumatoid factor, CCP, and DEBORA all negative.  Sed rate and CRP normal.  Inflammation improved on Plaquenil. 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  He is status post left carpal tunnel release 10/26/2023.  Persistent right carpal tunnel symptoms despite injection.  He is scheduled for right carpal tunnel and cubital tunnel release later this month. Encourage patient to go for eye follow up while on Plaquenil as well as routine follow-up for diabetes every 6 to 12 months.  He recently lowered his Plaquenil dose to 400 mg every Monday, Wednesday, and Friday and 200 mg every Tuesday, Thursday, Saturday, and Sunday in view of elevated Plaquenil level of 1705 on 2/17/2025.  Will continue reduced dose of Plaquenil with plans to repeat Plaquenil level in 3 months to make sure he is therapeutic.  Monitor disease activity and medication toxicity with lab work as ordered.  Follow-up 6 months or sooner if needed.         Relevant Medications    hydroxychloroquine (PLAQUENIL) 200 mg tablet    Other Relevant Orders    MISCELLANEOUS LAB TEST    C-reactive protein    Sedimentation rate, automated    CBC and differential    Comprehensive metabolic panel    Urinalysis with microscopic    Chronic heart failure with preserved ejection fraction (HCC)    Wt Readings from Last 3 Encounters:   04/03/25 114 kg (252 lb 6.4 oz)   03/10/25 112 kg (246 lb)   02/21/25 113 kg (248 lb 6.4 oz)     History diastolic dysfunction.  Follow-up cardiology.  Continue to monitor.               Stage 3a chronic kidney disease (HCC) - Primary    Lab Results   Component Value Date    EGFR 48 (L) 02/17/2025    EGFR 43 (L) 02/17/2025    EGFR 54 (L) 07/13/2024    CREATININE 1.59 (H) 02/17/2025    CREATININE 1.73 (H) 02/17/2025    CREATININE 1.44 (H) 07/13/2024   Worsening creatinine and estimated GFR currently chronic  kidney disease stage IIIa.  Patient was referred for updated BMP in the next week to monitor chronic kidney disease.  Suspect it may be related to his type 2 diabetes.  He does follow with nephrology.  Avoid nephrotoxic agents like nonsteroidals.  Continue to monitor.         Relevant Orders    Basic metabolic panel   Other Visit Diagnoses       Long-term use of Plaquenil        Relevant Orders    MISCELLANEOUS LAB TEST              Reviewed records, labs, and imaging with the patient in detail.  Counseled patient.  Discussion regarding my findings and recommendations.  Office visit with documentation 35 min.    Subjective:      Patient ID: Murtaza Cerrato is a 64 y.o. male.    Patient with primary generalized osteoarthritis, with probable cervical spondylosis and documented lumbar spondylosis, with soft tissue prominence bilateral hands, and history mild scalp psoriasis.  Questionable adhesive capsulitis bilateral shoulders.  History flexor tenosynovitis with multiple trigger releases secondary to diabetes.  Carpal tunnel syndrome versus neuropathy secondary to diabetes.  Patient is scheduled for right carpal tunnel and cubital tunnel release on 4/24/2025.  Advanced DJD right knee.  Venous insufficiency mild.  Type 2 diabetes with history of elevated hemoglobin A1c with marked improvement on most recent study.  History gout currently inactive.  Other medical problems include hypertension, hyperlipidemia, nephrolithiasis, lumbar spondylosis status post lumbar laminectomy.  He has been having more back and pain radiating to the left hip recently which has responded to chiropractic therapy, which affords mainly temporary benefit.    Scalp psoriasis overall better with topical shampoo OTC.  He has had Psoriasis versus eczema with scattered lesions on his arms and legs as well.  He had evaluation with dermatology.  He has history of severe right knee osteoarthritis and is S/P TKR 11/30/2021. He has no significant  pain in his operative knee. He continues to work on a diabetic diet and is using a [employer] glucose monitor.  The nephrologist started patient on Jardiance for renal function and proteinuria and he was referred for additional serologies in view of increased proteinuria.  He has been able to lower his insulin.  He discontinued Ozempic due to GI upset.  He has gained back the weight that he initially lost, however, diabetes is under excellent control with recent hemoglobin A1c 6.3 in 2/17/2025.  Patient tried sulfasalazine, but discontinued it in view of ongoing metallic taste in his mouth, with resolution of nausea and vomiting related to the medicine. He had developed increased and hand pain and swelling off of the sulfasalazine more prominent in the left index finger PIP and DIP joints and left thumb.  He has been on Plaquenil since March 2023 and notes overall improvement. He notes numbness and tingling in his right hand.  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  No cervical radiculopathy noted. He was unable to go to occupational therapy due to time constraints.  He is status post left carpal tunnel release 10/26/2023 with benefit.  He is having more prominent symptoms in his right hand and is due to have right carpal tunnel and right cubital tunnel release on 4/24/2025.  Right wrist x-ray dated 12/28/2023.  Most recent musculoskeletal ultrasound dated 10/4/2022 significant for no inflammatory changes noted. He notes dry mouth. He has lower extremity edema particularly ankles.  He does have compression hose which helps, but he is not wearing them daily.  He has history of periodontitis which was treated with deep scaling.  He sees the dentist every 3 months for cleanings.  Patient developed a blister on his left fifth toe and was treated by wound care and released.  He has erectile  dysfunction secondary to arterial insufficiency and he had penile implant on August 3.    Lab work dated 2/17/2025 significant for CBC unremarkable.  Random glucose 119.  BUN 28 with creatinine 1.59.  Estimated GFR slightly lower at 48.  Sodium 145.  Calcium 9.7.  Hemoglobin A1c 6.3.  Total cholesterol 128.  Triglycerides 78.  HDL 46.  LDL 67.  Sed rate 10.  CRP 1.  Plaquenil level high at 1705 which prompted a change in Plaquenil dosing to 400 mg every Monday, Wednesday, and Friday and 200 mg every Tuesday, Thursday, Saturday, and Sunday.  Review of lab work dated 7/13/2024 significant for CRP 1.  White blood cell count 4.9.  Random glucose 147.  BUN 28.  Creatinine 1.44 with estimated GFR 54.  Urinalysis 3+ dipstick protein and glucose.  Sed rate 10.  Hemoglobin A1c 6.3.  Review of lab work dated 7/18/2023 significant for DEBORA, rheumatoid factor, CCP all negative.  CBC unremarkable.  Glucose 132.  Creatinine 1.46 with estimated GFR 53.  Calcium 9.4.  CRP less than 1.  Sed rate 15.  Uric acid 5.6.  Urinalysis 3+ protein with trace glucose.  Review of lab work dated 1/24/2023 significant for hemoglobin A1c 7.0.  Glucose 109.  CBC unremarkable.  Creatinine 1.25 with estimated GFR 64.  Vitamin D 16.5 treated with 12 weeks of vitamin D2.  Protein to creatinine ratio markedly elevated at 5723.  Review of lab work dated 07/01/2022 reveals total cholesterol 139.  Triglyceride 89.  HDL 47.  LDL 75.  CBC unremarkable.  Glucose 101.  Estimated GFR 79.  Hemoglobin A1c 4.9.  Micro albumin to creatinine ratio elevated at 2143.  Review of lab work dated 02/19/2022 reveals CRP 4 with normal 0-10.  Urinalysis 4+ dipstick protein.  CBC unremarkable.  Creatinine 1.07.  Estimated GFR 73.  Sed rate 42 elevated.  Hemoglobin A1c 5.3.  Review of lab work dated 11/15/2021 reveals G6PD negative.      Review of musculoskeletal ultrasound dated 10/4/2022 with no evidence for inflammatory arthropathy.  X-ray bilateral hands dated 11/15/2021  significant for nonspecific arthropathy in the D IP and PIP joints 2nd to 5th fingers, with cartilage narrowing and minimal periarticular demineralization.  No erosions noted.  Neck x-ray unremarkable.        Allergies  Allergies   Allergen Reactions   • Latex Rash     At dentist, lips swollen    • Codeine Nausea Only     Reaction Date: 03Oct2005;        Home Medications    Current Outpatient Medications:   •  acetaminophen (TYLENOL) 650 mg CR tablet, Take 1 tablet (650 mg total) by mouth every 8 (eight) hours as needed for mild pain, Disp: 30 tablet, Rfl: 0  •  amLODIPine (NORVASC) 10 mg tablet, TAKE ONE TABLET BY MOUTH EVERY DAY, Disp: 90 tablet, Rfl: 1  •  aspirin (ECOTRIN LOW STRENGTH) 81 mg EC tablet, Take 81 mg by mouth daily, Disp: , Rfl:   •  atorvastatin (LIPITOR) 20 mg tablet, TAKE ONE TABLET BY MOUTH EVERY DAY, Disp: 90 tablet, Rfl: 1  •  Blood Glucose Monitoring Suppl (OneTouch Verio) w/Device KIT, Test blood sugar twice a day, Disp: 1 kit, Rfl: 0  •  cholecalciferol (VITAMIN D3) 1,000 units tablet, Take 1,000 Units by mouth daily, Disp: , Rfl:   •  Continuous Blood Gluc  (FreeStyle Ulysses 14 Day [place]) SHREE, Use, Disp: , Rfl:   •  Continuous Glucose Sensor (FreeStyle Ulysses 3 Sensor) MISC, Use 1 each every 14 (fourteen) days, Disp: 6 each, Rfl: 3  •  Empagliflozin (Jardiance) 10 MG TABS tablet, Take 1 tablet (10 mg total) by mouth every morning, Disp: 30 tablet, Rfl: 5  •  gabapentin (NEURONTIN) 100 mg capsule, TAKE ONE CAPSULE BY MOUTH FOUR TIMES A DAY, Disp: 120 capsule, Rfl: 2  •  glipiZIDE (GLUCOTROL XL) 10 mg 24 hr tablet, TAKE TWO TABLETS BY MOUTH EVERY DAY AT BEDTIME, Disp: 60 tablet, Rfl: 5  •  glucose blood (OneTouch Verio) test strip, Test blood sugar twice a day, Disp: 200 each, Rfl: 3  •  hydroxychloroquine (PLAQUENIL) 200 mg tablet, Take 1 tablet twice daily every Monday, Wednesday, and Friday and take 1 tablet daily every Tuesday, Thursday, Saturday, and Sunday, Disp: 120 tablet,  Rfl: 1  •  Insulin Glargine Solostar (Lantus SoloStar) 100 UNIT/ML SOPN, Inject 0.2 mL (20 Units total) as directed daily at bedtime, Disp: 30 mL, Rfl: 1  •  Insulin Pen Needle (B-D ULTRAFINE III SHORT PEN) 31G X 8 MM MISC, USE AS DIRECTED, Disp: 90 each, Rfl: 1  •  metFORMIN (GLUCOPHAGE) 500 mg tablet, TAKE TWO TABLETS BY MOUTH TWICE A DAY WITH MEALS (GENERIC GLUCOPHAGE), Disp: 360 tablet, Rfl: 1  •  olmesartan (BENICAR) 20 mg tablet, TAKE TWO TABLETS BY MOUTH EVERY DAY, Disp: 180 tablet, Rfl: 1  •  OneTouch Delica Lancets 33G MISC, Test blood sugar twice a day, Disp: 200 each, Rfl: 3  •  tamsulosin (FLOMAX) 0.4 mg, 0.4 mg daily, Disp: , Rfl:   •  ketoconazole (NIZORAL) 2 % shampoo, Apply topically (Patient not taking: Reported on 4/3/2025), Disp: , Rfl:   •  mupirocin (BACTROBAN) 2 % ointment, Apply topically 2 (two) times a day, Disp: 22 g, Rfl: 0    Past Medical History  Past Medical History:   Diagnosis Date   • Arthritis    • Carpal tunnel syndrome    • Chronic kidney disease     Stage 2 CKD   • Corneal abrasion     last assessed - 18Nov2012   • Diabetes mellitus (HCC)    • Gout    • Hiatal hernia    • Hyperlipidemia    • Hypertension    • Impaired fasting glucose     last assessed - 05Jun2006   • Kidney stone     last stone 2009   • Lumbar spondylosis 10/31/2021   • Lumbar spondylosis    • Numbness of fingers of both hands    • PONV (postoperative nausea and vomiting)    • Psoriasis    • RA (rheumatoid arthritis) ([facility])    • 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[facility])    • Sinus infection     currently under tx PO ABT etc as of 04/17/19   • Stage 2 chronic kidney disease 10/31/2021   • Viremia     Resolved - 22Jan2018       Past Surgical History   Past Surgical History:   Procedure Laterality Date   • ANKLE SURGERY Right     tendon arthrotomy   • ARTHROTOMY ANKLE     • BACK SURGERY  2014    lumbar laminectomy   • CARPAL TUNNEL RELEASE Left    • COLONOSCOPY     • COLONOSCOPY N/A 04/19/2019    Procedure:  COLONOSCOPY;  Surgeon: Toña Aly MD;  Location: [facility];  Service: Gastroenterology   • FINGER SURGERY Bilateral     every finger contracture release over a 10 year period trigger finger   • INCISION TENDON SHEATH      of a finger   • KNEE SURGERY Right     x2 arthrotomy   • MT ARTHRP KNE CONDYLE&PLATU MEDIAL&LAT COMPARTMENTS Right 11/30/2021    Procedure: ARTHROPLASTY KNEE TOTAL W ROBOT;  Surgeon: Marty Thomas DO;  Location: [facility];  Service: Orthopedics   • MT NEUROPLASTY &/TRANSPOS MEDIAN NRV CARPAL TUNNE Left 10/26/2023    Procedure: RELEASE CARPAL TUNNEL;  Surgeon: Aline Whatley MD;  Location: [facility];  Service: Orthopedics   • SHOULDER SURGERY Left     x1 staples, tendon repair secondary to many spontaneous dislocations       Family History   Family History   Problem Relation Age of Onset   • Hypertension Mother    • Arthritis Mother    • Hyperlipidemia Mother    • Lung cancer Mother    • Stroke Maternal Grandfather    • Diabetes Cousin    • COPD Father    • Depression Family    • Diabetes Family    • No Known Problems Sister    • No Known Problems Brother    • No Known Problems Maternal Aunt    • No Known Problems Maternal Uncle    • No Known Problems Paternal Aunt    • No Known Problems Paternal Uncle    • No Known Problems Maternal Grandmother    • No Known Problems Paternal Grandmother    • No Known Problems Paternal Grandfather        The following portions of the patient's history were reviewed and updated as appropriate: allergies, current medications, past family history, past medical history, past social history, past surgical history, and problem list.    Review of Systems   Constitutional:  Negative for chills and fever.   HENT:  Negative for ear pain and sore throat.         Dry mouth.   Eyes:  Negative for pain and visual disturbance.   Respiratory:  Negative for cough and shortness of breath.    Cardiovascular:  Positive for leg swelling. Negative for chest pain and  "palpitations.        Edema ankles/feet.   Gastrointestinal:  Negative for abdominal pain and vomiting.   Genitourinary:  Negative for dysuria and hematuria.        Less nocturia.  Erectile dysfunction.   Musculoskeletal:  Positive for arthralgias, back pain and joint swelling.        Morning stiffness minutes duration.  Right hand numbness persists with overall improvement in joint swelling bilateral hands on Plaquenil.  Patient has been having more back pain with some radiation into his left hip.  He has been treating with his chiropractor.  Psoriasis scalp, extensor surfaces left knee, few patches extremities, and questionable patch extensor surface elbows better with scalp shampoo.  No dry eyes.  No Raynaud's.  History gout right ankle.  Last episode years ago.  History adhesive capsulitis shoulders improved.  Severe DJD right knee, S/P TKR 11/30/2021 stable.    Skin:  Positive for rash. Negative for color change.        Scalp psoriasis for which he uses shampoo.  Psoriasis face and nasolabial folds improved.   Neurological:  Positive for numbness. Negative for seizures and syncope.   All other systems reviewed and are negative.        Objective:      /90   Pulse 59   Ht 5' 9.5\" (1.765 m)   Wt 114 kg (252 lb 6.4 oz)   SpO2 98%   BMI 36.74 kg/m²          Physical Exam  Vitals reviewed.   Constitutional:       Appearance: Normal appearance.   HENT:      Head: Normocephalic.      Nose:      Comments: Nose and throat unremarkable.  Cardiovascular:      Rate and Rhythm: Regular rhythm.   Pulmonary:      Breath sounds: Normal breath sounds.   Abdominal:      Palpations: Abdomen is soft.   Musculoskeletal:      Comments: Neck decreased lateral flexion.  Shoulders very slightly limited external rotation.  Bilateral shoulder crepitus.  Elbows full range of motion.  Wrists full range of motion with positive Tinel's bilaterally.  Hands minimal soft tissue prominence 2nd and 3rd MCPs right, scattered PIP joints, " especially left index and middle finger PIP joints, and left thumb IP joint.  Very slight prominence DIP joints.  Straight leg raising negative bilaterally.  Schober's negative.  No SI joint tenderness appreciated.  Hips full range of motion.  Knees right slightly decreased flexion and extension with slight cool effusion, patellofemoral crepitus, and surgical scar.  Left knee essentially full range of motion with patellofemoral crepitus.  Ankles slight soft tissue prominence lateral malleoli with edema right>left ankle.  Feet rearfoot hyperpronation with midfoot cavus deformities, high insteps, hallux valgus, and hammertoe deformities.  No synovitis noted.   Skin:     Comments: Plaque psoriasis scalp, with few patches lower extremities, extensor surface left knee, questionable extensor surfaces elbows. No onycholysis.  Varicosities lower extremities with trace to 1+ edema ankles and feet.  No stasis changes.               This note was written in part using the assistance of the [employer] [id number] microphone system. Those portions using this system have been dictated and not read.

## 2025-04-06 PROBLEM — N18.31 STAGE 3A CHRONIC KIDNEY DISEASE (HCC): Status: ACTIVE | Noted: 2025-04-06

## 2025-04-06 NOTE — ASSESSMENT & PLAN NOTE
Patient does have plaque psoriasis overall improved with shampoo but still has a few lesions in his scalp and extremities.  Encourage follow-up with dermatologist.  Joint disease stable on Plaquenil.  Rule out psoriatic component.  Monitor disease activity and medication toxicity with lab work as ordered.  Continue to monitor.

## 2025-04-06 NOTE — ASSESSMENT & PLAN NOTE
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  Lumbar spondylosis with increasing back pain with some radiation into the left hip.  Patient was referred for x-rays lumbar spine as well as physical therapy.  Patient was told to call the office if his back and hip pain recur frequency, and I will likely order an MRI for further evaluation.  Consider pain management if symptoms warrant.  Review of musculoskeletal ultrasound from October 2022 significant for no evidence of inflammatory arthritis.  Continue Plaquenil in reduced dosage in view of elevated Plaquenil level on lab work from February 2025.  Continue follow-up with eye doctor twice yearly to monitor for Plaquenil toxicity.  Encourage home exercise program as tolerated.  Continue to monitor.

## 2025-04-06 NOTE — ASSESSMENT & PLAN NOTE
Seronegative inflammatory arthritis.  Rule out seronegative rheumatoid arthritis versus psoriatic component.  Rule out erosive osteoarthritis.  Rule out arthritis related to diabetes.  Rheumatoid factor, CCP, and DEBORA all negative.  Sed rate and CRP normal.  Inflammation improved on Plaquenil. 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  He is status post left carpal tunnel release 10/26/2023.  Persistent right carpal tunnel symptoms despite injection.  He is scheduled for right carpal tunnel and cubital tunnel release later this month. Encourage patient to go for eye follow up while on Plaquenil as well as routine follow-up for diabetes every 6 to 12 months.  He recently lowered his Plaquenil dose to 400 mg every Monday, Wednesday, and Friday and 200 mg every Tuesday, Thursday, Saturday, and Sunday in view of elevated Plaquenil level of 1705 on 2/17/2025.  Will continue reduced dose of Plaquenil with plans to repeat Plaquenil level in 3 months to make sure he is therapeutic.  Monitor disease activity and medication toxicity with lab work as ordered.  Follow-up 6 months or sooner if needed.

## 2025-04-06 NOTE — ASSESSMENT & PLAN NOTE
History of gout quiescent.  Uric acid dated 7/18/2023 in target range at 5.6 on no urate lowering agent.  Continue to monitor clinically and with lab work as ordered.

## 2025-04-06 NOTE — ASSESSMENT & PLAN NOTE
Patient has gained back the weight he lost.  He was intolerant to Ozempic.  Encourage weight reduction in view of increased risk for comorbidities.  I have discussed the anti-inflammatory diet with him particularly in view of the potential to decrease insulin resistance and promote weight reduction.  Follow-up primary care.  Continue to monitor.

## 2025-04-06 NOTE — ASSESSMENT & PLAN NOTE
Low back pain with recent increase in symptoms with radiation into the left hip treated by chiropractor.  No evidence for inflammatory back pain by history, however, patient has not gone for updated imaging lumbar spine.  He does have history of lumbar decompression in the past.  I did give him a new prescription for updated lumbar spine films which I asked him to complete.  Continue to monitor back symptoms.  He was referred for physical therapy.  If symptoms would worsen, will likely refer for MRI.  Continue to monitor.

## 2025-04-06 NOTE — ASSESSMENT & PLAN NOTE
Lab Results   Component Value Date    EGFR 48 (L) 02/17/2025    EGFR 43 (L) 02/17/2025    EGFR 54 (L) 07/13/2024    CREATININE 1.59 (H) 02/17/2025    CREATININE 1.73 (H) 02/17/2025    CREATININE 1.44 (H) 07/13/2024   Worsening creatinine and estimated GFR currently chronic kidney disease stage IIIa.  Patient was referred for updated BMP in the next week to monitor chronic kidney disease.  Suspect it may be related to his type 2 diabetes.  He does follow with nephrology.  Avoid nephrotoxic agents like nonsteroidals.  Continue to monitor.

## 2025-04-06 NOTE — ASSESSMENT & PLAN NOTE
Wt Readings from Last 3 Encounters:   04/03/25 114 kg (252 lb 6.4 oz)   03/10/25 112 kg (246 lb)   02/21/25 113 kg (248 lb 6.4 oz)     History diastolic dysfunction.  Follow-up cardiology.  Continue to monitor.

## 2025-04-06 NOTE — ASSESSMENT & PLAN NOTE
Cervical spine films from November 2021 unremarkable.  EMG studies from September 2023 with no evidence of cervical radiculopathy.  No current neck pain or radicular symptoms.  Continue to monitor.

## 2025-04-08 DIAGNOSIS — I10 BENIGN ESSENTIAL HYPERTENSION: ICD-10-CM

## 2025-04-09 RX ORDER — AMLODIPINE BESYLATE 10 MG/1
10 TABLET ORAL DAILY
Qty: 90 TABLET | Refills: 1 | Status: SHIPPED | OUTPATIENT
Start: 2025-04-09

## 2025-04-20 DIAGNOSIS — E78.2 MIXED HYPERLIPIDEMIA: ICD-10-CM

## 2025-04-20 RX ORDER — ATORVASTATIN CALCIUM 20 MG/1
20 TABLET, FILM COATED ORAL DAILY
Qty: 90 TABLET | Refills: 1 | Status: SHIPPED | OUTPATIENT
Start: 2025-04-20

## 2025-04-21 DIAGNOSIS — E78.2 MIXED HYPERLIPIDEMIA: ICD-10-CM

## 2025-04-21 NOTE — TELEPHONE ENCOUNTER
Spoke with pt, pt requesting to cancel surgery at this time due to his wife having medical issues.  He will call back to reschedule.    SX cxd as requested.

## 2025-04-22 RX ORDER — ATORVASTATIN CALCIUM 20 MG/1
20 TABLET, FILM COATED ORAL DAILY
Qty: 90 TABLET | Refills: 1 | OUTPATIENT
Start: 2025-04-22

## 2025-04-26 DIAGNOSIS — R80.1 PERSISTENT PROTEINURIA: ICD-10-CM

## 2025-04-26 DIAGNOSIS — E11.29 TYPE 2 DIABETES MELLITUS WITH MICROALBUMINURIA, WITH LONG-TERM CURRENT USE OF INSULIN (HCC): ICD-10-CM

## 2025-04-26 DIAGNOSIS — I12.9 TYPE 2 DIABETES MELLITUS WITH STAGE 2 CHRONIC KIDNEY DISEASE AND HYPERTENSION  (HCC): ICD-10-CM

## 2025-04-26 DIAGNOSIS — E66.812 CLASS 2 SEVERE OBESITY DUE TO EXCESS CALORIES WITH SERIOUS COMORBIDITY AND BODY MASS INDEX (BMI) OF 35.0 TO 35.9 IN ADULT (HCC): ICD-10-CM

## 2025-04-26 DIAGNOSIS — E11.22 TYPE 2 DIABETES MELLITUS WITH STAGE 2 CHRONIC KIDNEY DISEASE AND HYPERTENSION  (HCC): ICD-10-CM

## 2025-04-26 DIAGNOSIS — N18.2 STAGE 2 CHRONIC KIDNEY DISEASE: ICD-10-CM

## 2025-04-26 DIAGNOSIS — E55.9 VITAMIN D DEFICIENCY: ICD-10-CM

## 2025-04-26 DIAGNOSIS — N18.2 TYPE 2 DIABETES MELLITUS WITH STAGE 2 CHRONIC KIDNEY DISEASE AND HYPERTENSION  (HCC): ICD-10-CM

## 2025-04-26 DIAGNOSIS — Z79.4 TYPE 2 DIABETES MELLITUS WITH MICROALBUMINURIA, WITH LONG-TERM CURRENT USE OF INSULIN (HCC): ICD-10-CM

## 2025-04-26 DIAGNOSIS — E66.01 CLASS 2 SEVERE OBESITY DUE TO EXCESS CALORIES WITH SERIOUS COMORBIDITY AND BODY MASS INDEX (BMI) OF 35.0 TO 35.9 IN ADULT (HCC): ICD-10-CM

## 2025-04-26 DIAGNOSIS — R80.9 TYPE 2 DIABETES MELLITUS WITH MICROALBUMINURIA, WITH LONG-TERM CURRENT USE OF INSULIN (HCC): ICD-10-CM

## 2025-04-28 DIAGNOSIS — E78.2 MIXED HYPERLIPIDEMIA: ICD-10-CM

## 2025-04-28 NOTE — TELEPHONE ENCOUNTER
Pharmacy called to check the status of the refill. Pharmacy made aware that refill request was received and is waiting for approval.     Patient is out of the medication.

## 2025-04-29 RX ORDER — ATORVASTATIN CALCIUM 20 MG/1
20 TABLET, FILM COATED ORAL DAILY
Qty: 90 TABLET | Refills: 0 | OUTPATIENT
Start: 2025-04-29

## 2025-05-01 DIAGNOSIS — I10 BENIGN ESSENTIAL HYPERTENSION: ICD-10-CM

## 2025-05-01 RX ORDER — OLMESARTAN MEDOXOMIL 20 MG/1
40 TABLET ORAL DAILY
Qty: 180 TABLET | Refills: 1 | Status: SHIPPED | OUTPATIENT
Start: 2025-05-01

## 2025-05-12 DIAGNOSIS — I12.9 TYPE 2 DIABETES MELLITUS WITH STAGE 3A CHRONIC KIDNEY DISEASE AND HYPERTENSION (HCC): ICD-10-CM

## 2025-05-12 DIAGNOSIS — N18.31 TYPE 2 DIABETES MELLITUS WITH STAGE 3A CHRONIC KIDNEY DISEASE AND HYPERTENSION (HCC): ICD-10-CM

## 2025-05-12 DIAGNOSIS — E11.22 TYPE 2 DIABETES MELLITUS WITH STAGE 3A CHRONIC KIDNEY DISEASE AND HYPERTENSION (HCC): ICD-10-CM

## 2025-05-12 RX ORDER — INSULIN GLARGINE 100 [IU]/ML
20 INJECTION, SOLUTION SUBCUTANEOUS
Qty: 30 ML | Refills: 0 | Status: SHIPPED | OUTPATIENT
Start: 2025-05-12

## 2025-06-25 ENCOUNTER — TELEPHONE (OUTPATIENT)
Dept: RHEUMATOLOGY | Facility: CLINIC | Age: 65
End: 2025-06-25

## 2025-07-08 DIAGNOSIS — E11.49 DIABETES MELLITUS TYPE 2 WITH NEUROLOGICAL MANIFESTATIONS (HCC): ICD-10-CM

## 2025-07-08 DIAGNOSIS — E08.65 DIABETES MELLITUS DUE TO UNDERLYING CONDITION WITH HYPERGLYCEMIA, WITH LONG-TERM CURRENT USE OF INSULIN (HCC): ICD-10-CM

## 2025-07-08 DIAGNOSIS — Z79.4 DIABETES MELLITUS DUE TO UNDERLYING CONDITION WITH HYPERGLYCEMIA, WITH LONG-TERM CURRENT USE OF INSULIN (HCC): ICD-10-CM

## 2025-07-10 RX ORDER — PEN NEEDLE, DIABETIC 31 GX5/16"
NEEDLE, DISPOSABLE MISCELLANEOUS
Qty: 90 EACH | Refills: 1 | Status: SHIPPED | OUTPATIENT
Start: 2025-07-10

## 2025-07-26 DIAGNOSIS — N18.31 TYPE 2 DIABETES MELLITUS WITH STAGE 3A CHRONIC KIDNEY DISEASE AND HYPERTENSION (HCC): ICD-10-CM

## 2025-07-26 DIAGNOSIS — E11.22 TYPE 2 DIABETES MELLITUS WITH STAGE 3A CHRONIC KIDNEY DISEASE AND HYPERTENSION (HCC): ICD-10-CM

## 2025-07-26 DIAGNOSIS — I12.9 TYPE 2 DIABETES MELLITUS WITH STAGE 3A CHRONIC KIDNEY DISEASE AND HYPERTENSION (HCC): ICD-10-CM

## 2025-07-28 DIAGNOSIS — Z29.89 SBE (SUBACUTE BACTERIAL ENDOCARDITIS) PROPHYLAXIS CANDIDATE: Primary | ICD-10-CM

## 2025-07-28 RX ORDER — AMOXICILLIN 500 MG/1
CAPSULE ORAL
Qty: 4 CAPSULE | Refills: 0 | Status: SHIPPED | OUTPATIENT
Start: 2025-07-28 | End: 2025-08-01

## 2025-07-29 RX ORDER — ACYCLOVIR 800 MG/1
TABLET ORAL
Qty: 6 EACH | Refills: 1 | Status: SHIPPED | OUTPATIENT
Start: 2025-07-29

## (undated) DEVICE — TRAVELKIT CONTAINS FIRST STEP KIT (200ML EP-4 KIT) AND SOILED SCOPE BAG - 1 KIT: Brand: TRAVELKIT CONTAINS FIRST STEP KIT AND SOILED SCOPE BAG

## (undated) DEVICE — MEDI-VAC YANKAUER SUCTION HANDLE: Brand: CARDINAL HEALTH

## (undated) DEVICE — DRESSING MEPILEX AG BORDER 4 X 12 IN

## (undated) DEVICE — GLOVE INDICATOR PI UNDERGLOVE SZ 7 BLUE

## (undated) DEVICE — TOWEL SET X-RAY

## (undated) DEVICE — VELYS ROBOT-ASSISTED SOLUTION ARRAY DRILL PIN 175 MM X 4 MM: Brand: VELYS

## (undated) DEVICE — COBAN 2 IN UNSTERILE

## (undated) DEVICE — ADHESIVE SKIN CLSR DERMABOND NX

## (undated) DEVICE — BAG SPECIMEN BIOHAZARD 10 X 10 ADHESIVE

## (undated) DEVICE — CURITY STRETCH BANDAGE: Brand: CURITY

## (undated) DEVICE — PADDING CAST 4 IN  COTTON STRL

## (undated) DEVICE — GLOVE PI ULTRA TOUCH SZ.8.5

## (undated) DEVICE — SUT STRATAFIX SPIRAL 3-0 PGA/PCL 30 X 30 CM SXMD2B410

## (undated) DEVICE — STERILE LATEX POWDER-FREE SURGICAL GLOVESWITH NITRILE COATING: Brand: PROTEXIS

## (undated) DEVICE — VELYS ROBOT-ASSISTED SOLUTION ARRAY DRILL PIN 125 MM X 4 MM: Brand: VELYS

## (undated) DEVICE — TIBURON HAND DRAPE: Brand: CONVERTORS

## (undated) DEVICE — LUBRICANT SURGILUBE TUBE 4 OZ  FLIP TOP

## (undated) DEVICE — TUBING BUBBLE CLEAR 5MM X 100 FT NS

## (undated) DEVICE — BANDAGE, ESMARK LF STR 4"X9'(20/CS): Brand: CYPRESS

## (undated) DEVICE — GLOVE INDICATOR PI UNDERGLOVE SZ 8.5 BLUE

## (undated) DEVICE — INTENDED FOR TISSUE SEPARATION, AND OTHER PROCEDURES THAT REQUIRE A SHARP SURGICAL BLADE TO PUNCTURE OR CUT.: Brand: BARD-PARKER ® CARBON RIB-BACK BLADES

## (undated) DEVICE — 3M™ STERI-DRAPE™ U-DRAPE 1015: Brand: STERI-DRAPE™

## (undated) DEVICE — GLOVE PI ULTRA TOUCH SZ.8.0

## (undated) DEVICE — TRAY FOLEY 16FR URIMETER SILICONE SURESTEP

## (undated) DEVICE — CAST PADDING 4 IN SYNTHETIC NON-STRL

## (undated) DEVICE — 450 ML BOTTLE OF 0.05% CHLORHEXIDINE GLUCONATE IN 99.95% STERILE WATER FOR IRRIGATION, USP AND APPLICATOR.: Brand: IRRISEPT ANTIMICROBIAL WOUND LAVAGE

## (undated) DEVICE — ANTIBACTERIAL UNDYED BRAIDED (POLYGLACTIN 910), SYNTHETIC ABSORBABLE SUTURE: Brand: COATED VICRYL

## (undated) DEVICE — SINGLE-USE BIOPSY FORCEPS: Brand: RADIAL JAW 4

## (undated) DEVICE — MARKER SPOT EX  BOWEL TATTOO SYRINGE

## (undated) DEVICE — VEST SURGEON DISPOSABLE

## (undated) DEVICE — VELYS ROBOT DRAPE

## (undated) DEVICE — 3M™ IOBAN™ 2 ANTIMICROBIAL INCISE DRAPE 6650EZ: Brand: IOBAN™ 2

## (undated) DEVICE — VELYS ROBOT-ASSISTED SOLUTION ARRAY DRILL PIN 100 MM X 4 MM: Brand: VELYS

## (undated) DEVICE — HANDPIECE SET WITH HIGH FLOW TIP AND SUCTION TUBE: Brand: INTERPULSE

## (undated) DEVICE — DISPOSABLE EQUIPMENT COVER: Brand: SMALL TOWEL DRAPE

## (undated) DEVICE — SOLIDIFIER FLUID WASTE CONTROL 1500ML

## (undated) DEVICE — VELYS ROBOTIC-ASSISTED SOLUTION ARRAY SET - KNEE: Brand: VELYS

## (undated) DEVICE — ORTHOPEDIC PACK: Brand: CARDINAL HEALTH

## (undated) DEVICE — BANDAGE, ESMARK LF STR 6"X9' (20/CS): Brand: CYPRESS

## (undated) DEVICE — CUFF TOURNIQUET 34 X 4 IN QUICK CONNECT DISP 1BLA

## (undated) DEVICE — TRAP POLY

## (undated) DEVICE — SUT STRATAFIX SPIRAL 1-0  CT-1 30 X 30CM SXPD2B403

## (undated) DEVICE — OCCLUSIVE GAUZE STRIP,3% BISMUTH TRIBROMOPHENATE IN PETROLATUM BLEND: Brand: XEROFORM

## (undated) DEVICE — GLOVE PI ULTRA TOUCH SZ.6.5

## (undated) DEVICE — SKIN MARKER DUAL TIP WITH RULER CAP, FLEXIBLE RULER AND LABELS: Brand: DEVON

## (undated) DEVICE — BRUSH ENDO CLEANING DBL-HEADER

## (undated) DEVICE — CAPIT KNEE ATTUNE FB CEMENT - DEPUY

## (undated) DEVICE — SUT VICRYL 0 CP-1 27 IN J267H

## (undated) DEVICE — DISPOSABLE BIOPSY VALVE MAJ-1555: Brand: SINGLE USE BIOPSY VALVE (STERILE)

## (undated) DEVICE — SPINNING CEMENT MIXING BOWL

## (undated) DEVICE — FORCEP ELECSURG RADIAL JAW4 2.2 X 240CM  HOT BX

## (undated) DEVICE — BASIC DOUBLE BASIN 2-LF: Brand: MEDLINE INDUSTRIES, INC.

## (undated) DEVICE — CHLORAPREP HI-LITE 26ML ORANGE

## (undated) DEVICE — GROUNDING PAD UNIVERSAL SLW

## (undated) DEVICE — PACK GENERAL LF

## (undated) DEVICE — TUBING AUX CHANNEL

## (undated) DEVICE — VELYS BLADE SAW OSC 85 X 19 X 2MM

## (undated) DEVICE — AIRLIFE™  ADULT CUSHION NASAL CANNULA WITH 7 FOOT (2.1 M) CRUSH-RESISTANT OXYGEN TUBING, AND U/CONNECT-IT ADAPTER: Brand: AIRLIFE™

## (undated) DEVICE — HOOD: Brand: FLYTE, SURGICOOL

## (undated) DEVICE — DUAL CUT SAGITTAL BLADE

## (undated) DEVICE — TIBURON EXTREMITY SHEET: Brand: CONVERTORS

## (undated) DEVICE — INSTRUMENT POUCH: Brand: CONVERTORS

## (undated) DEVICE — GLOVE EXAM NON-STRL NTRL PLUS LRG PF

## (undated) DEVICE — SPONGE GAUZE 4 X 9

## (undated) DEVICE — ACE WRAP 4 IN STERILE

## (undated) DEVICE — DRAPE SHEET X-LG

## (undated) DEVICE — ASTOUND SURGICAL GOWN, XXX LARGE, X-LONG: Brand: CONVERTORS

## (undated) DEVICE — DRAPE SHEET THREE QUARTER

## (undated) DEVICE — GAUZE SPONGES,16 PLY: Brand: CURITY

## (undated) DEVICE — LINER FOOT PAD STERILE DISPOSABLE

## (undated) DEVICE — ASTOUND STANDARD SURGICAL GOWN, XL: Brand: CONVERTORS

## (undated) DEVICE — GLOVE INDICATOR PI UNDERGLOVE SZ 8 BLUE

## (undated) DEVICE — 1200CC GUARDIAN II: Brand: GUARDIAN

## (undated) DEVICE — PREP SURGICAL PURPREP 26ML

## (undated) DEVICE — SUT ETHILON 4-0 P-3 18 IN 691G

## (undated) DEVICE — GLOVE INDICATOR PI UNDERGLOVE SZ 7.5 BLUE

## (undated) DEVICE — Device: Brand: OLYMPUS

## (undated) DEVICE — VELYS SATEL DRAPE